# Patient Record
Sex: MALE | Race: WHITE | NOT HISPANIC OR LATINO
[De-identification: names, ages, dates, MRNs, and addresses within clinical notes are randomized per-mention and may not be internally consistent; named-entity substitution may affect disease eponyms.]

---

## 2015-06-03 RX ORDER — WARFARIN SODIUM 2.5 MG/1
1 TABLET ORAL
Qty: 0 | Refills: 0 | DISCHARGE
Start: 2015-06-03

## 2015-06-03 RX ORDER — METOPROLOL TARTRATE 50 MG
1 TABLET ORAL
Qty: 0 | Refills: 0 | DISCHARGE
Start: 2015-06-03

## 2019-12-10 ENCOUNTER — APPOINTMENT (OUTPATIENT)
Dept: CARDIOTHORACIC SURGERY | Facility: CLINIC | Age: 84
End: 2019-12-10
Payer: MEDICARE

## 2019-12-10 ENCOUNTER — NON-APPOINTMENT (OUTPATIENT)
Age: 84
End: 2019-12-10

## 2019-12-10 ENCOUNTER — APPOINTMENT (OUTPATIENT)
Dept: CARDIOTHORACIC SURGERY | Facility: CLINIC | Age: 84
End: 2019-12-10

## 2019-12-10 ENCOUNTER — OUTPATIENT (OUTPATIENT)
Dept: OUTPATIENT SERVICES | Facility: HOSPITAL | Age: 84
LOS: 1 days | End: 2019-12-10

## 2019-12-10 VITALS
OXYGEN SATURATION: 98 % | HEART RATE: 86 BPM | SYSTOLIC BLOOD PRESSURE: 112 MMHG | HEIGHT: 65 IN | DIASTOLIC BLOOD PRESSURE: 73 MMHG | WEIGHT: 129 LBS | BODY MASS INDEX: 21.49 KG/M2 | TEMPERATURE: 98.1 F | RESPIRATION RATE: 15 BRPM

## 2019-12-10 DIAGNOSIS — I35.9 NONRHEUMATIC AORTIC VALVE DISORDER, UNSPECIFIED: ICD-10-CM

## 2019-12-10 DIAGNOSIS — Z98.61 ATHEROSCLEROTIC HEART DISEASE OF NATIVE CORONARY ARTERY W/OUT ANGINA PECTORIS: ICD-10-CM

## 2019-12-10 DIAGNOSIS — I25.10 ATHEROSCLEROTIC HEART DISEASE OF NATIVE CORONARY ARTERY W/OUT ANGINA PECTORIS: ICD-10-CM

## 2019-12-10 DIAGNOSIS — I35.0 NONRHEUMATIC AORTIC (VALVE) STENOSIS: ICD-10-CM

## 2019-12-10 DIAGNOSIS — I50.20 UNSPECIFIED SYSTOLIC (CONGESTIVE) HEART FAILURE: ICD-10-CM

## 2019-12-10 DIAGNOSIS — I25.10 ATHEROSCLEROTIC HEART DISEASE OF NATIVE CORONARY ARTERY WITHOUT ANGINA PECTORIS: ICD-10-CM

## 2019-12-10 DIAGNOSIS — I48.91 UNSPECIFIED ATRIAL FIBRILLATION: ICD-10-CM

## 2019-12-10 LAB
ALBUMIN SERPL ELPH-MCNC: 3.9 G/DL
ALP BLD-CCNC: 73 U/L
ALT SERPL-CCNC: 11 U/L
ANION GAP SERPL CALC-SCNC: 14 MMOL/L
AST SERPL-CCNC: 15 U/L
BILIRUB SERPL-MCNC: 2.2 MG/DL
BUN SERPL-MCNC: 50 MG/DL
CALCIUM SERPL-MCNC: 9.1 MG/DL
CHLORIDE SERPL-SCNC: 105 MMOL/L
CO2 SERPL-SCNC: 22 MMOL/L
CREAT SERPL-MCNC: 1.38 MG/DL
GLUCOSE SERPL-MCNC: 124 MG/DL
INR PPP: 1.74 RATIO
POTASSIUM SERPL-SCNC: 4.5 MMOL/L
PROT SERPL-MCNC: 6.8 G/DL
PT BLD: 20.4 SEC
SODIUM SERPL-SCNC: 141 MMOL/L

## 2019-12-10 PROCEDURE — 99205 OFFICE O/P NEW HI 60 MIN: CPT | Mod: PD

## 2019-12-10 PROCEDURE — 93306 TTE W/DOPPLER COMPLETE: CPT | Mod: 26

## 2019-12-10 PROCEDURE — 93000 ELECTROCARDIOGRAM COMPLETE: CPT | Mod: PD

## 2019-12-10 RX ORDER — GUAIFENESIN 600 MG/1
600 TABLET, EXTENDED RELEASE ORAL DAILY
Refills: 0 | Status: ACTIVE | COMMUNITY
Start: 2019-12-10

## 2019-12-10 RX ORDER — TORSEMIDE 20 MG/1
20 TABLET ORAL
Qty: 360 | Refills: 0 | Status: ACTIVE | COMMUNITY
Start: 2019-08-11

## 2019-12-10 RX ORDER — WARFARIN 5 MG/1
5 TABLET ORAL
Qty: 180 | Refills: 0 | Status: COMPLETED | COMMUNITY
Start: 2019-11-12 | End: 2019-12-10

## 2019-12-10 RX ORDER — WARFARIN SODIUM 7.5 MG/1
7.5 TABLET ORAL
Refills: 0 | Status: ACTIVE | COMMUNITY
Start: 2019-12-10

## 2019-12-10 RX ORDER — FOLIC ACID 1 MG/1
1 TABLET ORAL
Qty: 90 | Refills: 0 | Status: ACTIVE | COMMUNITY
Start: 2019-10-23

## 2019-12-10 RX ORDER — METOPROLOL SUCCINATE 25 MG/1
25 TABLET, EXTENDED RELEASE ORAL DAILY
Refills: 0 | Status: ACTIVE | COMMUNITY
Start: 2019-11-09

## 2019-12-10 RX ORDER — MECLIZINE HYDROCHLORIDE 12.5 MG/1
12.5 TABLET ORAL 3 TIMES DAILY
Qty: 30 | Refills: 3 | Status: ACTIVE | COMMUNITY
Start: 2019-12-10

## 2019-12-10 RX ORDER — FLUTICASONE PROPIONATE 50 UG/1
50 SPRAY, METERED NASAL
Qty: 48 | Refills: 0 | Status: ACTIVE | COMMUNITY
Start: 2019-09-13

## 2019-12-10 RX ORDER — AZELASTINE HYDROCHLORIDE 137 UG/1
0.1 SPRAY, METERED NASAL
Qty: 30 | Refills: 0 | Status: COMPLETED | COMMUNITY
Start: 2019-09-13 | End: 2019-12-10

## 2019-12-10 RX ORDER — BLOOD SUGAR DIAGNOSTIC
STRIP MISCELLANEOUS
Qty: 50 | Refills: 0 | Status: DISCONTINUED | COMMUNITY
Start: 2019-10-02

## 2019-12-10 RX ORDER — PREDNISONE 10 MG/1
10 TABLET ORAL
Qty: 10 | Refills: 0 | Status: COMPLETED | COMMUNITY
Start: 2019-09-13 | End: 2019-12-10

## 2019-12-10 RX ORDER — ROSUVASTATIN CALCIUM 10 MG/1
10 TABLET, FILM COATED ORAL
Qty: 90 | Refills: 0 | Status: COMPLETED | COMMUNITY
Start: 2019-12-07 | End: 2019-12-10

## 2019-12-10 NOTE — ASSESSMENT
[FreeTextEntry1] : Mr. Morales is an 84 year old male with past medical history which includes Anemia, AFib on Coumadin, T2DM, HLD, PVD, Menieres Disease, LBBB, and CAD s/p PCI to mLAD in June 2014, and to pRCA in May 2015. He reports 1-2 months of worsening dyspnea with minimal exertion, associated with palpitations. He reports increased fatigue with decreased activity tolerance and is now mainly sedentary.  He denies angina, PND, orthopnea, dizziness, syncope, or LE edema. He was referred to valve clinic by Dr. Das for consideration for PAYTON. \par \par I had the pleasure of evaluating your patient,Mr. JESUS MORALES who is an 84 year M with heart failure symptoms of fatigue and dyspnea on exertion  (NYHA class II).\par \par  Mr. JESUS MORALES has severe AS with pG 48 mmHg and mG 29 mmHg, AoV 3.5 m/sec and an KATHERYN of 0.8 cm2.  He is an intermediate risk for surgical aortic valve replacement with a calculated STS score of 5.2%. EF 28%. The risks and benefits of both SAVR and PAYTON have been explained and the patient would like to proceed with further workup and consideration for PAYTON by the structural heart team.  I explained the risks of vascular complication, pacemaker requirement and possible paravalvular leakage. He will require CT scan and catheterization before finalizing plans for the procedure. The patient was also made aware of the possibility of using conscious sedation or general anesthesia during the procedure.  Once completed, all imaging will be reviewed by the Heart Team and an optimal treatment strategy will be recommended.\par \par Plan:\par 1) Lab work scheduled for today CBC, CMP and Pro BNP\par 2) Structural CT scan without coronaries \par 3) Cardiac Catherization to evaluate coronary anatomy \par \par

## 2019-12-10 NOTE — DATA REVIEWED
[FreeTextEntry1] : Echocardiogram on 12/5/2019 demonstrated \par KATHERYN 0.7, mGr 29, pGr 48, AoV 3.5, \par \par Echocardiogram on 6/27/2019 demonstrated\par  EF 42%, KATHERYN 0.9, mGr 18, pGr 32, AoV 2.8 LVEF 28%.

## 2019-12-10 NOTE — REVIEW OF SYSTEMS
[Feeling Tired] : feeling tired [Lower Ext Edema] : lower extremity edema [Loss Of Hearing] : hearing loss [SOB on Exertion] : shortness of breath during exertion [Constipation] : constipation [Shortness Of Breath] : shortness of breath [Joint Pain] : joint pain [Negative] : Endocrine [Dizziness] : no dizziness [Chest Pain] : no chest pain

## 2019-12-10 NOTE — HISTORY OF PRESENT ILLNESS
[FreeTextEntry1] : Mr. Latham is an 84 year old male, referred by Dr. Das for evaluation of aortic stenosis with recent symptomatic progression and LVEF decline.  Roel lost his wife two months ago, and notes worsening dyspnea with minimal exertion; he also has angina and palpitations.  He has a decline in functional status that he has attributed to both symptoms and depression. He denies PND, orthopnea, dizziness, or syncope. He does have some LE edema that is improved with Torsemide.  His past medical history includes chronic anemia, AFib on Coumadin, T2DM, HLD, PVD,  Menieres Disease, LBBB, and CAD (s/p PCI mLAD in June 2014 and pRCA PCI, complicated by perforation and cardiogenic shock in May 2015).  He has a reduced ejection fraction, reported at 42% on TTE in June of 2019, now reported at 28% on Echo 12/5.  He reports that his left carotid is "100% blocked but not affecting me".\par \par Repeat TTE in our office today demonstrates an LVEF of 30%, an KATHERYN of 0.6, an AoV of 3.8m/s, and a DVI of 0.16.  He has 2 months of progressive dyspnea.  He describes his appetite as "good".  He reports "I have a hemoglobin problem" and was transfused in 2015 (not since per his report).  He reports no constipation but no melena.  He has substernal "burning" when "I lose my breath".  He has no syncopal symptoms.  He feels fatigued.  There have been no recent changes to his medications.  He takes Torsemide (20mg 4 days a week and 40mg 3 days a week).

## 2019-12-10 NOTE — PHYSICAL EXAM
[Eyelids - No Xanthelasma] : the eyelids demonstrated no xanthelasmas [No Oral Cyanosis] : no oral cyanosis [No Oral Pallor] : no oral pallor [Normal Rate] : normal [Irregularly Irregular] : irregularly irregular [III] : a grade 3 [___ +] : bilateral [unfilled]U+ pretibial pitting edema [Abdomen Soft] : soft [Bowel Sounds] : normal bowel sounds [Abdomen Tenderness] : non-tender [Cyanosis, Localized] : no localized cyanosis [Nail Clubbing] : no clubbing of the fingernails [FreeTextEntry1] : depressed and flat affect

## 2019-12-10 NOTE — DISCUSSION/SUMMARY
[Severe Aortic Stenosis] : severe aortic stenosis [Multidetector Cardiac CT] : multidetector cardiac CT [Deteriorating] : deteriorating [Coronary Angiography] : coronary angiography [Cardiac Catheterization] : cardiac catheterization [Aortic Valve Replacement] : aortic valve replacement [Medication Changes Per Orders] : Medication changes are as documented in orders [Acute Systolic Heart Failure] : acute systolic congestive heart failure [Patient] : the patient [Decompensated] : decompensated [FreeTextEntry1] : Mr Latham has severe aortic stenosis with acute on chronic decompensated systolic heart failure--I have recommended hospital admission today, directly from the office, for the treatment of his volume-excess heart failure (by exam both right and left sided).  He declines admission at this time, stating "I am not prepared for that".  I am therefore sending him for labs today, advised that he increase his Torsemide to 40mg daily, and schedule his right and left cardiac catheterization ASAP (if his numbers are poor, perhaps he will agree to admission for escalation of therapy post- catheterization).  He will ultimately require a cardiac CT to complete his evaluation, after which the optimal TAVR strategy will be recommended by the Heart Team.  I would have him seen by our CHF colleagues when in the hospital; Cardiomems (and possibly CRT-D) may also be reasonable considerations.  I have discussed my findings and plan with Dr Das.

## 2019-12-10 NOTE — CONSULT LETTER
[Dear  ___] : Dear ~TSERING, [Courtesy Letter:] : I had the pleasure of seeing your patient, [unfilled], in my office today. [Please see my note below.] : Please see my note below. [Consult Closing:] : Thank you very much for allowing me to participate in the care of this patient.  If you have any questions, please do not hesitate to contact me. [Sincerely,] : Sincerely, [FreeTextEntry2] : Donnie Das MD \par [address], [facility] [address]\[place] [address] [FreeTextEntry3] : Jens Johns MD\par \par Cardiovascular & Thoracic Surgery\par Professor\par [facility]\par \par

## 2019-12-10 NOTE — REVIEW OF SYSTEMS
[Feeling Fatigued] : feeling fatigued [Loss Of Hearing] : hearing loss [Dyspnea on exertion] : dyspnea during exertion [Chest  Pressure] : chest pressure [Lower Ext Edema] : lower extremity edema [Palpitations] : palpitations [Joint Pain] : joint pain [Depression] : depression [Dizziness] : dizziness [Under Stress] : under stress [Negative] : Heme/Lymph [Abdominal Pain] : no abdominal pain [Change in Appetite] : no change in appetite [Nausea] : no nausea

## 2019-12-10 NOTE — PHYSICAL EXAM
[General Appearance - In No Acute Distress] : in no acute distress [General Appearance - Alert] : alert [PERRL With Normal Accommodation] : pupils were equal in size, round, and reactive to light [Sclera] : the sclera and conjunctiva were normal [Jugular Venous Distention Increased] : there was no jugular-venous distention [Extraocular Movements] : extraocular movements were intact [] : no respiratory distress [Bibasilar Rales/Crackles] : bibasilar rales [III] : a grade 3 [Right Carotid Bruit] : right carotid bruit heard [Left Carotid Bruit] : left carotid bruit heard [Examination Of The Chest] : the chest was normal in appearance [Breast Appearance] : normal in appearance [Bowel Sounds] : normal bowel sounds [Abdomen Soft] : soft [Cervical Lymph Nodes Enlarged Posterior Bilaterally] : posterior cervical [No CVA Tenderness] : no ~M costovertebral angle tenderness [Involuntary Movements] : no involuntary movements were seen [Skin Color & Pigmentation] : normal skin color and pigmentation [No Focal Deficits] : no focal deficits [Oriented To Time, Place, And Person] : oriented to person, place, and time

## 2019-12-10 NOTE — HISTORY OF PRESENT ILLNESS
[FreeTextEntry1] : Mr. Latham is an 84 year old male with past medical history which includes Anemia, AFib on Coumadin, T2DM, HLD, PVD, Menieres Disease, LBBB, and CAD s/p PCI to mLAD in June 2014, and to pRCA in May 2015. He reports 1-2 months of worsening dyspnea with minimal exertion, associated with palpitations. He reports increased fatigue with decreased activity tolerance and is now mainly sedentary.  He denies angina, PND, orthopnea, dizziness, syncope, or LE edema. He was referred to valve clinic by Dr. Das for consideration for PAYTON. He lives alone with 24 hour aide.  \par \par

## 2019-12-13 ENCOUNTER — INPATIENT (INPATIENT)
Facility: HOSPITAL | Age: 84
LOS: 20 days | Discharge: NURSING, REHAB OR LONG-TERM CARE | DRG: 319 | End: 2020-01-03
Attending: INTERNAL MEDICINE | Admitting: INTERNAL MEDICINE
Payer: MEDICARE

## 2019-12-13 VITALS
HEIGHT: 65 IN | DIASTOLIC BLOOD PRESSURE: 59 MMHG | SYSTOLIC BLOOD PRESSURE: 122 MMHG | WEIGHT: 128.97 LBS | TEMPERATURE: 98 F | RESPIRATION RATE: 16 BRPM | HEART RATE: 87 BPM | OXYGEN SATURATION: 100 %

## 2019-12-13 DIAGNOSIS — E11.9 TYPE 2 DIABETES MELLITUS WITHOUT COMPLICATIONS: ICD-10-CM

## 2019-12-13 DIAGNOSIS — I48.91 UNSPECIFIED ATRIAL FIBRILLATION: ICD-10-CM

## 2019-12-13 DIAGNOSIS — I35.0 NONRHEUMATIC AORTIC (VALVE) STENOSIS: ICD-10-CM

## 2019-12-13 DIAGNOSIS — I50.23 ACUTE ON CHRONIC SYSTOLIC (CONGESTIVE) HEART FAILURE: ICD-10-CM

## 2019-12-13 DIAGNOSIS — N17.9 ACUTE KIDNEY FAILURE, UNSPECIFIED: ICD-10-CM

## 2019-12-13 DIAGNOSIS — Z02.9 ENCOUNTER FOR ADMINISTRATIVE EXAMINATIONS, UNSPECIFIED: ICD-10-CM

## 2019-12-13 DIAGNOSIS — D64.9 ANEMIA, UNSPECIFIED: ICD-10-CM

## 2019-12-13 DIAGNOSIS — E78.5 HYPERLIPIDEMIA, UNSPECIFIED: ICD-10-CM

## 2019-12-13 DIAGNOSIS — I35.9 NONRHEUMATIC AORTIC VALVE DISORDER, UNSPECIFIED: ICD-10-CM

## 2019-12-13 DIAGNOSIS — Z29.9 ENCOUNTER FOR PROPHYLACTIC MEASURES, UNSPECIFIED: ICD-10-CM

## 2019-12-13 DIAGNOSIS — I44.7 LEFT BUNDLE-BRANCH BLOCK, UNSPECIFIED: ICD-10-CM

## 2019-12-13 DIAGNOSIS — H81.09 MENIERE'S DISEASE, UNSPECIFIED EAR: ICD-10-CM

## 2019-12-13 LAB
ALBUMIN SERPL ELPH-MCNC: 3.7 G/DL — SIGNIFICANT CHANGE UP (ref 3.3–5)
ALP SERPL-CCNC: 68 U/L — SIGNIFICANT CHANGE UP (ref 40–120)
ALT FLD-CCNC: 9 U/L — LOW (ref 10–45)
ANION GAP SERPL CALC-SCNC: 12 MMOL/L — SIGNIFICANT CHANGE UP (ref 5–17)
APTT BLD: 31.5 SEC — SIGNIFICANT CHANGE UP (ref 27.5–36.3)
AST SERPL-CCNC: 10 U/L — SIGNIFICANT CHANGE UP (ref 10–40)
BILIRUB SERPL-MCNC: 2.2 MG/DL — HIGH (ref 0.2–1.2)
BLD GP AB SCN SERPL QL: NEGATIVE — SIGNIFICANT CHANGE UP
BUN SERPL-MCNC: 46 MG/DL — HIGH (ref 7–23)
CALCIUM SERPL-MCNC: 8.8 MG/DL — SIGNIFICANT CHANGE UP (ref 8.4–10.5)
CHLORIDE SERPL-SCNC: 105 MMOL/L — SIGNIFICANT CHANGE UP (ref 96–108)
CO2 SERPL-SCNC: 23 MMOL/L — SIGNIFICANT CHANGE UP (ref 22–31)
CREAT SERPL-MCNC: 1.39 MG/DL — HIGH (ref 0.5–1.3)
GLUCOSE BLDC GLUCOMTR-MCNC: 199 MG/DL — HIGH (ref 70–99)
GLUCOSE SERPL-MCNC: 145 MG/DL — HIGH (ref 70–99)
HCT VFR BLD CALC: 22.6 % — LOW (ref 39–50)
HCT VFR BLD CALC: 23.9 % — LOW (ref 39–50)
HGB BLD-MCNC: 7.4 G/DL — LOW (ref 13–17)
HGB BLD-MCNC: 7.9 G/DL — LOW (ref 13–17)
INR BLD: 1.49 RATIO — HIGH (ref 0.88–1.16)
MCHC RBC-ENTMCNC: 30.8 PG — SIGNIFICANT CHANGE UP (ref 27–34)
MCHC RBC-ENTMCNC: 31.3 PG — SIGNIFICANT CHANGE UP (ref 27–34)
MCHC RBC-ENTMCNC: 32.7 GM/DL — SIGNIFICANT CHANGE UP (ref 32–36)
MCHC RBC-ENTMCNC: 33.1 GM/DL — SIGNIFICANT CHANGE UP (ref 32–36)
MCV RBC AUTO: 94.2 FL — SIGNIFICANT CHANGE UP (ref 80–100)
MCV RBC AUTO: 94.8 FL — SIGNIFICANT CHANGE UP (ref 80–100)
NRBC # BLD: 0 /100 WBCS — SIGNIFICANT CHANGE UP (ref 0–0)
NRBC # BLD: 0 /100 WBCS — SIGNIFICANT CHANGE UP (ref 0–0)
NT-PROBNP SERPL-SCNC: HIGH PG/ML (ref 0–300)
PLATELET # BLD AUTO: 183 K/UL — SIGNIFICANT CHANGE UP (ref 150–400)
PLATELET # BLD AUTO: 201 K/UL — SIGNIFICANT CHANGE UP (ref 150–400)
POTASSIUM SERPL-MCNC: 3.8 MMOL/L — SIGNIFICANT CHANGE UP (ref 3.5–5.3)
POTASSIUM SERPL-SCNC: 3.8 MMOL/L — SIGNIFICANT CHANGE UP (ref 3.5–5.3)
PROT SERPL-MCNC: 7.4 G/DL — SIGNIFICANT CHANGE UP (ref 6–8.3)
PROTHROM AB SERPL-ACNC: 17.4 SEC — HIGH (ref 10–12.9)
RBC # BLD: 2.4 M/UL — LOW (ref 4.2–5.8)
RBC # BLD: 2.52 M/UL — LOW (ref 4.2–5.8)
RBC # FLD: 18.2 % — HIGH (ref 10.3–14.5)
RBC # FLD: 18.4 % — HIGH (ref 10.3–14.5)
RH IG SCN BLD-IMP: POSITIVE — SIGNIFICANT CHANGE UP
SODIUM SERPL-SCNC: 140 MMOL/L — SIGNIFICANT CHANGE UP (ref 135–145)
WBC # BLD: 8.05 K/UL — SIGNIFICANT CHANGE UP (ref 3.8–10.5)
WBC # BLD: 9.23 K/UL — SIGNIFICANT CHANGE UP (ref 3.8–10.5)
WBC # FLD AUTO: 8.05 K/UL — SIGNIFICANT CHANGE UP (ref 3.8–10.5)
WBC # FLD AUTO: 9.23 K/UL — SIGNIFICANT CHANGE UP (ref 3.8–10.5)

## 2019-12-13 PROCEDURE — 93456 R HRT CORONARY ARTERY ANGIO: CPT | Mod: 26

## 2019-12-13 PROCEDURE — 99223 1ST HOSP IP/OBS HIGH 75: CPT | Mod: GC,AI

## 2019-12-13 PROCEDURE — 99223 1ST HOSP IP/OBS HIGH 75: CPT | Mod: GC

## 2019-12-13 PROCEDURE — 71045 X-RAY EXAM CHEST 1 VIEW: CPT | Mod: 26

## 2019-12-13 PROCEDURE — 99152 MOD SED SAME PHYS/QHP 5/>YRS: CPT

## 2019-12-13 PROCEDURE — 93010 ELECTROCARDIOGRAM REPORT: CPT

## 2019-12-13 RX ORDER — HEPARIN SODIUM 5000 [USP'U]/ML
2000 INJECTION INTRAVENOUS; SUBCUTANEOUS EVERY 6 HOURS
Refills: 0 | Status: DISCONTINUED | OUTPATIENT
Start: 2019-12-13 | End: 2019-12-26

## 2019-12-13 RX ORDER — FERROUS SULFATE 325(65) MG
325 TABLET ORAL DAILY
Refills: 0 | Status: DISCONTINUED | OUTPATIENT
Start: 2019-12-13 | End: 2019-12-26

## 2019-12-13 RX ORDER — SIMVASTATIN 20 MG/1
1 TABLET, FILM COATED ORAL
Qty: 0 | Refills: 0 | DISCHARGE

## 2019-12-13 RX ORDER — HEPARIN SODIUM 5000 [USP'U]/ML
4500 INJECTION INTRAVENOUS; SUBCUTANEOUS EVERY 6 HOURS
Refills: 0 | Status: DISCONTINUED | OUTPATIENT
Start: 2019-12-13 | End: 2019-12-26

## 2019-12-13 RX ORDER — FLUTICASONE PROPIONATE 50 MCG
1 SPRAY, SUSPENSION NASAL DAILY
Refills: 0 | Status: DISCONTINUED | OUTPATIENT
Start: 2019-12-13 | End: 2019-12-26

## 2019-12-13 RX ORDER — DEXTROSE 50 % IN WATER 50 %
25 SYRINGE (ML) INTRAVENOUS ONCE
Refills: 0 | Status: DISCONTINUED | OUTPATIENT
Start: 2019-12-13 | End: 2019-12-15

## 2019-12-13 RX ORDER — CLOPIDOGREL BISULFATE 75 MG/1
1 TABLET, FILM COATED ORAL
Qty: 0 | Refills: 0 | DISCHARGE

## 2019-12-13 RX ORDER — HEPARIN SODIUM 5000 [USP'U]/ML
4500 INJECTION INTRAVENOUS; SUBCUTANEOUS ONCE
Refills: 0 | Status: COMPLETED | OUTPATIENT
Start: 2019-12-13 | End: 2019-12-13

## 2019-12-13 RX ORDER — MECLIZINE HCL 12.5 MG
12.5 TABLET ORAL THREE TIMES A DAY
Refills: 0 | Status: DISCONTINUED | OUTPATIENT
Start: 2019-12-13 | End: 2019-12-26

## 2019-12-13 RX ORDER — ALBUTEROL 90 UG/1
2 AEROSOL, METERED ORAL EVERY 6 HOURS
Refills: 0 | Status: DISCONTINUED | OUTPATIENT
Start: 2019-12-13 | End: 2019-12-26

## 2019-12-13 RX ORDER — DIGOXIN 250 MCG
0.12 TABLET ORAL DAILY
Refills: 0 | Status: DISCONTINUED | OUTPATIENT
Start: 2019-12-13 | End: 2019-12-17

## 2019-12-13 RX ORDER — METOPROLOL TARTRATE 50 MG
25 TABLET ORAL DAILY
Refills: 0 | Status: DISCONTINUED | OUTPATIENT
Start: 2019-12-13 | End: 2019-12-26

## 2019-12-13 RX ORDER — IPRATROPIUM/ALBUTEROL SULFATE 18-103MCG
1 AEROSOL WITH ADAPTER (GRAM) INHALATION
Qty: 0 | Refills: 0 | DISCHARGE

## 2019-12-13 RX ORDER — FUROSEMIDE 40 MG
80 TABLET ORAL
Refills: 0 | Status: DISCONTINUED | OUTPATIENT
Start: 2019-12-13 | End: 2019-12-13

## 2019-12-13 RX ORDER — FUROSEMIDE 40 MG
40 TABLET ORAL
Refills: 0 | Status: DISCONTINUED | OUTPATIENT
Start: 2019-12-14 | End: 2019-12-16

## 2019-12-13 RX ORDER — TIOTROPIUM BROMIDE 18 UG/1
1 CAPSULE ORAL; RESPIRATORY (INHALATION) DAILY
Refills: 0 | Status: DISCONTINUED | OUTPATIENT
Start: 2019-12-13 | End: 2019-12-26

## 2019-12-13 RX ORDER — CLOPIDOGREL BISULFATE 75 MG/1
75 TABLET, FILM COATED ORAL DAILY
Refills: 0 | Status: DISCONTINUED | OUTPATIENT
Start: 2019-12-13 | End: 2019-12-26

## 2019-12-13 RX ORDER — SIMVASTATIN 20 MG/1
10 TABLET, FILM COATED ORAL AT BEDTIME
Refills: 0 | Status: DISCONTINUED | OUTPATIENT
Start: 2019-12-13 | End: 2019-12-14

## 2019-12-13 RX ORDER — DEXTROSE 50 % IN WATER 50 %
12.5 SYRINGE (ML) INTRAVENOUS ONCE
Refills: 0 | Status: DISCONTINUED | OUTPATIENT
Start: 2019-12-13 | End: 2019-12-15

## 2019-12-13 RX ORDER — HEPARIN SODIUM 5000 [USP'U]/ML
INJECTION INTRAVENOUS; SUBCUTANEOUS
Qty: 25000 | Refills: 0 | Status: DISCONTINUED | OUTPATIENT
Start: 2019-12-13 | End: 2019-12-26

## 2019-12-13 RX ORDER — GLUCAGON INJECTION, SOLUTION 0.5 MG/.1ML
1 INJECTION, SOLUTION SUBCUTANEOUS ONCE
Refills: 0 | Status: DISCONTINUED | OUTPATIENT
Start: 2019-12-13 | End: 2019-12-15

## 2019-12-13 RX ORDER — DEXTROSE 50 % IN WATER 50 %
15 SYRINGE (ML) INTRAVENOUS ONCE
Refills: 0 | Status: DISCONTINUED | OUTPATIENT
Start: 2019-12-13 | End: 2019-12-15

## 2019-12-13 RX ORDER — INSULIN LISPRO 100/ML
VIAL (ML) SUBCUTANEOUS AT BEDTIME
Refills: 0 | Status: DISCONTINUED | OUTPATIENT
Start: 2019-12-13 | End: 2019-12-15

## 2019-12-13 RX ORDER — FOLIC ACID 0.8 MG
1 TABLET ORAL DAILY
Refills: 0 | Status: DISCONTINUED | OUTPATIENT
Start: 2019-12-13 | End: 2019-12-26

## 2019-12-13 RX ORDER — SODIUM CHLORIDE 9 MG/ML
3 INJECTION INTRAMUSCULAR; INTRAVENOUS; SUBCUTANEOUS EVERY 8 HOURS
Refills: 0 | Status: DISCONTINUED | OUTPATIENT
Start: 2019-12-13 | End: 2019-12-26

## 2019-12-13 RX ORDER — SODIUM CHLORIDE 9 MG/ML
1000 INJECTION, SOLUTION INTRAVENOUS
Refills: 0 | Status: DISCONTINUED | OUTPATIENT
Start: 2019-12-13 | End: 2019-12-15

## 2019-12-13 RX ORDER — INSULIN LISPRO 100/ML
VIAL (ML) SUBCUTANEOUS
Refills: 0 | Status: DISCONTINUED | OUTPATIENT
Start: 2019-12-13 | End: 2019-12-15

## 2019-12-13 RX ADMIN — SODIUM CHLORIDE 3 MILLILITER(S): 9 INJECTION INTRAMUSCULAR; INTRAVENOUS; SUBCUTANEOUS at 21:07

## 2019-12-13 RX ADMIN — SIMVASTATIN 10 MILLIGRAM(S): 20 TABLET, FILM COATED ORAL at 21:07

## 2019-12-13 RX ADMIN — Medication 80 MILLIGRAM(S): at 15:55

## 2019-12-13 RX ADMIN — Medication 1: at 17:31

## 2019-12-13 RX ADMIN — SODIUM CHLORIDE 3 MILLILITER(S): 9 INJECTION INTRAMUSCULAR; INTRAVENOUS; SUBCUTANEOUS at 15:55

## 2019-12-13 RX ADMIN — Medication 325 MILLIGRAM(S): at 17:30

## 2019-12-13 RX ADMIN — HEPARIN SODIUM 4500 UNIT(S): 5000 INJECTION INTRAVENOUS; SUBCUTANEOUS at 17:31

## 2019-12-13 RX ADMIN — Medication 1 MILLIGRAM(S): at 17:31

## 2019-12-13 RX ADMIN — HEPARIN SODIUM 1100 UNIT(S)/HR: 5000 INJECTION INTRAVENOUS; SUBCUTANEOUS at 17:32

## 2019-12-13 NOTE — CONSULT NOTE ADULT - SUBJECTIVE AND OBJECTIVE BOX
Chief Complaint:    HPI:  83 yo M with hx Afib (Coumadin), DM, HLD, CAD (stent 2014 and 2015 in mLAD and RCA), HFrEF (40%) was referred by cardiologist for CHF exacerbation. Patient was seeing cardiology for TAVR         evlatuion for severe aortic stenosis. Patient endorses chest pain with palpitations. Patient has lower extremitiy edema at baseline. He takes torsemide 20mg 4x week and 40mg 3x week.  Denies PND, orthopnea, dizziness, syncope  Echo 12/5 showed LVEF 28%. Repeat echo demonstrated LVEF 30%                  Admission labs: INR 1.49 (13 Dec 2019 12:28)      PMHx:   Anemia  LBBB (left bundle branch block)  DYER (dyspnea on exertion)  Coronary disease  CHF (congestive heart failure)  Former smoker  Meniere disease  HLD (hyperlipidemia)  DM (diabetes mellitus)  Atrial fibrillation  HTN (hypertension)      PSHx:   Lipoma  History of skin graft  Status post appendectomy      Allergies:  No Known Allergies      Home Meds:    Current Medications:   ALBUTerol    90 MICROgram(s) HFA Inhaler 2 Puff(s) Inhalation every 6 hours PRN  clopidogrel Tablet 75 milliGRAM(s) Oral daily  dextrose 40% Gel 15 Gram(s) Oral once PRN  dextrose 5%. 1000 milliLiter(s) IV Continuous <Continuous>  dextrose 50% Injectable 12.5 Gram(s) IV Push once  dextrose 50% Injectable 25 Gram(s) IV Push once  dextrose 50% Injectable 25 Gram(s) IV Push once  digoxin     Tablet 0.125 milliGRAM(s) Oral daily  ferrous    sulfate 325 milliGRAM(s) Oral daily  fluticasone propionate 50 MICROgram(s)/spray Nasal Spray 1 Spray(s) Both Nostrils daily  folic acid 1 milliGRAM(s) Oral daily  glucagon  Injectable 1 milliGRAM(s) IntraMuscular once PRN  guaiFENesin  milliGRAM(s) Oral every 12 hours PRN  insulin lispro (HumaLOG) corrective regimen sliding scale   SubCutaneous three times a day before meals  insulin lispro (HumaLOG) corrective regimen sliding scale   SubCutaneous at bedtime  meclizine 12.5 milliGRAM(s) Oral three times a day PRN  metoprolol succinate ER 25 milliGRAM(s) Oral daily  simvastatin 10 milliGRAM(s) Oral at bedtime  sodium chloride 0.9% lock flush 3 milliLiter(s) IV Push every 8 hours  tiotropium 18 MICROgram(s) Capsule 1 Capsule(s) Inhalation daily  torsemide 20 milliGRAM(s) Oral daily      FAMILY HISTORY:  Family history of brain tumor (Sibling)  Family history of heart attack (Child)  Family history of CHF (congestive heart failure)  Family history of lung cancer      Social History:  Smoking History: denies  Alcohol Use: denies  Drug Use: denies    REVIEW OF SYSTEMS:  CONSTITUTIONAL: No weakness, fevers or chills  EYES/ENT: No visual changes;  No dysphagia  NECK: No pain or stiffness  RESPIRATORY: No cough, wheezing, hemoptysis; No shortness of breath  CARDIOVASCULAR: No chest pain or palpitations; No lower extremity edema  GASTROINTESTINAL: No abdominal or epigastric pain. No nausea, vomiting, or hematemesis; No diarrhea or constipation. No melena or hematochezia.  BACK: No back pain  GENITOURINARY: No dysuria, frequency or hematuria  NEUROLOGICAL: No numbness or weakness  SKIN: No itching, burning, rashes, or lesions   All other review of systems is negative unless indicated above.        Physical Exam:  T(F): 97.9 (12-13), Max: 97.9 (12-13)  HR: 87 (12-13) (87 - 87)  BP: 122/59 (12-13) (122/59 - 122/59)  RR: 16 (12-13)  SpO2: 100% (12-13)  GENERAL: No acute distress, well-developed  HEAD:  Atraumatic, Normocephalic  ENT: EOMI, PERRLA, conjunctiva and sclera clear, Neck supple, No JVD, moist mucosa  CHEST/LUNG: Clear to auscultation bilaterally; No wheeze, equal breath sounds bilaterally   BACK: No spinal tenderness  HEART: Regular rate and rhythm; No murmurs, rubs, or gallops  ABDOMEN: Soft, Nontender, Nondistended; Bowel sounds present  EXTREMITIES:  No clubbing, cyanosis, or edema  PSYCH: Nl behavior, nl affect  NEUROLOGY: AAOx3, non-focal, cranial nerves intact  SKIN: Normal color, No rashes or lesions  LINES:      Labs: Personally reviewed                        7.9    9.23  )-----------( 201      ( 13 Dec 2019 07:52 )             23.9     12-13    140  |  105  |  46<H>  ----------------------------<  145<H>  3.8   |  23  |  1.39<H>    Ca    8.8      13 Dec 2019 07:52    TPro  7.4  /  Alb  3.7  /  TBili  2.2<H>  /  DBili  x   /  AST  10  /  ALT  9<L>  /  AlkPhos  68  12-13    PT/INR - ( 13 Dec 2019 07:52 )   PT: 17.4 sec;   INR: 1.49 ratio         PTT - ( 13 Dec 2019 07:52 )  PTT:31.5 sec          Serum Pro-Brain Natriuretic Peptide: 34533 pg/mL (12-13 @ 07:52)                    Cardiovascular Diagnostic Testing:    ECG: Personally reviewed:    Echo: Personally reviewed:    Stress Testing:    Cath:    CXR: Personally reviewed Chief Complaint:    HPI:  85 yo M with hx Afib (Coumadin), DM, HLD, CAD (stent 2014 and 2015 in mLAD and RCA), HFrEF (40%) was referred by cardiologist for CHF exacerbation. Patient was seeing cardiology for TAVR         evlatuion for severe aortic stenosis. Patient endorses chest pain with palpitations. Patient has lower extremitiy edema at baseline. He takes torsemide 20mg 4x week and 40mg 3x week.  Denies PND, orthopnea, dizziness, syncope  Echo 12/5 showed LVEF 28%. Repeat echo demonstrated LVEF 30%              EASUREMENTS: (See below)  ------------------------------------------------------------------------  OBSERVATIONS:  Mitral Valve: There is mitral annular calcification. There  is at least moderate(2-3+) mitral regurgitation.  This may  be an underestimation.  Aortic Root: The aortic root is normal in size.  Aortic Valve: The aortic valve is severely calcified. Peak  transaortic valve gradient equals 58 mm Hg, mean  transaortic valve gradient equals 30 mm Hg, estimated  aortic valve area equals 0.6 sqcm (by continuity equation),  aortic valve velocity time integral equals 90 cm, the DVI=  0.16,consistent with severe aortic stenosis.  The patient was in atrial fibrillation with variability in  the R-R intervals and gradients.  The highest matched R-R  intervals were used. Minimal aortic regurgitation.  Peak  left ventricular outflow tract gradient equals 2 mm Hg,  mean gradient is equal to 1 mm Hg, LVOT velocity time  integral equals 15 cm.  Left Atrium: Severely dilated left atrium.  LA volume index  = 70 cc/m2.  Left Ventricle: The LVEF about 30%. The interventicular  septum is dyskinetic.  The rest of the walls are  hypokinetic.  There is dyssynchronous contraction of the  left ventricle. The left ventricle is normal in size.  Right Heart: Normal right atrium.  The right atrial area= 18cm2, the right atrial volume=  45ml, the right atrial volume index= 27ml/m2. Normal right  ventricular size and function. Normal tricuspid valve.  Minimal tricuspid regurgitation. Normal pulmonic valve. No  pulmonic regurgitation.  Pericardium/PleuraThere is no pericardial effusion.  Hemodynamic: Estimated right atrial pressure is 8 mm Hg.  Estimated right ventricular systolic pressure equals 42 mm  Hg, assuming right atrial pressure equals 8 mm Hg,  consistent with mild pulmonary hypertension.  ------------------------------------------------------------------------  CONCLUSIONS:  1. The aortic valve is severely calcified. Peak transaortic  valve gradient equals 58 mm Hg, mean transaortic valve  gradient equals 30 mm Hg, estimated aortic valve area  equals 0.6 sqcm (by continuity equation), aortic valve  velocity time integral equals 90 cm, the DVI= 0.16,  consistent with severe aortic stenosis.  The patient was in atrial fibrillation with variability in  the R-R intervals and gradients.  The highest matched R-R  intervals were used.      PMHx:   Anemia  LBBB (left bundle branch block)  DYER (dyspnea on exertion)  Coronary disease  CHF (congestive heart failure)  Former smoker  Meniere disease  HLD (hyperlipidemia)  DM (diabetes mellitus)  Atrial fibrillation  HTN (hypertension)      PSHx:   Lipoma  History of skin graft  Status post appendectomy      Allergies:  No Known Allergies      Home Meds:    Current Medications:   ALBUTerol    90 MICROgram(s) HFA Inhaler 2 Puff(s) Inhalation every 6 hours PRN  clopidogrel Tablet 75 milliGRAM(s) Oral daily  dextrose 40% Gel 15 Gram(s) Oral once PRN  dextrose 5%. 1000 milliLiter(s) IV Continuous <Continuous>  dextrose 50% Injectable 12.5 Gram(s) IV Push once  dextrose 50% Injectable 25 Gram(s) IV Push once  dextrose 50% Injectable 25 Gram(s) IV Push once  digoxin     Tablet 0.125 milliGRAM(s) Oral daily  ferrous    sulfate 325 milliGRAM(s) Oral daily  fluticasone propionate 50 MICROgram(s)/spray Nasal Spray 1 Spray(s) Both Nostrils daily  folic acid 1 milliGRAM(s) Oral daily  glucagon  Injectable 1 milliGRAM(s) IntraMuscular once PRN  guaiFENesin  milliGRAM(s) Oral every 12 hours PRN  insulin lispro (HumaLOG) corrective regimen sliding scale   SubCutaneous three times a day before meals  insulin lispro (HumaLOG) corrective regimen sliding scale   SubCutaneous at bedtime  meclizine 12.5 milliGRAM(s) Oral three times a day PRN  metoprolol succinate ER 25 milliGRAM(s) Oral daily  simvastatin 10 milliGRAM(s) Oral at bedtime  sodium chloride 0.9% lock flush 3 milliLiter(s) IV Push every 8 hours  tiotropium 18 MICROgram(s) Capsule 1 Capsule(s) Inhalation daily  torsemide 20 milliGRAM(s) Oral daily      FAMILY HISTORY:  Family history of brain tumor (Sibling)  Family history of heart attack (Child)  Family history of CHF (congestive heart failure)  Family history of lung cancer      Social History:  Smoking History: denies  Alcohol Use: denies  Drug Use: denies    REVIEW OF SYSTEMS:  CONSTITUTIONAL: No weakness, fevers or chills  EYES/ENT: No visual changes;  No dysphagia  NECK: No pain or stiffness  RESPIRATORY: No cough, wheezing, hemoptysis; No shortness of breath  CARDIOVASCULAR: No chest pain or palpitations; No lower extremity edema  GASTROINTESTINAL: No abdominal or epigastric pain. No nausea, vomiting, or hematemesis; No diarrhea or constipation. No melena or hematochezia.  BACK: No back pain  GENITOURINARY: No dysuria, frequency or hematuria  NEUROLOGICAL: No numbness or weakness  SKIN: No itching, burning, rashes, or lesions   All other review of systems is negative unless indicated above.        Physical Exam:  T(F): 97.9 (12-13), Max: 97.9 (12-13)  HR: 87 (12-13) (87 - 87)  BP: 122/59 (12-13) (122/59 - 122/59)  RR: 16 (12-13)  SpO2: 100% (12-13)  GENERAL: No acute distress, well-developed  HEAD:  Atraumatic, Normocephalic  ENT: EOMI, PERRLA, conjunctiva and sclera clear, Neck supple, No JVD, moist mucosa  CHEST/LUNG: Clear to auscultation bilaterally; No wheeze, equal breath sounds bilaterally   BACK: No spinal tenderness  HEART: Regular rate and rhythm; No murmurs, rubs, or gallops  ABDOMEN: Soft, Nontender, Nondistended; Bowel sounds present  EXTREMITIES:  No clubbing, cyanosis, or edema  PSYCH: Nl behavior, nl affect  NEUROLOGY: AAOx3, non-focal, cranial nerves intact  SKIN: Normal color, No rashes or lesions  LINES:      Labs: Personally reviewed                        7.9    9.23  )-----------( 201      ( 13 Dec 2019 07:52 )             23.9     12-13    140  |  105  |  46<H>  ----------------------------<  145<H>  3.8   |  23  |  1.39<H>    Ca    8.8      13 Dec 2019 07:52    TPro  7.4  /  Alb  3.7  /  TBili  2.2<H>  /  DBili  x   /  AST  10  /  ALT  9<L>  /  AlkPhos  68  12-13    PT/INR - ( 13 Dec 2019 07:52 )   PT: 17.4 sec;   INR: 1.49 ratio         PTT - ( 13 Dec 2019 07:52 )  PTT:31.5 sec          Serum Pro-Brain Natriuretic Peptide: 42125 pg/mL (12-13 @ 07:52)                    Cardiovascular Diagnostic Testing:    ECG: Personally reviewed:    Echo: Personally reviewed:    Stress Testing:    Cath:    CXR: Personally reviewed Chief Complaint: worsening shortness of breath for one month    HPI:  An 84 year old man with history of moderate LV systolic dysfunction, ischemic cardiomyopathy, CAD s/p PCI (coronary perforation and cardiogenic shock in 2015), atrial fibrillation (coumadin), DM, HLD was referred for LHC/RHC in preparation for TAVR evaluation. In the past one month, he's been having worsening shortness of breath with minimal exertion including going to the bathroom or dressing himself. He also noticed "burning" chest pain and palpitation with exertional dyspnea. He also noted worsening peripheral edema and more frequent lightheadedness. He denies syncope, orthopnea, PND, coughing, fever, nausea, vomiting, abdominal discomfort, diarrhea, melena/hematochezia, or urinary problems.    He has a nurse who helps him at home and she also agrees that he's been having worsening dyspnea. He reports good appetite. Prior to this past month, he used to drive and able to go to grocery shopping without shortness of breath. He denies heart failure hospitalization. He had a complicated hospital course post PCI which led to cardiogenic shock but denies having current symptoms prior to this month.    He was referred to Dr. Rodriguez([facility])on 12/10  to be evaluated for TAVR. He recommended him to be admitted to the hospital but the patient declined so instead he recommended increasing torsemide dosing fomr 40mg TIW to 40mg daily and recommended outpatient RHC/LHC. LHC showed distal LAD disease and 100% ostial RCA disease and moderate disease in OM I. Filling pressures were mildly elevated and CO/CI 4.3/2.6 .  Recommended admission for work up for Hgb 7.3 (~b/l 8-10), heart failure, and further evaluation for TAVR.          PMHx:   Anemia  LBBB (left bundle branch block)  DYER (dyspnea on exertion)  Coronary disease  CHF (congestive heart failure)  Former smoker  Meniere disease  HLD (hyperlipidemia)  DM (diabetes mellitus)  Atrial fibrillation  HTN (hypertension)      PSHx:   Lipoma  History of skin graft  Status post appendectomy      Allergies:  No Known Allergies      Home Meds:  Toprol 12.5mg daily  digoxin 125mcg TIW  torsemide 40mg TIW to daily starting 12/10   simvastatin 10mg daily  Plavix 75mg daily  coumadin 5mg 4 days 7.5mg 3 days a week  combivent  meclizine prn  flonase    Current Medications:   ALBUTerol    90 MICROgram(s) HFA Inhaler 2 Puff(s) Inhalation every 6 hours PRN  clopidogrel Tablet 75 milliGRAM(s) Oral daily  dextrose 40% Gel 15 Gram(s) Oral once PRN  dextrose 5%. 1000 milliLiter(s) IV Continuous <Continuous>  dextrose 50% Injectable 12.5 Gram(s) IV Push once  dextrose 50% Injectable 25 Gram(s) IV Push once  dextrose 50% Injectable 25 Gram(s) IV Push once  digoxin     Tablet 0.125 milliGRAM(s) Oral daily  ferrous    sulfate 325 milliGRAM(s) Oral daily  fluticasone propionate 50 MICROgram(s)/spray Nasal Spray 1 Spray(s) Both Nostrils daily  folic acid 1 milliGRAM(s) Oral daily  glucagon  Injectable 1 milliGRAM(s) IntraMuscular once PRN  guaiFENesin  milliGRAM(s) Oral every 12 hours PRN  insulin lispro (HumaLOG) corrective regimen sliding scale   SubCutaneous three times a day before meals  insulin lispro (HumaLOG) corrective regimen sliding scale   SubCutaneous at bedtime  meclizine 12.5 milliGRAM(s) Oral three times a day PRN  metoprolol succinate ER 25 milliGRAM(s) Oral daily  simvastatin 10 milliGRAM(s) Oral at bedtime  sodium chloride 0.9% lock flush 3 milliLiter(s) IV Push every 8 hours  tiotropium 18 MICROgram(s) Capsule 1 Capsule(s) Inhalation daily  torsemide 20 milliGRAM(s) Oral daily      FAMILY HISTORY:  Family history of brain tumor (Sibling)  Family history of heart attack (Child)  Family history of CHF (congestive heart failure)  Family history of lung cancer      Social History:  Smoking History: denies  Alcohol Use: denies  Drug Use: denies    REVIEW OF SYSTEMS:  All 14points eview of systems is negative unless indicated above.        Physical Exam:  T(F): 97.9 (12-13), Max: 97.9 (12-13)  HR: 87 (12-13) (87 - 87)  BP: 122/59 (12-13) (122/59 - 122/59)  RR: 16 (12-13)  SpO2: 100% (12-13)  GENERAL: No acute distress, well-developed  ENT: conjunctiva pallor, sclera clear, Markedly elevated JVP  CHEST/LUNG: Non-labored breathing, bibasilar rales, no wheezes  HEART: IRR; II/VI CRISTIAN , rubs, or gallops  ABDOMEN: Soft, Nontender, Nondistended; Bowel sounds present  EXTREMITIES:  No clubbing, cyanosis. +1 pitting up to midshin bilaterally   PSYCH: Nl behavior, nl affect  NEUROLOGY: AAOx3, non-focal,  SKIN: Normal color, No rashes or lesions  LINES:      Labs: Personally reviewed                        7.9    9.23  )-----------( 201      ( 13 Dec 2019 07:52 )             23.9     12-13    140  |  105  |  46<H>  ----------------------------<  145<H>  3.8   |  23  |  1.39<H>    Ca    8.8      13 Dec 2019 07:52    TPro  7.4  /  Alb  3.7  /  TBili  2.2<H>  /  DBili  x   /  AST  10  /  ALT  9<L>  /  AlkPhos  68  12-13    PT/INR - ( 13 Dec 2019 07:52 )   PT: 17.4 sec;   INR: 1.49 ratio         PTT - ( 13 Dec 2019 07:52 )  PTT:31.5 sec          Serum Pro-Brain Natriuretic Peptide: 15467 pg/mL (12-13 @ 07:52)        Cardiovascular Diagnostic Testing:    ECG: Personally reviewed: afib, LBBB QRS 150ms      LHC/RHC 12/13 :  25% mLAD in-stent restenosis. 60% stenosis of distal third of LAd  70% OM I stenosis  100% ostial RCA in-stent restenosis    RA 10 RV 57/8 PA 52/21/36 Wedge 23 CO/CI 4.34/CI 2.64 PA sat 47.1 Ao Sat 98%      TTE 12/10/19  LVEDD  LVEF ~30%  Peak gradient across the aortic valve 58mmHG and 30mmHg calculated KATHERYN (continuity eq) 06sqm and DVI 0.16. Severe aortic stenosis.  Minimal AI. LVOT VTI 15cm. Severely dilated LA. RV size and function appear preserved. Chief Complaint: worsening shortness of breath for one month    HPI:  An 84 year old man with history of moderate LV systolic dysfunction, ischemic cardiomyopathy, CAD s/p PCI (coronary perforation and cardiogenic shock in 2015), atrial fibrillation (coumadin), DM, HLD was referred for LHC/RHC in preparation for TAVR evaluation. In the past one month, he's been having worsening shortness of breath with minimal exertion including going to the bathroom or dressing himself. He also noticed "burning" chest pain and palpitation with exertional dyspnea. He also noted worsening peripheral edema and more frequent lightheadedness. He denies syncope, orthopnea, PND, coughing, fever, nausea, vomiting, abdominal discomfort, diarrhea, melena/hematochezia, or urinary problems.    He has a nurse who helps him at home and she also agrees that he's been having worsening dyspnea. He reports good appetite. Prior to this past month, he used to drive and able to go to grocery shopping without shortness of breath. He denies heart failure hospitalization. He had a complicated hospital course post PCI which led to cardiogenic shock but denies having current symptoms prior to this month.    He was referred to Dr. Rodriguez([facility])on 12/10  to be evaluated for TAVR. He recommended him to be admitted to the hospital but the patient declined so instead he recommended increasing torsemide dosing fomr 40mg TIW to 40mg daily and recommended outpatient RHC/LHC. LHC showed distal LAD disease and 100% ostial RCA disease and moderate disease in OM I. Filling pressures were mildly elevated and CO/CI 4.3/2.6 .  Recommended admission for work up for Hgb 7.3 (~b/l 8-10), heart failure, and further evaluation for TAVR.          PMHx:   Anemia  LBBB (left bundle branch block)  DYER (dyspnea on exertion)  Coronary disease  CHF (congestive heart failure)  Former smoker  Meniere disease  HLD (hyperlipidemia)  DM (diabetes mellitus)  Atrial fibrillation  HTN (hypertension)      PSHx:   Lipoma  History of skin graft  Status post appendectomy      Allergies:  No Known Allergies      Home Meds:  Toprol 12.5mg daily  digoxin 125mcg TIW  torsemide 40mg TIW to daily starting 12/10   simvastatin 10mg daily  Plavix 75mg daily  coumadin 5mg 4 days 7.5mg 3 days a week  combivent  meclizine prn  flonase    Current Medications:   ALBUTerol    90 MICROgram(s) HFA Inhaler 2 Puff(s) Inhalation every 6 hours PRN  clopidogrel Tablet 75 milliGRAM(s) Oral daily  dextrose 40% Gel 15 Gram(s) Oral once PRN  dextrose 5%. 1000 milliLiter(s) IV Continuous <Continuous>  dextrose 50% Injectable 12.5 Gram(s) IV Push once  dextrose 50% Injectable 25 Gram(s) IV Push once  dextrose 50% Injectable 25 Gram(s) IV Push once  digoxin     Tablet 0.125 milliGRAM(s) Oral daily  ferrous    sulfate 325 milliGRAM(s) Oral daily  fluticasone propionate 50 MICROgram(s)/spray Nasal Spray 1 Spray(s) Both Nostrils daily  folic acid 1 milliGRAM(s) Oral daily  glucagon  Injectable 1 milliGRAM(s) IntraMuscular once PRN  guaiFENesin  milliGRAM(s) Oral every 12 hours PRN  insulin lispro (HumaLOG) corrective regimen sliding scale   SubCutaneous three times a day before meals  insulin lispro (HumaLOG) corrective regimen sliding scale   SubCutaneous at bedtime  meclizine 12.5 milliGRAM(s) Oral three times a day PRN  metoprolol succinate ER 25 milliGRAM(s) Oral daily  simvastatin 10 milliGRAM(s) Oral at bedtime  sodium chloride 0.9% lock flush 3 milliLiter(s) IV Push every 8 hours  tiotropium 18 MICROgram(s) Capsule 1 Capsule(s) Inhalation daily  torsemide 20 milliGRAM(s) Oral daily      FAMILY HISTORY:  Family history of brain tumor (Sibling)  Family history of heart attack (Child)  Family history of CHF (congestive heart failure)  Family history of lung cancer      Social History:  Smoking History: denies  Alcohol Use: denies  Drug Use: denies    REVIEW OF SYSTEMS:  All 14points eview of systems is negative unless indicated above.        Physical Exam:  T(F): 97.9 (12-13), Max: 97.9 (12-13)  HR: 87 (12-13) (87 - 87)  BP: 122/59 (12-13) (122/59 - 122/59)  RR: 16 (12-13)  SpO2: 100% (12-13)  GENERAL: No acute distress, well-developed  ENT: conjunctiva pallor, sclera clear, Markedly elevated JVP  CHEST/LUNG: Non-labored breathing, bibasilar rales, no wheezes  HEART: IRR; II/VI CRISTIAN , rubs, or gallops  ABDOMEN: Soft, Nontender, Nondistended; Bowel sounds present  EXTREMITIES:  No clubbing, cyanosis. +1 pitting up to midshin bilaterally   PSYCH: Nl behavior, nl affect  NEUROLOGY: AAOx3, non-focal,  SKIN: Normal color, No rashes or lesions  LINES:      Labs: Personally reviewed                        7.9    9.23  )-----------( 201      ( 13 Dec 2019 07:52 )             23.9     12-13    140  |  105  |  46<H>  ----------------------------<  145<H>  3.8   |  23  |  1.39<H>    Ca    8.8      13 Dec 2019 07:52    TPro  7.4  /  Alb  3.7  /  TBili  2.2<H>  /  DBili  x   /  AST  10  /  ALT  9<L>  /  AlkPhos  68  12-13    PT/INR - ( 13 Dec 2019 07:52 )   PT: 17.4 sec;   INR: 1.49 ratio         PTT - ( 13 Dec 2019 07:52 )  PTT:31.5 sec          Serum Pro-Brain Natriuretic Peptide: 15857 pg/mL (12-13 @ 07:52)        Cardiovascular Diagnostic Testing:    ECG: Personally reviewed: afib, LBBB QRS 150ms      LHC/RHC 12/13 :  25% mLAD in-stent restenosis. 60% stenosis of distal third of LAd  70% OM I stenosis  100% ostial RCA in-stent restenosis    RA 10 RV 57/8 PA 52/21/36 Wedge 23 CO/CI 4.34/CI 2.64 PA sat 47.1 Ao Sat 98%      TTE 12/10/19  LVEDD 4.5cm  LVEF ~30%  Peak gradient across the aortic valve 58mmHG and 30mmHg calculated KATHERYN (continuity eq) 06sqm and DVI 0.16. Severe aortic stenosis.  Minimal AI. LVOT VTI 15cm. Severely dilated LA. RV size and function appear preserved. Chief Complaint: worsening shortness of breath for one month    HPI:  An 84 year old man with history of moderate LV systolic dysfunction, ischemic cardiomyopathy, CAD s/p PCI (coronary perforation and cardiogenic shock in 2015), atrial fibrillation (coumadin), DM, HLD was referred for LHC/RHC in preparation for TAVR evaluation. In the past one month, he's been having worsening shortness of breath with minimal exertion including going to the bathroom or dressing himself. He also noticed "burning" chest pain and palpitation with exertional dyspnea. He also noted worsening peripheral edema and more frequent lightheadedness. He denies syncope, orthopnea, PND, coughing, fever, nausea, vomiting, abdominal discomfort, diarrhea, melena/hematochezia, or urinary problems.    He has a nurse who helps him at home and she also agrees that he's been having worsening dyspnea. He reports good appetite. Prior to this past month, he used to drive and able to go to grocery shopping without shortness of breath. He denies heart failure hospitalization. He had a complicated hospital course post PCI which led to cardiogenic shock but denies having current symptoms prior to this month.    He was referred to Dr. Rodriguez([facility])on 12/10  to be evaluated for TAVR. He recommended him to be admitted to the hospital but the patient declined so instead he recommended increasing torsemide dosing fomr 40mg TIW to 40mg daily and recommended outpatient RHC/LHC. LHC showed distal LAD disease and 100% ostial RCA disease and moderate disease in OM I. Filling pressures were mildly elevated and CO/CI 4.3/2.6 .  Recommended admission for work up for Hgb 7.3 (~b/l 8-10), heart failure, and further evaluation for TAVR.          PMHx:   Anemia  LBBB (left bundle branch block)  DYER (dyspnea on exertion)  Coronary disease  CHF (congestive heart failure)  Former smoker  Meniere disease  HLD (hyperlipidemia)  DM (diabetes mellitus)  Atrial fibrillation  HTN (hypertension)      PSHx:   Lipoma  History of skin graft  Status post appendectomy      Allergies:  No Known Allergies      Home Meds:  Toprol 12.5mg daily  digoxin 125mcg TIW  torsemide 40mg TIW to daily starting 12/10   simvastatin 10mg daily  Plavix 75mg daily  coumadin 5mg 4 days 7.5mg 3 days a week  combivent  meclizine prn  flonase    Current Medications:   ALBUTerol    90 MICROgram(s) HFA Inhaler 2 Puff(s) Inhalation every 6 hours PRN  clopidogrel Tablet 75 milliGRAM(s) Oral daily  dextrose 40% Gel 15 Gram(s) Oral once PRN  dextrose 5%. 1000 milliLiter(s) IV Continuous <Continuous>  dextrose 50% Injectable 12.5 Gram(s) IV Push once  dextrose 50% Injectable 25 Gram(s) IV Push once  dextrose 50% Injectable 25 Gram(s) IV Push once  digoxin     Tablet 0.125 milliGRAM(s) Oral daily  ferrous    sulfate 325 milliGRAM(s) Oral daily  fluticasone propionate 50 MICROgram(s)/spray Nasal Spray 1 Spray(s) Both Nostrils daily  folic acid 1 milliGRAM(s) Oral daily  glucagon  Injectable 1 milliGRAM(s) IntraMuscular once PRN  guaiFENesin  milliGRAM(s) Oral every 12 hours PRN  insulin lispro (HumaLOG) corrective regimen sliding scale   SubCutaneous three times a day before meals  insulin lispro (HumaLOG) corrective regimen sliding scale   SubCutaneous at bedtime  meclizine 12.5 milliGRAM(s) Oral three times a day PRN  metoprolol succinate ER 25 milliGRAM(s) Oral daily  simvastatin 10 milliGRAM(s) Oral at bedtime  sodium chloride 0.9% lock flush 3 milliLiter(s) IV Push every 8 hours  tiotropium 18 MICROgram(s) Capsule 1 Capsule(s) Inhalation daily  torsemide 20 milliGRAM(s) Oral daily      FAMILY HISTORY:  Family history of brain tumor (Sibling)  Family history of heart attack (Child)  Family history of CHF (congestive heart failure)  Family history of lung cancer      Social History:  Smoking History: former smoker (20 pack year, quit 1970's)  Alcohol Use: denies      REVIEW OF SYSTEMS:  All 14points review of systems is negative unless indicated above.        Physical Exam:  T(F): 97.9 (12-13), Max: 97.9 (12-13)  HR: 87 (12-13) (87 - 87)  BP: 122/59 (12-13) (122/59 - 122/59)  RR: 16 (12-13)  SpO2: 100% (12-13)  GENERAL: No acute distress, well-developed  ENT: conjunctiva pallor, sclera clear, Markedly elevated JVP  CHEST/LUNG: Non-labored breathing, bibasilar rales, no wheezes  HEART: IRR; II/VI CRISTIAN , rubs, or gallops  ABDOMEN: Soft, Nontender, Nondistended; Bowel sounds present  EXTREMITIES:  No clubbing, cyanosis. +1 pitting up to midshin bilaterally   PSYCH: Nl behavior, nl affect  NEUROLOGY: AAOx3, non-focal,  SKIN: Normal color, No rashes or lesions  LINES:      Labs: Personally reviewed                        7.9    9.23  )-----------( 201      ( 13 Dec 2019 07:52 )             23.9     12-13    140  |  105  |  46<H>  ----------------------------<  145<H>  3.8   |  23  |  1.39<H>    Ca    8.8      13 Dec 2019 07:52    TPro  7.4  /  Alb  3.7  /  TBili  2.2<H>  /  DBili  x   /  AST  10  /  ALT  9<L>  /  AlkPhos  68  12-13    PT/INR - ( 13 Dec 2019 07:52 )   PT: 17.4 sec;   INR: 1.49 ratio         PTT - ( 13 Dec 2019 07:52 )  PTT:31.5 sec          Serum Pro-Brain Natriuretic Peptide: 95040 pg/mL (12-13 @ 07:52)        Cardiovascular Diagnostic Testing:    ECG: Personally reviewed: afib, LBBB QRS 150ms      LHC/RHC 12/13 :  25% mLAD in-stent restenosis. 60% stenosis of distal third of LAd  70% OM I stenosis  100% ostial RCA in-stent restenosis    RA 10 RV 57/8 PA 52/21/36 Wedge 23 CO/CI 4.34/CI 2.64 PA sat 47.1 Ao Sat 98%      TTE 12/10/19  LVEDD 4.5cm  LVEF ~30%  Peak gradient across the aortic valve 58mmHG and 30mmHg calculated KATHERYN (continuity eq) 06sqm and DVI 0.16. Severe aortic stenosis.  Minimal AI. LVOT VTI 15cm. Severely dilated LA. RV size and function appear preserved.

## 2019-12-13 NOTE — H&P ADULT - NSHPREVIEWOFSYSTEMS_GEN_ALL_CORE
Review of Systems:  Constitutional: No fever, No weight loss, good appetite/po intake  Head: No headache   Eyes: No blurry vision, No diplopia  Neuro: No tremors, No muscle weakness   Cardiovascular: No chest pain, No palpitations  Respiratory: No SOB, No cough  GI: No nausea, No vomiting, No diarrhea  : No dysuria, No hematuria  Skin: No rash  MSK: No joint pain   Psych: No depression Review of Systems:  Constitutional: No fever, No weight loss, good appetite/po intake  Head: No headache   Eyes: No blurry vision, No diplopia  Neuro: No tremors, No muscle weakness   Cardiovascular: No chest pain, Intermittent palpitations  Respiratory: Increased SOB with ambulation, No cough, yellow phlegm production  GI: No nausea, No vomiting, No diarrhea  : No dysuria, No hematuria  Skin: No rash  MSK: No joint pain   Psych: No depression

## 2019-12-13 NOTE — H&P ADULT - ASSESSMENT
84M PMH chronic anemia, Afib on coumadin, T2DM, HLD, PVD, Menieres Disease, carotid stenosis, LBBB on EKG, CAD s/p PCI to mLAD in June 2014 and PCI to pRCA that was complicated by perforation and cardiogenic shock in May of 2015. Presented today for elective RHC/LHC as part of TAVR evaluation for severe AS. Being admitted for acute on chronic decompensated heart failure with reduced ejection fraction, and anemia. TTE 12/10 showing severe AS and EF 30%. 84M PMH chronic anemia, Afib on coumadin, T2DM, HLD, PVD, Menieres Disease, carotid stenosis, LBBB on EKG, CAD s/p PCI to mLAD in June 2014 and PCI to pRCA that was complicated by perforation and cardiogenic shock in May of 2015 presented for elective C/[facility] for TAVR evaluation for severe AS, admitted for acute on on chronic CHF and anemia workup. 84M PMH chronic anemia, HFrEF (40%), Afib on coumadin, T2DM, HLD, PVD, Menieres Disease, carotid stenosis, LBBB on EKG, CAD s/p PCI to mLAD in June 2014 and PCI to pRCA that was complicated by perforation and cardiogenic shock in May of 2015 presented for elective C/C for TAVR evaluation for severe AS, admitted for acute on on chronic CHF and anemia workup.

## 2019-12-13 NOTE — H&P ADULT - NSHPLABSRESULTS_GEN_ALL_CORE
< end of copied text >    < from: Transthoracic Echocardiogram (12.10.19 @ 10:24) >    PROCEDURE: Transthoracic echocardiogram with 2-D, M-Mode  and complete spectral and color flow Doppler.  INDICATION: Nonrheumatic aortic valve disorder, unspecified  (I35.9)  ------------------------------------------------------------------------  MEASUREMENTS: (See below)  ------------------------------------------------------------------------  OBSERVATIONS:  Mitral Valve: There is mitral annular calcification. There  is at least moderate(2-3+) mitral regurgitation.  This may  be an underestimation.  Aortic Root: The aortic root is normal in size.  Aortic Valve: The aortic valve is severely calcified. Peak  transaortic valve gradient equals 58 mm Hg, mean  transaortic valve gradient equals 30 mm Hg, estimated  aortic valve area equals 0.6 sqcm (by continuity equation),  aortic valve velocity time integral equals 90 cm, the DVI=  0.16,consistent with severe aortic stenosis.  The patient was in atrial fibrillation with variability in  the R-R intervals and gradients.  The highest matched R-R  intervals were used. Minimal aortic regurgitation.  Peak  left ventricular outflow tract gradient equals 2 mm Hg,  mean gradient is equal to 1 mm Hg, LVOT velocity time  integral equals 15 cm.  Left Atrium: Severely dilated left atrium.  LA volume index  = 70 cc/m2.  Left Ventricle: The LVEF about 30%. The interventicular  septum is dyskinetic.  The rest of the walls are  hypokinetic.  There is dyssynchronous contraction of the  left ventricle. The left ventricle is normal in size.  Right Heart: Normal right atrium.  The right atrial area= 18cm2, the right atrial volume=  45ml, the right atrial volume index= 27ml/m2. Normal right  ventricular size and function. Normal tricuspid valve.  Minimal tricuspid regurgitation. Normal pulmonic valve. No  pulmonic regurgitation.  Pericardium/PleuraThere is no pericardial effusion.  Hemodynamic: Estimated right atrial pressure is 8 mm Hg.  Estimated right ventricular systolic pressure equals 42 mm  Hg, assuming right atrial pressure equals 8 mm Hg,  consistent with mild pulmonary hypertension.  ------------------------------------------------------------------------  CONCLUSIONS:  1. The aortic valve is severely calcified. Peak transaortic  valve gradient equals 58 mm Hg, mean transaortic valve  gradient equals 30 mm Hg, estimated aortic valve area  equals 0.6 sqcm (by continuity equation), aortic valve  velocity time integral equals 90 cm, the DVI= 0.16,  consistent with severe aortic stenosis.  The patient was in atrial fibrillation with variability in  the R-R intervals and gradients.  The highest matched R-R  intervals were used.    < end of copied text > 12-13    140  |  105  |  46<H>  ----------------------------<  145<H>  3.8   |  23  |  1.39<H>    Ca    8.8      13 Dec 2019 07:52    TPro  7.4  /  Alb  3.7  /  TBili  2.2<H>  /  DBili  x   /  AST  10  /  ALT  9<L>  /  AlkPhos  68  12-13      PT/INR - ( 13 Dec 2019 07:52 )   PT: 17.4 sec;   INR: 1.49 ratio         PTT - ( 13 Dec 2019 07:52 )  PTT:31.5 sec                                        7.9    9.23  )-----------( 201      ( 13 Dec 2019 07:52 )             23.9     CAPILLARY BLOOD GLUCOSE              < from: Transthoracic Echocardiogram (12.10.19 @ 10:24) >    PROCEDURE: Transthoracic echocardiogram with 2-D, M-Mode  and complete spectral and color flow Doppler.  INDICATION: Nonrheumatic aortic valve disorder, unspecified  (I35.9)  ------------------------------------------------------------------------  MEASUREMENTS: (See below)  ------------------------------------------------------------------------  OBSERVATIONS:  Mitral Valve: There is mitral annular calcification. There  is at least moderate(2-3+) mitral regurgitation.  This may  be an underestimation.  Aortic Root: The aortic root is normal in size.  Aortic Valve: The aortic valve is severely calcified. Peak  transaortic valve gradient equals 58 mm Hg, mean  transaortic valve gradient equals 30 mm Hg, estimated  aortic valve area equals 0.6 sqcm (by continuity equation),  aortic valve velocity time integral equals 90 cm, the DVI=  0.16,consistent with severe aortic stenosis.  The patient was in atrial fibrillation with variability in  the R-R intervals and gradients.  The highest matched R-R  intervals were used. Minimal aortic regurgitation.  Peak  left ventricular outflow tract gradient equals 2 mm Hg,  mean gradient is equal to 1 mm Hg, LVOT velocity time  integral equals 15 cm.  Left Atrium: Severely dilated left atrium.  LA volume index  = 70 cc/m2.  Left Ventricle: The LVEF about 30%. The interventicular  septum is dyskinetic.  The rest of the walls are  hypokinetic.  There is dyssynchronous contraction of the  left ventricle. The left ventricle is normal in size.  Right Heart: Normal right atrium.  The right atrial area= 18cm2, the right atrial volume=  45ml, the right atrial volume index= 27ml/m2. Normal right  ventricular size and function. Normal tricuspid valve.  Minimal tricuspid regurgitation. Normal pulmonic valve. No  pulmonic regurgitation.  Pericardium/PleuraThere is no pericardial effusion.  Hemodynamic: Estimated right atrial pressure is 8 mm Hg.  Estimated right ventricular systolic pressure equals 42 mm  Hg, assuming right atrial pressure equals 8 mm Hg,  consistent with mild pulmonary hypertension.  ------------------------------------------------------------------------  CONCLUSIONS:  1. The aortic valve is severely calcified. Peak transaortic  valve gradient equals 58 mm Hg, mean transaortic valve  gradient equals 30 mm Hg, estimated aortic valve area  equals 0.6 sqcm (by continuity equation), aortic valve  velocity time integral equals 90 cm, the DVI= 0.16,  consistent with severe aortic stenosis.  The patient was in atrial fibrillation with variability in  the R-R intervals and gradients.  The highest matched R-R  intervals were used.    < end of copied text >

## 2019-12-13 NOTE — H&P ADULT - PROBLEM SELECTOR PLAN 4
Coumadin was held this past Tuesday in setting of cardiac cath and possible TAVR  - Start Heparin Pt on coumadin at home, will hold and start Heparin gtt in anticipation for possible TAVR  - CHADSVASc score of 6 Pt on coumadin at home, will hold and start Heparin gtt in anticipation for possible TAVR  - CHADSVASc score of 6  - Continue with digoxin and toprol 25mg qd Check AM digoxin level  - Monitor on telemetry

## 2019-12-13 NOTE — H&P ADULT - HISTORY OF PRESENT ILLNESS
Mr. Latham is an 84 year old male, referred by Dr. Das for evaluation of aortic stenosis with recent symptomatic progression and LVEF decline. Roel lost his wife two months ago, and notes worsening dyspnea with minimal exertion; he also has angina and palpitations. He has a decline in functional status that he has attributed to both symptoms and depression. He denies PND, orthopnea, dizziness, or syncope. He does have some LE edema that is improved with Torsemide. His past medical history includes chronic anemia, AFib on Coumadin, T2DM, HLD, PVD, Menieres Disease, LBBB, and CAD (s/p PCI mLAD in June 2014 and pRCA PCI, complicated by perforation and cardiogenic shock in May 2015). He has a reduced ejection fraction, reported at 42% on TTE in June of 2019, now reported at 28% on Echo 12/5. He reports that his left carotid is "100% blocked but not affecting me".    Repeat TTE in our office today demonstrates an LVEF of 30%, an KATHERYN of 0.6, an AoV of 3.8m/s, and a DVI of 0.16. He has 2 months of progressive dyspnea. He describes his appetite as "good". He reports "I have a hemoglobin problem" and was transfused in 2015 (not since per his report). He reports no constipation but no melena. He has substernal "burning" when "I lose my breath". He has no syncopal symptoms. He feels fatigued. There have been no recent changes to his medications. He takes Torsemide (20mg 4 days a week and 40mg 3 days a week).

## 2019-12-13 NOTE — CONSULT NOTE ADULT - PROBLEM SELECTOR RECOMMENDATION 3
Structural team is evaluating him for a TAVR on this admission. - agree with TAVR evaluation, his very high gradients despite a low ejection fraction suggest critical AS

## 2019-12-13 NOTE — H&P ADULT - NSHPPHYSICALEXAM_GEN_ALL_CORE
PHYSICAL EXAM  GENERAL: NAD, lying comfortably in bed   HEAD:  Atraumatic, Normocephalic  EYES: EOMI b/l, PERRLA b/l, conjunctiva and sclera clear  NECK: Supple, No JVD, No LAD   CHEST/LUNG: Clear to auscultation bilaterally; No wheeze or ronchi  HEART: Regular rate and rhythm; S1 and S2 present, No murmurs, rubs, or gallops  ABDOMEN: Soft, Nontender, Nondistended; Bowel sounds present  EXTREMITIES:  2+ Peripheral Pulses, No clubbing, cyanosis, or edema  NEURO: AAOx3, non-focal   SKIN: No rashes or lesions PHYSICAL EXAM  GENERAL: NAD, lying comfortably in bed   HEAD:  Atraumatic, Normocephalic  EYES: EOMI b/l, PERRLA b/l, conjunctiva and sclera clear  NECK: Supple, No JVD, No LAD   CHEST/LUNG: Clear to auscultation bilaterally; No wheeze or ronchi  HEART: Regular rate and rhythm; S1 and S2 present, Systolic murmurs, no rubs, or gallops  ABDOMEN: Soft, Nontender, Nondistended; Bowel sounds present  EXTREMITIES:  2+ Peripheral Pulses, No clubbing, cyanosis, 2+ pitting edema  NEURO: AAOx3, non-focal   SKIN: No rashes or lesions

## 2019-12-13 NOTE — H&P ADULT - PROBLEM SELECTOR PLAN 9
Transitions of Care Status:  1.  Name of PCP: Sunshine Rosa  2.  PCP Contacted on Admission: [ ] Y    [ ] N    3.  PCP contacted at Discharge: [ ] Y    [ ] N    [ ] N/A  4.  Post-Discharge Appointment Date and Location:  5.  Summary of Handoff given to PCP:

## 2019-12-13 NOTE — CONSULT NOTE ADULT - PROBLEM SELECTOR RECOMMENDATION 4
baseline reportedly 8-10. No s/s of acute bleed.   -obtain anemia work up including iron panel. - obtain iron studies, if iron deficient give IV iron

## 2019-12-13 NOTE — PROGRESS NOTE ADULT - PROBLEM SELECTOR PLAN 1
Heart failure consult called  R/LHC- LAD 60%, OM1 70%, ostial %;   PA 52/21/36, PCWP 23, RV 57/3/8, CO 4.34, CI 2.64, PA sat 47.1%, AO sat 98.2%; Heart failure consult called  R/LHC- LAD 60%, OM1 70%, ostial %;   PA 52/21/36, PCWP 23, RV 57/3/8, CO 4.34, CI 2.64, PA sat 47.1%, AO sat 98.2%;  Lasix increased to 80mg IV BID, fluid restriction

## 2019-12-13 NOTE — H&P ADULT - NSHPSOCIALHISTORY_GEN_ALL_CORE
Patient lives at home ambulates with walker. HHA comes 5 times a week for assistance with daily activities. Patient's wife  3 mos ago. Patient had 1 son who  in 2017 due to unknown heart condition. Retired [employer] worker. Smoked cigarettes for 20 years but quit in . Denies alcohol use.

## 2019-12-13 NOTE — H&P ADULT - PROBLEM SELECTOR PLAN 7
diet controlled  last A1C per patient 5  repeat A1C - not on home medication, diet controlled  - Check A1c  - ISS

## 2019-12-13 NOTE — CONSULT NOTE ADULT - PROBLEM SELECTOR RECOMMENDATION 2
Rate controlled. LBBB.  Continue with digoxin at home dose  Continue metop XL 12.5mg daily Rate controlled. LBBB.  Continue with digoxin at home dose  Continue metop XL 12.5mg daily  -After optimizing his medical therapy +/- TAVR, he might benefit from BiV PPM vs BiV ICD later. - rate controlled, continue metoprolol  - continue anticoagulation

## 2019-12-13 NOTE — PROGRESS NOTE ADULT - PROBLEM SELECTOR PLAN 2
TAVR evaluation in process, TTE and RHC/LHC complete   Pt. will need cardiac CT when optimized to complete evaluation, after which timing of TAVR will be determined.    [id number] TAVR evaluation in process, TTE and RHC/LHC complete   Pt. will need cardiac CT when optimized to complete evaluation, after which timing of TAVR will be determined (inpt vs outpt).     [id number]

## 2019-12-13 NOTE — H&P ADULT - HISTORY OF PRESENT ILLNESS
83 yo M with hx Afib (Coumadin), DM, HLD, CAD (stent 2014 and 2015 in mLAD and RCA), HFrEF (40%) was referred by cardiologist for CHF exacerbation. Patient was seeing cardiology for TAVR evlatuion for severe aortic stenosis. Patient endorses chest pain with palpitations. Patient has lower extremitiy edema at baseline. He takes torsemide 20mg 4x week and 40mg 3x week.  Denies PND, orthopnea, dizziness, syncope  Echo 12/5 showed LVEF 28%. Repeat echo demonstrated LVEF 30%    Admission labs: INR 1.49 83 yo M with hx Afib (Coumadin), DM, HLD, CAD (stent 2014 and 2015 in mLAD and RCA), HFrEF (40%) was referred by cardiologist for CHF exacerbation. Patient states that since Thanksgiving he has had worsening SOB. Patient ambulates with walker with assistance of [employer] MARK present at bedside, and recently has become more dyspneic with mild activities around the house. Additionally, his legs became swollen and he reports 6-7lb weight gain in last month. Patient uses 2 pillows to sleep at night but denies any difficulty breathing at night. Patient was seeing cardiology for TAVR evaluation for severe aortic stenosis. This past Tuesday, 12/10 patient went to cardiologist, Dr. Becker who increased torsemide from 20 mg to 40mg daily and recommended patient to come to hospital for further optimization of HF but patient declined. Echo from 12/10 shows sever aortic stenosis and EF 30%. Patient came today for a scheduled RHC/LHC prior to TAVR. Patient was found to have LE swelling and anemia with Hb of 7.9 and was admitted to medicine for further management.   Patient denies chest pain but complains of intermittent palpitations. Denies PND, orthopnea, dizziness, syncope. Patient complains of increased yellow phlegm production, but no cough, fever, sick contacts or travel history. Denies any history of COPD, never on home O2.  Echo 12/5 showed LVEF 28%. Repeat echo 12/10 demonstrated LVEF 30%    Admission labs: Hb 7.9 INR 1.49

## 2019-12-13 NOTE — PATIENT PROFILE ADULT - TOBACCO USE
----- Message from Candy Quijano MD sent at 5/22/2018  4:16 PM CDT -----  U/s negative for DVT or clot.  That is good news.  She does have a large baker's cyst behind her knee, which is an indication of knee injury and possible ligament or meniscal injury.  Recommend we have Anaid campo. MAT   Former smoker

## 2019-12-13 NOTE — H&P ADULT - NSICDXFAMILYHX_GEN_ALL_CORE_FT
FAMILY HISTORY:  Family history of CHF (congestive heart failure)  Family history of lung cancer    Sibling  Still living? No  Family history of brain tumor, Age at diagnosis: Age Unknown    Child  Still living? No  Family history of heart attack, Age at diagnosis: Age Unknown

## 2019-12-13 NOTE — CONSULT NOTE ADULT - PROBLEM SELECTOR RECOMMENDATION 5
Baseline SCr is unclear and he is SCr is not significantly elevated.  Monitor especially after dye used during cardiac cath.  If it remains stable, we'll consider an ARB. - unclear baseline Cr, continue to monitor

## 2019-12-13 NOTE — CONSULT NOTE ADULT - PROBLEM SELECTOR RECOMMENDATION 6
- he meets IIa indications for CRT-D given low EF with LBBB and concurrent AF. Given dyssynchrony of septum noted on TTE I believe he has a higher chance of being a responder if able to have high CRT pacing percentage with his AF  - would consider evaluation after TAVR

## 2019-12-13 NOTE — PROGRESS NOTE ADULT - ASSESSMENT
84 year old male with past medical history includes chronic anemia (follows outpt with hematologist Dr. Miranda - reports having PRBC transfusion in 2015, none since that time), AFib on Coumadin, T2DM which is diet controlled, HLD, PVD, Menieres Disease, carotid stenosis, LBBB on EKG, and CAD s/p PCI to mLAD in June 2014 and PCI to pRCA that was complicated by perforation and cardiogenic shock in May of 2015. Presented today for elective RHC/LHC as part of TAVR evaluation for severe AS. Being admitted for acute on chronic decompensated heart failure with reduced ejection fraction, and anemia.

## 2019-12-13 NOTE — CONSULT NOTE ADULT - PROBLEM SELECTOR RECOMMENDATION 9
Appear mildly volume overloaded with elevated JVP. Mildly elevated filling pressure and not in low output state. TTE 12/10 LVEDD 4.5cm LVEF 30%, preserved RV size and function grossly.  -start furosemide IVP 40mg BID starting tomorrow since he already received one dose of IV diuretic this evening  -daily standing weight  -strict I's/O's  -Continue metop XL 12.5mg daily   -if his renal function remains stable, we'll eventually plan to start ARB for afterload reduction. - GDMT: continue metoprolol succinate 12.5 mg daily (will uptitrate when euvolemic). If Cr stable tomorrow will start valsartan 40 mg BID with plans to eventually switch to Entresto.   - diuretic: furosemide IVP 40mg BID  - daily standing weight and strict I's and O's

## 2019-12-13 NOTE — H&P ADULT - PROBLEM SELECTOR PLAN 2
TTE showing severe aortic stenosis, symptomatic with SOB on exertion  Cardiac Cath done for TAVR evaluation  f/u Cath report TTE showing severe aortic stenosis, symptomatic with SOB on exertion  R/LHC- LAD 60%, OM1 70%, ostial %;   PA 52/21/36, PCWP 23, RV 57/3/8, CO 4.34, CI 2.64, PA sat 47.1%, AO sat 98.2% TTE showing severe aortic stenosis, symptomatic with SOB on exertion  - Structural heart following, plan for possible eventual TAVR when euvolemic.  - Monitor on tele.

## 2019-12-13 NOTE — H&P CARDIOLOGY - HISTORY OF PRESENT ILLNESS
85 yo  ma;e no implantable device with PMHx of AFib (on warfarin, last dose 12/10), HTN, HLD, DM2, CAD (s/p mLAD stent 6/6/14), CHFrEF (EF 40%), Meniere's disease, RCA stent x2 in 5/2015 (complicated with Tamponade Vfib arrest) and aort valve stenosis presents for cardiac cath prior to TAVR. pt reports he feels SOB in the house going to bath room. 85 yo  ma;e no implantable device with PMHx of AFib (on warfarin, last dose 12/10), HTN, HLD, DM2, CAD (s/p mLAD stent 6/6/14), CHFrEF (EF 40%), Meniere's disease, RCA stent x2 in 5/2015 (complicated with perforation and tamponade, Vfib arrest) and aortic valve stenosis presents for cardiac cath prior to TAVR.   Pt reports he lost his wife a few months ago noted worsening DYER (minimal exertion in the house), followed up with Dr. Das and referred to Dr. Becker for further TAVR evaluation.   < from: Transthoracic Echocardiogram (12.10.19 @ 10:24) >  1. The aortic valve is severely calcified. Peak transaortic valve gradient equals 58 mm Hg, mean transaortic valve gradient equals 30 mm Hg, estimated aortic valve area equals 0.6 sqcm (by continuity equation), aortic valve velocity time integral equals 90 cm, the DVI= 0.16, consistent with severe aortic stenosis.  The patient was in atrial fibrillation with variability in the R-R intervals and gradients.  The highest matched R-R intervals were used. LVEF of 30%, an KATHERYN of 0.6, an AoV of 3.8m/s, and a DVI of 0.16.  < end of copied text >    Echo 6/2019 EF 42% on TTE, now reported at 28% on Echo 12/5/2019.      Symptoms: severe DYER       Angina (Class):        Ischemic Symptoms: DYER    Heart Failure:        Systolic:        NYHA Class (within 2 weeks): Class IV    Assessment of LVEF:       EF: 30%       Assessed by: Echo       Date: 12/10/2019    Prior Cardiac Interventions:       PCI's: LAD, RCA       CABG:     Noninvasive Testing: Not done since 2015  Stress Test: Date:        Protocol:        Duration of Exercise:        Symptoms:        EKG Changes:        DTS:        Myocardial Imaging:        Risk Assessment:     Echo: see above    Antianginal Therapies:        Beta Blockers:         Calcium Channel Blockers:        Long Acting Nitrates:        Ranexa:     Associated Risk Factors:        Cerebrovascular Disease: N/A       Chronic Lung Disease: N/A       Peripheral Arterial Disease: N/A       Chronic Kidney Disease (if yes, what is GFR): N/A       Uncontrolled Diabetes (if yes, what is HgbA1C or FBS): N/A       Poorly Controlled Hypertension (if yes, what is SBP): N/A       Morbid Obesity (if yes, what is BMI): N/A       History of Recent Ventricular Arrhythmia: N/A       Inability to Ambulate Safely: N/A       Need for Therapeutic Anticoagulation: N/A       Antiplatelet or Contrast Allergy: N/A 85 yo  ma;e no implantable device with PMHx of AFib (on warfarin, last dose 12/10), HTN, HLD, DM2, CAD (s/p mLAD stent 6/6/14), CHFrEF (EF 40%), Meniere's disease, RCA stent x2 in 5/2015 (complicated with perforation and tamponade, Vfib arrest), anemia and aortic valve stenosis presents for cardiac cath prior to TAVR.   Pt reports he lost his wife a few months ago noted worsening DYER (minimal exertion in the house), followed up with Dr. Das and referred to Dr. Becker for further TAVR evaluation.   < from: Transthoracic Echocardiogram (12.10.19 @ 10:24) >  1. The aortic valve is severely calcified. Peak transaortic valve gradient equals 58 mm Hg, mean transaortic valve gradient equals 30 mm Hg, estimated aortic valve area equals 0.6 sqcm (by continuity equation), aortic valve velocity time integral equals 90 cm, the DVI= 0.16, consistent with severe aortic stenosis.  The patient was in atrial fibrillation with variability in the R-R intervals and gradients.  The highest matched R-R intervals were used. LVEF of 30%, an KATHERYN of 0.6, an AoV of 3.8m/s, and a DVI of 0.16.  < end of copied text >    Echo 6/2019 EF 42% on TTE, now reported at 28% on Echo 12/5/2019.      Symptoms: severe DYER       Angina (Class):        Ischemic Symptoms: DYER    Heart Failure:        Systolic:        NYHA Class (within 2 weeks): Class IV    Assessment of LVEF:       EF: 30%       Assessed by: Echo       Date: 12/10/2019    Prior Cardiac Interventions:       PCI's: LAD, RCA       CABG:     Noninvasive Testing: Not done since 2015  Stress Test: Date:        Protocol:        Duration of Exercise:        Symptoms:        EKG Changes:        DTS:        Myocardial Imaging:        Risk Assessment:     Echo: see above    Antianginal Therapies:        Beta Blockers:         Calcium Channel Blockers:        Long Acting Nitrates:        Ranexa:     Associated Risk Factors:        Cerebrovascular Disease: N/A       Chronic Lung Disease: N/A       Peripheral Arterial Disease: N/A       Chronic Kidney Disease (if yes, what is GFR): N/A       Uncontrolled Diabetes (if yes, what is HgbA1C or FBS): N/A       Poorly Controlled Hypertension (if yes, what is SBP): N/A       Morbid Obesity (if yes, what is BMI): N/A       History of Recent Ventricular Arrhythmia: N/A       Inability to Ambulate Safely: N/A       Need for Therapeutic Anticoagulation: N/A       Antiplatelet or Contrast Allergy: N/A

## 2019-12-13 NOTE — H&P ADULT - PROBLEM SELECTOR PLAN 3
Hb 7.9, baseline 8-9, unclear etiology  follows with outside hematologist, Dr. Miranda  has not needed transfusion since 2015  - Iron studies  - CBC daily  - Transfuse if Hb<7 Hb 7.9, baseline 8-9 in 2015, follows with outside hematologist, Dr. Miranda  - Will check iron studies, ferritin, B12, folate.   - Transfuse if Hb<7, T&S

## 2019-12-13 NOTE — H&P ADULT - ATTENDING COMMENTS
Seen, examined the patient with house staff in ED  Admitted for acute on chronic systolic HF, s/p L and R heart cath today in anticipation of TAVR for severe AS  Resting in bed, c/w exertional dyspnea and 5 Lbs weight gain in 2 weeks  - Reviewed labs, imaging, cath report  - appreciated HF team eval, recommended IV Diuretic for next few days, closely monitor I/O, weight and renal function (got contrast in cath)  - c/w BB, AC, might need afterload reducers next week  - f/u Structural heart team plan

## 2019-12-13 NOTE — H&P CARDIOLOGY - PMH
Atrial fibrillation    CHF (congestive heart failure)    Coronary disease  PCI  DM (diabetes mellitus)    DYER (dyspnea on exertion)    Former smoker    HLD (hyperlipidemia)    HTN (hypertension)    Meniere disease Atrial fibrillation    CHF (congestive heart failure)    Coronary disease  PCI  DM (diabetes mellitus)    DYER (dyspnea on exertion)    Former smoker    HLD (hyperlipidemia)    HTN (hypertension)    LBBB (left bundle branch block)    Meniere disease Anemia    Atrial fibrillation    CHF (congestive heart failure)    Coronary disease  PCI  DM (diabetes mellitus)    DYER (dyspnea on exertion)    Former smoker    HLD (hyperlipidemia)    HTN (hypertension)    LBBB (left bundle branch block)    Meniere disease

## 2019-12-13 NOTE — H&P ADULT - NSICDXPASTMEDICALHX_GEN_ALL_CORE_FT
PAST MEDICAL HISTORY:  Anemia     Atrial fibrillation     CHF (congestive heart failure)     Coronary disease PCI    DM (diabetes mellitus)     DYER (dyspnea on exertion)     Former smoker     HLD (hyperlipidemia)     HTN (hypertension)     LBBB (left bundle branch block)     Meniere disease

## 2019-12-13 NOTE — H&P CARDIOLOGY - FAMILY HISTORY
Sibling  Still living? No  Family history of brain tumor, Age at diagnosis: Age Unknown     Mother  Still living? No  Family history of CHF (congestive heart failure), Age at diagnosis: Age Unknown  Family history of heart attack, Age at diagnosis: Age Unknown     Child  Still living? No  Family history of heart attack, Age at diagnosis: Age Unknown     Father  Still living? No  Family history of lung cancer, Age at diagnosis: Age Unknown

## 2019-12-13 NOTE — H&P ADULT - PROBLEM SELECTOR PLAN 1
TTE 12/5 EF 28%, repeat TTE 12/10 EF 30%  2+ LE edema on exam likely 2/2 acute decompensation of HF related to worsening aortic stenosis.  pro-BNP 70481  Was increased by cardiologist to torsemide 40 mg daily at home  - Start IV diuretic  - Strict Ins and Outs  - daily weights  - Low salt diet, 1L daily fluid restriction  - f/u CXR TTE 12/5 EF 28%, repeat TTE 12/10 EF 30%  2+ LE edema on exam with rales bilaterally 2/2 acute decompensation of HF related to worsening aortic stenosis and PO intake  pro-BNP 18629  - recenttly increased to torsemide 40 mg daily by cardiologist  - Start IV Lasix 80mg BID  - Strict Ins and Outs  - daily weights  - Low salt diet, 1L daily fluid restriction  - f/u CXR Pt presented with DYER, with recent TTE 12/10 EF 30% with severe AS, with LV dysfunction, prior Echo in 6/2019 was 42%. Exam notable for 3+ LE edema up to b/l thigh, b/l rales, likely related to severe AS. Pt endorsing diet compliant, recently increased torsemide from 20mg qd to 40mg qd  - WIll start IV Lasix 80mg BID for diuresis.   - Strict Ins and Outs, daily weight, fluid restriction to 1L/day  - Will get CXR   - Follow up HF  recs. Pt presented with DYER, with recent TTE 12/10 EF 30% with severe AS, with LV dysfunction, prior Echo in 6/2019 was 42%. Exam notable for 3+ LE edema up to b/l thigh, b/l rales, likely related to severe AS. Pt endorsing diet compliant, recently increased torsemide from 20mg qd to 40mg qd  - WIll start IV Lasix 80mg BID for diuresis.   - Strict Ins and Outs, daily weight, fluid restriction to 1L/day  - Will get CXR   - C/w toprol 25mg qd  - Follow up HF  recs.

## 2019-12-14 LAB
ANION GAP SERPL CALC-SCNC: 13 MMOL/L — SIGNIFICANT CHANGE UP (ref 5–17)
APTT BLD: 109.2 SEC — HIGH (ref 27.5–36.3)
APTT BLD: 72.5 SEC — HIGH (ref 27.5–36.3)
APTT BLD: 82 SEC — HIGH (ref 27.5–36.3)
APTT BLD: 94 SEC — HIGH (ref 27.5–36.3)
BUN SERPL-MCNC: 52 MG/DL — HIGH (ref 7–23)
CALCIUM SERPL-MCNC: 8.9 MG/DL — SIGNIFICANT CHANGE UP (ref 8.4–10.5)
CHLORIDE SERPL-SCNC: 101 MMOL/L — SIGNIFICANT CHANGE UP (ref 96–108)
CO2 SERPL-SCNC: 21 MMOL/L — LOW (ref 22–31)
CREAT SERPL-MCNC: 1.51 MG/DL — HIGH (ref 0.5–1.3)
DIGOXIN SERPL-MCNC: 1.9 NG/ML — SIGNIFICANT CHANGE UP (ref 0.8–2)
FERRITIN SERPL-MCNC: 360 NG/ML — SIGNIFICANT CHANGE UP (ref 30–400)
FOLATE SERPL-MCNC: >20 NG/ML — SIGNIFICANT CHANGE UP
GLUCOSE BLDC GLUCOMTR-MCNC: 140 MG/DL — HIGH (ref 70–99)
GLUCOSE BLDC GLUCOMTR-MCNC: 162 MG/DL — HIGH (ref 70–99)
GLUCOSE BLDC GLUCOMTR-MCNC: 162 MG/DL — HIGH (ref 70–99)
GLUCOSE BLDC GLUCOMTR-MCNC: 171 MG/DL — HIGH (ref 70–99)
GLUCOSE SERPL-MCNC: 154 MG/DL — HIGH (ref 70–99)
HAPTOGLOB SERPL-MCNC: 31 MG/DL — LOW (ref 34–200)
HBA1C BLD-MCNC: 5.6 % — SIGNIFICANT CHANGE UP (ref 4–5.6)
HCT VFR BLD CALC: 24.5 % — LOW (ref 39–50)
HGB BLD-MCNC: 7.8 G/DL — LOW (ref 13–17)
IRON SATN MFR SERPL: 22 % — SIGNIFICANT CHANGE UP (ref 16–55)
IRON SATN MFR SERPL: 58 UG/DL — SIGNIFICANT CHANGE UP (ref 45–165)
LDH SERPL L TO P-CCNC: 257 U/L — HIGH (ref 50–242)
MCHC RBC-ENTMCNC: 30.5 PG — SIGNIFICANT CHANGE UP (ref 27–34)
MCHC RBC-ENTMCNC: 31.8 GM/DL — LOW (ref 32–36)
MCV RBC AUTO: 95.7 FL — SIGNIFICANT CHANGE UP (ref 80–100)
PLATELET # BLD AUTO: 192 K/UL — SIGNIFICANT CHANGE UP (ref 150–400)
POTASSIUM SERPL-MCNC: 3.8 MMOL/L — SIGNIFICANT CHANGE UP (ref 3.5–5.3)
POTASSIUM SERPL-SCNC: 3.8 MMOL/L — SIGNIFICANT CHANGE UP (ref 3.5–5.3)
RBC # BLD: 2.56 M/UL — LOW (ref 4.2–5.8)
RBC # BLD: 2.56 M/UL — LOW (ref 4.2–5.8)
RBC # FLD: 18.3 % — HIGH (ref 10.3–14.5)
RETICS #: 170.7 K/UL — HIGH (ref 25–125)
RETICS/RBC NFR: 6.7 % — HIGH (ref 0.5–2.5)
SODIUM SERPL-SCNC: 135 MMOL/L — SIGNIFICANT CHANGE UP (ref 135–145)
TIBC SERPL-MCNC: 264 UG/DL — SIGNIFICANT CHANGE UP (ref 220–430)
UIBC SERPL-MCNC: 206 UG/DL — SIGNIFICANT CHANGE UP (ref 110–370)
VIT B12 SERPL-MCNC: 675 PG/ML — SIGNIFICANT CHANGE UP (ref 232–1245)
WBC # BLD: 7.95 K/UL — SIGNIFICANT CHANGE UP (ref 3.8–10.5)
WBC # FLD AUTO: 7.95 K/UL — SIGNIFICANT CHANGE UP (ref 3.8–10.5)

## 2019-12-14 PROCEDURE — 99232 SBSQ HOSP IP/OBS MODERATE 35: CPT | Mod: GC

## 2019-12-14 RX ORDER — LANOLIN ALCOHOL/MO/W.PET/CERES
6 CREAM (GRAM) TOPICAL AT BEDTIME
Refills: 0 | Status: DISCONTINUED | OUTPATIENT
Start: 2019-12-14 | End: 2019-12-26

## 2019-12-14 RX ORDER — ATORVASTATIN CALCIUM 80 MG/1
40 TABLET, FILM COATED ORAL AT BEDTIME
Refills: 0 | Status: DISCONTINUED | OUTPATIENT
Start: 2019-12-14 | End: 2019-12-26

## 2019-12-14 RX ORDER — LANOLIN ALCOHOL/MO/W.PET/CERES
6 CREAM (GRAM) TOPICAL AT BEDTIME
Refills: 0 | Status: DISCONTINUED | OUTPATIENT
Start: 2019-12-14 | End: 2019-12-14

## 2019-12-14 RX ADMIN — HEPARIN SODIUM 1000 UNIT(S)/HR: 5000 INJECTION INTRAVENOUS; SUBCUTANEOUS at 15:37

## 2019-12-14 RX ADMIN — Medication 325 MILLIGRAM(S): at 11:47

## 2019-12-14 RX ADMIN — TIOTROPIUM BROMIDE 1 CAPSULE(S): 18 CAPSULE ORAL; RESPIRATORY (INHALATION) at 11:47

## 2019-12-14 RX ADMIN — CLOPIDOGREL BISULFATE 75 MILLIGRAM(S): 75 TABLET, FILM COATED ORAL at 11:47

## 2019-12-14 RX ADMIN — Medication 1: at 12:19

## 2019-12-14 RX ADMIN — Medication 40 MILLIGRAM(S): at 06:08

## 2019-12-14 RX ADMIN — HEPARIN SODIUM 1000 UNIT(S)/HR: 5000 INJECTION INTRAVENOUS; SUBCUTANEOUS at 07:52

## 2019-12-14 RX ADMIN — Medication 6 MILLIGRAM(S): at 22:09

## 2019-12-14 RX ADMIN — ATORVASTATIN CALCIUM 40 MILLIGRAM(S): 80 TABLET, FILM COATED ORAL at 22:12

## 2019-12-14 RX ADMIN — Medication 1 MILLIGRAM(S): at 11:47

## 2019-12-14 RX ADMIN — Medication 40 MILLIGRAM(S): at 17:59

## 2019-12-14 RX ADMIN — HEPARIN SODIUM 1100 UNIT(S)/HR: 5000 INJECTION INTRAVENOUS; SUBCUTANEOUS at 00:19

## 2019-12-14 RX ADMIN — Medication 1 SPRAY(S): at 12:56

## 2019-12-14 RX ADMIN — SODIUM CHLORIDE 3 MILLILITER(S): 9 INJECTION INTRAMUSCULAR; INTRAVENOUS; SUBCUTANEOUS at 21:23

## 2019-12-14 RX ADMIN — Medication 0.12 MILLIGRAM(S): at 06:08

## 2019-12-14 RX ADMIN — SODIUM CHLORIDE 3 MILLILITER(S): 9 INJECTION INTRAMUSCULAR; INTRAVENOUS; SUBCUTANEOUS at 13:18

## 2019-12-14 RX ADMIN — Medication 6 MILLIGRAM(S): at 00:41

## 2019-12-14 RX ADMIN — SODIUM CHLORIDE 3 MILLILITER(S): 9 INJECTION INTRAMUSCULAR; INTRAVENOUS; SUBCUTANEOUS at 06:05

## 2019-12-14 RX ADMIN — HEPARIN SODIUM 1000 UNIT(S)/HR: 5000 INJECTION INTRAVENOUS; SUBCUTANEOUS at 23:47

## 2019-12-14 RX ADMIN — Medication 1: at 08:28

## 2019-12-14 RX ADMIN — Medication 25 MILLIGRAM(S): at 06:08

## 2019-12-14 NOTE — PROGRESS NOTE ADULT - PROBLEM SELECTOR PLAN 2
TTE showing severe aortic stenosis, symptomatic with SOB on exertion  - Structural heart following, plan for possible eventual TAVR when euvolemic.  - Monitor on tele.  - pt wishes go home after diuresis and does not want to stay in the hospital for TAVR

## 2019-12-14 NOTE — PROGRESS NOTE ADULT - ASSESSMENT
84M PMH chronic anemia, HFrEF (40%), Afib on coumadin, T2DM, HLD, PVD, Menieres Disease, carotid stenosis, LBBB on EKG, CAD s/p PCI to mLAD in June 2014 and PCI to pRCA that was complicated by perforation and cardiogenic shock in May of 2015 presented for elective C/C for TAVR evaluation for severe AS, admitted for acute on on chronic CHF and anemia workup.

## 2019-12-14 NOTE — PROGRESS NOTE ADULT - PROBLEM SELECTOR PLAN 6
-home rosuvstatin 10mg occasionally has dizziness and tinnitus, currently asymptomatic  - continue with home meclizine

## 2019-12-14 NOTE — PROGRESS NOTE ADULT - SUBJECTIVE AND OBJECTIVE BOX
Shari Dang MD  PGY-1  Pager [phone]  Pager [phone]      Patient is a 84y old  Male who presents with a chief complaint of CHF exacerbation (13 Dec 2019 14:16)        SUBJECTIVE / OVERNIGHT EVENTS: Patient had no acute events overnight. Patient seen and examined at bedside this morning. He could not sleep last night despite give 6mg melatonin and wishes to go home. Patient expressed concerns for being in the hospital for prolong period of time and wants to be home before the holidays. His wife passed away earlier this year after being the hospital and transferred to a nursing home. After diuresis, patient wants to continue TAVR evaluation outpatient      ROS: [ - ] Fever [ - ] Chills [ - ] Nausea/Vomiting [ - ] Chest Pain [ - ] Shortness of breath     Telemetry: Afib 70s-90s    MEDICATIONS  (STANDING):  clopidogrel Tablet 75 milliGRAM(s) Oral daily  dextrose 5%. 1000 milliLiter(s) (50 mL/Hr) IV Continuous <Continuous>  dextrose 50% Injectable 12.5 Gram(s) IV Push once  dextrose 50% Injectable 25 Gram(s) IV Push once  dextrose 50% Injectable 25 Gram(s) IV Push once  digoxin     Tablet 0.125 milliGRAM(s) Oral daily  ferrous    sulfate 325 milliGRAM(s) Oral daily  fluticasone propionate 50 MICROgram(s)/spray Nasal Spray 1 Spray(s) Both Nostrils daily  folic acid 1 milliGRAM(s) Oral daily  furosemide   Injectable 40 milliGRAM(s) IV Push two times a day  heparin  Infusion.  Unit(s)/Hr (11 mL/Hr) IV Continuous <Continuous>  insulin lispro (HumaLOG) corrective regimen sliding scale   SubCutaneous three times a day before meals  insulin lispro (HumaLOG) corrective regimen sliding scale   SubCutaneous at bedtime  melatonin 6 milliGRAM(s) Oral at bedtime  metoprolol succinate ER 25 milliGRAM(s) Oral daily  simvastatin 10 milliGRAM(s) Oral at bedtime  sodium chloride 0.9% lock flush 3 milliLiter(s) IV Push every 8 hours  tiotropium 18 MICROgram(s) Capsule 1 Capsule(s) Inhalation daily    MEDICATIONS  (PRN):  ALBUTerol    90 MICROgram(s) HFA Inhaler 2 Puff(s) Inhalation every 6 hours PRN Shortness of Breath and/or Wheezing  dextrose 40% Gel 15 Gram(s) Oral once PRN Blood Glucose LESS THAN 70 milliGRAM(s)/deciliter  glucagon  Injectable 1 milliGRAM(s) IntraMuscular once PRN Glucose LESS THAN 70 milligrams/deciliter  guaiFENesin  milliGRAM(s) Oral every 12 hours PRN Cough  heparin  Injectable 4500 Unit(s) IV Push every 6 hours PRN For aPTT less than 40  heparin  Injectable 2000 Unit(s) IV Push every 6 hours PRN For aPTT between 40 - 57  meclizine 12.5 milliGRAM(s) Oral three times a day PRN Dizziness      Vital Signs Last 24 Hrs  T(C): 36.6 (14 Dec 2019 04:42), Max: 37.1 (13 Dec 2019 20:18)  T(F): 97.8 (14 Dec 2019 04:42), Max: 98.7 (13 Dec 2019 20:18)  HR: 74 (14 Dec 2019 04:42) (74 - 95)  BP: 106/66 (14 Dec 2019 04:42) (99/56 - 111/71)  BP(mean): --  RR: 18 (14 Dec 2019 04:42) (18 - 18)  SpO2: 94% (14 Dec 2019 04:42) (94% - 96%)  CAPILLARY BLOOD GLUCOSE      POCT Blood Glucose.: 171 mg/dL (14 Dec 2019 08:23)  POCT Blood Glucose.: 199 mg/dL (13 Dec 2019 21:06)    I&O's Summary    13 Dec 2019 07:01  -  14 Dec 2019 07:00  --------------------------------------------------------  IN: 120 mL / OUT: 600 mL / NET: -480 mL        PHYSICAL EXAM  GENERAL: NAD, lying comfortably in bed, frail   HEENT:  Atraumatic, Normocephalic, EOMI, conjunctiva and sclera clear, no LAD  CHEST/LUNG: Clear to auscultation bilaterally; No wheeze  HEART: irregular irregular, S1 and S2. Systolic murmur decreacendo.  ABDOMEN: Soft, Nontender, Nondistended; Bowel sounds present  EXTREMITIES:  2+ Peripheral Pulses, 1+ pitting edema bilaterally  NEURO: AAOx3, non-focal  SKIN: No rashes or lesions    LABS:                        7.4    8.05  )-----------( 183      ( 13 Dec 2019 23:52 )             22.6     12-14    135  |  101  |  52<H>  ----------------------------<  154<H>  3.8   |  21<L>  |  1.51<H>    Ca    8.9      14 Dec 2019 07:15    TPro  7.4  /  Alb  3.7  /  TBili  2.2<H>  /  DBili  x   /  AST  10  /  ALT  9<L>  /  AlkPhos  68  12-13    PT/INR - ( 13 Dec 2019 07:52 )   PT: 17.4 sec;   INR: 1.49 ratio         PTT - ( 14 Dec 2019 07:15 )  PTT:109.2 sec      RADIOLOGY & ADDITIONAL TESTS:  < from: Xray Chest 1 View- PORTABLE-Urgent (12.13.19 @ 15:34) >  EXAM:  XR CHEST PORTABLE URGENT 1V                            PROCEDURE DATE:  12/13/2019            INTERPRETATION:  CLINICAL INFORMATION: CHF.    EXAM: Frontal chest radiograph dated 12/13/2019.    COMPARISON: CT chest 5/25/2015.    FINDINGS:  The lungs are clear.  Loculated fluid in the left major fissure.  The cardiomediastinal silhouette is enlarged.  Chronic deformity of the right humeral head.    IMPRESSION:   Loculated fluid in the left major fissure.    < end of copied text >    Imaging Personally Reviewed:  Consultant(s) Notes Reviewed:

## 2019-12-14 NOTE — PROGRESS NOTE ADULT - PROBLEM SELECTOR PLAN 9
Transitions of Care Status:  1.  Name of PCP: Sunshine Rosa  2.  PCP Contacted on Admission: [ ] Y    [ ] N    3.  PCP contacted at Discharge: [ ] Y    [ ] N    [ ] N/A  4.  Post-Discharge Appointment Date and Location:  5.  Summary of Handoff given to PCP: DVT: heparin gtt for possible TAVR procedure  Diet: Consistent carb and DASH  Dispo: PT eval

## 2019-12-14 NOTE — PROGRESS NOTE ADULT - PROBLEM SELECTOR PLAN 3
Hb 7.9, baseline 8-9 in 2015, follows with outside hematologist, Dr. Miranda  - f/u iron studies, ferritin, B12, folate.   - Transfuse if Hb<7, T&S

## 2019-12-14 NOTE — PROGRESS NOTE ADULT - PROBLEM SELECTOR PLAN 4
Pt on coumadin at home, will hold and start Heparin gtt in anticipation for possible TAVR  - CHADSVASc score of 6  - Continue with digoxin and toprol 25mg qd. Digoxin level wnl  - Monitor on telemetry 2/2 contrast from R/LHC. Cr initial 1.3 (unclear baseline Cr)  - hold valsartan until Cr stabilizes  - trend Cr and avoid nephrotoxin meds  - monitor I/Os

## 2019-12-14 NOTE — PROGRESS NOTE ADULT - PROBLEM SELECTOR PLAN 1
Pt presented with DYER, with recent TTE 12/10 EF 30% with severe AS, with LV dysfunction, prior Echo in 6/2019 was 42%. Exam notable for 3+ LE edema up to b/l thigh, b/l rales, likely related to severe AS. Pt endorsing diet compliant, recently increased torsemide from 20mg qd to 40mg qd  - WIll start IV Lasix 40mg BID for diuresis.   - Strict Ins and Outs, daily weight, fluid restriction to 1L/day  - Will get CXR   - C/w toprol 25mg qd  - Follow up HF  recs. Pt presented with DYER, with recent TTE 12/10 EF 30% with severe AS, with LV dysfunction, prior Echo in 6/2019 was 42%. Exam notable for 3+ LE edema up to b/l thigh, b/l rales, likely related to severe AS. Pt endorsing diet compliant, recently increased torsemide from 20mg qd to 40mg qd  -CXR: loculated fluid at left major fissue  - Will start IV Lasix 40mg BID for diuresis.   - Strict Ins and Outs, daily weight, fluid restriction to 1L/day  - C/w toprol 25mg qd  - Follow up HF  recs. Pt presented with DYER, with recent TTE 12/10 EF 30% with severe AS, with LV dysfunction, prior Echo in 6/2019 was 42%. Exam notable for 3+ LE edema up to b/l thigh, b/l rales, likely related to severe AS. Pt endorsing diet compliant, recently increased torsemide from 20mg qd to 40mg qd  -CXR: loculated fluid at left major fissure. Repeat CXR on Monday to evaluate fluid  - IV Lasix 40mg BID for diuresis.   - Strict Ins and Outs, daily weight, fluid restriction to 1L/day  - C/w toprol 25mg qd  - Follow up HF  recs.

## 2019-12-14 NOTE — PROGRESS NOTE ADULT - PROBLEM SELECTOR PLAN 5
occasionally has dizziness and tinnitus, currently asymptomatic  - continue with home meclizine Pt on coumadin at home, will hold and start Heparin gtt in anticipation for possible TAVR  - CHADSVASc score of 6  - Continue with digoxin and toprol 25mg qd. Digoxin level wnl  - Monitor on telemetry

## 2019-12-15 LAB
ANION GAP SERPL CALC-SCNC: 17 MMOL/L — SIGNIFICANT CHANGE UP (ref 5–17)
APTT BLD: 67.5 SEC — HIGH (ref 27.5–36.3)
BUN SERPL-MCNC: 51 MG/DL — HIGH (ref 7–23)
CALCIUM SERPL-MCNC: 8.3 MG/DL — LOW (ref 8.4–10.5)
CHLORIDE SERPL-SCNC: 103 MMOL/L — SIGNIFICANT CHANGE UP (ref 96–108)
CO2 SERPL-SCNC: 20 MMOL/L — LOW (ref 22–31)
CREAT SERPL-MCNC: 1.47 MG/DL — HIGH (ref 0.5–1.3)
GLUCOSE BLDC GLUCOMTR-MCNC: 140 MG/DL — HIGH (ref 70–99)
GLUCOSE BLDC GLUCOMTR-MCNC: 157 MG/DL — HIGH (ref 70–99)
GLUCOSE BLDC GLUCOMTR-MCNC: 169 MG/DL — HIGH (ref 70–99)
GLUCOSE BLDC GLUCOMTR-MCNC: 170 MG/DL — HIGH (ref 70–99)
GLUCOSE SERPL-MCNC: 146 MG/DL — HIGH (ref 70–99)
HCT VFR BLD CALC: 23.7 % — LOW (ref 39–50)
HGB BLD-MCNC: 7.5 G/DL — LOW (ref 13–17)
MAGNESIUM SERPL-MCNC: 2.2 MG/DL — SIGNIFICANT CHANGE UP (ref 1.6–2.6)
MCHC RBC-ENTMCNC: 30.7 PG — SIGNIFICANT CHANGE UP (ref 27–34)
MCHC RBC-ENTMCNC: 31.6 GM/DL — LOW (ref 32–36)
MCV RBC AUTO: 97.1 FL — SIGNIFICANT CHANGE UP (ref 80–100)
PHOSPHATE SERPL-MCNC: 3.3 MG/DL — SIGNIFICANT CHANGE UP (ref 2.5–4.5)
PLATELET # BLD AUTO: 182 K/UL — SIGNIFICANT CHANGE UP (ref 150–400)
POTASSIUM SERPL-MCNC: 3.6 MMOL/L — SIGNIFICANT CHANGE UP (ref 3.5–5.3)
POTASSIUM SERPL-SCNC: 3.6 MMOL/L — SIGNIFICANT CHANGE UP (ref 3.5–5.3)
RBC # BLD: 2.44 M/UL — LOW (ref 4.2–5.8)
RBC # FLD: 18.2 % — HIGH (ref 10.3–14.5)
SODIUM SERPL-SCNC: 140 MMOL/L — SIGNIFICANT CHANGE UP (ref 135–145)
WBC # BLD: 8.47 K/UL — SIGNIFICANT CHANGE UP (ref 3.8–10.5)
WBC # FLD AUTO: 8.47 K/UL — SIGNIFICANT CHANGE UP (ref 3.8–10.5)

## 2019-12-15 PROCEDURE — 99233 SBSQ HOSP IP/OBS HIGH 50: CPT

## 2019-12-15 PROCEDURE — 99232 SBSQ HOSP IP/OBS MODERATE 35: CPT | Mod: GC

## 2019-12-15 RX ADMIN — SODIUM CHLORIDE 3 MILLILITER(S): 9 INJECTION INTRAMUSCULAR; INTRAVENOUS; SUBCUTANEOUS at 05:20

## 2019-12-15 RX ADMIN — Medication 40 MILLIGRAM(S): at 05:22

## 2019-12-15 RX ADMIN — Medication 325 MILLIGRAM(S): at 11:48

## 2019-12-15 RX ADMIN — Medication 1 MILLIGRAM(S): at 11:49

## 2019-12-15 RX ADMIN — Medication 25 MILLIGRAM(S): at 05:22

## 2019-12-15 RX ADMIN — HEPARIN SODIUM 1000 UNIT(S)/HR: 5000 INJECTION INTRAVENOUS; SUBCUTANEOUS at 09:29

## 2019-12-15 RX ADMIN — SODIUM CHLORIDE 3 MILLILITER(S): 9 INJECTION INTRAMUSCULAR; INTRAVENOUS; SUBCUTANEOUS at 21:45

## 2019-12-15 RX ADMIN — SODIUM CHLORIDE 3 MILLILITER(S): 9 INJECTION INTRAMUSCULAR; INTRAVENOUS; SUBCUTANEOUS at 13:12

## 2019-12-15 RX ADMIN — Medication 1: at 12:35

## 2019-12-15 RX ADMIN — Medication 0.12 MILLIGRAM(S): at 05:21

## 2019-12-15 RX ADMIN — Medication 40 MILLIGRAM(S): at 17:11

## 2019-12-15 RX ADMIN — Medication 1: at 08:26

## 2019-12-15 RX ADMIN — Medication 1 SPRAY(S): at 11:48

## 2019-12-15 RX ADMIN — ATORVASTATIN CALCIUM 40 MILLIGRAM(S): 80 TABLET, FILM COATED ORAL at 21:41

## 2019-12-15 RX ADMIN — CLOPIDOGREL BISULFATE 75 MILLIGRAM(S): 75 TABLET, FILM COATED ORAL at 12:35

## 2019-12-15 RX ADMIN — TIOTROPIUM BROMIDE 1 CAPSULE(S): 18 CAPSULE ORAL; RESPIRATORY (INHALATION) at 11:49

## 2019-12-15 NOTE — PHYSICAL THERAPY INITIAL EVALUATION ADULT - PERTINENT HX OF CURRENT PROBLEM, REHAB EVAL
85 yo  male no implantable device with PMHx of AFib (on warfarin, last dose 12/10), HTN, HLD, DM2, CAD (s/p mLAD stent 6/6/14), CHFrEF (EF 40%), Meniere's disease, RCA stent x2 in 5/2015 (complicated with perforation and tamponade, Vfib arrest), anemia and aortic valve stenosis presents for cardiac cath prior to TAVR., CXR 12/13, loculated fluid in the L major fissure, EcG 12/13, A-fib, L BBB.

## 2019-12-15 NOTE — PHYSICAL THERAPY INITIAL EVALUATION ADULT - PLANNED THERAPY INTERVENTIONS, PT EVAL
bed mobility training/transfer training/gait training/stairs: GOAL: patient will be able to negotiated 6 stairs (I) with one HR in 2 weeks

## 2019-12-15 NOTE — PROGRESS NOTE ADULT - PROBLEM SELECTOR PLAN 5
Pt on coumadin at home, will hold and start Heparin gtt in anticipation for possible TAVR  - CHADSVASc score of 6  - Continue with digoxin and toprol 25mg qd. Digoxin level wnl  - Monitor on telemetry

## 2019-12-15 NOTE — PROGRESS NOTE ADULT - PROBLEM SELECTOR PLAN 6
- he meets IIa indications for CRT given low EF with LBBB and concurrent AF. Given dyssynchrony of septum noted on TTE I believe he has a higher chance of being a responder if able to have high CRT pacing percentage with his AF  - would consider evaluation after TAVR.

## 2019-12-15 NOTE — PROGRESS NOTE ADULT - PROBLEM SELECTOR PLAN 1
Pt presented with DYER, with recent TTE 12/10 EF 30% with severe AS, with LV dysfunction, prior Echo in 6/2019 was 42%. Exam notable for 3+ LE edema up to b/l thigh, b/l rales, likely related to severe AS. Pt endorsing diet compliant, recently increased torsemide from 20mg qd to 40mg qd  -CXR: loculated fluid at left major fissure. Repeat CXR on Monday to evaluate fluid  - IV Lasix 40mg BID for diuresis.   - Strict Ins and Outs, daily weight, fluid restriction to 1L/day  - C/w toprol 25mg qd  - Follow up HF  recs.

## 2019-12-15 NOTE — PROVIDER CONTACT NOTE (OTHER) - BACKGROUND
83 yo male admitted on 12/13 for aortic valve disorder. PMH LBBB, CHF, Afib and htn. First time pause on tele.

## 2019-12-15 NOTE — PROVIDER CONTACT NOTE (OTHER) - ASSESSMENT
Pt. alert and oriented x4, remained safe and hemodynamically stable. VSS. Pt denies chest pain, palpations or SOB.

## 2019-12-15 NOTE — PROGRESS NOTE ADULT - PROBLEM SELECTOR PLAN 2
TTE showing severe aortic stenosis, symptomatic with SOB on exertion  - Structural heart following, plan for possible eventual TAVR when euvolemic.  - Monitor on tele.  - pt wishes go home after diuresis and does not want to stay in the hospital for TAVR TTE showing severe aortic stenosis, symptomatic with SOB on exertion  - Structural heart following, plan for possible eventual TAVR when euvolemic.  - Monitor on tele.  - Patient is agreeable to stay in the hospital for TAVR if it Is to be performed near, but if not, then prefers to go home and come back for TAVR.

## 2019-12-15 NOTE — PHYSICAL THERAPY INITIAL EVALUATION ADULT - ADDITIONAL COMMENTS
as per pt, resides in a PH alone, 6 stairs to enter with HR, amb (I) with RW, required assist for ADLs. +HHA 24H/5D

## 2019-12-15 NOTE — PROGRESS NOTE ADULT - ASSESSMENT
85 YO M with a history of ACC/AHA Stage C ischemic cardiomyopathy (LV 4.5 cm, EF 30%), severe AS (58/30/0.6), chronic AF on coumadin, and CAD s/p PCI to RCA and LAD with prior tamponade during coronary intervention who is admitted with decompensated heart failure after R/LHC with plans for optimization prior to consideration of TAVR. His volume status has improved with diuretics    Review of pertinent studies   RHC: RA 10, PA 52/21/36, PCWP 23, CO/CI 4.34/CI 2.64, PA sat 47.1, Ao Sat 98%  LHC: occluded ostial RCA from ISR, 70% OM1, moderate LAD disease   TTE: LV 4.5 cm, EF 30%, severe AS (58/30/0.6) from calcific valve with severely restricted opening, moderate MR, LVOT VTI 15 cm, dyssynchronous septal movement, normal RV function   EKG: AF with LBBB,  ms

## 2019-12-15 NOTE — PROGRESS NOTE ADULT - PROBLEM SELECTOR PLAN 4
2/2 contrast from R/LHC. Cr initial 1.3 (unclear baseline Cr)  - hold valsartan until Cr stabilizes  - trend Cr and avoid nephrotoxin meds  - monitor I/Os

## 2019-12-15 NOTE — PROGRESS NOTE ADULT - PROBLEM SELECTOR PLAN 1
- GDMT: continue metoprolol succinate 25 mg daily. Deferring ACEi/ARB given mildly elevated Cr without known baseline  - diuretic: furosemide IVP 40mg BID, likely switch to PO tomorrow. Will avoid overaggressive diuresis in setting of severe AS   - daily standing weight and strict I's and O's.

## 2019-12-15 NOTE — PROGRESS NOTE ADULT - ATTENDING COMMENTS
Patient seen and examined. Continue with IV lasix twice day, likely transition to PO lasix tomorrow. CHF team following. Cr stable. Patient seen and examined. Continue with IV lasix twice day, likely transition to PO lasix tomorrow. CHF team following. Cr stable.    CHRONIC ATRIAL FIBRILLATION

## 2019-12-15 NOTE — PROGRESS NOTE ADULT - SUBJECTIVE AND OBJECTIVE BOX
Patricia Rodriguez Karie  PGY-2  Pager: [phone]    Patient is a 84y old  Male who presents with a chief complaint of CHF exacerbation (14 Dec 2019 10:36)      SUBJECTIVE / OVERNIGHT EVENTS:    MEDICATIONS  (STANDING):  atorvastatin 40 milliGRAM(s) Oral at bedtime  clopidogrel Tablet 75 milliGRAM(s) Oral daily  dextrose 5%. 1000 milliLiter(s) (50 mL/Hr) IV Continuous <Continuous>  dextrose 50% Injectable 12.5 Gram(s) IV Push once  dextrose 50% Injectable 25 Gram(s) IV Push once  dextrose 50% Injectable 25 Gram(s) IV Push once  digoxin     Tablet 0.125 milliGRAM(s) Oral daily  ferrous    sulfate 325 milliGRAM(s) Oral daily  fluticasone propionate 50 MICROgram(s)/spray Nasal Spray 1 Spray(s) Both Nostrils daily  folic acid 1 milliGRAM(s) Oral daily  furosemide   Injectable 40 milliGRAM(s) IV Push two times a day  heparin  Infusion.  Unit(s)/Hr (11 mL/Hr) IV Continuous <Continuous>  insulin lispro (HumaLOG) corrective regimen sliding scale   SubCutaneous three times a day before meals  insulin lispro (HumaLOG) corrective regimen sliding scale   SubCutaneous at bedtime  melatonin 6 milliGRAM(s) Oral at bedtime  metoprolol succinate ER 25 milliGRAM(s) Oral daily  sodium chloride 0.9% lock flush 3 milliLiter(s) IV Push every 8 hours  tiotropium 18 MICROgram(s) Capsule 1 Capsule(s) Inhalation daily    MEDICATIONS  (PRN):  ALBUTerol    90 MICROgram(s) HFA Inhaler 2 Puff(s) Inhalation every 6 hours PRN Shortness of Breath and/or Wheezing  dextrose 40% Gel 15 Gram(s) Oral once PRN Blood Glucose LESS THAN 70 milliGRAM(s)/deciliter  glucagon  Injectable 1 milliGRAM(s) IntraMuscular once PRN Glucose LESS THAN 70 milligrams/deciliter  guaiFENesin  milliGRAM(s) Oral every 12 hours PRN Cough  heparin  Injectable 4500 Unit(s) IV Push every 6 hours PRN For aPTT less than 40  heparin  Injectable 2000 Unit(s) IV Push every 6 hours PRN For aPTT between 40 - 57  meclizine 12.5 milliGRAM(s) Oral three times a day PRN Dizziness      T(C): 36.8 (12-15-19 @ 06:05), Max: 37 (12-14-19 @ 19:57)  HR: 80 (12-15-19 @ 06:05) (73 - 89)  BP: 104/60 (12-15-19 @ 06:05) (104/59 - 110/66)  RR: 18 (12-15-19 @ 06:05) (18 - 18)  SpO2: 99% (12-15-19 @ 06:05) (95% - 100%)  CAPILLARY BLOOD GLUCOSE      POCT Blood Glucose.: 157 mg/dL (15 Dec 2019 08:05)  POCT Blood Glucose.: 162 mg/dL (14 Dec 2019 21:36)  POCT Blood Glucose.: 140 mg/dL (14 Dec 2019 16:58)  POCT Blood Glucose.: 162 mg/dL (14 Dec 2019 11:52)    I&O's Summary    14 Dec 2019 07:01  -  15 Dec 2019 07:00  --------------------------------------------------------  IN: 930 mL / OUT: 2050 mL / NET: -1120 mL        PHYSICAL EXAM:  PHYSICAL EXAM:    Constitutional: NAD. well-developed; well-groomed; well-nourished;  HEENT: AT/NC, PERRLA; EOMI, MMM, no oropharyngeal lesions, no erythema, no exudates, Supple neck; normal thyroid gland, no cervical lymphadenopathy  Respiratory: CTAB. equal aeration bilaterally. no wheezing, no crackes, no rhonchi. no increase in WOB  Cardiovascular: RRR, no M/R/G. 2+ distal pulses. Cap refill < 2 seconds. no JVD  Gastrointestinal: soft; NT/ND, +BS, no rebounding tenderness / guarding / HSM / mass / ascites.  Genitourinary: not examined.  Extremities: no clubbing; no cyanosis; no pedal edema, non-tender to palpation, DP and Radial pulses intact.  Skin: warm and dry; color normal: no rash: no ulcers  Neurological: A&Ox 3; responds to pain; responds to verbal commands; epicritic and protopathic sensation intact: CN nerves grossly intact.   MSK/Back: no deformities. Active and passive ROM intact; strength intact, no CVA tenderness, No joint tenderness, swelling, erythema  Psychiatric: normal mood/affect. Denies SI/HI    LABS:                        7.8    7.95  )-----------( 192      ( 14 Dec 2019 10:55 )             24.5     12-15    140  |  103  |  51<H>  ----------------------------<  146<H>  3.6   |  20<L>  |  1.47<H>    Ca    8.3<L>      15 Dec 2019 06:15  Phos  3.3     12-15  Mg     2.2     12-15      PTT - ( 14 Dec 2019 23:27 )  PTT:72.5 sec          RADIOLOGY & ADDITIONAL TESTS:    Imaging Personally Reviewed: JESSIKA    Consultant(s) Notes Reviewed:  JESSIKA    Care Discussed with Consultants/Other Providers: JESSIKA Patricia Rodriguez Karie  PGY-2  Pager: [phone]    Patient is a 84y old  Male who presents with a chief complaint of CHF exacerbation (14 Dec 2019 10:36)      SUBJECTIVE / OVERNIGHT EVENTS:  no acute events overnight.  tele showed 3 seconds sinus pause and 4 beats of NSVT. VS was stable and patient did not have any symptoms from the episodes.  Had good UOP of 2L and neg -1120.   Patient reports no dyspneia, good po intake.   Though he feels weak and is unable to ambulate as baseline, yet he does not want to go to rehab as he has bad experience from it.  He has home health aid for 24 hours, who drives for him, so he would rather commute from home or prefer home PT.  He also is agreeable to stay in the hospital until the procedure of TAVR if procedure will be done in near future.     MEDICATIONS  (STANDING):  atorvastatin 40 milliGRAM(s) Oral at bedtime  clopidogrel Tablet 75 milliGRAM(s) Oral daily  dextrose 5%. 1000 milliLiter(s) (50 mL/Hr) IV Continuous <Continuous>  dextrose 50% Injectable 12.5 Gram(s) IV Push once  dextrose 50% Injectable 25 Gram(s) IV Push once  dextrose 50% Injectable 25 Gram(s) IV Push once  digoxin     Tablet 0.125 milliGRAM(s) Oral daily  ferrous    sulfate 325 milliGRAM(s) Oral daily  fluticasone propionate 50 MICROgram(s)/spray Nasal Spray 1 Spray(s) Both Nostrils daily  folic acid 1 milliGRAM(s) Oral daily  furosemide   Injectable 40 milliGRAM(s) IV Push two times a day  heparin  Infusion.  Unit(s)/Hr (11 mL/Hr) IV Continuous <Continuous>  insulin lispro (HumaLOG) corrective regimen sliding scale   SubCutaneous three times a day before meals  insulin lispro (HumaLOG) corrective regimen sliding scale   SubCutaneous at bedtime  melatonin 6 milliGRAM(s) Oral at bedtime  metoprolol succinate ER 25 milliGRAM(s) Oral daily  sodium chloride 0.9% lock flush 3 milliLiter(s) IV Push every 8 hours  tiotropium 18 MICROgram(s) Capsule 1 Capsule(s) Inhalation daily    MEDICATIONS  (PRN):  ALBUTerol    90 MICROgram(s) HFA Inhaler 2 Puff(s) Inhalation every 6 hours PRN Shortness of Breath and/or Wheezing  dextrose 40% Gel 15 Gram(s) Oral once PRN Blood Glucose LESS THAN 70 milliGRAM(s)/deciliter  glucagon  Injectable 1 milliGRAM(s) IntraMuscular once PRN Glucose LESS THAN 70 milligrams/deciliter  guaiFENesin  milliGRAM(s) Oral every 12 hours PRN Cough  heparin  Injectable 4500 Unit(s) IV Push every 6 hours PRN For aPTT less than 40  heparin  Injectable 2000 Unit(s) IV Push every 6 hours PRN For aPTT between 40 - 57  meclizine 12.5 milliGRAM(s) Oral three times a day PRN Dizziness      T(C): 36.8 (12-15-19 @ 06:05), Max: 37 (12-14-19 @ 19:57)  HR: 80 (12-15-19 @ 06:05) (73 - 89)  BP: 104/60 (12-15-19 @ 06:05) (104/59 - 110/66)  RR: 18 (12-15-19 @ 06:05) (18 - 18)  SpO2: 99% (12-15-19 @ 06:05) (95% - 100%)  CAPILLARY BLOOD GLUCOSE      POCT Blood Glucose.: 157 mg/dL (15 Dec 2019 08:05)  POCT Blood Glucose.: 162 mg/dL (14 Dec 2019 21:36)  POCT Blood Glucose.: 140 mg/dL (14 Dec 2019 16:58)  POCT Blood Glucose.: 162 mg/dL (14 Dec 2019 11:52)    I&O's Summary    14 Dec 2019 07:01  -  15 Dec 2019 07:00  --------------------------------------------------------  IN: 930 mL / OUT: 2050 mL / NET: -1120 mL      PHYSICAL EXAM:    Constitutional: NAD. sitting on chair on room air.  HEENT: AT/NC, EOMI, MMM, supple neck.   Respiratory: CTAB. equal aeration bilaterally. no wheezing, no crackes, no rhonchi. no increase in WOB  Cardiovascular: irregularly irregular, systolic ejection murmur without masking S2. no pulsus parvus et tardus on exam. 2+ distal pulses.   Gastrointestinal: soft; NT/ND, +BS  Extremities: no cyanosis; trace pedal edema, non-tender to palpation, DP and Radial pulses intact.  Neurological: A&Ox 3  Psychiatric: normal mood/affect.     LABS:                        7.8    7.95  )-----------( 192      ( 14 Dec 2019 10:55 )             24.5     12-15    140  |  103  |  51<H>  ----------------------------<  146<H>  3.6   |  20<L>  |  1.47<H>    Ca    8.3<L>      15 Dec 2019 06:15  Phos  3.3     12-15  Mg     2.2     12-15      PTT - ( 14 Dec 2019 23:27 )  PTT:72.5 sec          RADIOLOGY & ADDITIONAL TESTS:    Imaging Personally Reviewed: JESSIKA    Consultant(s) Notes Reviewed:  JESSIKA    Care Discussed with Consultants/Other Providers: JESSIKA

## 2019-12-15 NOTE — PROGRESS NOTE ADULT - SUBJECTIVE AND OBJECTIVE BOX
Subjective:  No overnight events    Medications:  ALBUTerol    90 MICROgram(s) HFA Inhaler 2 Puff(s) Inhalation every 6 hours PRN  atorvastatin 40 milliGRAM(s) Oral at bedtime  clopidogrel Tablet 75 milliGRAM(s) Oral daily  dextrose 40% Gel 15 Gram(s) Oral once PRN  dextrose 5%. 1000 milliLiter(s) IV Continuous <Continuous>  dextrose 50% Injectable 12.5 Gram(s) IV Push once  dextrose 50% Injectable 25 Gram(s) IV Push once  dextrose 50% Injectable 25 Gram(s) IV Push once  digoxin     Tablet 0.125 milliGRAM(s) Oral daily  ferrous    sulfate 325 milliGRAM(s) Oral daily  fluticasone propionate 50 MICROgram(s)/spray Nasal Spray 1 Spray(s) Both Nostrils daily  folic acid 1 milliGRAM(s) Oral daily  furosemide   Injectable 40 milliGRAM(s) IV Push two times a day  glucagon  Injectable 1 milliGRAM(s) IntraMuscular once PRN  guaiFENesin  milliGRAM(s) Oral every 12 hours PRN  heparin  Infusion.  Unit(s)/Hr IV Continuous <Continuous>  heparin  Injectable 4500 Unit(s) IV Push every 6 hours PRN  heparin  Injectable 2000 Unit(s) IV Push every 6 hours PRN  insulin lispro (HumaLOG) corrective regimen sliding scale   SubCutaneous three times a day before meals  insulin lispro (HumaLOG) corrective regimen sliding scale   SubCutaneous at bedtime  meclizine 12.5 milliGRAM(s) Oral three times a day PRN  melatonin 6 milliGRAM(s) Oral at bedtime  metoprolol succinate ER 25 milliGRAM(s) Oral daily  sodium chloride 0.9% lock flush 3 milliLiter(s) IV Push every 8 hours  tiotropium 18 MICROgram(s) Capsule 1 Capsule(s) Inhalation daily    Vitals:  T(C): 36.7 (12-15-19 @ 12:30), Max: 37 (19 @ 19:57)  HR: 75 (12-15-19 @ 14:11) (73 - 89)  BP: 106/68 (12-15-19 @ 14:11) (104/59 - 110/70)  BP(mean): --  RR: 18 (12-15-19 @ 14:11) (18 - 18)  SpO2: 98% (12-15-19 @ 14:11) (95% - 100%)    Daily     Daily Weight in k.7 (15 Dec 2019 08:42)        I&O's Summary    14 Dec 2019 07:01  -  15 Dec 2019 07:00  --------------------------------------------------------  IN: 930 mL / OUT: 2050 mL / NET: -1120 mL    15 Dec 2019 07:01  -  15 Dec 2019 15:49  --------------------------------------------------------  IN: 660 mL / OUT: 750 mL / NET: -90 mL        Physical Exam:  Appearance: No Acute Distress  HEENT: JVP 10-12 cm H20  Cardiovascular: irregularly irregular, 3/6 late peaking CRISTIAN at RUSB  Respiratory: Trace basilar rales   Gastrointestinal: Soft, Non-tender, non-distended	  Skin: no skin lesions  Neurologic: Non-focal  Extremities: Trace LE edema, warm and well perfused  Psychiatry: A & O x 3, Mood & affect appropriate      Labs:                        7.5    8.47  )-----------( 182      ( 15 Dec 2019 09:09 )             23.7     12-15    140  |  103  |  51<H>  ----------------------------<  146<H>  3.6   |  20<L>  |  1.47<H>    Ca    8.3<L>      15 Dec 2019 06:15  Phos  3.3     12-15  Mg     2.2     12-15        PTT - ( 15 Dec 2019 08:50 )  PTT:67.5 sec      Serum Pro-Brain Natriuretic Peptide: 90233 pg/mL ( @ 07:52)      Lactate Dehydrogenase, Serum: 257 U/L ( @ 07:15)

## 2019-12-15 NOTE — PROGRESS NOTE ADULT - PROBLEM SELECTOR PLAN 3
- agree with TAVR evaluation, his very high gradients despite a low ejection fraction suggest critical AS.

## 2019-12-16 DIAGNOSIS — I50.43 ACUTE ON CHRONIC COMBINED SYSTOLIC (CONGESTIVE) AND DIASTOLIC (CONGESTIVE) HEART FAILURE: ICD-10-CM

## 2019-12-16 LAB
ANION GAP SERPL CALC-SCNC: 14 MMOL/L — SIGNIFICANT CHANGE UP (ref 5–17)
APTT BLD: 58.1 SEC — HIGH (ref 27.5–36.3)
BLD GP AB SCN SERPL QL: NEGATIVE — SIGNIFICANT CHANGE UP
BUN SERPL-MCNC: 54 MG/DL — HIGH (ref 7–23)
CALCIUM SERPL-MCNC: 8.3 MG/DL — LOW (ref 8.4–10.5)
CHLORIDE SERPL-SCNC: 102 MMOL/L — SIGNIFICANT CHANGE UP (ref 96–108)
CO2 SERPL-SCNC: 22 MMOL/L — SIGNIFICANT CHANGE UP (ref 22–31)
CREAT SERPL-MCNC: 1.4 MG/DL — HIGH (ref 0.5–1.3)
GLUCOSE BLDC GLUCOMTR-MCNC: 194 MG/DL — HIGH (ref 70–99)
GLUCOSE SERPL-MCNC: 136 MG/DL — HIGH (ref 70–99)
HCT VFR BLD CALC: 21.7 % — LOW (ref 39–50)
HCT VFR BLD CALC: 25.5 % — LOW (ref 39–50)
HGB BLD-MCNC: 7 G/DL — CRITICAL LOW (ref 13–17)
HGB BLD-MCNC: 8.3 G/DL — LOW (ref 13–17)
INR BLD: 1.25 RATIO — HIGH (ref 0.88–1.16)
MAGNESIUM SERPL-MCNC: 2.2 MG/DL — SIGNIFICANT CHANGE UP (ref 1.6–2.6)
MCHC RBC-ENTMCNC: 30.4 PG — SIGNIFICANT CHANGE UP (ref 27–34)
MCHC RBC-ENTMCNC: 30.7 PG — SIGNIFICANT CHANGE UP (ref 27–34)
MCHC RBC-ENTMCNC: 32.3 GM/DL — SIGNIFICANT CHANGE UP (ref 32–36)
MCHC RBC-ENTMCNC: 32.5 GM/DL — SIGNIFICANT CHANGE UP (ref 32–36)
MCV RBC AUTO: 94.3 FL — SIGNIFICANT CHANGE UP (ref 80–100)
MCV RBC AUTO: 94.4 FL — SIGNIFICANT CHANGE UP (ref 80–100)
NRBC # BLD: 0 /100 WBCS — SIGNIFICANT CHANGE UP (ref 0–0)
PHOSPHATE SERPL-MCNC: 3.4 MG/DL — SIGNIFICANT CHANGE UP (ref 2.5–4.5)
PLATELET # BLD AUTO: 167 K/UL — SIGNIFICANT CHANGE UP (ref 150–400)
PLATELET # BLD AUTO: 209 K/UL — SIGNIFICANT CHANGE UP (ref 150–400)
POTASSIUM SERPL-MCNC: 3.4 MMOL/L — LOW (ref 3.5–5.3)
POTASSIUM SERPL-SCNC: 3.4 MMOL/L — LOW (ref 3.5–5.3)
PROTHROM AB SERPL-ACNC: 14.3 SEC — HIGH (ref 10–13.1)
RBC # BLD: 2.3 M/UL — LOW (ref 4.2–5.8)
RBC # BLD: 2.7 M/UL — LOW (ref 4.2–5.8)
RBC # FLD: 18.3 % — HIGH (ref 10.3–14.5)
RBC # FLD: 18.3 % — HIGH (ref 10.3–14.5)
RH IG SCN BLD-IMP: POSITIVE — SIGNIFICANT CHANGE UP
SODIUM SERPL-SCNC: 138 MMOL/L — SIGNIFICANT CHANGE UP (ref 135–145)
WBC # BLD: 7.29 K/UL — SIGNIFICANT CHANGE UP (ref 3.8–10.5)
WBC # BLD: 9.88 K/UL — SIGNIFICANT CHANGE UP (ref 3.8–10.5)
WBC # FLD AUTO: 7.29 K/UL — SIGNIFICANT CHANGE UP (ref 3.8–10.5)
WBC # FLD AUTO: 9.88 K/UL — SIGNIFICANT CHANGE UP (ref 3.8–10.5)

## 2019-12-16 PROCEDURE — 71045 X-RAY EXAM CHEST 1 VIEW: CPT | Mod: 26

## 2019-12-16 PROCEDURE — 99233 SBSQ HOSP IP/OBS HIGH 50: CPT | Mod: GC

## 2019-12-16 PROCEDURE — 99232 SBSQ HOSP IP/OBS MODERATE 35: CPT

## 2019-12-16 RX ORDER — POTASSIUM CHLORIDE 20 MEQ
40 PACKET (EA) ORAL ONCE
Refills: 0 | Status: COMPLETED | OUTPATIENT
Start: 2019-12-16 | End: 2019-12-16

## 2019-12-16 RX ORDER — FUROSEMIDE 40 MG
80 TABLET ORAL ONCE
Refills: 0 | Status: COMPLETED | OUTPATIENT
Start: 2019-12-16 | End: 2019-12-16

## 2019-12-16 RX ADMIN — HEPARIN SODIUM 1000 UNIT(S)/HR: 5000 INJECTION INTRAVENOUS; SUBCUTANEOUS at 09:05

## 2019-12-16 RX ADMIN — SODIUM CHLORIDE 3 MILLILITER(S): 9 INJECTION INTRAMUSCULAR; INTRAVENOUS; SUBCUTANEOUS at 12:46

## 2019-12-16 RX ADMIN — Medication 0.12 MILLIGRAM(S): at 05:24

## 2019-12-16 RX ADMIN — SODIUM CHLORIDE 3 MILLILITER(S): 9 INJECTION INTRAMUSCULAR; INTRAVENOUS; SUBCUTANEOUS at 22:36

## 2019-12-16 RX ADMIN — CLOPIDOGREL BISULFATE 75 MILLIGRAM(S): 75 TABLET, FILM COATED ORAL at 12:45

## 2019-12-16 RX ADMIN — Medication 80 MILLIGRAM(S): at 18:49

## 2019-12-16 RX ADMIN — Medication 1 MILLIGRAM(S): at 12:45

## 2019-12-16 RX ADMIN — SODIUM CHLORIDE 3 MILLILITER(S): 9 INJECTION INTRAMUSCULAR; INTRAVENOUS; SUBCUTANEOUS at 05:16

## 2019-12-16 RX ADMIN — Medication 25 MILLIGRAM(S): at 05:24

## 2019-12-16 RX ADMIN — Medication 1 SPRAY(S): at 12:45

## 2019-12-16 RX ADMIN — ATORVASTATIN CALCIUM 40 MILLIGRAM(S): 80 TABLET, FILM COATED ORAL at 22:12

## 2019-12-16 RX ADMIN — Medication 325 MILLIGRAM(S): at 12:45

## 2019-12-16 RX ADMIN — Medication 40 MILLIGRAM(S): at 05:24

## 2019-12-16 RX ADMIN — TIOTROPIUM BROMIDE 1 CAPSULE(S): 18 CAPSULE ORAL; RESPIRATORY (INHALATION) at 12:45

## 2019-12-16 RX ADMIN — Medication 40 MILLIEQUIVALENT(S): at 09:05

## 2019-12-16 NOTE — PROGRESS NOTE ADULT - ATTENDING COMMENTS
Seen, examined the patient, attandant at bedside  Admitted for acute on chronic systolic HF, s/p L and R heart cath on admission, in anticipation of TAVR for severe AS  Resting in bed, feels better, no acute SOB, c/o exertional dyspnea  - Reviewed labs, imaging, cath report  - appreciated HF team eval, recommended IV Diuretic lasix, closely monitor I/O, weight   - c/w BB, AC, might need afterload reducers   - Structural heart team f/u plan noted, Cardiac CT recommended

## 2019-12-16 NOTE — PROGRESS NOTE ADULT - PROBLEM SELECTOR PLAN 2
Monitor I and O, Monitor Daily weight. Continue with IV Diuresis, though would transition to oral when able.

## 2019-12-16 NOTE — PROGRESS NOTE ADULT - PROBLEM SELECTOR PLAN 6
- he meets IIa indications for CRT given low EF with LBBB and concurrent AF.   - would consider evaluation after TAVR.

## 2019-12-16 NOTE — PROGRESS NOTE ADULT - ATTENDING COMMENTS
Briefly, 83 y/o M with a history of ACC/AHA Stage C ischemic cardiomyopathy (LV 4.5 cm, EF 30%), severe AS (58/30/0.6), chronic AF on coumadin, and CAD s/p PCI to RCA and LAD  who is admitted with decompensated heart failure after R/C with plans for optimization prior to consideration of TAVR. On exam, NAD, JVP approx 12 cm with HJR with v waves, harsh crescendo/decrescendo murmur in RUSB with radiation to clavicle with soft S2, CTAB, nontender, trace edema b/l. TTE reviewed - severe LV dysfunction (likely closer to 25-30%), severe AS (mean gradient 30, KATHERYN 0.6). LHC/RHC reviewed.   - would favor patient having his TAVR this admission if possible given significant AS on exam and severe LV dysfunction with marginal kidney function  - CTA tomorrow  - give 80 mg IV lasix x1 now and hold further until post-CT given risk of MAI  - c/w other meds for now; may need to reduce dig to every other day

## 2019-12-16 NOTE — PROGRESS NOTE ADULT - SUBJECTIVE AND OBJECTIVE BOX
Subjective:  No overnight events. He is walking around without significant dyspnea.    Tele: afib     Current Meds:  ALBUTerol    90 MICROgram(s) HFA Inhaler 2 Puff(s) Inhalation every 6 hours PRN  atorvastatin 40 milliGRAM(s) Oral at bedtime  clopidogrel Tablet 75 milliGRAM(s) Oral daily  digoxin     Tablet 0.125 milliGRAM(s) Oral daily  ferrous    sulfate 325 milliGRAM(s) Oral daily  fluticasone propionate 50 MICROgram(s)/spray Nasal Spray 1 Spray(s) Both Nostrils daily  folic acid 1 milliGRAM(s) Oral daily  furosemide   Injectable 80 milliGRAM(s) IV Push once  guaiFENesin  milliGRAM(s) Oral every 12 hours PRN  heparin  Infusion.  Unit(s)/Hr IV Continuous <Continuous>  heparin  Injectable 4500 Unit(s) IV Push every 6 hours PRN  heparin  Injectable 2000 Unit(s) IV Push every 6 hours PRN  meclizine 12.5 milliGRAM(s) Oral three times a day PRN  melatonin 6 milliGRAM(s) Oral at bedtime  metoprolol succinate ER 25 milliGRAM(s) Oral daily  sodium chloride 0.9% lock flush 3 milliLiter(s) IV Push every 8 hours  tiotropium 18 MICROgram(s) Capsule 1 Capsule(s) Inhalation daily      Vitals:  T(C): 36.8 (12-16-19 @ 11:56), Max: 37 (12-16-19 @ 04:37)  T(F): 98.2 (12-16-19 @ 11:56), Max: 98.6 (12-16-19 @ 04:37)  HR: 70 (12-16-19 @ 11:56) (69 - 77)  BP: 109/62 (12-16-19 @ 11:56) (105/65 - 121/61)  RR: 18 (12-16-19 @ 11:56) (18 - 18)  SpO2: 98% (12-16-19 @ 11:56) (96% - 98%)      I&O's Summary    15 Dec 2019 07:01  -  16 Dec 2019 07:00  --------------------------------------------------------  IN: 1090 mL / OUT: 1100 mL / NET: -10 mL    16 Dec 2019 07:01  -  16 Dec 2019 17:20  --------------------------------------------------------  IN: 360 mL / OUT: 0 mL / NET: 360 mL        Physical Exam:  Appearance: No Acute Distress  HEENT: JVP ~15cm H2O  Cardiovascular: irregularly irregular, 3/6 late peaking CRISTIAN at RUSB  Respiratory: Trace basilar rales   Gastrointestinal: Soft, Non-tender, non-distended	  Skin: no skin lesions  Neurologic: Non-focal  Extremities: Trace LE edema, warm and well perfused  Psychiatry: A & O x 3, Mood & affect appropriate                              8.3    9.88  )-----------( 209      ( 16 Dec 2019 13:09 )             25.5     12-16    138  |  102  |  54<H>  ----------------------------<  136<H>  3.4<L>   |  22  |  1.40<H>    Ca    8.3<L>      16 Dec 2019 06:48  Phos  3.4     12-16  Mg     2.2     12-16      PT/INR - ( 16 Dec 2019 08:34 )   PT: 14.3 sec;   INR: 1.25 ratio         PTT - ( 16 Dec 2019 08:34 )  PTT:58.1 sec      Serum Pro-Brain Natriuretic Peptide: 61302 pg/mL (12-13 @ 07:52)

## 2019-12-16 NOTE — PROGRESS NOTE ADULT - PROBLEM SELECTOR PLAN 3
- agree with TAVR evaluation, his very high gradients despite a low ejection fraction suggest critical AS. We'll discuss with the structural team about possibly doing TAVR on this admission.

## 2019-12-16 NOTE — PROGRESS NOTE ADULT - PROBLEM SELECTOR PLAN 1
- GDMT: continue metoprolol succinate 25 mg daily. Deferring ACEi/ARB given mildly elevated Cr without known baseline  - diuretic: Increase furosemide IVP 40mg BID to 80mg once tonight, Hold the morning dose given that he will be receiving IV contrast for CT. Will avoid overaggressive diuresis in setting of severe AS   - daily standing weight and strict I's and O's.

## 2019-12-16 NOTE — PROGRESS NOTE ADULT - PROBLEM SELECTOR PLAN 2
TTE showing severe aortic stenosis, symptomatic with SOB on exertion  - Structural heart following, plan for possible eventual TAVR when euvolemic.  - Monitor on tele.  - Patient is agreeable to stay in the hospital for TAVR if it Is to be performed near, but if not, then prefers to go home and come back for TAVR.   - Spoke with HF, likely outpatient TAVR, awaiting official recs.

## 2019-12-16 NOTE — PROGRESS NOTE ADULT - SUBJECTIVE AND OBJECTIVE BOX
Contact Info:  Pancho Walker M.D., PGY-2  Pager: ns- [phone], DQA- [phone]      Chief Complaint: Patient is a 84y old  Male who presents with a chief complaint of CHF exacerbation (15 Dec 2019 15:48)        INTERVAL HPI/OVERNIGHT EVENTS:   No acute overnight event. Patient reports feeling_____. Denied fever, chill, nausea, vomiting, headache, CP, SOB, abdominal pain, diarrhea, or constipation.       MEDICATIONS  (STANDING):  atorvastatin 40 milliGRAM(s) Oral at bedtime  clopidogrel Tablet 75 milliGRAM(s) Oral daily  digoxin     Tablet 0.125 milliGRAM(s) Oral daily  ferrous    sulfate 325 milliGRAM(s) Oral daily  fluticasone propionate 50 MICROgram(s)/spray Nasal Spray 1 Spray(s) Both Nostrils daily  folic acid 1 milliGRAM(s) Oral daily  furosemide   Injectable 40 milliGRAM(s) IV Push two times a day  heparin  Infusion.  Unit(s)/Hr (11 mL/Hr) IV Continuous <Continuous>  melatonin 6 milliGRAM(s) Oral at bedtime  metoprolol succinate ER 25 milliGRAM(s) Oral daily  sodium chloride 0.9% lock flush 3 milliLiter(s) IV Push every 8 hours  tiotropium 18 MICROgram(s) Capsule 1 Capsule(s) Inhalation daily    MEDICATIONS  (PRN):  ALBUTerol    90 MICROgram(s) HFA Inhaler 2 Puff(s) Inhalation every 6 hours PRN Shortness of Breath and/or Wheezing  guaiFENesin  milliGRAM(s) Oral every 12 hours PRN Cough  heparin  Injectable 4500 Unit(s) IV Push every 6 hours PRN For aPTT less than 40  heparin  Injectable 2000 Unit(s) IV Push every 6 hours PRN For aPTT between 40 - 57  meclizine 12.5 milliGRAM(s) Oral three times a day PRN Dizziness      Vital Signs Last 24 Hrs  T(C): 37 (16 Dec 2019 04:37), Max: 37 (16 Dec 2019 04:37)  T(F): 98.6 (16 Dec 2019 04:37), Max: 98.6 (16 Dec 2019 04:37)  HR: 77 (16 Dec 2019 04:37) (69 - 90)  BP: 105/65 (16 Dec 2019 04:37) (100/51 - 121/61)  BP(mean): --  RR: 18 (16 Dec 2019 04:37) (18 - 18)  SpO2: 96% (16 Dec 2019 04:37) (96% - 99%)  Supplemental O2: [ ] No, on Room Air [ ] Yes,     I&O's Detail    15 Dec 2019 07:01  -  16 Dec 2019 07:00  --------------------------------------------------------  IN:    heparin  Infusion.: 110 mL    Oral Fluid: 980 mL  Total IN: 1090 mL    OUT:    Voided: 1100 mL  Total OUT: 1100 mL    Total NET: -10 mL        CAPILLARY BLOOD GLUCOSE      POCT Blood Glucose.: 169 mg/dL (15 Dec 2019 21:11)  POCT Blood Glucose.: 140 mg/dL (15 Dec 2019 16:33)  POCT Blood Glucose.: 170 mg/dL (15 Dec 2019 12:14)  POCT Blood Glucose.: 157 mg/dL (15 Dec 2019 08:05)      PHYSICAL EXAM:  Daily     Daily Weight in k.7 (15 Dec 2019 08:42)   Appearance: NAD, well-developed	  HEENT:   Normal oral mucosa, PERRL, EOMI	  Neck: No JVD, no LAD, supple, trachea in midline  Cardiovascular: Normal S1 S2, No JVD, No murmurs  Respiratory: Lungs clear to auscultation, no wheezing, rhonchi  Gastrointestinal:  Soft, Non-tender, nondistended, + BS  Skin: No rashes, No ecchymoses, No cyanosis	  MSK/Extremities: Normal range of motion, 5/5 Muscle strength bilaterally. No clubbing, cyanosis or edema  Vascular: Peripheral pulses palpable 2+ bilaterally  Neurologic: Non-focal, CN II-XII intact  Psychiatry: A & O x 3, Mood & affect appropriate    LABS:                        7.5    8.47  )-----------( 182      ( 15 Dec 2019 09:09 )             23.7     12-15    140  |  103  |  51<H>  ----------------------------<  146<H>  3.6   |  20<L>  |  1.47<H>    Ca    8.3<L>      15 Dec 2019 06:15  Phos  3.3     12-15  Mg     2.2     12-15      LIVER FUNCTIONS - ( 13 Dec 2019 07:52 )  Alb: 3.7 g/dL / Pro: 7.4 g/dL / ALK PHOS: 68 U/L / ALT: 9 U/L / AST: 10 U/L / GGT: x     / T. Bili 2.2 mg/dL / D. Bili x         PTT - ( 15 Dec 2019 08:50 )  PTT:67.5 sec            Microbiology:    RADIOLOGY & ADDITIONAL TESTS:    Xray -   CT -  MRI -     Imaging Personally Reviewed:  [ ] YES  [ ] NO  Consultant(s) Notes Reviewed:  [X] YES  [ ] NO  Care Discussed with Consultants/Other Providers [ ] YES  [ ] NO Contact Info:  Pancho Walker M.D., PGY-3  Pager: ns- [phone], lij- [phone]      Chief Complaint: Patient is a 84y old  Male who presents with a chief complaint of CHF exacerbation (15 Dec 2019 15:48)        INTERVAL HPI/OVERNIGHT EVENTS:   No acute event overnight. Pt endorsing feeling good. Lost about 10lbs since admission. No other complaints.       MEDICATIONS  (STANDING):  atorvastatin 40 milliGRAM(s) Oral at bedtime  clopidogrel Tablet 75 milliGRAM(s) Oral daily  digoxin     Tablet 0.125 milliGRAM(s) Oral daily  ferrous    sulfate 325 milliGRAM(s) Oral daily  fluticasone propionate 50 MICROgram(s)/spray Nasal Spray 1 Spray(s) Both Nostrils daily  folic acid 1 milliGRAM(s) Oral daily  furosemide   Injectable 40 milliGRAM(s) IV Push two times a day  heparin  Infusion.  Unit(s)/Hr (11 mL/Hr) IV Continuous <Continuous>  melatonin 6 milliGRAM(s) Oral at bedtime  metoprolol succinate ER 25 milliGRAM(s) Oral daily  sodium chloride 0.9% lock flush 3 milliLiter(s) IV Push every 8 hours  tiotropium 18 MICROgram(s) Capsule 1 Capsule(s) Inhalation daily    MEDICATIONS  (PRN):  ALBUTerol    90 MICROgram(s) HFA Inhaler 2 Puff(s) Inhalation every 6 hours PRN Shortness of Breath and/or Wheezing  guaiFENesin  milliGRAM(s) Oral every 12 hours PRN Cough  heparin  Injectable 4500 Unit(s) IV Push every 6 hours PRN For aPTT less than 40  heparin  Injectable 2000 Unit(s) IV Push every 6 hours PRN For aPTT between 40 - 57  meclizine 12.5 milliGRAM(s) Oral three times a day PRN Dizziness      Vital Signs Last 24 Hrs  T(C): 37 (16 Dec 2019 04:37), Max: 37 (16 Dec 2019 04:37)  T(F): 98.6 (16 Dec 2019 04:37), Max: 98.6 (16 Dec 2019 04:37)  HR: 77 (16 Dec 2019 04:37) (69 - 90)  BP: 105/65 (16 Dec 2019 04:37) (100/51 - 121/61)  BP(mean): --  RR: 18 (16 Dec 2019 04:37) (18 - 18)  SpO2: 96% (16 Dec 2019 04:37) (96% - 99%)  Supplemental O2: [ ] No, on Room Air [ ] Yes,     I&O's Detail    15 Dec 2019 07:01  -  16 Dec 2019 07:00  --------------------------------------------------------  IN:    heparin  Infusion.: 110 mL    Oral Fluid: 980 mL  Total IN: 1090 mL    OUT:    Voided: 1100 mL  Total OUT: 1100 mL    Total NET: -10 mL        CAPILLARY BLOOD GLUCOSE      POCT Blood Glucose.: 169 mg/dL (15 Dec 2019 21:11)  POCT Blood Glucose.: 140 mg/dL (15 Dec 2019 16:33)  POCT Blood Glucose.: 170 mg/dL (15 Dec 2019 12:14)  POCT Blood Glucose.: 157 mg/dL (15 Dec 2019 08:05)      PHYSICAL EXAM:  Constitutional: NAD. sitting on chair on room air.  HEENT: AT/NC, EOMI, MMM, supple neck.   Respiratory: CTAB. equal aeration bilaterally. no wheezing, no crackes, no rhonchi. no increase in WOB  Cardiovascular: irregularly irregular, systolic ejection murmur without masking S2. no pulsus parvus et tardus on exam. 2+ distal pulses.   Gastrointestinal: soft; NT/ND, +BS  Extremities: no cyanosis; 1-2+ pitting edema on b/l ankles, non-tender to palpation, DP and Radial pulses intact.   Neurological: A&Ox 3  Psychiatric: normal mood/affect.     LABS:                        7.5    8.47  )-----------( 182      ( 15 Dec 2019 09:09 )             23.7     12-15    140  |  103  |  51<H>  ----------------------------<  146<H>  3.6   |  20<L>  |  1.47<H>    Ca    8.3<L>      15 Dec 2019 06:15  Phos  3.3     12-15  Mg     2.2     12-15      LIVER FUNCTIONS - ( 13 Dec 2019 07:52 )  Alb: 3.7 g/dL / Pro: 7.4 g/dL / ALK PHOS: 68 U/L / ALT: 9 U/L / AST: 10 U/L / GGT: x     / T. Bili 2.2 mg/dL / D. Bili x         PTT - ( 15 Dec 2019 08:50 )  PTT:67.5 sec        Imaging Personally Reviewed:  [ ] YES  [ ] NO  Consultant(s) Notes Reviewed:  [X] YES  [ ] NO  Care Discussed with Consultants/Other Providers [ ] YES  [ ] NO

## 2019-12-16 NOTE — PROGRESS NOTE ADULT - PROBLEM SELECTOR PLAN 5
Pt on coumadin at home, will hold and start Heparin gtt in anticipation for possible TAVR. If no procedure, transition to coumadin   - CHADSVASc score of 6  - Continue with digoxin and toprol 25mg qd. Digoxin level wnl  - Monitor on telemetry

## 2019-12-16 NOTE — PROGRESS NOTE ADULT - ASSESSMENT
83 YO M with a history of ACC/AHA Stage C ischemic cardiomyopathy (LV 4.5 cm, EF 30%), severe AS (58/30/0.6), chronic AF on coumadin, and CAD s/p PCI to RCA and LAD with prior tamponade during coronary intervention who is admitted with decompensated heart failure after R/LHC with plans for optimization prior to consideration of TAVR. His volume status has improved with diuretics but JVP is still elevated.    Review of pertinent studies   RHC: RA 10, PA 52/21/36, PCWP 23, CO/CI 4.34/CI 2.64, PA sat 47.1, Ao Sat 98%  LHC: occluded ostial RCA from ISR, 70% OM1, moderate LAD disease   TTE: LV 4.5 cm, EF 30%, severe AS (58/30/0.6) from calcific valve with severely restricted opening, moderate MR, LVOT VTI 15 cm, dyssynchronous septal movement, normal RV function   EKG: AF with LBBB,  ms 85 YO M with a history of ACC/AHA Stage C ischemic cardiomyopathy (LV 4.5 cm, EF 30%), severe AS (58/30/0.6), chronic AF on coumadin, and CAD s/p PCI to RCA and LAD with prior tamponade during coronary intervention who is admitted with decompensated heart failure after R/LHC with plans for optimization prior to consideration of TAVR. His volume status has improved with diuretics but JVP is still elevated.    Review of pertinent studies   RHC: RA 10, PA 52/21/36, PCWP 23, CO/CI 4.34/CI 2.64, PA sat 47.1, Ao Sat 98%  LHC: occluded ostial RCA from ISR, 70% OM1, moderate LAD disease   TTE: LV 4.5 cm, EF 30%, severe AS (58/30/0.6) from calcific valve with severely restricted opening, moderate MR, LVOT VTI 15 cm, dyssynchronous septal movement, normal RV function   EKG: AF with LBBB,  ms

## 2019-12-16 NOTE — PROGRESS NOTE ADULT - PROBLEM SELECTOR PLAN 1
Pt presented with DYER, with recent TTE 12/10 EF 30% with severe AS, with LV dysfunction, prior Echo in 6/2019 was 42%. Exam notable for 3+ LE edema up to b/l thigh, b/l rales, likely related to severe AS, being diuresed with lasix 40mg IV BID, currently appear near euvolemia.   -CXR: loculated fluid at left major fissure. Will get repeat CXR today  - IV Lasix 40mg BID for diuresis - Appreciate HF recs, maybe transition to PO today  - Strict Ins and Outs, daily weight, fluid restriction to 1L/day  - C/w toprol 25mg qd  - Follow up HF  recs.

## 2019-12-16 NOTE — PROGRESS NOTE ADULT - SUBJECTIVE AND OBJECTIVE BOX
*****Structural Heart Team*****    Subjective:    Patient is resting comfortably in a chair, offering no complaints at this time. He states that he still has some mild DYER when he ambulates with assistance, but that he feels "much better" than before when he was admitted. He denies any pain or issue with his groins.    PAST MEDICAL & SURGICAL HISTORY:  Anemia  LBBB (left bundle branch block)  DYER (dyspnea on exertion)  Coronary disease: PCI  CHF (congestive heart failure)  Former smoker  Meniere disease  HLD (hyperlipidemia)  DM (diabetes mellitus)  Atrial fibrillation  HTN (hypertension)  Lipoma  History of skin graft  Status post appendectomy        T(C): 36.8 (19 @ 11:56), Max: 37 (19 @ 04:37)  HR: 70 (19 @ 11:56) (69 - 90)  BP: 109/62 (19 @ 11:56) (100/51 - 121/61)  RR: 18 (19 @ 11:56) (18 - 18)  SpO2: 98% (19 @ 11:56) (96% - 99%)  Wt(kg): --  12-15 @ 07:01  -   @ 07:00  --------------------------------------------------------  IN: 1090 mL / OUT: 1100 mL / NET: -10 mL      MEDICATIONS  (STANDING):  atorvastatin 40 milliGRAM(s) Oral at bedtime  clopidogrel Tablet 75 milliGRAM(s) Oral daily  digoxin     Tablet 0.125 milliGRAM(s) Oral daily  ferrous    sulfate 325 milliGRAM(s) Oral daily  fluticasone propionate 50 MICROgram(s)/spray Nasal Spray 1 Spray(s) Both Nostrils daily  folic acid 1 milliGRAM(s) Oral daily  furosemide   Injectable 40 milliGRAM(s) IV Push two times a day  heparin  Infusion.  Unit(s)/Hr (11 mL/Hr) IV Continuous <Continuous>  melatonin 6 milliGRAM(s) Oral at bedtime  metoprolol succinate ER 25 milliGRAM(s) Oral daily  sodium chloride 0.9% lock flush 3 milliLiter(s) IV Push every 8 hours  tiotropium 18 MICROgram(s) Capsule 1 Capsule(s) Inhalation daily    MEDICATIONS  (PRN):  ALBUTerol    90 MICROgram(s) HFA Inhaler 2 Puff(s) Inhalation every 6 hours PRN Shortness of Breath and/or Wheezing  guaiFENesin  milliGRAM(s) Oral every 12 hours PRN Cough  heparin  Injectable 4500 Unit(s) IV Push every 6 hours PRN For aPTT less than 40  heparin  Injectable 2000 Unit(s) IV Push every 6 hours PRN For aPTT between 40 - 57  meclizine 12.5 milliGRAM(s) Oral three times a day PRN Dizziness      Review of Symptoms:  Constitutional: Awake, Alert, Follows commands  Respiratory: Mild DYER, Mild SOB  Cardiac: Denies CP, Denies Palpitations  Gastrointestinal: Denies Pain, Denies N/V, tolerating po intake  Vascular: Negative  Extremities: No Edema, No joint pain or swelling  Neurological: Negative  Endocrine: No heat or cold intolerance, No excessive thirst  Heme/Onc: Negative    Exam:  General: A/Ox3, NAD but appears frail, Follows all commands  HEENT: Supple, No JVD, Trachea midline, no masses  Pulmonary: fine crackles, = Chest Excursion, no accessory muscle use  Cor: S1S2, Irregular, II/VI CRISTIAN, No Gallop/Rub  ECG: AFib  Groin/Wound: Soft, NT, No hematoma at site of the Cath  Gastrointestinal: Soft, NT/ND, + Bowel Sounds  Neuro: = motor and sensory B/L, No focal deficits  Vascular: 1+ Pulses B/L, No Edema Noted  Extremities: + PMS x4, No joint pain or swelling  Skin: Warm/Dry/Normal color, Normal turgor, no rashes                          8.3    9.88  )-----------( 209      ( 16 Dec 2019 13:09 )             25.5   12-16    138  |  102  |  54<H>  ----------------------------<  136<H>  3.4<L>   |  22  |  1.40<H>    Ca    8.3<L>      16 Dec 2019 06:48  Phos  3.4     12-16  Mg     2.2     12-16    PT/INR - ( 16 Dec 2019 08:34 )   PT: 14.3 sec;   INR: 1.25 ratio         PTT - ( 16 Dec 2019 08:34 )  PTT:58.1 sec    Imaging Reviewed:     TTE:    < from: Transthoracic Echocardiogram (12.10.19 @ 10:24) >  OBSERVATIONS:  Mitral Valve: There is mitral annular calcification. There  is at least moderate(2-3+) mitral regurgitation.  This may  be an underestimation.  Aortic Root: The aortic root is normal in size.  Aortic Valve: The aortic valve is severely calcified. Peak  transaortic valve gradient equals 58 mm Hg, mean  transaortic valve gradient equals 30 mm Hg, estimated  aortic valve area equals 0.6 sqcm (by continuity equation),  aortic valve velocity time integral equals 90 cm, the DVI=  0.16,consistent with severe aortic stenosis.  The patient was in atrial fibrillation with variability in  the R-R intervals and gradients.  The highest matched R-R  intervals were used. Minimal aortic regurgitation.  Peak  left ventricular outflow tract gradient equals 2 mm Hg,  mean gradient is equal to 1 mm Hg, LVOT velocity time  integral equals 15 cm.  Left Atrium: Severely dilated left atrium.  LA volume index  = 70 cc/m2.  Left Ventricle: The LVEF about 30%. The interventicular  septum is dyskinetic.  The rest of the walls are  hypokinetic.  There is dyssynchronous contraction of the  left ventricle. The left ventricle is normal in size.  Right Heart: Normal right atrium.  The right atrial area= 18cm2, the right atrial volume=  45ml, the right atrial volume index= 27ml/m2. Normal right  ventricular size and function. Normal tricuspid valve.  Minimal tricuspid regurgitation. Normal pulmonic valve. No  pulmonic regurgitation.  Pericardium/PleuraThere is no pericardial effusion.  Hemodynamic: Estimated right atrial pressure is 8 mm Hg.  Estimated right ventricular systolic pressure equals 42 mm  Hg, assuming right atrial pressure equals 8 mm Hg,  consistent with mild pulmonary hypertension.  ------------------------------------------------------------------------  CONCLUSIONS:  1. The aortic valve is severely calcified. Peak transaortic  valve gradient equals 58 mm Hg, mean transaortic valve  gradient equals 30 mm Hg, estimated aortic valve area  equals 0.6 sqcm (by continuity equation), aortic valve  velocity time integral equals 90 cm, the DVI= 0.16,  consistent with severe aortic stenosis.  The patient was in atrial fibrillation with variability in  the R-R intervals and gradients.  The highest matched R-R  intervals were used.    < end of copied text >    Cardiac Cath:    < from: Cardiac Cath Lab - Adult (19 @ 09:35) >  VENTRICLES: No left ventriculogram was performed.  CORONARY VESSELS: The coronary circulation is co-dominant.  LM:   --  LM: Angiography showed minor luminal irregularities with no flow  limiting lesions.  LAD:   --  Mid LAD: There was a tubular 25 % stenosis at the site of a  prior stent, in-stent. In a second lesion, there was a discrete 60 %  stenosis in the distal third of the vessel segment.  --  D1: Angiography showed minor luminal irregularities with no flow  limiting lesions.  CX:   --  Proximal circumflex: Angiography showed minor luminal  irregularities with no flow limiting lesions.  --  OM1: There was a diffuse 70 % stenosis.  --  LPL1: Angiography showed minor luminal irregularities with no flow  limiting lesions.  RCA:   --  Ostial RCA: There was a diffuse 100 % stenosis at the site of a  prior stent, in-stent.  COMPLICATIONS: There were no complications.  DIAGNOSTIC IMPRESSIONS: Severe anemia (7.3). Patent LAD stent. Occluded RCA  stents. Moderate LAD disease distal to the stent. Mod-severe om disease.  High filling pressures.  DIAGNOSTIC RECOMMENDATIONS: Admit for anemia w/u, heart failure eval and  structural heart eval.  Prepared and signed by  Anuel Loja M.D.  Signed 2019 15:16:10  HEMODYNAMIC TABLES  Pressures:  Baseline  Pressures:  - HR: 83  Pressures:  - Rhythm:  Pressures:  -- Pulmonary Artery (S/D/M): 52/21/36  Pressures:  -- Pulmonary Capillary Wedge: 23/34/23  Pressures:  -- Right Atrium (a/v/M): 14/13/10  Pressures:  -- Right Ventricle (s/edp): 57/8/--  O2 Sats:  Baseline  O2 Sats:  - HR: 83  O2 Sats:  - Rhythm:  O2 Sats:  -- AO: 7.3/98.2/9.75  O2 Sats:  -- PA: 7.3/47.1/4.68  Outputs:  Baseline  Outputs:  -- CALCUL[age]  Outputs:  -- CALCULATIONS: Body Surface Area: 1.64  Outputs:  -- CALCULATIONS: Height in cm: 165.00  Outputs:  -- CALCULATIONS: Sex: Male  Outputs:  -- CALCULATIONS: Weight in k.50  Outputs:  -- OUTPUTS: CO by Mel: 4.34  Outputs:  -- OUTPUTS: Mel cardiac index: 2.64    < end of copied text >

## 2019-12-17 LAB
ANION GAP SERPL CALC-SCNC: 13 MMOL/L — SIGNIFICANT CHANGE UP (ref 5–17)
APTT BLD: 63.1 SEC — HIGH (ref 27.5–36.3)
BUN SERPL-MCNC: 54 MG/DL — HIGH (ref 7–23)
CALCIUM SERPL-MCNC: 9 MG/DL — SIGNIFICANT CHANGE UP (ref 8.4–10.5)
CHLORIDE SERPL-SCNC: 101 MMOL/L — SIGNIFICANT CHANGE UP (ref 96–108)
CO2 SERPL-SCNC: 22 MMOL/L — SIGNIFICANT CHANGE UP (ref 22–31)
CREAT SERPL-MCNC: 1.33 MG/DL — HIGH (ref 0.5–1.3)
GLUCOSE BLDC GLUCOMTR-MCNC: 117 MG/DL — HIGH (ref 70–99)
GLUCOSE SERPL-MCNC: 140 MG/DL — HIGH (ref 70–99)
HCT VFR BLD CALC: 22.8 % — LOW (ref 39–50)
HGB BLD-MCNC: 7.3 G/DL — LOW (ref 13–17)
INR BLD: 1.13 RATIO — SIGNIFICANT CHANGE UP (ref 0.88–1.16)
MAGNESIUM SERPL-MCNC: 2.2 MG/DL — SIGNIFICANT CHANGE UP (ref 1.6–2.6)
MCHC RBC-ENTMCNC: 30.7 PG — SIGNIFICANT CHANGE UP (ref 27–34)
MCHC RBC-ENTMCNC: 32 GM/DL — SIGNIFICANT CHANGE UP (ref 32–36)
MCV RBC AUTO: 95.8 FL — SIGNIFICANT CHANGE UP (ref 80–100)
PHOSPHATE SERPL-MCNC: 3.3 MG/DL — SIGNIFICANT CHANGE UP (ref 2.5–4.5)
PLATELET # BLD AUTO: 183 K/UL — SIGNIFICANT CHANGE UP (ref 150–400)
POTASSIUM SERPL-MCNC: 4 MMOL/L — SIGNIFICANT CHANGE UP (ref 3.5–5.3)
POTASSIUM SERPL-SCNC: 4 MMOL/L — SIGNIFICANT CHANGE UP (ref 3.5–5.3)
PROTHROM AB SERPL-ACNC: 12.8 SEC — SIGNIFICANT CHANGE UP (ref 10–13.1)
RBC # BLD: 2.38 M/UL — LOW (ref 4.2–5.8)
RBC # FLD: 18.1 % — HIGH (ref 10.3–14.5)
SODIUM SERPL-SCNC: 136 MMOL/L — SIGNIFICANT CHANGE UP (ref 135–145)
WBC # BLD: 7.48 K/UL — SIGNIFICANT CHANGE UP (ref 3.8–10.5)
WBC # FLD AUTO: 7.48 K/UL — SIGNIFICANT CHANGE UP (ref 3.8–10.5)

## 2019-12-17 PROCEDURE — 99233 SBSQ HOSP IP/OBS HIGH 50: CPT

## 2019-12-17 PROCEDURE — 99232 SBSQ HOSP IP/OBS MODERATE 35: CPT

## 2019-12-17 PROCEDURE — 75574 CT ANGIO HRT W/3D IMAGE: CPT | Mod: 26

## 2019-12-17 PROCEDURE — 94010 BREATHING CAPACITY TEST: CPT | Mod: 26

## 2019-12-17 RX ORDER — DIGOXIN 250 MCG
0.12 TABLET ORAL
Refills: 0 | Status: DISCONTINUED | OUTPATIENT
Start: 2019-12-18 | End: 2019-12-21

## 2019-12-17 RX ORDER — SENNA PLUS 8.6 MG/1
2 TABLET ORAL AT BEDTIME
Refills: 0 | Status: DISCONTINUED | OUTPATIENT
Start: 2019-12-17 | End: 2019-12-26

## 2019-12-17 RX ORDER — FUROSEMIDE 40 MG
80 TABLET ORAL DAILY
Refills: 0 | Status: DISCONTINUED | OUTPATIENT
Start: 2019-12-18 | End: 2019-12-18

## 2019-12-17 RX ADMIN — TIOTROPIUM BROMIDE 1 CAPSULE(S): 18 CAPSULE ORAL; RESPIRATORY (INHALATION) at 11:51

## 2019-12-17 RX ADMIN — Medication 1 SPRAY(S): at 11:50

## 2019-12-17 RX ADMIN — SODIUM CHLORIDE 3 MILLILITER(S): 9 INJECTION INTRAMUSCULAR; INTRAVENOUS; SUBCUTANEOUS at 05:11

## 2019-12-17 RX ADMIN — SODIUM CHLORIDE 3 MILLILITER(S): 9 INJECTION INTRAMUSCULAR; INTRAVENOUS; SUBCUTANEOUS at 21:55

## 2019-12-17 RX ADMIN — SENNA PLUS 2 TABLET(S): 8.6 TABLET ORAL at 22:00

## 2019-12-17 RX ADMIN — Medication 1 MILLIGRAM(S): at 11:50

## 2019-12-17 RX ADMIN — Medication 325 MILLIGRAM(S): at 11:50

## 2019-12-17 RX ADMIN — HEPARIN SODIUM 1000 UNIT(S)/HR: 5000 INJECTION INTRAVENOUS; SUBCUTANEOUS at 09:58

## 2019-12-17 RX ADMIN — CLOPIDOGREL BISULFATE 75 MILLIGRAM(S): 75 TABLET, FILM COATED ORAL at 11:50

## 2019-12-17 RX ADMIN — Medication 25 MILLIGRAM(S): at 05:12

## 2019-12-17 RX ADMIN — SODIUM CHLORIDE 3 MILLILITER(S): 9 INJECTION INTRAMUSCULAR; INTRAVENOUS; SUBCUTANEOUS at 14:23

## 2019-12-17 RX ADMIN — Medication 0.12 MILLIGRAM(S): at 05:12

## 2019-12-17 RX ADMIN — ATORVASTATIN CALCIUM 40 MILLIGRAM(S): 80 TABLET, FILM COATED ORAL at 22:00

## 2019-12-17 NOTE — PROGRESS NOTE ADULT - PROBLEM SELECTOR PLAN 2
Patient is currently off of all diuretics, though initially was on IV furosemide. Monitor Daily weight and keep I < O.      G. ASTRID Dupree  [id number]

## 2019-12-17 NOTE — PROGRESS NOTE ADULT - ASSESSMENT
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

## 2019-12-17 NOTE — PROGRESS NOTE ADULT - ATTENDING COMMENTS
Seen, examined the patient, attendant at bedside  Admitted for acute on chronic systolic HF, s/p L and R heart cath on admission, in anticipation of TAVR for severe AS  Sitting in chair, breathing better, but c/o exertional dyspnea  - Reviewed labs, imaging, cath report, CXR, Echo  - spoke to Structural heart team, plans for TAVR on Mon 12.22, Cardiac CT, carotid doppler recommended   - HF team plan noted, recommended IV lasix 80mg daily, Metoprolol,     closely monitor I/O, weight   - c/w IV heparin gtt

## 2019-12-17 NOTE — PROGRESS NOTE ADULT - PROBLEM SELECTOR PLAN 1
Pt presented with DYER, with recent TTE 12/10 EF 30% with severe AS, with LV dysfunction, prior Echo in 6/2019 was 42%. Exam notable for 3+ LE edema up to b/l thigh, b/l rales, likely related to severe AS, being diuresed with lasix 40mg IV BID, currently appear near euvolemia.   -CXR: loculated fluid at left major fissure. Repeat CXR 12/16 showed clear lungs  - IV Lasix 80mg daily for diuresis  - Strict Ins and Outs, daily weight, fluid restriction to 1L/day  - C/w toprol 25mg qd  - Follow up HF  recs.

## 2019-12-17 NOTE — PROGRESS NOTE ADULT - SUBJECTIVE AND OBJECTIVE BOX
*****Structural Heart Team*****    Subjective:    Patient resting comfortably in a chair, stating he still feels a little weak and SOB when he moves, otherwise he is OK. He denies any CP or dizziness. He is set to get his Cardiac CT today, which will complete his TAVR workup.      PAST MEDICAL & SURGICAL HISTORY:  Anemia  LBBB (left bundle branch block)  DYER (dyspnea on exertion)  Coronary disease: PCI  CHF (congestive heart failure)  Former smoker  Meniere disease  HLD (hyperlipidemia)  DM (diabetes mellitus)  Atrial fibrillation  HTN (hypertension)  Lipoma  History of skin graft  Status post appendectomy        T(C): 36.8 (19 @ 04:09), Max: 36.8 (19 @ 11:56)  HR: 78 (19 @ 05:09) (70 - 78)  BP: 104/66 (19 @ 05:09) (104/66 - 119/66)  RR: 18 (19 @ 04:09) (18 - 18)  SpO2: 97% (19 @ 04:09) (97% - 98%)  Wt(kg): --   @ 07:01  -   @ 07:00  --------------------------------------------------------  IN: 800 mL / OUT: 1040 mL / NET: -240 mL      MEDICATIONS  (STANDING):  atorvastatin 40 milliGRAM(s) Oral at bedtime  clopidogrel Tablet 75 milliGRAM(s) Oral daily  digoxin     Tablet 0.125 milliGRAM(s) Oral daily  ferrous    sulfate 325 milliGRAM(s) Oral daily  fluticasone propionate 50 MICROgram(s)/spray Nasal Spray 1 Spray(s) Both Nostrils daily  folic acid 1 milliGRAM(s) Oral daily  heparin  Infusion.  Unit(s)/Hr (11 mL/Hr) IV Continuous <Continuous>  melatonin 6 milliGRAM(s) Oral at bedtime  metoprolol succinate ER 25 milliGRAM(s) Oral daily  senna 2 Tablet(s) Oral at bedtime  sodium chloride 0.9% lock flush 3 milliLiter(s) IV Push every 8 hours  tiotropium 18 MICROgram(s) Capsule 1 Capsule(s) Inhalation daily    MEDICATIONS  (PRN):  ALBUTerol    90 MICROgram(s) HFA Inhaler 2 Puff(s) Inhalation every 6 hours PRN Shortness of Breath and/or Wheezing  guaiFENesin  milliGRAM(s) Oral every 12 hours PRN Cough  heparin  Injectable 4500 Unit(s) IV Push every 6 hours PRN For aPTT less than 40  heparin  Injectable 2000 Unit(s) IV Push every 6 hours PRN For aPTT between 40 - 57  meclizine 12.5 milliGRAM(s) Oral three times a day PRN Dizziness      Review of Symptoms:  Constitutional: Awake, Alert, Follows commands  Respiratory: + DYER, + SOB  Cardiac: Denies CP, Denies Palpitations  Gastrointestinal: Denies Pain, Denies N/V, tolerating po intake  Vascular: Negative  Extremities: + Edema, No joint pain or swelling  Neurological: Negative  Endocrine: No heat or cold intolerance, No excessive thirst  Heme/Onc: Negative    Exam:  General: A/O x3, Awake, Weak, follows commands  HEENT: Supple, No JVD, Trachea midline, no masses  Pulmonary: CTAB, = Chest Excursion, no accessory muscle use  Cor: S1S2, Irregular, III/VI CRISTIAN, No gallops or rubs  ECG: AFib  Groin/Wound: No Hematoma at Cath Site  Gastrointestinal: Soft, NT/ND, + Bowel Sounds  Neuro: = motor and sensory B/L, No focal deficits  Vascular: 1+ pulses B/L, No edema noted, No Carotid Bruits  Extremities: No joint pain or swelling, + PMSx4  Skin: Warm/Dry/Normal color, Normal turgor, no rashes                          7.3    7.48  )-----------( 183      ( 17 Dec 2019 09:18 )             22.8   12-    136  |  101  |  54<H>  ----------------------------<  140<H>  4.0   |  22  |  1.33<H>    Ca    9.0      17 Dec 2019 06:45  Phos  3.3     12-17  Mg     2.2     12-17    PT/INR - ( 17 Dec 2019 09:15 )   PT: 12.8 sec;   INR: 1.13 ratio         PTT - ( 17 Dec 2019 09:15 )  PTT:63.1 sec    Imaging Reviewed:    TTE:    < from: Transthoracic Echocardiogram (12.10.19 @ 10:24) >  OBSERVATIONS:  Mitral Valve: There is mitral annular calcification. There  is at least moderate(2-3+) mitral regurgitation.  This may  be an underestimation.  Aortic Root: The aortic root is normal in size.  Aortic Valve: The aortic valve is severely calcified. Peak  transaortic valve gradient equals 58 mm Hg, mean  transaortic valve gradient equals 30 mm Hg, estimated  aortic valve area equals 0.6 sqcm (by continuity equation),  aortic valve velocity time integral equals 90 cm, the DVI=  0.16,consistent with severe aortic stenosis.  The patient was in atrial fibrillation with variability in  the R-R intervals and gradients.  The highest matched R-R  intervals were used. Minimal aortic regurgitation.  Peak  left ventricular outflow tract gradient equals 2 mm Hg,  mean gradient is equal to 1 mm Hg, LVOT velocity time  integral equals 15 cm.  Left Atrium: Severely dilated left atrium.  LA volume index  = 70 cc/m2.  Left Ventricle: The LVEF about 30%. The interventicular  septum is dyskinetic.  The rest of the walls are  hypokinetic.  There is dyssynchronous contraction of the  left ventricle. The left ventricle is normal in size.  Right Heart: Normal right atrium.  The right atrial area= 18cm2, the right atrial volume=  45ml, the right atrial volume index= 27ml/m2. Normal right  ventricular size and function. Normal tricuspid valve.  Minimal tricuspid regurgitation. Normal pulmonic valve. No  pulmonic regurgitation.  Pericardium/PleuraThere is no pericardial effusion.  Hemodynamic: Estimated right atrial pressure is 8 mm Hg.  Estimated right ventricular systolic pressure equals 42 mm  Hg, assuming right atrial pressure equals 8 mm Hg,  consistent with mild pulmonary hypertension.  ------------------------------------------------------------------------  CONCLUSIONS:  1. The aortic valve is severely calcified. Peak transaortic  valve gradient equals 58 mm Hg, mean transaortic valve  gradient equals 30 mm Hg, estimated aortic valve area  equals 0.6 sqcm (by continuity equation), aortic valve  velocity time integral equals 90 cm, the DVI= 0.16,  consistent with severe aortic stenosis.  The patient was in atrial fibrillation with variability in  the R-R intervals and gradients.  The highest matched R-R  intervals were used.    < end of copied text >    Cardiac Cath:    < from: Cardiac Cath Lab - Adult (19 @ 09:35) >  VENTRICLES: No left ventriculogram was performed.  CORONARY VESSELS: The coronary circulation is co-dominant.  LM:   --  LM: Angiography showed minor luminal irregularities with no flow  limiting lesions.  LAD:   --  Mid LAD: There was a tubular 25 % stenosis at the site of a  prior stent, in-stent. In a second lesion, there was a discrete 60 %  stenosis in the distal third of the vessel segment.  --  D1: Angiography showed minor luminal irregularities with no flow  limiting lesions.  CX:   --  Proximal circumflex: Angiography showed minor luminal  irregularities with no flow limiting lesions.  --  OM1: There was a diffuse 70 % stenosis.  --  LPL1: Angiography showed minor luminal irregularities with no flow  limiting lesions.  RCA:   --  Ostial RCA: There was a diffuse 100 % stenosis at the site of a  prior stent, in-stent.  COMPLICATIONS: There were no complications.  DIAGNOSTIC IMPRESSIONS: Severe anemia (7.3). Patent LAD stent. Occluded RCA  stents. Moderate LAD disease distal to the stent. Mod-severe om disease.  High filling pressures.  DIAGNOSTIC RECOMMENDATIONS: Admit for anemia w/u, heart failure eval and  structural heart eval.  Prepared and signed by  Anuel Loja M.D.  Signed 2019 15:16:10  HEMODYNAMIC TABLES  Pressures:  Baseline  Pressures:  - HR: 83  Pressures:  - Rhythm:  Pressures:  -- Pulmonary Artery (S/D/M): 52/21/36  Pressures:  -- Pulmonary Capillary Wedge: 23/34/23  Pressures:  -- Right Atrium (a/v/M): 14/13/10  Pressures:  -- Right Ventricle (s/edp): 57/8/--  O2 Sats:  Baseline  O2 Sats:  - HR: 83  O2 Sats:  - Rhythm:  O2 Sats:  -- AO: 7.3/98.2/9.75  O2 Sats:  -- PA: 7.3/47.1/4.68  Outputs:  Baseline  Outputs:  -- CALCULATIONS[age]  Outputs:  -- CALCULATIONS: Body Surface Area: 1.64  Outputs:  -- CALCULATIONS: Height in cm: 165.00  Outputs:  -- CALCULATIONS: Sex: Male  Outputs:  -- CALCULATIONS: Weight in k.50  Outputs:  -- OUTPUTS: CO by Mel: 4.34  Outputs:  -- OUTPUTS: Mel cardiac index: 2.64    < end of copied text >

## 2019-12-17 NOTE — PROGRESS NOTE ADULT - PROBLEM SELECTOR PLAN 5
Pt on coumadin at home, will hold and start Heparin gtt in anticipation for possible TAVR. If no procedure, transition to coumadin   - CHADSVASc score of 6  - Continue with digoxin MWF and toprol 25mg qd. Digoxin level wnl  - Monitor on telemetry

## 2019-12-17 NOTE — PROGRESS NOTE ADULT - SUBJECTIVE AND OBJECTIVE BOX
Shari Dang MD  PGY-1  Pager [phone]  Pager Y [phone]      Patient is a 84y old  Male who presents with a chief complaint of CHF exacerbation (16 Dec 2019 17:19)        SUBJECTIVE / OVERNIGHT EVENTS: Patient had no acute events overnight. Patient seen and examined at bedside this morning.     ROS: [ - ] Fever [ - ] Chills [ - ] Nausea/Vomiting [ - ] Chest Pain [ - ] Shortness of breath     MEDICATIONS  (STANDING):  atorvastatin 40 milliGRAM(s) Oral at bedtime  clopidogrel Tablet 75 milliGRAM(s) Oral daily  digoxin     Tablet 0.125 milliGRAM(s) Oral daily  ferrous    sulfate 325 milliGRAM(s) Oral daily  fluticasone propionate 50 MICROgram(s)/spray Nasal Spray 1 Spray(s) Both Nostrils daily  folic acid 1 milliGRAM(s) Oral daily  heparin  Infusion.  Unit(s)/Hr (11 mL/Hr) IV Continuous <Continuous>  melatonin 6 milliGRAM(s) Oral at bedtime  metoprolol succinate ER 25 milliGRAM(s) Oral daily  sodium chloride 0.9% lock flush 3 milliLiter(s) IV Push every 8 hours  tiotropium 18 MICROgram(s) Capsule 1 Capsule(s) Inhalation daily    MEDICATIONS  (PRN):  ALBUTerol    90 MICROgram(s) HFA Inhaler 2 Puff(s) Inhalation every 6 hours PRN Shortness of Breath and/or Wheezing  guaiFENesin  milliGRAM(s) Oral every 12 hours PRN Cough  heparin  Injectable 4500 Unit(s) IV Push every 6 hours PRN For aPTT less than 40  heparin  Injectable 2000 Unit(s) IV Push every 6 hours PRN For aPTT between 40 - 57  meclizine 12.5 milliGRAM(s) Oral three times a day PRN Dizziness      Vital Signs Last 24 Hrs  T(C): 36.8 (17 Dec 2019 04:09), Max: 36.8 (16 Dec 2019 11:56)  T(F): 98.2 (17 Dec 2019 04:09), Max: 98.3 (16 Dec 2019 21:12)  HR: 78 (17 Dec 2019 05:09) (70 - 78)  BP: 104/66 (17 Dec 2019 05:09) (104/66 - 119/66)  BP(mean): --  RR: 18 (17 Dec 2019 04:09) (18 - 18)  SpO2: 97% (17 Dec 2019 04:09) (97% - 98%)  CAPILLARY BLOOD GLUCOSE        I&O's Summary    16 Dec 2019 07:01  -  17 Dec 2019 07:00  --------------------------------------------------------  IN: 800 mL / OUT: 1040 mL / NET: -240 mL        PHYSICAL EXAM  GENERAL: NAD, lying comfortably in bed   HEENT:  Atraumatic, Normocephalic, EOMI, conjunctiva and sclera clear, no LAD  CHEST/LUNG: Clear to auscultation bilaterally; No wheeze  HEART: RRR, S1 and S2 No murmurs, rubs, or gallops  ABDOMEN: Soft, Nontender, Nondistended; Bowel sounds present  EXTREMITIES:  2+ Peripheral Pulses, No clubbing, cyanosis, or edema  NEURO: AAOx3, non-focal  SKIN: No rashes or lesions    LABS:                        7.3    7.48  )-----------( 183      ( 17 Dec 2019 09:18 )             22.8     12-17    136  |  101  |  54<H>  ----------------------------<  140<H>  4.0   |  22  |  1.33<H>    Ca    9.0      17 Dec 2019 06:45  Phos  3.3     12-17  Mg     2.2     12-17      PT/INR - ( 17 Dec 2019 09:15 )   PT: 12.8 sec;   INR: 1.13 ratio         PTT - ( 17 Dec 2019 09:15 )  PTT:63.1 sec            RADIOLOGY & ADDITIONAL TESTS:    Imaging Personally Reviewed:  Consultant(s) Notes Reviewed: Shari Dang MD  PGY-1  Pager [phone]  Pager H [phone]      Patient is a 84y old  Male who presents with a chief complaint of CHF exacerbation (16 Dec 2019 17:19)        SUBJECTIVE / OVERNIGHT EVENTS: Patient had no acute events overnight. Patient seen and examined at bedside this morning.     ROS: [ - ] Fever [ - ] Chills [ - ] Nausea/Vomiting [ - ] Chest Pain [ - ] Shortness of breath     MEDICATIONS  (STANDING):  atorvastatin 40 milliGRAM(s) Oral at bedtime  clopidogrel Tablet 75 milliGRAM(s) Oral daily  digoxin     Tablet 0.125 milliGRAM(s) Oral daily  ferrous    sulfate 325 milliGRAM(s) Oral daily  fluticasone propionate 50 MICROgram(s)/spray Nasal Spray 1 Spray(s) Both Nostrils daily  folic acid 1 milliGRAM(s) Oral daily  heparin  Infusion.  Unit(s)/Hr (11 mL/Hr) IV Continuous <Continuous>  melatonin 6 milliGRAM(s) Oral at bedtime  metoprolol succinate ER 25 milliGRAM(s) Oral daily  sodium chloride 0.9% lock flush 3 milliLiter(s) IV Push every 8 hours  tiotropium 18 MICROgram(s) Capsule 1 Capsule(s) Inhalation daily    MEDICATIONS  (PRN):  ALBUTerol    90 MICROgram(s) HFA Inhaler 2 Puff(s) Inhalation every 6 hours PRN Shortness of Breath and/or Wheezing  guaiFENesin  milliGRAM(s) Oral every 12 hours PRN Cough  heparin  Injectable 4500 Unit(s) IV Push every 6 hours PRN For aPTT less than 40  heparin  Injectable 2000 Unit(s) IV Push every 6 hours PRN For aPTT between 40 - 57  meclizine 12.5 milliGRAM(s) Oral three times a day PRN Dizziness      Vital Signs Last 24 Hrs  T(C): 36.8 (17 Dec 2019 04:09), Max: 36.8 (16 Dec 2019 11:56)  T(F): 98.2 (17 Dec 2019 04:09), Max: 98.3 (16 Dec 2019 21:12)  HR: 78 (17 Dec 2019 05:09) (70 - 78)  BP: 104/66 (17 Dec 2019 05:09) (104/66 - 119/66)  BP(mean): --  RR: 18 (17 Dec 2019 04:09) (18 - 18)  SpO2: 97% (17 Dec 2019 04:09) (97% - 98%)  CAPILLARY BLOOD GLUCOSE        I&O's Summary    16 Dec 2019 07:01  -  17 Dec 2019 07:00  --------------------------------------------------------  IN: 800 mL / OUT: 1040 mL / NET: -240 mL        PHYSICAL EXAM  GENERAL: NAD, lying comfortably in bed   HEENT:  Atraumatic, Normocephalic, EOMI, conjunctiva and sclera clear, no LAD  CHEST/LUNG: Clear to auscultation bilaterally; No wheeze  HEART: RRR, S1 and S2. systolic murmur, no rubs, or gallops  ABDOMEN: Soft, Nontender, Nondistended; Bowel sounds present  EXTREMITIES:  2+ Peripheral Pulses, No clubbing, cyanosis, or edema  NEURO: AAOx3, non-focal  SKIN: No rashes or lesions    LABS:                        7.3    7.48  )-----------( 183      ( 17 Dec 2019 09:18 )             22.8     12-17    136  |  101  |  54<H>  ----------------------------<  140<H>  4.0   |  22  |  1.33<H>    Ca    9.0      17 Dec 2019 06:45  Phos  3.3     12-17  Mg     2.2     12-17      PT/INR - ( 17 Dec 2019 09:15 )   PT: 12.8 sec;   INR: 1.13 ratio         PTT - ( 17 Dec 2019 09:15 )  PTT:63.1 sec            RADIOLOGY & ADDITIONAL TESTS:    Imaging Personally Reviewed:  Consultant(s) Notes Reviewed: Shari Dang MD  PGY-1  Pager [phone]  Pager E [phone]      Patient is a 84y old  Male who presents with a chief complaint of CHF exacerbation (16 Dec 2019 17:19)        SUBJECTIVE / OVERNIGHT EVENTS: Patient had no acute events overnight. Patient seen and examined at bedside this morning.     ROS: [ - ] Fever [ - ] Chills [ - ] Nausea/Vomiting [ - ] Chest Pain [ - ] Shortness of breath     MEDICATIONS  (STANDING):  atorvastatin 40 milliGRAM(s) Oral at bedtime  clopidogrel Tablet 75 milliGRAM(s) Oral daily  digoxin     Tablet 0.125 milliGRAM(s) Oral daily  ferrous    sulfate 325 milliGRAM(s) Oral daily  fluticasone propionate 50 MICROgram(s)/spray Nasal Spray 1 Spray(s) Both Nostrils daily  folic acid 1 milliGRAM(s) Oral daily  heparin  Infusion.  Unit(s)/Hr (11 mL/Hr) IV Continuous <Continuous>  melatonin 6 milliGRAM(s) Oral at bedtime  metoprolol succinate ER 25 milliGRAM(s) Oral daily  sodium chloride 0.9% lock flush 3 milliLiter(s) IV Push every 8 hours  tiotropium 18 MICROgram(s) Capsule 1 Capsule(s) Inhalation daily    MEDICATIONS  (PRN):  ALBUTerol    90 MICROgram(s) HFA Inhaler 2 Puff(s) Inhalation every 6 hours PRN Shortness of Breath and/or Wheezing  guaiFENesin  milliGRAM(s) Oral every 12 hours PRN Cough  heparin  Injectable 4500 Unit(s) IV Push every 6 hours PRN For aPTT less than 40  heparin  Injectable 2000 Unit(s) IV Push every 6 hours PRN For aPTT between 40 - 57  meclizine 12.5 milliGRAM(s) Oral three times a day PRN Dizziness      Vital Signs Last 24 Hrs  T(C): 36.8 (17 Dec 2019 04:09), Max: 36.8 (16 Dec 2019 11:56)  T(F): 98.2 (17 Dec 2019 04:09), Max: 98.3 (16 Dec 2019 21:12)  HR: 78 (17 Dec 2019 05:09) (70 - 78)  BP: 104/66 (17 Dec 2019 05:09) (104/66 - 119/66)  BP(mean): --  RR: 18 (17 Dec 2019 04:09) (18 - 18)  SpO2: 97% (17 Dec 2019 04:09) (97% - 98%)  CAPILLARY BLOOD GLUCOSE        I&O's Summary    16 Dec 2019 07:01  -  17 Dec 2019 07:00  --------------------------------------------------------  IN: 800 mL / OUT: 1040 mL / NET: -240 mL        PHYSICAL EXAM  GENERAL: NAD, lying comfortably in bed   HEENT:  Atraumatic, Normocephalic, EOMI, conjunctiva and sclera clear, no LAD  CHEST/LUNG: Clear to auscultation bilaterally; No wheeze  HEART: irregularly irregular, S1 and S2. systolic murmur, no rubs, or gallops  ABDOMEN: Soft, Nontender, Nondistended; Bowel sounds present  EXTREMITIES:  2+ Peripheral Pulses, No clubbing, cyanosis, or edema  NEURO: AAOx3, non-focal  SKIN: No rashes or lesions    LABS:                        7.3    7.48  )-----------( 183      ( 17 Dec 2019 09:18 )             22.8     12-17    136  |  101  |  54<H>  ----------------------------<  140<H>  4.0   |  22  |  1.33<H>    Ca    9.0      17 Dec 2019 06:45  Phos  3.3     12-17  Mg     2.2     12-17      PT/INR - ( 17 Dec 2019 09:15 )   PT: 12.8 sec;   INR: 1.13 ratio         PTT - ( 17 Dec 2019 09:15 )  PTT:63.1 sec            RADIOLOGY & ADDITIONAL TESTS:    Imaging Personally Reviewed:  Consultant(s) Notes Reviewed:

## 2019-12-17 NOTE — PROGRESS NOTE ADULT - PROBLEM SELECTOR PLAN 3
Hb 7.9, baseline 8-9 in 2015, follows with outside hematologist, Dr. Miranda  - Transfuse if Hb<7, T&S

## 2019-12-17 NOTE — PROGRESS NOTE ADULT - PROBLEM SELECTOR PLAN 1
Patient is scheduled for his TAVR CT today, which will complete his workup. Our plan is to keep him in the hospital, and we will implant on Monday 12/23/2019. Plan discussed with patient and Heart Failure service, both of which agree.

## 2019-12-17 NOTE — PROGRESS NOTE ADULT - ASSESSMENT
84M PMH chronic anemia, HFrEF (40%), Afib on coumadin, T2DM, HLD, PVD, Menieres Disease, carotid stenosis, LBBB on EKG, CAD s/p PCI to mLAD in June 2014 and PCI to pRCA that was complicated by perforation and cardiogenic shock in May of 2015 presented for elective [facility] for TAVR evaluation for severe AS, admitted for acute on on chronic CHF and anemia workup, plan for TVAR Mon 12/23

## 2019-12-17 NOTE — PROGRESS NOTE ADULT - PROBLEM SELECTOR PLAN 2
TTE showing severe aortic stenosis, symptomatic with SOB on exertion  - Structural heart following, plan for TAVR Mon 12/23  - Monitor on tele  - f/u CT coronaries and [facility] cartoid arteries

## 2019-12-18 LAB
ANION GAP SERPL CALC-SCNC: 8 MMOL/L — SIGNIFICANT CHANGE UP (ref 5–17)
APTT BLD: 66.8 SEC — HIGH (ref 27.5–36.3)
BUN SERPL-MCNC: 55 MG/DL — HIGH (ref 7–23)
CALCIUM SERPL-MCNC: 8.5 MG/DL — SIGNIFICANT CHANGE UP (ref 8.4–10.5)
CHLORIDE SERPL-SCNC: 101 MMOL/L — SIGNIFICANT CHANGE UP (ref 96–108)
CO2 SERPL-SCNC: 23 MMOL/L — SIGNIFICANT CHANGE UP (ref 22–31)
CREAT SERPL-MCNC: 1.42 MG/DL — HIGH (ref 0.5–1.3)
GLUCOSE SERPL-MCNC: 138 MG/DL — HIGH (ref 70–99)
HCT VFR BLD CALC: 22 % — LOW (ref 39–50)
HGB BLD-MCNC: 7.2 G/DL — LOW (ref 13–17)
MAGNESIUM SERPL-MCNC: 2.3 MG/DL — SIGNIFICANT CHANGE UP (ref 1.6–2.6)
MCHC RBC-ENTMCNC: 31 PG — SIGNIFICANT CHANGE UP (ref 27–34)
MCHC RBC-ENTMCNC: 32.7 GM/DL — SIGNIFICANT CHANGE UP (ref 32–36)
MCV RBC AUTO: 94.8 FL — SIGNIFICANT CHANGE UP (ref 80–100)
PHOSPHATE SERPL-MCNC: 3.4 MG/DL — SIGNIFICANT CHANGE UP (ref 2.5–4.5)
PLATELET # BLD AUTO: 186 K/UL — SIGNIFICANT CHANGE UP (ref 150–400)
POTASSIUM SERPL-MCNC: 3.9 MMOL/L — SIGNIFICANT CHANGE UP (ref 3.5–5.3)
POTASSIUM SERPL-SCNC: 3.9 MMOL/L — SIGNIFICANT CHANGE UP (ref 3.5–5.3)
RBC # BLD: 2.32 M/UL — LOW (ref 4.2–5.8)
RBC # FLD: 18.1 % — HIGH (ref 10.3–14.5)
SODIUM SERPL-SCNC: 132 MMOL/L — LOW (ref 135–145)
WBC # BLD: 7.73 K/UL — SIGNIFICANT CHANGE UP (ref 3.8–10.5)
WBC # FLD AUTO: 7.73 K/UL — SIGNIFICANT CHANGE UP (ref 3.8–10.5)

## 2019-12-18 PROCEDURE — 99232 SBSQ HOSP IP/OBS MODERATE 35: CPT | Mod: GC

## 2019-12-18 PROCEDURE — 93880 EXTRACRANIAL BILAT STUDY: CPT | Mod: 26

## 2019-12-18 PROCEDURE — 99231 SBSQ HOSP IP/OBS SF/LOW 25: CPT

## 2019-12-18 PROCEDURE — 99233 SBSQ HOSP IP/OBS HIGH 50: CPT | Mod: GC

## 2019-12-18 RX ADMIN — Medication 80 MILLIGRAM(S): at 06:07

## 2019-12-18 RX ADMIN — CLOPIDOGREL BISULFATE 75 MILLIGRAM(S): 75 TABLET, FILM COATED ORAL at 11:59

## 2019-12-18 RX ADMIN — Medication 1 MILLIGRAM(S): at 12:00

## 2019-12-18 RX ADMIN — Medication 25 MILLIGRAM(S): at 06:06

## 2019-12-18 RX ADMIN — SODIUM CHLORIDE 3 MILLILITER(S): 9 INJECTION INTRAMUSCULAR; INTRAVENOUS; SUBCUTANEOUS at 13:04

## 2019-12-18 RX ADMIN — HEPARIN SODIUM 1000 UNIT(S)/HR: 5000 INJECTION INTRAVENOUS; SUBCUTANEOUS at 09:29

## 2019-12-18 RX ADMIN — TIOTROPIUM BROMIDE 1 CAPSULE(S): 18 CAPSULE ORAL; RESPIRATORY (INHALATION) at 12:00

## 2019-12-18 RX ADMIN — ATORVASTATIN CALCIUM 40 MILLIGRAM(S): 80 TABLET, FILM COATED ORAL at 22:09

## 2019-12-18 RX ADMIN — Medication 0.12 MILLIGRAM(S): at 06:06

## 2019-12-18 RX ADMIN — SODIUM CHLORIDE 3 MILLILITER(S): 9 INJECTION INTRAMUSCULAR; INTRAVENOUS; SUBCUTANEOUS at 21:49

## 2019-12-18 RX ADMIN — Medication 325 MILLIGRAM(S): at 12:00

## 2019-12-18 RX ADMIN — Medication 1 SPRAY(S): at 12:00

## 2019-12-18 RX ADMIN — SENNA PLUS 2 TABLET(S): 8.6 TABLET ORAL at 22:09

## 2019-12-18 RX ADMIN — SODIUM CHLORIDE 3 MILLILITER(S): 9 INJECTION INTRAMUSCULAR; INTRAVENOUS; SUBCUTANEOUS at 06:07

## 2019-12-18 NOTE — PROGRESS NOTE ADULT - PROBLEM SELECTOR PLAN 1
Pt presented with DYER, with recent TTE 12/10 EF 30% with severe AS, with LV dysfunction, prior Echo in 6/2019 was 42%. Exam notable for 3+ LE edema up to b/l thigh, b/l rales, likely related to severe AS, being diuresed with lasix 40mg IV BID, currently appear near euvolemia.   -CXR: loculated fluid at left major fissure. Repeat CXR 12/16 showed clear lungs  - IV Lasix 80mg daily for diuresis  - Strict Ins and Outs, daily weight, fluid restriction to 1L/day  - C/w toprol 25mg qd  - Follow up HF  recs. Pt presented with DYER, with recent TTE 12/10 EF 30% with severe AS, with LV dysfunction, prior Echo in 6/2019 was 42%. Exam notable for 3+ LE edema up to b/l thigh, b/l rales, likely related to severe AS, being diuresed with lasix 40mg IV BID, currently appear near euvolemia.   -CXR: loculated fluid at left major fissure. Repeat CXR 12/16 showed clear lungs  - IV Lasix 80mg daily for diuresis  - Strict Ins and Outs, daily weight, fluid restriction to 1L/day  - C/w toprol 25mg qd  - Follow up HF  recs

## 2019-12-18 NOTE — PROGRESS NOTE ADULT - SUBJECTIVE AND OBJECTIVE BOX
Structural Heart Team    Mr Latham is sitting up in a chair.  He reports some pain in his chest with deep inhalation and says he feels a little worse than yesterday.      REVIEW OF SYSTEMS:    CONSTITUTIONAL: No weakness, fevers or chills  EYES/ENT: No visual changes;  No vertigo or throat pain   NECK: No pain or stiffness  RESPIRATORY: No cough, wheezing, hemoptysis; No shortness of breath  CARDIOVASCULAR: No chest pain or palpitations  GASTROINTESTINAL: No abdominal or epigastric pain. No nausea, vomiting, or hematemesis; No diarrhea or constipation. No melena or hematochezia.  GENITOURINARY: No dysuria, frequency or hematuria  NEUROLOGICAL: No numbness or weakness  SKIN: No itching, rashes      Allergies    No Known Allergies    Intolerances      Vital Signs Last 24 Hrs  T(C): 36.8 (18 Dec 2019 12:33), Max: 37 (17 Dec 2019 18:15)  T(F): 98.2 (18 Dec 2019 12:33), Max: 98.6 (17 Dec 2019 18:15)  HR: 94 (18 Dec 2019 12:33) (76 - 94)  BP: 100/66 (18 Dec 2019 12:33) (98/65 - 104/67)  BP(mean): --  RR: 18 (18 Dec 2019 12:33) (18 - 18)  SpO2: 95% (18 Dec 2019 12:33) (95% - 100%)    MEDICATIONS  (STANDING):  atorvastatin 40 milliGRAM(s) Oral at bedtime  clopidogrel Tablet 75 milliGRAM(s) Oral daily  digoxin     Tablet 0.125 milliGRAM(s) Oral <User Schedule>  ferrous    sulfate 325 milliGRAM(s) Oral daily  fluticasone propionate 50 MICROgram(s)/spray Nasal Spray 1 Spray(s) Both Nostrils daily  folic acid 1 milliGRAM(s) Oral daily  furosemide   Injectable 80 milliGRAM(s) IV Push daily  heparin  Infusion.  Unit(s)/Hr (11 mL/Hr) IV Continuous <Continuous>  melatonin 6 milliGRAM(s) Oral at bedtime  metoprolol succinate ER 25 milliGRAM(s) Oral daily  senna 2 Tablet(s) Oral at bedtime  sodium chloride 0.9% lock flush 3 milliLiter(s) IV Push every 8 hours  tiotropium 18 MICROgram(s) Capsule 1 Capsule(s) Inhalation daily      Exam-  General: NAD  Cor: s1s2, IRR, III/VI high pitch systolic murmur     EKG: afib  Pulm: CTA b/l, no w/r/r  Gastointestinal: soft, nontender, nondistended, +bowel sounds  Extremities: no edema  Neuro: A&Ox3, nonfocal                          7.2    7.73  )-----------( 186      ( 18 Dec 2019 08:13 )             22.0   12-18    132<L>  |  101  |  55<H>  ----------------------------<  138<H>  3.9   |  23  |  1.42<H>    Ca    8.5      18 Dec 2019 06:17  Phos  3.4     12-18  Mg     2.3     12-18    PT/INR - ( 17 Dec 2019 09:15 )   PT: 12.8 sec;   INR: 1.13 ratio         PTT - ( 18 Dec 2019 08:19 )  PTT:66.8 sec  I&O's Summary    17 Dec 2019 07:01  -  18 Dec 2019 07:00  --------------------------------------------------------  IN: 490 mL / OUT: 1150 mL / NET: -660 mL    18 Dec 2019 07:01  -  18 Dec 2019 14:18  --------------------------------------------------------  IN: 360 mL / OUT: 0 mL / NET: 360 mL              Assessment/Plan:  Mr Latham is an 85 y/o male with Severe Aortic Stenosis, 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

## 2019-12-18 NOTE — PROGRESS NOTE ADULT - ATTENDING COMMENTS
Seen, examined the patient, attendant at bedside  Sitting in chair, breathing better, but c/o exertional dyspnea  - Reviewed labs, imaging, cath report, CXR, Echo  - Structural heart team plans for TAVR on Mon 12/22, Cardiac CT done and awaiting on carotid doppler   - HF team plan noted, recommended IV lasix 80mg daily, Metoprolol    closely monitor I/O, weight   - c/w IV heparin gtt

## 2019-12-18 NOTE — PROGRESS NOTE ADULT - PROBLEM SELECTOR PLAN 1
Close to euvolemic  -Continue metoprolol succinate 25 mg daily.  - Deferring ACEi/ARB given mildly elevated Cr without known baseline  - Stop furosemide IVP 80mg.   - daily standing weight and strict I's and O's. still with elevated neck veins; received 80 mg IV lasix today; hold tomorrow   -Continue metoprolol succinate 25 mg daily.  - Deferring ACEi/ARB given mildly elevated Cr without known baseline  - daily standing weight and strict I's and O's.

## 2019-12-18 NOTE — PROGRESS NOTE ADULT - PROBLEM SELECTOR PLAN 9
DVT: heparin gtt for TAVR procedure  Diet: Consistent carb and DASH  Dispo: PT eval DVT: heparin gtt for TAVR procedure  Diet: Consistent carb and DASH  Dispo: PT reeval after TAVR

## 2019-12-18 NOTE — PROGRESS NOTE ADULT - ASSESSMENT
84M PMH chronic anemia, HFrEF (40%), Afib on coumadin, T2DM, HLD, PVD, Menieres Disease, carotid stenosis, LBBB on EKG, CAD s/p PCI to mLAD in June 2014 and PCI to pRCA that was complicated by perforation and cardiogenic shock in May of 2015 presented for elective [facility] for TAVR evaluation for severe AS, admitted for acute on on chronic CHF and anemia workup, plan for TVAR Mon 12/23

## 2019-12-18 NOTE — PROGRESS NOTE ADULT - ATTENDING COMMENTS
Briefly, 85 y/o M with a history of ACC/AHA Stage C ischemic cardiomyopathy (LV 4.5 cm, EF 30%), severe AS (58/30/0.6), chronic AF on coumadin, and CAD s/p PCI to RCA and LAD  who is admitted with decompensated heart failure after R/LHC with plans for optimization prior to consideration of TAVR which will be on 12/23. On exam, NAD, JVP approx 12 cm with HJR with v waves, harsh crescendo/decrescendo murmur in RUSB with radiation to clavicle with soft S2, CTAB, nontender, trace edema b/l. TTE reviewed - severe LV dysfunction (likely closer to 25-30%), severe AS (mean gradient 30, KATHERYN 0.6). LHC/RHC reviewed.   - received 80 mg IV lasix; will reassess tomorrow further dosing

## 2019-12-18 NOTE — PROGRESS NOTE ADULT - SUBJECTIVE AND OBJECTIVE BOX
Shari Dang MD  PGY-1  Pager [phone]  Pager [phone]      Patient is a 84y old  Male who presents with a chief complaint of CHF exacerbation (17 Dec 2019 11:50)        SUBJECTIVE / OVERNIGHT EVENTS: Patient had no acute events overnight. Patient seen and examined at bedside this morning.     ROS: [ - ] Fever [ - ] Chills [ - ] Nausea/Vomiting [ - ] Chest Pain [ - ] Shortness of breath     MEDICATIONS  (STANDING):  atorvastatin 40 milliGRAM(s) Oral at bedtime  clopidogrel Tablet 75 milliGRAM(s) Oral daily  digoxin     Tablet 0.125 milliGRAM(s) Oral <User Schedule>  ferrous    sulfate 325 milliGRAM(s) Oral daily  fluticasone propionate 50 MICROgram(s)/spray Nasal Spray 1 Spray(s) Both Nostrils daily  folic acid 1 milliGRAM(s) Oral daily  furosemide   Injectable 80 milliGRAM(s) IV Push daily  heparin  Infusion.  Unit(s)/Hr (11 mL/Hr) IV Continuous <Continuous>  melatonin 6 milliGRAM(s) Oral at bedtime  metoprolol succinate ER 25 milliGRAM(s) Oral daily  senna 2 Tablet(s) Oral at bedtime  sodium chloride 0.9% lock flush 3 milliLiter(s) IV Push every 8 hours  tiotropium 18 MICROgram(s) Capsule 1 Capsule(s) Inhalation daily    MEDICATIONS  (PRN):  ALBUTerol    90 MICROgram(s) HFA Inhaler 2 Puff(s) Inhalation every 6 hours PRN Shortness of Breath and/or Wheezing  guaiFENesin  milliGRAM(s) Oral every 12 hours PRN Cough  heparin  Injectable 4500 Unit(s) IV Push every 6 hours PRN For aPTT less than 40  heparin  Injectable 2000 Unit(s) IV Push every 6 hours PRN For aPTT between 40 - 57  meclizine 12.5 milliGRAM(s) Oral three times a day PRN Dizziness      Vital Signs Last 24 Hrs  T(C): 36.9 (18 Dec 2019 04:45), Max: 37 (17 Dec 2019 18:15)  T(F): 98.5 (18 Dec 2019 04:45), Max: 98.6 (17 Dec 2019 18:15)  HR: 76 (18 Dec 2019 06:05) (76 - 79)  BP: 104/67 (18 Dec 2019 06:05) (98/65 - 104/67)  BP(mean): --  RR: 18 (18 Dec 2019 04:45) (18 - 18)  SpO2: 99% (18 Dec 2019 04:45) (99% - 100%)  CAPILLARY BLOOD GLUCOSE      POCT Blood Glucose.: 117 mg/dL (17 Dec 2019 17:05)    I&O's Summary    16 Dec 2019 07:01  -  17 Dec 2019 07:00  --------------------------------------------------------  IN: 800 mL / OUT: 1040 mL / NET: -240 mL    17 Dec 2019 07:01  -  18 Dec 2019 06:31  --------------------------------------------------------  IN: 370 mL / OUT: 850 mL / NET: -480 mL        PHYSICAL EXAM  GENERAL: NAD, lying comfortably in bed   HEENT:  Atraumatic, Normocephalic, EOMI, conjunctiva and sclera clear, no LAD  CHEST/LUNG: Clear to auscultation bilaterally; No wheeze  HEART: RRR, S1 and S2 No murmurs, rubs, or gallops  ABDOMEN: Soft, Nontender, Nondistended; Bowel sounds present  EXTREMITIES:  2+ Peripheral Pulses, No clubbing, cyanosis, or edema  NEURO: AAOx3, non-focal  SKIN: No rashes or lesions    LABS:                        7.3    7.48  )-----------( 183      ( 17 Dec 2019 09:18 )             22.8     12-17    136  |  101  |  54<H>  ----------------------------<  140<H>  4.0   |  22  |  1.33<H>    Ca    9.0      17 Dec 2019 06:45  Phos  3.3     12-17  Mg     2.2     12-17      PT/INR - ( 17 Dec 2019 09:15 )   PT: 12.8 sec;   INR: 1.13 ratio         PTT - ( 17 Dec 2019 09:15 )  PTT:63.1 sec            RADIOLOGY & ADDITIONAL TESTS:    Imaging Personally Reviewed:  Consultant(s) Notes Reviewed: Shari Dang MD  PGY-1  Pager [phone]  Pager [phone]      Patient is a 84y old  Male who presents with a chief complaint of CHF exacerbation (17 Dec 2019 11:50)        SUBJECTIVE / OVERNIGHT EVENTS: Patient had no acute events overnight. Patient seen and examined at bedside this morning. Patient is doing well and waiting for TAVR Mon. Lower extremity swelling has improved     ROS: [ - ] Fever [ - ] Chills [ - ] Nausea/Vomiting [ - ] Chest Pain [ - ] Shortness of breath     MEDICATIONS  (STANDING):  atorvastatin 40 milliGRAM(s) Oral at bedtime  clopidogrel Tablet 75 milliGRAM(s) Oral daily  digoxin     Tablet 0.125 milliGRAM(s) Oral <User Schedule>  ferrous    sulfate 325 milliGRAM(s) Oral daily  fluticasone propionate 50 MICROgram(s)/spray Nasal Spray 1 Spray(s) Both Nostrils daily  folic acid 1 milliGRAM(s) Oral daily  furosemide   Injectable 80 milliGRAM(s) IV Push daily  heparin  Infusion.  Unit(s)/Hr (11 mL/Hr) IV Continuous <Continuous>  melatonin 6 milliGRAM(s) Oral at bedtime  metoprolol succinate ER 25 milliGRAM(s) Oral daily  senna 2 Tablet(s) Oral at bedtime  sodium chloride 0.9% lock flush 3 milliLiter(s) IV Push every 8 hours  tiotropium 18 MICROgram(s) Capsule 1 Capsule(s) Inhalation daily    MEDICATIONS  (PRN):  ALBUTerol    90 MICROgram(s) HFA Inhaler 2 Puff(s) Inhalation every 6 hours PRN Shortness of Breath and/or Wheezing  guaiFENesin  milliGRAM(s) Oral every 12 hours PRN Cough  heparin  Injectable 4500 Unit(s) IV Push every 6 hours PRN For aPTT less than 40  heparin  Injectable 2000 Unit(s) IV Push every 6 hours PRN For aPTT between 40 - 57  meclizine 12.5 milliGRAM(s) Oral three times a day PRN Dizziness      Vital Signs Last 24 Hrs  T(C): 36.9 (18 Dec 2019 04:45), Max: 37 (17 Dec 2019 18:15)  T(F): 98.5 (18 Dec 2019 04:45), Max: 98.6 (17 Dec 2019 18:15)  HR: 76 (18 Dec 2019 06:05) (76 - 79)  BP: 104/67 (18 Dec 2019 06:05) (98/65 - 104/67)  BP(mean): --  RR: 18 (18 Dec 2019 04:45) (18 - 18)  SpO2: 99% (18 Dec 2019 04:45) (99% - 100%)  CAPILLARY BLOOD GLUCOSE      POCT Blood Glucose.: 117 mg/dL (17 Dec 2019 17:05)    I&O's Summary    16 Dec 2019 07:01  -  17 Dec 2019 07:00  --------------------------------------------------------  IN: 800 mL / OUT: 1040 mL / NET: -240 mL    17 Dec 2019 07:01  -  18 Dec 2019 06:31  --------------------------------------------------------  IN: 370 mL / OUT: 850 mL / NET: -480 mL        PHYSICAL EXAM  GENERAL: NAD, lying comfortably in bed   HEENT:  Atraumatic, Normocephalic, EOMI, conjunctiva and sclera clear, no LAD  CHEST/LUNG: Clear to auscultation bilaterally; No wheeze  HEART: irregularly irregular, systolic murmur, S1 and S2. No rubs, or gallops  ABDOMEN: Soft, Nontender, Nondistended; Bowel sounds present  EXTREMITIES:  2+ Peripheral Pulses, No clubbing, cyanosis, or edema  NEURO: AAOx3, non-focal  SKIN: No rashes or lesions    LABS:                        7.3    7.48  )-----------( 183      ( 17 Dec 2019 09:18 )             22.8     12-17    136  |  101  |  54<H>  ----------------------------<  140<H>  4.0   |  22  |  1.33<H>    Ca    9.0      17 Dec 2019 06:45  Phos  3.3     12-17  Mg     2.2     12-17      PT/INR - ( 17 Dec 2019 09:15 )   PT: 12.8 sec;   INR: 1.13 ratio         PTT - ( 17 Dec 2019 09:15 )  PTT:63.1 sec            RADIOLOGY & ADDITIONAL TESTS:    Imaging Personally Reviewed:  Consultant(s) Notes Reviewed:

## 2019-12-18 NOTE — PROGRESS NOTE ADULT - PROBLEM SELECTOR PLAN 2
TTE showing severe aortic stenosis, symptomatic with SOB on exertion  - Structural heart following, plan for TAVR Mon 12/23  - Monitor on tele  - f/u CT coronaries and VA cartoid arteries TTE showing severe aortic stenosis, symptomatic with SOB on exertion  - Structural heart following, plan for TAVR Mon 12/23  - Monitor on tele  - f/u CT coronaries and VA carotid arteries

## 2019-12-18 NOTE — PROGRESS NOTE ADULT - SUBJECTIVE AND OBJECTIVE BOX
Subjective   Radha Steele is a 41 y.o. female.   Chief Complaint   Patient presents with   • Vomiting     vomitng and ear pain since November.  Was in ICU for 3 days in December for worsening vomiting      History of Present Illness   She is here for follow up .   Has surgery scheduled at   on right eye tomorrow.   She was recently in ICU on 12/31/2017. Creatinine was reported as high as 2. 37 from 1.49. Patient reports her ears feel full and she is having some nausea and vomiting off and on.     The following portions of the patient's history were reviewed and updated as appropriate: allergies, current medications, past family history, past medical history, past social history, past surgical history and problem list.    Review of Systems   Constitutional: Positive for fatigue. Negative for chills and fever.   HENT: Positive for ear pain and sneezing.         Feel full - popping    Respiratory: Negative.    Cardiovascular: Positive for leg swelling.   Gastrointestinal: Positive for nausea and vomiting. Negative for abdominal distention, abdominal pain, anal bleeding, blood in stool, constipation and diarrhea.   Allergic/Immunologic: Negative.    Neurological: Positive for headaches. Negative for dizziness and facial asymmetry.       Objective   Allergies   Allergen Reactions   • Keflex [Cephalexin] Hallucinations   • Penicillins Rash     Visit Vitals   • /70 (BP Location: Left arm, Patient Position: Sitting, Cuff Size: Adult)   • Pulse 89   • Temp 98.1 °F (36.7 °C) (Oral)   • Resp 18   • Wt 92.7 kg (204 lb 6.4 oz)   • SpO2 98%   • BMI 36.21 kg/m2       Physical Exam   Constitutional: She is oriented to person, place, and time. She appears well-developed and well-nourished.   Eyes:   Poor vision    Cardiovascular: Normal rate, regular rhythm and normal heart sounds.    Pulmonary/Chest: Effort normal and breath sounds normal.   Abdominal: Soft. Bowel sounds are normal. She exhibits no distension and no mass.  Subjective:  No overnight events. He is walking around without significant dyspnea.    Tele: afib 70's             Current Meds:  ALBUTerol    90 MICROgram(s) HFA Inhaler 2 Puff(s) Inhalation every 6 hours PRN  atorvastatin 40 milliGRAM(s) Oral at bedtime  clopidogrel Tablet 75 milliGRAM(s) Oral daily  digoxin     Tablet 0.125 milliGRAM(s) Oral <User Schedule>  ferrous    sulfate 325 milliGRAM(s) Oral daily  fluticasone propionate 50 MICROgram(s)/spray Nasal Spray 1 Spray(s) Both Nostrils daily  folic acid 1 milliGRAM(s) Oral daily  guaiFENesin  milliGRAM(s) Oral every 12 hours PRN  heparin  Infusion.  Unit(s)/Hr IV Continuous <Continuous>  heparin  Injectable 4500 Unit(s) IV Push every 6 hours PRN  heparin  Injectable 2000 Unit(s) IV Push every 6 hours PRN  meclizine 12.5 milliGRAM(s) Oral three times a day PRN  melatonin 6 milliGRAM(s) Oral at bedtime  metoprolol succinate ER 25 milliGRAM(s) Oral daily  senna 2 Tablet(s) Oral at bedtime  sodium chloride 0.9% lock flush 3 milliLiter(s) IV Push every 8 hours  tiotropium 18 MICROgram(s) Capsule 1 Capsule(s) Inhalation daily      Vitals:  T(C): 36.8 (12-18-19 @ 12:33), Max: 36.9 (12-18-19 @ 04:45)  T(F): 98.2 (12-18-19 @ 12:33), Max: 98.5 (12-18-19 @ 04:45)  HR: 94 (12-18-19 @ 12:33) (76 - 94)  BP: 100/66 (12-18-19 @ 12:33) (98/65 - 104/67)  RR: 18 (12-18-19 @ 12:33) (18 - 18)  SpO2: 95% (12-18-19 @ 12:33) (95% - 100%)      I&O's Summary    17 Dec 2019 07:01  -  18 Dec 2019 07:00  --------------------------------------------------------  IN: 490 mL / OUT: 1150 mL / NET: -660 mL    18 Dec 2019 07:01  -  18 Dec 2019 18:43  --------------------------------------------------------  IN: 360 mL / OUT: 400 mL / NET: -40 mL      Physical Exam:  Appearance: No Acute Distress  HEENT: JVP ~15cm H2O  Cardiovascular: irregularly irregular, 3/6 late peaking CRISTIAN at RUSB  Respiratory: Trace basilar rales   Gastrointestinal: Soft, Non-tender, non-distended	  Skin: no skin lesions  Neurologic: Non-focal  Extremities: Trace LE edema, warm and well perfused  Psychiatry: A & O x 3, Mood & affect appropriate                              7.2    7.73  )-----------( 186      ( 18 Dec 2019 08:13 )             22.0     12-18    132<L>  |  101  |  55<H>  ----------------------------<  138<H>  3.9   |  23  |  1.42<H>    Ca    8.5      18 Dec 2019 06:17  Phos  3.4     12-18  Mg     2.3     12-18      PT/INR - ( 17 Dec 2019 09:15 )   PT: 12.8 sec;   INR: 1.13 ratio         PTT - ( 18 Dec 2019 08:19 )  PTT:66.8 sec      Serum Pro-Brain Natriuretic Peptide: 86965 pg/mL (12-13 @ 07:52) There is no tenderness. There is no rebound and no guarding. No hernia.   Lymphadenopathy:     She has no cervical adenopathy.   Neurological: She is alert and oriented to person, place, and time.   Skin: Skin is warm and dry.   Psychiatric: She has a normal mood and affect. Her behavior is normal.   Vitals reviewed.      Assessment/Plan   Radha was seen today for vomiting.    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    Her  lantus was increase to 7 units nightly on her patient discharge paperwork from . Will make changes on her medication list.     Patient is continuing to have nausea and vomiting periodically. Her ultrasound was unable to view the appendix.   Will order a CT of abdomen and pelvis without contrast due to renal status.     Follow up in 3-4 weeks and as needed. Patient is agreeable.        See patient instructions for CT of abdomen and pelvis.           Suzi Nj, APRN

## 2019-12-18 NOTE — PROGRESS NOTE ADULT - ASSESSMENT
85 YO M with a history of ACC/AHA Stage C ischemic cardiomyopathy (LV 4.5 cm, EF 30%), severe AS (58/30/0.6), chronic AF on coumadin, and CAD s/p PCI to RCA and LAD with prior tamponade during coronary intervention who is admitted with decompensated heart failure after R/LHC with plans for optimization prior to consideration of TAVR. He underwent CT coronaries on 12/17 as a part of TAVR evaluation. His volume status has improved with diuretics.   Tentatively scheduled for TAVR on next Mon 12/23.    Pertinent recent studies.  RHC: 12/13 RA 10, PA 52/21/36, PCWP 23, CO/CI 4.34/CI 2.64, PA sat 47.1, Ao Sat 98%  LHC: 12/13 occluded ostial RCA from ISR, 70% OM1, moderate LAD disease   TTE: 12/13 LV 4.5 cm, EF 30%, severe AS (58/30/0.6) from calcific valve with severely restricted opening, moderate MR, LVOT VTI 15 cm, dyssynchronous septal movement, normal RV function

## 2019-12-19 ENCOUNTER — TRANSCRIPTION ENCOUNTER (OUTPATIENT)
Age: 84
End: 2019-12-19

## 2019-12-19 LAB
ACANTHOCYTES BLD QL SMEAR: SLIGHT — SIGNIFICANT CHANGE UP
ALBUMIN SERPL ELPH-MCNC: 3.4 G/DL — SIGNIFICANT CHANGE UP (ref 3.3–5)
ALP SERPL-CCNC: 65 U/L — SIGNIFICANT CHANGE UP (ref 40–120)
ALT FLD-CCNC: 6 U/L — LOW (ref 10–45)
ANION GAP SERPL CALC-SCNC: 14 MMOL/L — SIGNIFICANT CHANGE UP (ref 5–17)
ANION GAP SERPL CALC-SCNC: 18 MMOL/L — HIGH (ref 5–17)
ANISOCYTOSIS BLD QL: SLIGHT — SIGNIFICANT CHANGE UP
APPEARANCE UR: CLEAR — SIGNIFICANT CHANGE UP
APTT BLD: 52 SEC — HIGH (ref 27.5–36.3)
APTT BLD: 66.2 SEC — HIGH (ref 27.5–36.3)
AST SERPL-CCNC: 17 U/L — SIGNIFICANT CHANGE UP (ref 10–40)
BACTERIA # UR AUTO: NEGATIVE — SIGNIFICANT CHANGE UP
BASOPHILS # BLD AUTO: 0 K/UL — SIGNIFICANT CHANGE UP (ref 0–0.2)
BASOPHILS NFR BLD AUTO: 0 % — SIGNIFICANT CHANGE UP (ref 0–2)
BILIRUB SERPL-MCNC: 2.9 MG/DL — HIGH (ref 0.2–1.2)
BILIRUB UR-MCNC: NEGATIVE — SIGNIFICANT CHANGE UP
BLD GP AB SCN SERPL QL: NEGATIVE — SIGNIFICANT CHANGE UP
BUN SERPL-MCNC: 62 MG/DL — HIGH (ref 7–23)
BUN SERPL-MCNC: 64 MG/DL — HIGH (ref 7–23)
CALCIUM SERPL-MCNC: 8.5 MG/DL — SIGNIFICANT CHANGE UP (ref 8.4–10.5)
CALCIUM SERPL-MCNC: 8.7 MG/DL — SIGNIFICANT CHANGE UP (ref 8.4–10.5)
CHLORIDE SERPL-SCNC: 101 MMOL/L — SIGNIFICANT CHANGE UP (ref 96–108)
CHLORIDE SERPL-SCNC: 97 MMOL/L — SIGNIFICANT CHANGE UP (ref 96–108)
CO2 SERPL-SCNC: 17 MMOL/L — LOW (ref 22–31)
CO2 SERPL-SCNC: 22 MMOL/L — SIGNIFICANT CHANGE UP (ref 22–31)
COLOR SPEC: YELLOW — SIGNIFICANT CHANGE UP
CREAT SERPL-MCNC: 1.65 MG/DL — HIGH (ref 0.5–1.3)
CREAT SERPL-MCNC: 1.66 MG/DL — HIGH (ref 0.5–1.3)
DAT C3-SP REAG RBC QL: POSITIVE — SIGNIFICANT CHANGE UP
DAT POLY-SP REAG RBC QL: POSITIVE — SIGNIFICANT CHANGE UP
DATE OF TRANSF RX: SIGNIFICANT CHANGE UP
DIFF PNL FLD: NEGATIVE — SIGNIFICANT CHANGE UP
DIRECT COOMBS IGG: POSITIVE — SIGNIFICANT CHANGE UP
ELLIPTOCYTES BLD QL SMEAR: SLIGHT — SIGNIFICANT CHANGE UP
ELUATE ANTIBODY 1: SIGNIFICANT CHANGE UP
EOSINOPHIL # BLD AUTO: 0.11 K/UL — SIGNIFICANT CHANGE UP (ref 0–0.5)
EOSINOPHIL NFR BLD AUTO: 1 % — SIGNIFICANT CHANGE UP (ref 0–6)
EPI CELLS # UR: 1 /HPF — SIGNIFICANT CHANGE UP
GAS PNL BLDA: SIGNIFICANT CHANGE UP
GLUCOSE BLDC GLUCOMTR-MCNC: 145 MG/DL — HIGH (ref 70–99)
GLUCOSE SERPL-MCNC: 139 MG/DL — HIGH (ref 70–99)
GLUCOSE SERPL-MCNC: 172 MG/DL — HIGH (ref 70–99)
GLUCOSE UR QL: NEGATIVE — SIGNIFICANT CHANGE UP
GRAN CASTS # UR COMP ASSIST: 2 /LPF — SIGNIFICANT CHANGE UP
HCT VFR BLD CALC: 20.6 % — CRITICAL LOW (ref 39–50)
HCT VFR BLD CALC: 23.2 % — LOW (ref 39–50)
HGB BLD-MCNC: 6.5 G/DL — CRITICAL LOW (ref 13–17)
HGB BLD-MCNC: 7.5 G/DL — LOW (ref 13–17)
HYALINE CASTS # UR AUTO: 0 /LPF — SIGNIFICANT CHANGE UP (ref 0–2)
INR BLD: 1.09 RATIO — SIGNIFICANT CHANGE UP (ref 0.88–1.16)
KETONES UR-MCNC: NEGATIVE — SIGNIFICANT CHANGE UP
LDH SERPL L TO P-CCNC: 372 U/L — HIGH (ref 50–242)
LEUKOCYTE ESTERASE UR-ACNC: ABNORMAL
LYMPHOCYTES # BLD AUTO: 1.12 K/UL — SIGNIFICANT CHANGE UP (ref 1–3.3)
LYMPHOCYTES # BLD AUTO: 10 % — LOW (ref 13–44)
MACROCYTES BLD QL: SLIGHT — SIGNIFICANT CHANGE UP
MAGNESIUM SERPL-MCNC: 2.2 MG/DL — SIGNIFICANT CHANGE UP (ref 1.6–2.6)
MAGNESIUM SERPL-MCNC: 2.3 MG/DL — SIGNIFICANT CHANGE UP (ref 1.6–2.6)
MANUAL SMEAR VERIFICATION: SIGNIFICANT CHANGE UP
MCHC RBC-ENTMCNC: 30 PG — SIGNIFICANT CHANGE UP (ref 27–34)
MCHC RBC-ENTMCNC: 30.4 PG — SIGNIFICANT CHANGE UP (ref 27–34)
MCHC RBC-ENTMCNC: 31.6 GM/DL — LOW (ref 32–36)
MCHC RBC-ENTMCNC: 32.3 GM/DL — SIGNIFICANT CHANGE UP (ref 32–36)
MCV RBC AUTO: 93.9 FL — SIGNIFICANT CHANGE UP (ref 80–100)
MCV RBC AUTO: 94.9 FL — SIGNIFICANT CHANGE UP (ref 80–100)
METAMYELOCYTES # FLD: 2 % — HIGH (ref 0–0)
MICROCYTES BLD QL: SIGNIFICANT CHANGE UP
MONOCYTES # BLD AUTO: 0.34 K/UL — SIGNIFICANT CHANGE UP (ref 0–0.9)
MONOCYTES NFR BLD AUTO: 3 % — SIGNIFICANT CHANGE UP (ref 2–14)
MYELOCYTES NFR BLD: 1 % — HIGH (ref 0–0)
NEUTROPHILS # BLD AUTO: 9.22 K/UL — HIGH (ref 1.8–7.4)
NEUTROPHILS NFR BLD AUTO: 79 % — HIGH (ref 43–77)
NEUTS BAND # BLD: 3 % — SIGNIFICANT CHANGE UP (ref 0–8)
NITRITE UR-MCNC: NEGATIVE — SIGNIFICANT CHANGE UP
NRBC # BLD: 0 /100 — SIGNIFICANT CHANGE UP (ref 0–0)
PH UR: 5.5 — SIGNIFICANT CHANGE UP (ref 5–8)
PHOSPHATE SERPL-MCNC: 3.6 MG/DL — SIGNIFICANT CHANGE UP (ref 2.5–4.5)
PHOSPHATE SERPL-MCNC: 4.1 MG/DL — SIGNIFICANT CHANGE UP (ref 2.5–4.5)
PLAT MORPH BLD: NORMAL — SIGNIFICANT CHANGE UP
PLATELET # BLD AUTO: 191 K/UL — SIGNIFICANT CHANGE UP (ref 150–400)
PLATELET # BLD AUTO: 290 K/UL — SIGNIFICANT CHANGE UP (ref 150–400)
POIKILOCYTOSIS BLD QL AUTO: SLIGHT — SIGNIFICANT CHANGE UP
POLYCHROMASIA BLD QL SMEAR: SLIGHT — SIGNIFICANT CHANGE UP
POTASSIUM SERPL-MCNC: 4 MMOL/L — SIGNIFICANT CHANGE UP (ref 3.5–5.3)
POTASSIUM SERPL-MCNC: 4.3 MMOL/L — SIGNIFICANT CHANGE UP (ref 3.5–5.3)
POTASSIUM SERPL-SCNC: 4 MMOL/L — SIGNIFICANT CHANGE UP (ref 3.5–5.3)
POTASSIUM SERPL-SCNC: 4.3 MMOL/L — SIGNIFICANT CHANGE UP (ref 3.5–5.3)
PROMYELOCYTES # FLD: 1 % — HIGH (ref 0–0)
PROT SERPL-MCNC: 7.1 G/DL — SIGNIFICANT CHANGE UP (ref 6–8.3)
PROT UR-MCNC: NEGATIVE — SIGNIFICANT CHANGE UP
PROTHROM AB SERPL-ACNC: 12.5 SEC — SIGNIFICANT CHANGE UP (ref 10–12.9)
RBC # BLD: 2.17 M/UL — LOW (ref 4.2–5.8)
RBC # BLD: 2.47 M/UL — LOW (ref 4.2–5.8)
RBC # BLD: 2.47 M/UL — LOW (ref 4.2–5.8)
RBC # FLD: 18.1 % — HIGH (ref 10.3–14.5)
RBC # FLD: 18.2 % — HIGH (ref 10.3–14.5)
RBC BLD AUTO: ABNORMAL
RBC CASTS # UR COMP ASSIST: 6 /HPF — HIGH (ref 0–4)
RETICS #: 165 K/UL — HIGH (ref 25–125)
RETICS/RBC NFR: 6.7 % — HIGH (ref 0.5–2.5)
RH IG SCN BLD-IMP: POSITIVE — SIGNIFICANT CHANGE UP
SCHISTOCYTES BLD QL AUTO: SLIGHT — SIGNIFICANT CHANGE UP
SODIUM SERPL-SCNC: 132 MMOL/L — LOW (ref 135–145)
SODIUM SERPL-SCNC: 137 MMOL/L — SIGNIFICANT CHANGE UP (ref 135–145)
SP GR SPEC: 1.02 — SIGNIFICANT CHANGE UP (ref 1.01–1.02)
UROBILINOGEN FLD QL: ABNORMAL
WBC # BLD: 11.24 K/UL — HIGH (ref 3.8–10.5)
WBC # BLD: 8.26 K/UL — SIGNIFICANT CHANGE UP (ref 3.8–10.5)
WBC # FLD AUTO: 11.24 K/UL — HIGH (ref 3.8–10.5)
WBC # FLD AUTO: 8.26 K/UL — SIGNIFICANT CHANGE UP (ref 3.8–10.5)
WBC UR QL: 7 /HPF — HIGH (ref 0–5)

## 2019-12-19 PROCEDURE — 71045 X-RAY EXAM CHEST 1 VIEW: CPT | Mod: 26,76

## 2019-12-19 PROCEDURE — 93010 ELECTROCARDIOGRAM REPORT: CPT

## 2019-12-19 PROCEDURE — 99232 SBSQ HOSP IP/OBS MODERATE 35: CPT

## 2019-12-19 PROCEDURE — 99233 SBSQ HOSP IP/OBS HIGH 50: CPT | Mod: GC

## 2019-12-19 RX ORDER — FUROSEMIDE 40 MG
40 TABLET ORAL ONCE
Refills: 0 | Status: COMPLETED | OUTPATIENT
Start: 2019-12-19 | End: 2019-12-19

## 2019-12-19 RX ORDER — FUROSEMIDE 40 MG
80 TABLET ORAL ONCE
Refills: 0 | Status: DISCONTINUED | OUTPATIENT
Start: 2019-12-19 | End: 2019-12-19

## 2019-12-19 RX ADMIN — Medication 6 MILLIGRAM(S): at 21:06

## 2019-12-19 RX ADMIN — Medication 1 MILLIGRAM(S): at 11:10

## 2019-12-19 RX ADMIN — SENNA PLUS 2 TABLET(S): 8.6 TABLET ORAL at 21:06

## 2019-12-19 RX ADMIN — CLOPIDOGREL BISULFATE 75 MILLIGRAM(S): 75 TABLET, FILM COATED ORAL at 11:10

## 2019-12-19 RX ADMIN — ATORVASTATIN CALCIUM 40 MILLIGRAM(S): 80 TABLET, FILM COATED ORAL at 21:06

## 2019-12-19 RX ADMIN — TIOTROPIUM BROMIDE 1 CAPSULE(S): 18 CAPSULE ORAL; RESPIRATORY (INHALATION) at 14:41

## 2019-12-19 RX ADMIN — SODIUM CHLORIDE 3 MILLILITER(S): 9 INJECTION INTRAMUSCULAR; INTRAVENOUS; SUBCUTANEOUS at 21:05

## 2019-12-19 RX ADMIN — Medication 1 SPRAY(S): at 11:10

## 2019-12-19 RX ADMIN — Medication 25 MILLIGRAM(S): at 06:11

## 2019-12-19 RX ADMIN — Medication 325 MILLIGRAM(S): at 14:40

## 2019-12-19 RX ADMIN — SODIUM CHLORIDE 3 MILLILITER(S): 9 INJECTION INTRAMUSCULAR; INTRAVENOUS; SUBCUTANEOUS at 14:39

## 2019-12-19 RX ADMIN — Medication 40 MILLIGRAM(S): at 15:28

## 2019-12-19 RX ADMIN — SODIUM CHLORIDE 3 MILLILITER(S): 9 INJECTION INTRAMUSCULAR; INTRAVENOUS; SUBCUTANEOUS at 05:09

## 2019-12-19 RX ADMIN — HEPARIN SODIUM 1000 UNIT(S)/HR: 5000 INJECTION INTRAVENOUS; SUBCUTANEOUS at 11:11

## 2019-12-19 NOTE — PROGRESS NOTE ADULT - PROBLEM SELECTOR PLAN 4
- no evidence of iron deficiency anemia. Possibly due to hemolysis in the setting of severe AS.  - a drop H/H this morning. Receiving a unit of PRBC now.  - Please do further anemia work up including LDH, haptoglobin, retic count, and T bili and direct bili.

## 2019-12-19 NOTE — DISCHARGE NOTE NURSING/CASE MANAGEMENT/SOCIAL WORK - PATIENT PORTAL LINK FT
You can access the FollowMyHealth Patient Portal offered by [facility] by registering at the following website: [email or web address]. By joining [facility]’s FollowMyHealth portal, you will also be able to view your health information using other applications (apps) compatible with our system.

## 2019-12-19 NOTE — CHART NOTE - NSCHARTNOTEFT_GEN_A_CORE
Upon Nutritional Assessment by the Registered Dietitian your patient was determined to meet criteria / has evidence of the following diagnosis/diagnoses:          [x]  Mild Protein Calorie Malnutrition        [ ]  Moderate Protein Calorie Malnutrition        [ ] Severe Protein Calorie Malnutrition        [ ] Unspecified Protein Calorie Malnutrition        [ ] Underweight / BMI <19        [ ] Morbid Obesity / BMI > 40      Findings as based on:  [x] Comprehensive nutrition assessment   [x] Nutrition Focused Physical Exam  [x] Other: moderate-severe muscle and fat loss; inadequate energy intake per diet recall related to  Inadequate energy intake 2/2 increased needs    Nutrition Plan/Recommendations:    1. Liberalize diet to Low Sodium only. Continue to monitor PO intake, diet tolerance, weight, labs, skin integrity, food preferences, and further educational needs.   2. Reinforce nutrition education for increased needs, and heart failure nutrition therapy.   3. Recommend Ensure Enlive x 1/day (provides 350 kcal, 20 g protein) and Mighty Health Shake x 1/day  to optimize protein and calorie intake.    RD remains available. Litzy Díaz RD, CDN Pager: [phone]       PROVIDER Section:     By signing this assessment you are acknowledging and agree with the diagnosis/diagnoses assigned by the Registered Dietitian    Comments:

## 2019-12-19 NOTE — DISCHARGE NOTE NURSING/CASE MANAGEMENT/SOCIAL WORK - NSDCPEEMAIL_GEN_ALL_CORE
[facility] for Tobacco Control email [email or web address]

## 2019-12-19 NOTE — DIETITIAN INITIAL EVALUATION ADULT. - SIGNS/SYMPTOMS
moderate-severe muscle and fat loss; moderate-severe muscle and fat loss; inadequate energy intake per diet recall

## 2019-12-19 NOTE — PROGRESS NOTE ADULT - ATTENDING COMMENTS
Seen, examined the patient, attendant at bedside  RRT now for acute hypoxia and chills, being treated for anemia with PRBC, possible transfusion reaction   In bed now, was not SOB in am, has been c/o exertional dyspnea  - Reviewed labs, imaging, cath report, CXR- fluffy infiltrate    Lasix 40mg IV, benadryl, Tylenol were given, will closely monitor  - Structural heart team plans for TAVR on Mon 12/22, Cardiac CT done and awaiting on carotid doppler     called Vascular cardiology for carotid occlusion ( severe on left, moderate on right)  - HF team plan noted, recommended IV Lasix 80mg daily, Metoprolol    closely monitor I/O, weight   - c/w IV heparin gtt .

## 2019-12-19 NOTE — PROGRESS NOTE ADULT - SUBJECTIVE AND OBJECTIVE BOX
Shari Dang MD  PGY-1  Pager [phone]  Pager [phone]      Patient is a 84y old  Male who presents with a chief complaint of CHF exacerbation (18 Dec 2019 18:42)        SUBJECTIVE / OVERNIGHT EVENTS: Patient had no acute events overnight. Patient seen and examined at bedside this morning.     ROS: [ - ] Fever [ - ] Chills [ - ] Nausea/Vomiting [ - ] Chest Pain [ - ] Shortness of breath     MEDICATIONS  (STANDING):  atorvastatin 40 milliGRAM(s) Oral at bedtime  clopidogrel Tablet 75 milliGRAM(s) Oral daily  digoxin     Tablet 0.125 milliGRAM(s) Oral <User Schedule>  ferrous    sulfate 325 milliGRAM(s) Oral daily  fluticasone propionate 50 MICROgram(s)/spray Nasal Spray 1 Spray(s) Both Nostrils daily  folic acid 1 milliGRAM(s) Oral daily  heparin  Infusion.  Unit(s)/Hr (11 mL/Hr) IV Continuous <Continuous>  melatonin 6 milliGRAM(s) Oral at bedtime  metoprolol succinate ER 25 milliGRAM(s) Oral daily  senna 2 Tablet(s) Oral at bedtime  sodium chloride 0.9% lock flush 3 milliLiter(s) IV Push every 8 hours  tiotropium 18 MICROgram(s) Capsule 1 Capsule(s) Inhalation daily    MEDICATIONS  (PRN):  ALBUTerol    90 MICROgram(s) HFA Inhaler 2 Puff(s) Inhalation every 6 hours PRN Shortness of Breath and/or Wheezing  guaiFENesin  milliGRAM(s) Oral every 12 hours PRN Cough  heparin  Injectable 4500 Unit(s) IV Push every 6 hours PRN For aPTT less than 40  heparin  Injectable 2000 Unit(s) IV Push every 6 hours PRN For aPTT between 40 - 57  meclizine 12.5 milliGRAM(s) Oral three times a day PRN Dizziness      Vital Signs Last 24 Hrs  T(C): 36.7 (19 Dec 2019 04:44), Max: 36.8 (18 Dec 2019 12:33)  T(F): 98.1 (19 Dec 2019 04:44), Max: 98.2 (18 Dec 2019 12:33)  HR: 64 (19 Dec 2019 06:05) (64 - 97)  BP: 97/55 (19 Dec 2019 06:05) (95/56 - 105/68)  BP(mean): --  RR: 18 (19 Dec 2019 04:44) (18 - 18)  SpO2: 99% (19 Dec 2019 04:44) (95% - 99%)  CAPILLARY BLOOD GLUCOSE        I&O's Summary    18 Dec 2019 07:01  -  19 Dec 2019 07:00  --------------------------------------------------------  IN: 480 mL / OUT: 400 mL / NET: 80 mL        PHYSICAL EXAM  GENERAL: NAD, lying comfortably in bed   HEENT:  Atraumatic, Normocephalic, EOMI, conjunctiva and sclera clear, no LAD  CHEST/LUNG: Clear to auscultation bilaterally; No wheeze  HEART: RRR, S1 and S2 No murmurs, rubs, or gallops  ABDOMEN: Soft, Nontender, Nondistended; Bowel sounds present  EXTREMITIES:  2+ Peripheral Pulses, No clubbing, cyanosis, or edema  NEURO: AAOx3, non-focal  SKIN: No rashes or lesions    LABS:                        6.5    8.26  )-----------( 191      ( 19 Dec 2019 08:05 )             20.6     12-19    137  |  101  |  62<H>  ----------------------------<  139<H>  4.0   |  22  |  1.65<H>    Ca    8.5      19 Dec 2019 06:26  Phos  3.6     12-19  Mg     2.3     12-19      PT/INR - ( 17 Dec 2019 09:15 )   PT: 12.8 sec;   INR: 1.13 ratio         PTT - ( 18 Dec 2019 08:19 )  PTT:66.8 sec            RADIOLOGY & ADDITIONAL TESTS:    Imaging Personally Reviewed:  Consultant(s) Notes Reviewed: Shari Dang MD  PGY-1  Pager [phone]  Pager [phone]      Patient is a 84y old  Male who presents with a chief complaint of CHF exacerbation (18 Dec 2019 18:42)        SUBJECTIVE / OVERNIGHT EVENTS: Patient had no acute events overnight. Patient seen and examined at bedside this morning. He endorses pain in his ribs when taking deep breaths but denies SOB.     ROS: [ - ] Fever [ - ] Chills [ - ] Nausea/Vomiting [ - ] Chest Pain [ - ] Shortness of breath   Tele: Afib 60s-80s with PVCs    Around 1:20pm, RRT called for hypoxia. Patient started 1/2 U pRBC transfusion and was 1hr 15 mins in before becoming SOB with rigors. Transfusion was stopped and nurse was unable to get a pulse ox reading. Patient was tachypenic RR26 but AOx3. He was put non nonrebreather and he felt better. Crackles on the RLL. Patient endorsed that he felt cold, temp was 97.5, but SBP was increasing to 200s. Symptoms most likely from transfusion rxn. Per pt, last transfusion was 2015 and had no rxn. CXR was overall unchanged from admission besides small atelectasis bilaterally. Pt was given 40IV lasix and pulse ox 100%. Patient was put on room air and saturating at 96-98%.     MEDICATIONS  (STANDING):  atorvastatin 40 milliGRAM(s) Oral at bedtime  clopidogrel Tablet 75 milliGRAM(s) Oral daily  digoxin     Tablet 0.125 milliGRAM(s) Oral <User Schedule>  ferrous    sulfate 325 milliGRAM(s) Oral daily  fluticasone propionate 50 MICROgram(s)/spray Nasal Spray 1 Spray(s) Both Nostrils daily  folic acid 1 milliGRAM(s) Oral daily  heparin  Infusion.  Unit(s)/Hr (11 mL/Hr) IV Continuous <Continuous>  melatonin 6 milliGRAM(s) Oral at bedtime  metoprolol succinate ER 25 milliGRAM(s) Oral daily  senna 2 Tablet(s) Oral at bedtime  sodium chloride 0.9% lock flush 3 milliLiter(s) IV Push every 8 hours  tiotropium 18 MICROgram(s) Capsule 1 Capsule(s) Inhalation daily    MEDICATIONS  (PRN):  ALBUTerol    90 MICROgram(s) HFA Inhaler 2 Puff(s) Inhalation every 6 hours PRN Shortness of Breath and/or Wheezing  guaiFENesin  milliGRAM(s) Oral every 12 hours PRN Cough  heparin  Injectable 4500 Unit(s) IV Push every 6 hours PRN For aPTT less than 40  heparin  Injectable 2000 Unit(s) IV Push every 6 hours PRN For aPTT between 40 - 57  meclizine 12.5 milliGRAM(s) Oral three times a day PRN Dizziness      Vital Signs Last 24 Hrs  T(C): 36.7 (19 Dec 2019 04:44), Max: 36.8 (18 Dec 2019 12:33)  T(F): 98.1 (19 Dec 2019 04:44), Max: 98.2 (18 Dec 2019 12:33)  HR: 64 (19 Dec 2019 06:05) (64 - 97)  BP: 97/55 (19 Dec 2019 06:05) (95/56 - 105/68)  BP(mean): --  RR: 18 (19 Dec 2019 04:44) (18 - 18)  SpO2: 99% (19 Dec 2019 04:44) (95% - 99%)  CAPILLARY BLOOD GLUCOSE        I&O's Summary    18 Dec 2019 07:01  -  19 Dec 2019 07:00  --------------------------------------------------------  IN: 480 mL / OUT: 400 mL / NET: 80 mL        PHYSICAL EXAM  GENERAL: NAD, lying comfortably in bed   HEENT:  Atraumatic, Normocephalic, EOMI, conjunctiva and sclera clear, no LAD  CHEST/LUNG: Clear to auscultation bilaterally; No wheeze  HEART: systolic murmur. irregularly irregular, S1 and S2. No murmurs, rubs, or gallops  ABDOMEN: Soft, Nontender, Nondistended; Bowel sounds present  EXTREMITIES:  2+ Peripheral Pulses, No clubbing, cyanosis, or edema  NEURO: AAOx3, non-focal  SKIN: No rashes or lesions    LABS:                        6.5    8.26  )-----------( 191      ( 19 Dec 2019 08:05 )             20.6     12-19    137  |  101  |  62<H>  ----------------------------<  139<H>  4.0   |  22  |  1.65<H>    Ca    8.5      19 Dec 2019 06:26  Phos  3.6     12-19  Mg     2.3     12-19      PT/INR - ( 17 Dec 2019 09:15 )   PT: 12.8 sec;   INR: 1.13 ratio         PTT - ( 18 Dec 2019 08:19 )  PTT:66.8 sec      RADIOLOGY & ADDITIONAL TESTS:    Imaging Personally Reviewed:  Consultant(s) Notes Reviewed:

## 2019-12-19 NOTE — DIETITIAN INITIAL EVALUATION ADULT. - PERTINENT LABORATORY DATA
(12/19) Glucose 139 <H>, BUN 62 <H>, Creatinine 1.65 <H>, eGFR 38 <L> (12/17) Finger Sticks 117 (12/14) HbA1c 5.6%

## 2019-12-19 NOTE — DIETITIAN INITIAL EVALUATION ADULT. - DIET TYPE
DASH/TLC, 100ml Fluid Restriction Recommend liberalize diet to low sodium + Ensure Enlive x 1/day (provides 350 kcal, 20 g protein) + Might[employer] Shake x 1/daily (provides 172kcal, 5g protein)- Spoke to team Defer fluids to team

## 2019-12-19 NOTE — PROGRESS NOTE ADULT - PROBLEM SELECTOR PLAN 3
Hb 7.9, baseline 8-9 in 2015, follows with outside hematologist, Dr. Miranda  - Transfuse if Hb<7, T&S Hb 7.9, baseline 8-9 in 2015, follows with outside hematologist, Dr. Miranda  s/p possible transfusion rxn during 1/2U prbc  - Transfuse if Hb<7, T&S  - f/u transfusion rxn labs

## 2019-12-19 NOTE — DISCHARGE NOTE NURSING/CASE MANAGEMENT/SOCIAL WORK - NSDCPEWEB_GEN_ALL_CORE
[facility] website --- [email or web address] website --- [email or web address]

## 2019-12-19 NOTE — DIETITIAN INITIAL EVALUATION ADULT. - PROBLEM SELECTOR PLAN 1
Pt presented with DYER, with recent TTE 12/10 EF 30% with severe AS, with LV dysfunction, prior Echo in 6/2019 was 42%. Exam notable for 3+ LE edema up to b/l thigh, b/l rales, likely related to severe AS. Pt endorsing diet compliant, recently increased torsemide from 20mg qd to 40mg qd  - WIll start IV Lasix 80mg BID for diuresis.   - Strict Ins and Outs, daily weight, fluid restriction to 1L/day  - Will get CXR   - C/w toprol 25mg qd  - Follow up HF  recs.

## 2019-12-19 NOTE — PROGRESS NOTE ADULT - ATTENDING COMMENTS
Feels more short of breath today. Neck veins elevated. Labs noted - dropping H/H.   - please check hemolysis labs (haptoglobin, LDH, retic count)  - give lasix 80 mg IV now and then twice/day  - plan for TAVR possibly next week

## 2019-12-19 NOTE — DIETITIAN INITIAL EVALUATION ADULT. - ENERGY NEEDS
Per medical record pt is an 83 yo male with PMH: Afib on warfarin, HTN. HLD, T2DM, CAD, CHFrEF, Meniere's disease, RCA stent x 2 (2015), anemia, aortic valve stenosis, presented for cardiac cath before TAVR procedure, found with acute on chronic CHF. TAVR planned for 12/23.

## 2019-12-19 NOTE — DIETITIAN INITIAL EVALUATION ADULT. - CONTINUE CURRENT NUTRITION CARE PLAN
Recommend add Ensure Enlive x 1/day (provides 350 kcal, 20 g protein) to optimize protein and calorie intake.- Will speak to team/yes

## 2019-12-19 NOTE — PROVIDER CONTACT NOTE (OTHER) - SITUATION
Pt. heparin running @ 10ml/hr, however last aptt was sub therapeutic (52) when results came back @ 14:30. However, heparin bag was not scanned at the time and was noticed by night nurse.

## 2019-12-19 NOTE — PROGRESS NOTE ADULT - PROBLEM SELECTOR PLAN 2
TTE showing severe aortic stenosis, symptomatic with SOB on exertion  - Structural heart following, plan for TAVR Mon 12/23  - Monitor on tele  - f/u CT coronaries and VA carotid arteries TTE showing severe aortic stenosis, symptomatic with SOB on exertion  - Structural heart following, plan for TAVR Mon 12/23  - Monitor on tele  - f/u CT coronaries and VA carotid arteries (left ICA occluded, right ICA 50-69% TTE showing severe aortic stenosis, symptomatic with SOB on exertion  - Structural heart following, plan for TAVR Mon 12/23  - Monitor on tele  - f/u CT coronaries and VA carotid arteries (left ICA occluded, right ICA 50-69%)

## 2019-12-19 NOTE — PROGRESS NOTE ADULT - ASSESSMENT
84M PMH chronic anemia, HFrEF (40%), Afib on coumadin, T2DM, HLD, PVD, Menieres Disease, carotid stenosis, LBBB on EKG, CAD s/p PCI to mLAD in June 2014 and PCI to pRCA that was complicated by perforation and cardiogenic shock in May of 2015 presented for elective [facility] for TAVR evaluation for severe AS, admitted for acute on on chronic CHF and anemia workup, plan for TVAR Mon 12/23

## 2019-12-19 NOTE — DIETITIAN INITIAL EVALUATION ADULT. - OTHER INFO
Pt reports good appetite ad PO intake PTA. Reports following low sodium diet more recently PTA; Prior to wife's passing a few months ago pt reports consuming frozen, prepackaged meals. States he consumes on breakfast and dinner most days, skipping lunch. Per diet recall pt and aide state pt eats well rounded, home cooked meals at home. Denies use of vitamins PTA. States occasional consumption of Ensure at home. Pt denies food allergies. Pt reports diagnosis of T2DM "years ago" made him change the way he ate and motivated him to eat a more healthful diet. Pt states T2DM controlled by diet only. No reported T2DM medications per pt and medical record. Pt reports good appetite ad PO intake PTA. Reports following low sodium diet more recently PTA; Prior to wife's passing a few months ago pt reports consuming frozen, prepackaged meals. States he consumes on breakfast and dinner most days, skipping lunch. Per diet recall pt and aide state pt eats well rounded, home cooked meals at home. Denies use of vitamins PTA. States occasional consumption of Ensure at home. Pt denies food allergies. Pt reports diagnosis of T2DM "over 20 years ago" made him change the way he ate and motivated him to eat a more healthful diet. Pt states T2DM controlled by diet only. No reported T2DM medications per pt and medical record. HbA1c of 5.6% (12/14) shows good BG control.    Pt reports good appetite and PO intake up until 2 days ago (12/17). States low appetite and fatigue of hospital food has made it difficult for him to eat enough. Pt offered and amenable to trying Ensure Enlive x 1/day (provides 350 kcal, 20 g protein).- Spoke to team. Pt denies difficulty chewing and swallowing. States no N+V. Reports last BM today (12/19). Pt and aide state UBW is 117 pounds. State no recent weight changes. Per previous RD note 05/27/2015 pt was 130 pounds. Current charted standing weight is 125.2 pounds (12/18). However ?accuracy due to fluid retention as pt with fluctuating edema throughout hospital course. -Will continue to monitor    Reinforced Heart Failure Nutrition Therapy by discussing importance of low sodium diet adherence, with examples of foods to avoid, fluid restriction, and daily weights. Pt was able to teach back many points of education. Also discussed increased needs and how to maximize PO intake by mimicking small frequent meals by leaving food away from the tray for later consumption and sipping on nutritional supplement between meals. Also discussed importance of protein intake with examples of foods. Pt reports good appetite ad PO intake PTA. Reports following low sodium diet more recently PTA; Prior to wife's passing a few months ago pt reports consuming frozen, prepackaged meals. States he consumes only breakfast and dinner most days, skipping lunch. Per diet recall pt and aide state pt eats well rounded, home cooked meals at home. Denies use of vitamins PTA. States occasional consumption of Ensure at home. Pt denies food allergies. Pt reports diagnosis of T2DM "over 20 years ago" made him change the way he ate and motivated him to eat a more healthful diet. Pt states T2DM controlled by diet only. Per pt does not check finger sticks and relies on doctor to let him know BG levels. No reported T2DM medications per pt and medical record. HbA1c of 5.6% (12/14) shows good BG control.    Pt reports good appetite and PO intake up until 2 days ago (12/17). States low appetite and fatigue of hospital food has made it difficult for him to eat enough. Pt offered and amenable to trying Ensure Enlive x 1/day (provides 350 kcal, 20 g protein).- Spoke to team. Pt denies difficulty chewing and swallowing. States no N+V. Reports last BM today (12/19). Pt and aide state UBW is 117 pounds. State no recent weight changes. Per previous RD note 05/27/2015 pt was 130 pounds. Current charted standing weight is 125.2 pounds (12/18). However ?accuracy due to fluid retention as pt with fluctuating edema throughout hospital course. -Will continue to monitor    Reinforced Heart Failure Nutrition Therapy by discussing importance of low sodium diet adherence, with examples of foods to avoid, fluid restriction, and daily weights. Pt was able to teach back many points of education. Also discussed increased needs and how to maximize PO intake by mimicking small frequent meals by leaving food away from the tray for later consumption and sipping on nutritional supplement between meals. Also discussed importance of protein intake with examples of foods. Pt made aware RD remains available. Pt reports good appetite and PO intake PTA. Reports following low sodium diet more recently PTA; Prior to wife's passing a few months ago pt reports consuming frozen, prepackaged meals. States he consumes only breakfast and dinner most days, skipping lunch. Per diet recall pt and aide state pt eats well rounded, home cooked meals at home. Denies use of vitamins PTA. States occasional consumption of Ensure at home. Pt denies food allergies. Pt reports diagnosis of T2DM "over 20 years ago" made him change the way he ate and motivated him to eat a more healthful diet. Pt states T2DM controlled by diet only. Per pt does not check finger sticks and relies on doctor to let him know BG levels. No reported T2DM medications per pt and medical record. HbA1c of 5.6% (12/14) shows good BG control.    Pt reports good appetite and PO intake up until 2 days ago (12/17). States low appetite and fatigue of hospital food has made it difficult for him to eat enough. Pt offered and amenable to trying Ensure Enlive x 1/day (provides 350 kcal, 20 g protein).- Spoke to team. Pt denies difficulty chewing and swallowing. States no N+V. Reports last BM today (12/19). Pt and aide state UBW is 117 pounds. State no recent weight changes. Per previous RD note 05/27/2015 pt was 130 pounds. Current charted standing weight is 125.2 pounds (12/18). However ?accuracy due to fluid retention as pt with fluctuating edema throughout hospital course. -Will continue to monitor    Reinforced Heart Failure Nutrition Therapy by discussing importance of low sodium diet adherence, with examples of foods to avoid, fluid restriction, and daily weights. Pt was able to teach back many points of education. Also discussed increased needs and how to maximize PO intake by mimicking small frequent meals by leaving food away from the tray for later consumption and sipping on nutritional supplement between meals. Also discussed importance of protein intake with examples of foods. Pt made aware RD remains available.

## 2019-12-19 NOTE — DISCHARGE NOTE NURSING/CASE MANAGEMENT/SOCIAL WORK - NSDCPEPTCOWAR_GEN_ALL_CORE
Warfarin/Coumadin - Follow up monitoring/Warfarin/Coumadin - Dietary Advice/Warfarin/Coumadin - Potential for adverse drug reactions and interactions/Warfarin/Coumadin - Compliance

## 2019-12-19 NOTE — DIETITIAN INITIAL EVALUATION ADULT. - PROBLEM SELECTOR PLAN 2
TTE showing severe aortic stenosis, symptomatic with SOB on exertion  - Structural heart following, plan for possible eventual TAVR when euvolemic.  - Monitor on tele.

## 2019-12-19 NOTE — DIETITIAN INITIAL EVALUATION ADULT. - PROBLEM SELECTOR PLAN 4
Pt on coumadin at home, will hold and start Heparin gtt in anticipation for possible TAVR  - CHADSVASc score of 6  - Continue with digoxin and toprol 25mg qd Check AM digoxin level  - Monitor on telemetry

## 2019-12-19 NOTE — PROVIDER CONTACT NOTE (OTHER) - BACKGROUND
pt admitted for aortic valve disorder. Pt had a Rapid called for blood transfusion reaction. During rapid the heparin could've been interfered w/ causing sub therapeutic result.

## 2019-12-19 NOTE — RAPID RESPONSE TEAM SUMMARY - NSSITUATIONBACKGROUNDRRT_GEN_ALL_CORE
84M PMH chronic anemia, HFrEF (40%), Afib on coumadin, T2DM, HLD, PVD, Menieres Disease, carotid stenosis, LBBB on EKG, CAD s/p PCI to mLAD in June 2014 and PCI to pRCA c/b perforation and cardiogenic shock in May of 2015 presented for elective [facility] for TAVR evaluation for severe AS, admitted for acute on on chronic CHF and anemia workup, pending TVAR for Mon 12/23.  RRT called due to respiratory distress and hypoxia. Pt was reported to be seen in respiratory distress and turned blue while receiving blood transfusion. Transfusion was stopped. he was placed on NRB mask. EKG showed prior LBBB without rate 80-90s. CXr concerning for a possible RLL infiltrate vs pulmonary edema. 40 IV lasix was given. ABG, CBC, CMP, t+s and coags were drawn. Primary team at bedside to assume care for possible transfusion reaction vs flash pulmonary edema.

## 2019-12-19 NOTE — PROGRESS NOTE ADULT - SUBJECTIVE AND OBJECTIVE BOX
Subjective:  No overnight events. Denies dyspnea, chest pain, leg edema, or lightheadedness.    Tele: afib 70's               Current Meds:  ALBUTerol    90 MICROgram(s) HFA Inhaler 2 Puff(s) Inhalation every 6 hours PRN  atorvastatin 40 milliGRAM(s) Oral at bedtime  clopidogrel Tablet 75 milliGRAM(s) Oral daily  digoxin     Tablet 0.125 milliGRAM(s) Oral <User Schedule>  ferrous    sulfate 325 milliGRAM(s) Oral daily  fluticasone propionate 50 MICROgram(s)/spray Nasal Spray 1 Spray(s) Both Nostrils daily  folic acid 1 milliGRAM(s) Oral daily  furosemide   Injectable 80 milliGRAM(s) IV Push once  guaiFENesin  milliGRAM(s) Oral every 12 hours PRN  heparin  Infusion.  Unit(s)/Hr IV Continuous <Continuous>  heparin  Injectable 4500 Unit(s) IV Push every 6 hours PRN  heparin  Injectable 2000 Unit(s) IV Push every 6 hours PRN  meclizine 12.5 milliGRAM(s) Oral three times a day PRN  melatonin 6 milliGRAM(s) Oral at bedtime  metoprolol succinate ER 25 milliGRAM(s) Oral daily  senna 2 Tablet(s) Oral at bedtime  sodium chloride 0.9% lock flush 3 milliLiter(s) IV Push every 8 hours  tiotropium 18 MICROgram(s) Capsule 1 Capsule(s) Inhalation daily      Vitals:  T(C): 36.3 (12-19-19 @ 12:00), Max: 36.7 (12-18-19 @ 20:09)  T(F): 97.3 (12-19-19 @ 12:00), Max: 98.1 (12-18-19 @ 20:09)  HR: 73 (12-19-19 @ 12:00) (64 - 97)  BP: 100/61 (12-19-19 @ 12:00) (95/56 - 105/68)  RR: 18 (12-19-19 @ 12:00) (18 - 18)  SpO2: 99% (12-19-19 @ 12:00) (98% - 99%)      I&O's Summary    18 Dec 2019 07:01  -  19 Dec 2019 07:00  --------------------------------------------------------  IN: 480 mL / OUT: 400 mL / NET: 80 mL    19 Dec 2019 07:01  -  19 Dec 2019 13:31  --------------------------------------------------------  IN: 400 mL / OUT: 0 mL / NET: 400 mL        Physical Exam:  Appearance: No Acute Distress  HEENT: JVP ~15cm H2O  Cardiovascular: irregularly irregular, 3/6 late peaking CRISTIAN at RUSB  Respiratory: Trace basilar rales   Gastrointestinal: Soft, Non-tender, non-distended	  Skin: no skin lesions  Neurologic: Non-focal  Extremities: Trace LE edema, warm and well perfused  Psychiatry: A & O x 3, Mood & affect appropriate                              6.5    8.26  )-----------( 191      ( 19 Dec 2019 08:05 )             20.6     12-19    137  |  101  |  62<H>  ----------------------------<  139<H>  4.0   |  22  |  1.65<H>    Ca    8.5      19 Dec 2019 06:26  Phos  3.6     12-19  Mg     2.3     12-19      PTT - ( 19 Dec 2019 09:36 )  PTT:66.2 sec      Serum Pro-Brain Natriuretic Peptide: 06122 pg/mL (12-13 @ 07:52)

## 2019-12-19 NOTE — DISCHARGE NOTE NURSING/CASE MANAGEMENT/SOCIAL WORK - NSDCPEPTCAREGIVEDUMATLIST _GEN_ALL_CORE
Pink


[phone]


   ICD Code: I95.9 - HYPOTENSION, UNSPECIFIED   Status: Acute   Current Visit: 

Yes   





(2) Pneumonia


SNOMED Code(s): 822995483


   DAILY   


 


 


 Prochlorperazine [University City


   University City


Enoxaparin Sodium (Lovenox)  40 mg SUBCUT Q24H [facility]


Fentanyl (Duragesic)  25 mcg TRDERM Q72H [facility]


Ferrous Sulfate (Ferrous Sulfate)  325 mg PO TID [facility]


Fludrocortisone Acetate (Florinef)  0.1 mg PO DAILY [facility]


Folic Acid (Folic Acid)  1 mg PO DAILY [facility]


Gabapentin (Neurontin)  900 mg PO TID [facility]


[facility]


Midodrine (Midodrine)  10 mg PO TID [facility]


Non-Formulary Medication (Bupropion)  100 mg PO BID [facility]


Non-Formulary Medication (Cholecalciferol (Vitamin D3) [Vitamin D3])  50,000 

unit PO Q7D [facility]


University City


University City


Ondansetron HCl (Zofran Odt)  4 mg PO Q4H PRN


   PRN Reason: nausea, able to take PO


Oxycodone HCl (Oxycodone)  5 mg PO Q4H PRN


   PRN Reason: Pain (moderate 4-6)


Prochlorperazine Maleate (Compazine)  10 mg PO QID PRN


   PRN Reason: Nausea


Sertraline HCl (Zoloft)  200 mg PO DAILY [facility]








Heart Failure/Influenza Vaccination/Diabetes Diabetes/Warfarin/Coumadin/Heart Failure/Influenza Vaccination

## 2019-12-19 NOTE — DIETITIAN INITIAL EVALUATION ADULT. - ADD RECOMMEND
1. Continue to monitor PO intake, diet tolerance, weight, labs, skin integrity, food preferences, and further educational needs. 2. Reinforce nutrition education for increased needs, and heart failure nutrition therapy. 3. Recommend Ensure Enlive x 1/day (provides 350 kcal, 20 g protein) to optimize protein and calorie intake. 1. Continue to monitor PO intake, diet tolerance, weight, labs, skin integrity, food preferences, and further educational needs. 2. Reinforce nutrition education for increased needs, and heart failure nutrition therapy. 3. Recommend Ensure Enlive x 1/day (provides 350 kcal, 20 g protein) and [employer] Shake x 1/daily (provides 172kcal, 5g protein) to optimize protein and calorie intake. 4. Mild malnutrition note placed. 1. Continue to monitor PO intake, diet tolerance, weight, labs, skin integrity, food preferences, and further educational needs. 2. Reinforce nutrition education for increased needs, and heart failure nutrition therapy. 3. Recommend liberalize diet to low sodium, Ensure Enlive x 1/day (provides 350 kcal, 20 g protein) and [employer] Shake x 1/daily (provides 172kcal, 5g protein) to optimize protein and calorie intake; Defer fluids to team 4. Mild malnutrition note placed.

## 2019-12-19 NOTE — DIETITIAN INITIAL EVALUATION ADULT. - REASON INDICATOR FOR ASSESSMENT
Pt seen for length of stay initial nutrition evaluation  Information obtained from medical record, pt, aide at bedside, and previous RD note.

## 2019-12-19 NOTE — PROGRESS NOTE ADULT - PROBLEM SELECTOR PLAN 5
- Cr stable at 1.4-5. Up to 1.65. could be 2/2 contrast dye exposure two days ago.  - close monitoring

## 2019-12-19 NOTE — DIETITIAN INITIAL EVALUATION ADULT. - PHYSICAL APPEARANCE
other (specify)/Nutrition focused physical exam conducted with pt's consent, under RD supervision. Physical findings: Severe muscle loss clavicle; Mild muscle loss temples. Moderate fat loss buccal region, triceps, and shoulders. Ht: 65 inches Wt: 125.2 pounds BMI: 20.8kg/m2 IBW: 136 pounds (+/-10%) %IBW: 92%  Edema: 2+ bilateral ankles per flow sheets Skin: no pressure injuries per documentation

## 2019-12-19 NOTE — PROGRESS NOTE ADULT - PROBLEM SELECTOR PLAN 1
Pt presented with DYER, with recent TTE 12/10 EF 30% with severe AS, with LV dysfunction, prior Echo in 6/2019 was 42%. Exam notable for 3+ LE edema up to b/l thigh, b/l rales, likely related to severe AS, being diuresed with lasix 40mg IV BID, currently appear near euvolemia.   -CXR: loculated fluid at left major fissure. Repeat CXR 12/16 showed clear lungs  - IV Lasix 80mg daily for diuresis  - Strict Ins and Outs, daily weight, fluid restriction to 1L/day  - C/w toprol 25mg qd  - Follow up HF  recs

## 2019-12-19 NOTE — DISCHARGE NOTE NURSING/CASE MANAGEMENT/SOCIAL WORK - NSTRANSFEREYEGLASSESPAIRS_GEN_A_NUR
Attempted to contact patient for follow up from hospital visit in Sept. 2018 for left frontal AVM.  Phone number on file is invalid.  Letter mailed to home address on file.    
1 pair

## 2019-12-19 NOTE — DIETITIAN INITIAL EVALUATION ADULT. - ETIOLOGY
Inadequate intake 2/2 increased needs Inadequate energy intake 2/2 increased needs Inadequate energy intake 2/2 increased needs due to heart failure

## 2019-12-19 NOTE — PROVIDER CONTACT NOTE (OTHER) - ASSESSMENT
Pt VSS, see flowsheet, denies pain or discomfort. Skin is ecchymotic, but  no signs of bleeding. Heparin running @ 10ml/hr.

## 2019-12-19 NOTE — PROVIDER CONTACT NOTE (OTHER) - ACTION/TREATMENT ORDERED:
Notified Team 4 MD Pj Cast. Will ask for if aptt order is recommended or if heparin rate needs to be changed. Will continue to monitor

## 2019-12-19 NOTE — PROGRESS NOTE ADULT - PROBLEM SELECTOR PLAN 1
still with elevated neck veins  -Continue metoprolol succinate 25 mg daily.  -Give Lasix IVP 80mg x1 now  - Deferring ACEi/ARB given mildly elevated Cr without known baseline  - daily standing weight and strict I's and O's.

## 2019-12-20 DIAGNOSIS — I48.20 CHRONIC ATRIAL FIBRILLATION, UNSPECIFIED: ICD-10-CM

## 2019-12-20 DIAGNOSIS — N17.9 ACUTE KIDNEY FAILURE, UNSPECIFIED: ICD-10-CM

## 2019-12-20 LAB
ALBUMIN SERPL ELPH-MCNC: 3 G/DL — LOW (ref 3.3–5)
ALP SERPL-CCNC: 56 U/L — SIGNIFICANT CHANGE UP (ref 40–120)
ALT FLD-CCNC: 7 U/L — LOW (ref 10–45)
ANION GAP SERPL CALC-SCNC: 16 MMOL/L — SIGNIFICANT CHANGE UP (ref 5–17)
APTT BLD: 65.6 SEC — HIGH (ref 27.5–36.3)
APTT BLD: 74.8 SEC — HIGH (ref 27.5–36.3)
AST SERPL-CCNC: 12 U/L — SIGNIFICANT CHANGE UP (ref 10–40)
BASOPHILS # BLD AUTO: 0.06 K/UL — SIGNIFICANT CHANGE UP (ref 0–0.2)
BASOPHILS NFR BLD AUTO: 0.8 % — SIGNIFICANT CHANGE UP (ref 0–2)
BILIRUB SERPL-MCNC: 1.7 MG/DL — HIGH (ref 0.2–1.2)
BUN SERPL-MCNC: 76 MG/DL — HIGH (ref 7–23)
CALCIUM SERPL-MCNC: 8.6 MG/DL — SIGNIFICANT CHANGE UP (ref 8.4–10.5)
CHLORIDE SERPL-SCNC: 99 MMOL/L — SIGNIFICANT CHANGE UP (ref 96–108)
CO2 SERPL-SCNC: 20 MMOL/L — LOW (ref 22–31)
CREAT SERPL-MCNC: 2.49 MG/DL — HIGH (ref 0.5–1.3)
EOSINOPHIL # BLD AUTO: 0.19 K/UL — SIGNIFICANT CHANGE UP (ref 0–0.5)
EOSINOPHIL NFR BLD AUTO: 2.7 % — SIGNIFICANT CHANGE UP (ref 0–6)
GLUCOSE SERPL-MCNC: 130 MG/DL — HIGH (ref 70–99)
HAPTOGLOB SERPL-MCNC: <20 MG/DL — LOW (ref 34–200)
HAPTOGLOB SERPL-MCNC: <20 MG/DL — LOW (ref 34–200)
HCT VFR BLD CALC: 20.7 % — CRITICAL LOW (ref 39–50)
HGB BLD-MCNC: 6.8 G/DL — CRITICAL LOW (ref 13–17)
IMM GRANULOCYTES NFR BLD AUTO: 2.4 % — HIGH (ref 0–1.5)
LDH SERPL L TO P-CCNC: 321 U/L — HIGH (ref 50–242)
LYMPHOCYTES # BLD AUTO: 1.21 K/UL — SIGNIFICANT CHANGE UP (ref 1–3.3)
LYMPHOCYTES # BLD AUTO: 16.9 % — SIGNIFICANT CHANGE UP (ref 13–44)
MAGNESIUM SERPL-MCNC: 2.3 MG/DL — SIGNIFICANT CHANGE UP (ref 1.6–2.6)
MCHC RBC-ENTMCNC: 30.9 PG — SIGNIFICANT CHANGE UP (ref 27–34)
MCHC RBC-ENTMCNC: 32.9 GM/DL — SIGNIFICANT CHANGE UP (ref 32–36)
MCV RBC AUTO: 94.1 FL — SIGNIFICANT CHANGE UP (ref 80–100)
MONOCYTES # BLD AUTO: 0.42 K/UL — SIGNIFICANT CHANGE UP (ref 0–0.9)
MONOCYTES NFR BLD AUTO: 5.9 % — SIGNIFICANT CHANGE UP (ref 2–14)
NEUTROPHILS # BLD AUTO: 5.09 K/UL — SIGNIFICANT CHANGE UP (ref 1.8–7.4)
NEUTROPHILS NFR BLD AUTO: 71.3 % — SIGNIFICANT CHANGE UP (ref 43–77)
PHOSPHATE SERPL-MCNC: 4.8 MG/DL — HIGH (ref 2.5–4.5)
PLATELET # BLD AUTO: 188 K/UL — SIGNIFICANT CHANGE UP (ref 150–400)
POTASSIUM SERPL-MCNC: 4.2 MMOL/L — SIGNIFICANT CHANGE UP (ref 3.5–5.3)
POTASSIUM SERPL-SCNC: 4.2 MMOL/L — SIGNIFICANT CHANGE UP (ref 3.5–5.3)
PROT SERPL-MCNC: 6.5 G/DL — SIGNIFICANT CHANGE UP (ref 6–8.3)
RBC # BLD: 2.2 M/UL — LOW (ref 4.2–5.8)
RBC # FLD: 18.2 % — HIGH (ref 10.3–14.5)
SODIUM SERPL-SCNC: 135 MMOL/L — SIGNIFICANT CHANGE UP (ref 135–145)
WBC # BLD: 7.14 K/UL — SIGNIFICANT CHANGE UP (ref 3.8–10.5)
WBC # FLD AUTO: 7.14 K/UL — SIGNIFICANT CHANGE UP (ref 3.8–10.5)

## 2019-12-20 PROCEDURE — 99222 1ST HOSP IP/OBS MODERATE 55: CPT | Mod: GC

## 2019-12-20 PROCEDURE — 99223 1ST HOSP IP/OBS HIGH 75: CPT

## 2019-12-20 PROCEDURE — 99233 SBSQ HOSP IP/OBS HIGH 50: CPT | Mod: GC

## 2019-12-20 PROCEDURE — 99232 SBSQ HOSP IP/OBS MODERATE 35: CPT

## 2019-12-20 PROCEDURE — 99223 1ST HOSP IP/OBS HIGH 75: CPT | Mod: GC

## 2019-12-20 RX ORDER — FUROSEMIDE 40 MG
20 TABLET ORAL ONCE
Refills: 0 | Status: DISCONTINUED | OUTPATIENT
Start: 2019-12-20 | End: 2019-12-20

## 2019-12-20 RX ORDER — DIPHENHYDRAMINE HCL 50 MG
50 CAPSULE ORAL ONCE
Refills: 0 | Status: DISCONTINUED | OUTPATIENT
Start: 2019-12-20 | End: 2019-12-20

## 2019-12-20 RX ORDER — ACETAMINOPHEN 500 MG
650 TABLET ORAL ONCE
Refills: 0 | Status: COMPLETED | OUTPATIENT
Start: 2019-12-20 | End: 2019-12-20

## 2019-12-20 RX ORDER — ACETAMINOPHEN 500 MG
650 TABLET ORAL ONCE
Refills: 0 | Status: DISCONTINUED | OUTPATIENT
Start: 2019-12-20 | End: 2019-12-20

## 2019-12-20 RX ADMIN — Medication 1 SPRAY(S): at 14:33

## 2019-12-20 RX ADMIN — CLOPIDOGREL BISULFATE 75 MILLIGRAM(S): 75 TABLET, FILM COATED ORAL at 14:33

## 2019-12-20 RX ADMIN — Medication 25 MILLIGRAM(S): at 05:03

## 2019-12-20 RX ADMIN — TIOTROPIUM BROMIDE 1 CAPSULE(S): 18 CAPSULE ORAL; RESPIRATORY (INHALATION) at 14:33

## 2019-12-20 RX ADMIN — HEPARIN SODIUM 1000 UNIT(S)/HR: 5000 INJECTION INTRAVENOUS; SUBCUTANEOUS at 18:49

## 2019-12-20 RX ADMIN — Medication 0.12 MILLIGRAM(S): at 05:03

## 2019-12-20 RX ADMIN — SODIUM CHLORIDE 3 MILLILITER(S): 9 INJECTION INTRAMUSCULAR; INTRAVENOUS; SUBCUTANEOUS at 14:34

## 2019-12-20 RX ADMIN — SENNA PLUS 2 TABLET(S): 8.6 TABLET ORAL at 21:58

## 2019-12-20 RX ADMIN — Medication 1 MILLIGRAM(S): at 14:33

## 2019-12-20 RX ADMIN — SODIUM CHLORIDE 3 MILLILITER(S): 9 INJECTION INTRAMUSCULAR; INTRAVENOUS; SUBCUTANEOUS at 21:57

## 2019-12-20 RX ADMIN — Medication 325 MILLIGRAM(S): at 14:33

## 2019-12-20 RX ADMIN — Medication 6 MILLIGRAM(S): at 21:58

## 2019-12-20 RX ADMIN — SODIUM CHLORIDE 3 MILLILITER(S): 9 INJECTION INTRAMUSCULAR; INTRAVENOUS; SUBCUTANEOUS at 05:06

## 2019-12-20 RX ADMIN — HEPARIN SODIUM 1000 UNIT(S)/HR: 5000 INJECTION INTRAVENOUS; SUBCUTANEOUS at 09:59

## 2019-12-20 RX ADMIN — ATORVASTATIN CALCIUM 40 MILLIGRAM(S): 80 TABLET, FILM COATED ORAL at 21:58

## 2019-12-20 NOTE — PROVIDER CONTACT NOTE (OTHER) - BACKGROUND
Pt admitted for aortic valve disorder. pt has had episode of pauses on tele as long as 3.1 seconds during this admission.

## 2019-12-20 NOTE — PROGRESS NOTE ADULT - ATTENDING COMMENTS
Seen, examined the patient, attendant at bedside  84M PMH chronic anemia, HFrEF (40%), Afib on coumadin, T2DM, HLD, PVD, Menieres Disease, carotid stenosis, LBBB on EKG, CAD s/p PCI to mLAD in June 2014 and PCI to pRCA that was complicated by perforation and cardiogenic shock in May of 2015 presented for elective RHC/LHC for TAVR evaluation for severe AS, admitted for acute on on chronic CHF and anemia   - In bed now, no c/o SOB, had transfusion reaction yesterday, has been c/o exertional dyspnea  - Reviewed labs, imaging, cath report,   - Structural heart team plans for TAVR on Mon 12/22, Cardiac CT done, carotid duplex showed total LIC occlusion and moderate right carotid occlusion. Vascular cardiology cleared.   - HF team plan noted, recommended IV Lasix 80mg daily, Metoprolol  - Noted JUHI on CKD 3- likely from contrast mediated( cath & Cardiac CT)    Renal consult called.    daily I/O  - Blood bank to let us know about transfusion reaction- Ab etc. Heme consult called also for anemia   - c/w IV heparin gtt for chronic a.fib

## 2019-12-20 NOTE — PROGRESS NOTE ADULT - ASSESSMENT
85 YO M with a history of ACC/AHA Stage C ischemic cardiomyopathy (LV 4.5 cm, EF 30%), severe AS (58/30/0.6), chronic AF on coumadin, and CAD s/p PCI to RCA and LAD with prior tamponade during coronary intervention who is admitted with decompensated heart failure after R/LHC with plans for optimization prior to consideration of TAVR. He underwent CT coronaries on 12/17 as a part of TAVR evaluation. His volume status has improved with diuretics. Now, his renal function has worsened in the setting of contrast use with CT.  Tentatively scheduled for possible BAV vs TAVR on Mon given his worsened renal function, anemia, and overall frailty.    Pertinent recent studies.  RHC: 12/13 RA 10, PA 52/21/36, PCWP 23, CO/CI 4.34/CI 2.64, PA sat 47.1, Ao Sat 98%  LHC: 12/13 occluded ostial RCA from ISR, 70% OM1, moderate LAD disease   TTE: 12/13 LV 4.5 cm, EF 30%, severe AS (58/30/0.6) from calcific valve with severely restricted opening, moderate MR, LVOT VTI 15 cm, dyssynchronous septal movement, normal RV function

## 2019-12-20 NOTE — PROGRESS NOTE ADULT - ATTENDING COMMENTS
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  This should be medically managed.  Surveillance duplex for the R ICA in 6 months    No objection to TAVR or BAV    [facility] [id number]

## 2019-12-20 NOTE — CONSULT NOTE ADULT - ASSESSMENT
85 yo M with hx Afib (Coumadin), DM, HLD, CAD (stent 2014 and 2015 in mLAD and RCA), HFrEF (40%), sever aortic stenosis was admitted to [facility] on 12/13/19 after being referred by Cardiologist Dr. Becker for  decompensated heart failure after R/LHC with plans for optimization prior to consideration of TAVR. Nephrology consulted for JUHI.

## 2019-12-20 NOTE — PROGRESS NOTE ADULT - SUBJECTIVE AND OBJECTIVE BOX
*****Structural Heart Team*****    Subjective:    The patient is laying comfortably in bed, stating he feels tired. His H and H has been steadily dropping, and when they went to give him a unit of PRBC yesterday, he began having SOB, and the transfusion was stopped. It is unsure if it was a transfusion reaction, or if it was TACO. Awaiting testing results from [facility]. He currently denies SOB.      PAST MEDICAL & SURGICAL HISTORY:  Anemia  LBBB (left bundle branch block)  DYER (dyspnea on exertion)  Coronary disease: PCI  CHF (congestive heart failure)  Former smoker  Meniere disease  HLD (hyperlipidemia)  DM (diabetes mellitus)  Atrial fibrillation  HTN (hypertension)  Lipoma  History of skin graft  Status post appendectomy        T(C): 36.4 (12-20-19 @ 09:53), Max: 36.9 (12-19-19 @ 20:16)  HR: 69 (12-20-19 @ 09:53) (69 - 85)  BP: 107/56 (12-20-19 @ 09:53) (93/58 - 107/56)  RR: 18 (12-20-19 @ 09:53) (18 - 18)  SpO2: 99% (12-20-19 @ 09:53) (96% - 99%)  Wt(kg): --  12-19 @ 07:01  -  12-20 @ 07:00  --------------------------------------------------------  IN: 880 mL / OUT: 300 mL / NET: 580 mL      MEDICATIONS  (STANDING):  atorvastatin 40 milliGRAM(s) Oral at bedtime  clopidogrel Tablet 75 milliGRAM(s) Oral daily  digoxin     Tablet 0.125 milliGRAM(s) Oral <User Schedule>  ferrous    sulfate 325 milliGRAM(s) Oral daily  fluticasone propionate 50 MICROgram(s)/spray Nasal Spray 1 Spray(s) Both Nostrils daily  folic acid 1 milliGRAM(s) Oral daily  heparin  Infusion.  Unit(s)/Hr (11 mL/Hr) IV Continuous <Continuous>  melatonin 6 milliGRAM(s) Oral at bedtime  metoprolol succinate ER 25 milliGRAM(s) Oral daily  senna 2 Tablet(s) Oral at bedtime  sodium chloride 0.9% lock flush 3 milliLiter(s) IV Push every 8 hours  tiotropium 18 MICROgram(s) Capsule 1 Capsule(s) Inhalation daily    MEDICATIONS  (PRN):  ALBUTerol    90 MICROgram(s) HFA Inhaler 2 Puff(s) Inhalation every 6 hours PRN Shortness of Breath and/or Wheezing  guaiFENesin  milliGRAM(s) Oral every 12 hours PRN Cough  heparin  Injectable 4500 Unit(s) IV Push every 6 hours PRN For aPTT less than 40  heparin  Injectable 2000 Unit(s) IV Push every 6 hours PRN For aPTT between 40 - 57  meclizine 12.5 milliGRAM(s) Oral three times a day PRN Dizziness      Review of Symptoms:  Constitutional: Awake, Alert, Follows commands  Respiratory: + SOB  Cardiac: Denies CP, Denies Palpitations  Gastrointestinal: Denies Pain, Denies N/V, tolerating po intake  Vascular: Negative  Extremities: No Edema, No joint pain or swelling  Neurological: Negative  Endocrine: No heat or cold intolerance, No excessive thirst  Heme/Onc: Anemia    Exam:  General: Weak, A/O x3  HEENT: Supple, No JVD, Trachea midline, no masses, LESLIE  Pulmonary: CTAB, = Chest Excursion, no accessory muscle use  Cor: S1S2, Irregular, III/VI CRISTIAN  ECG: AFib  Gastrointestinal: Soft, NT/ND, + Bowel Sounds  Neuro: = motor and sensory B/L, No focal deficits  Vascular: 1+ Pulses B/L, Trace edema  Extremities: No joint pain or swelling  Skin: Warm/Dry/Normal color, Normal turgor, no rashes                          6.8    7.14  )-----------( 188      ( 20 Dec 2019 08:34 )             20.7   12-20    135  |  99  |  76<H>  ----------------------------<  130<H>  4.2   |  20<L>  |  2.49<H>    Ca    8.6      20 Dec 2019 06:33  Phos  4.8     12-20  Mg     2.3     12-20    TPro  6.5  /  Alb  3.0<L>  /  TBili  1.7<H>  /  DBili  x   /  AST  12  /  ALT  7<L>  /  AlkPhos  56  12-20  PT/INR - ( 19 Dec 2019 14:30 )   PT: 12.5 sec;   INR: 1.09 ratio         PTT - ( 20 Dec 2019 08:23 )  PTT:65.6 sec    Imaging Reviewed:

## 2019-12-20 NOTE — PROGRESS NOTE ADULT - PROBLEM SELECTOR PLAN 6
- he meets IIa indications for CRT given low EF with LBBB and concurrent AF.   - would consider evaluation after BAV and/or TAVR

## 2019-12-20 NOTE — PROGRESS NOTE ADULT - PROBLEM SELECTOR PLAN 1
Overall not volume expanded.  -Continue metoprolol succinate 25 mg daily.  -we'll hold off diuretics and ACE/ARB given worsening renal function.  - daily standing weight and strict I's and O's.

## 2019-12-20 NOTE — CONSULT NOTE ADULT - ASSESSMENT
84M PMH chronic anemia, HFrEF (40%), Afib on coumadin, T2DM, HLD, PVD, Menieres Disease, carotid stenosis, LBBB on EKG, CAD s/p PCI to mLAD in June 2014 and PCI to pRCA that was complicated by perforation and cardiogenic shock in May of 2015 presented for elective RHC/LHC for TAVR evaluation for severe AS, admitted for acute on on chronic CHF and anemia workup for TVAR or balloon aortic vavluoplasty Mon 12/23. Course complicated by possible transfusion rxn with RRT 12/19 for hypoxia. Hematology consulted for hx of chronic anemia.     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, please obtain cold agglutinin titer to r/o cold agglutinin component   - Please start treatment with steroids 1mg/kg daily   - Please obtain pan scan CT chest/abd/pelvis with IV contrast to r/o any underlying malignancy that may be causing this chronic hemolytic anemia   - Continue folic acid  - Please obtain ALMA DELIA and rheumatoid factor as pt states he has arthritis in many joints, r/o underlying autoimmune disease that could have triggered hemolytic anemia  - Agree that TAVR may not be the best option for patient who has dropping H/H, and may not meet parameter of hgb 8 for procedure due to hemolytic process  - DO NOT transfuse pt unless absolutely necessary as pt's anemia is hemolytic in nature  - Trend daily CBC with diff, retic count and LDH  - No DIC noted on labs   - will follow closely with you     Freda Correa MD  Hematology Oncology Fellow, PGY-4  [facility] Pager: [phone]/ [facility] Pager: [phone]

## 2019-12-20 NOTE — PROGRESS NOTE ADULT - PROBLEM SELECTOR PLAN 4
2/2 contrast from R/LHC. Cr initial 1.3 (unclear baseline Cr)  - hold valsartan until Cr stabilizes  - trend Cr and avoid nephrotoxin meds  - monitor I/Os 2/2 contrast from R/C with diuresis. Cr initial 1.3 (baseline Cr 0.85 from 2015)  - hold valsartan until Cr stabilizes  - trend Cr and avoid nephrotoxin meds  - f/u urine studies   - monitor I/Os  - nephrology consulted

## 2019-12-20 NOTE — PROGRESS NOTE ADULT - PROBLEM SELECTOR PLAN 1
Patient TAVR workup is completed. He is preliminarily scheduled for his TAVR this morning, however we have concerns that TAVR might not be his best option at this time. He is debilitated with a dropping  H and H, as well as a rising creatinine. BAV at this time might be his better option as a stabilizer and to see how he does. Will discuss with Dr. Stephen and Dr. Becker. Also discussed this with the patient and Dr. Richter this morning

## 2019-12-20 NOTE — CHART NOTE - NSCHARTNOTEFT_GEN_A_CORE
Nutrition Follow Up Note  Patient seen for: Initial malnutrition follow up    Per medical record pt is 85yo male with PMH chronic anemia, HFrEF (40%), Afib on coumadin, T2DM, HLD, PVD, Meniere's Disease, carotid stenosis, LBBB on EKG, CAD, presented for elective RHC/LHC for TAVR evaluation for severe AS, admitted for acute on chronic CHF and anemia workup, plan for TVAR Mon 12/23. Pt had rapid response called yesterday (12/19) for hypoxia. Pt now NPO after midnight 12/19 pending BAV procedure today (12/20) as he is not in ideal condition for TAVR per PA event note.     Source: Medical record and health aide at bedside.    Diet: NPO after Midnight 12/19; previously DASH/TLC, 100ml fluid restriction + Ensure Enlive x 1/day (provides 350 kcal, 20 g protein) + Health Shake x 1/day (provides 172 kcal, 5g protein)     Per health aide at bedside pt ate about 25% of dinner last night (12/19). States no BM since early yesterday (12/19). Aide had questions about menu. Discussed how to order and pt preferences.     Pertinent Medications: senna. Lipitor, ferrous sulfate, folic acid Toprol XL  Pertinent Labs: BUN 72 <H>, Creatinine 2.49 <H>, Glucose 130 <H>, ALT/SGPT 7 <L>, eGFR 23 <L>, Phosphorous 4.8 <H>    Skin per nursing documentation: tear on left arm; no pressure injuries  Edema: 12/19 1+ bilateral ankles and feet    Estimated Needs:   [x] no change since previous assessment (based on 56.7kg weight from 12/18): 30-35 kcal/kg = 9417-9788 kcal/day; 1.2-1.4 g/kg= 68.04-79.38g protein/day  [ ] recalculated:     Previous Nutrition Diagnosis: Malnutrition Mild in the context of chronic disease  Nutrition Diagnosis is: care plan in progress; Being addressed with addition of supplements     New Nutrition Diagnosis: N/A    Interventions:     Recommend  1) Continue to monitor diet, weight, labs, skin integrity, food preferences, and further educational needs.   2) Once diet resumed, encourage PO intake and monitor tolerance to diet- Adjust as needed.  3) Pt's aide made aware RD remains available.     RD remains available upon request and will follow up per protocol.  Clarice Arias, Dietetic Intern (Pager #[phone]) Nutrition Follow Up Note  Patient seen for: Initial mild malnutrition follow up    Per medical record pt is 83yo male with PMH chronic anemia, HFrEF (40%), Afib on coumadin, T2DM, HLD, PVD, Meniere's Disease, carotid stenosis, LBBB on EKG, CAD, presented for elective RHC/LHC for TAVR evaluation for severe AS, admitted for acute on chronic CHF and anemia workup, plan for TVAR Mon 12/23. Pt had rapid response called yesterday (12/19) for hypoxia. Pt now NPO after midnight 12/19 pending BAV procedure today (12/20) as he is not in ideal condition for TAVR per PA event note.     Source: Medical record and health aide at bedside.    Diet: NPO after Midnight 12/19; previously DASH/TLC, 1000ml fluid restriction + Ensure Enlive x 1/day (provides 350 kcal, 20 g protein) + Health Shake x 1/day (provides 172 kcal, 5g protein)     Pt observed resting in bed. Per health aide at bedside pt ate about 25% of dinner last night (12/19). States no BM since early yesterday (12/19). Aide had questions about menu. Discussed how to order and pt preferences.     Pertinent Medications: senna. Lipitor, ferrous sulfate, folic acid Toprol XL  Pertinent Labs: BUN 72 <H>, Creatinine 2.49 <H>, Glucose 130 <H>, ALT/SGPT 7 <L>, eGFR 23 <L>, Phosphorous 4.8 <H>    Skin per nursing documentation: tear on left arm; no pressure injuries  Edema: 12/19 1+ bilateral ankles and feet    Estimated Needs:   [x] no change since previous assessment (based on 56.7kg weight from 12/18): 30-35 kcal/kg = 0662-0647 kcal/day; 1.2-1.4 g/kg= 68.04-79.38g protein/day  [ ] recalculated:     Previous Nutrition Diagnosis: Malnutrition Mild in the context of chronic disease  Nutrition Diagnosis is: care plan in progress; Being addressed with addition of supplements     New Nutrition Diagnosis: N/A    Interventions:     Recommend  1) Continue to monitor diet, weight, labs, skin integrity, food preferences, and further educational needs.   2) Once diet resumed, encourage PO intake and monitor tolerance to diet- Adjust as needed.  3) Pt's aide made aware RD remains available.     RD remains available upon request and will follow up per protocol.  Clarice Arias, Dietetic Intern (Pager #[phone])

## 2019-12-20 NOTE — PROVIDER CONTACT NOTE (OTHER) - RECOMMENDATIONS
Notified Team 4 MD Pj Cast. Will pass on to day shift nurse to continue to monitor and monitor AM lab results.

## 2019-12-20 NOTE — PROGRESS NOTE ADULT - PROBLEM SELECTOR PLAN 4
- No evidence of iron deficiency anemia. Possibly due to hemolysis in the setting of severe AS. s/p 1 U PRBC with post transfusion reaction.  -Blood bank and Heme note appreciated. Findings consistent with hemolytic anemia. Dire Maribell pos consistent with Warm hemolytic aneami. Awaiting cold agglutinin titer. Steroids recommended.  Ruling out underlying autoimmune disease.

## 2019-12-20 NOTE — CONSULT NOTE ADULT - SUBJECTIVE AND OBJECTIVE BOX
REASON FOR CONSULTATION:    HPI:    REVIEW OF SYSTEMS:    CONSTITUTIONAL: No weakness, fevers or chills  EYES/ENT: No visual changes;  No vertigo or throat pain   NECK: No pain or stiffness  RESPIRATORY: No cough, wheezing, hemoptysis; No shortness of breath  CARDIOVASCULAR: No chest pain or palpitations  GASTROINTESTINAL: No abdominal or epigastric pain. No nausea, vomiting, or hematemesis; No diarrhea or constipation. No melena or hematochezia.  GENITOURINARY: No dysuria, frequency or hematuria  NEUROLOGICAL: No numbness or weakness  SKIN: No itching, burning, rashes, or lesions   All other review of systems is negative unless indicated above.    Allergies    No Known Allergies    Intolerances        MEDICATIONS  (STANDING):  atorvastatin 40 milliGRAM(s) Oral at bedtime  clopidogrel Tablet 75 milliGRAM(s) Oral daily  digoxin     Tablet 0.125 milliGRAM(s) Oral <User Schedule>  ferrous    sulfate 325 milliGRAM(s) Oral daily  fluticasone propionate 50 MICROgram(s)/spray Nasal Spray 1 Spray(s) Both Nostrils daily  folic acid 1 milliGRAM(s) Oral daily  heparin  Infusion.  Unit(s)/Hr (11 mL/Hr) IV Continuous <Continuous>  melatonin 6 milliGRAM(s) Oral at bedtime  metoprolol succinate ER 25 milliGRAM(s) Oral daily  senna 2 Tablet(s) Oral at bedtime  sodium chloride 0.9% lock flush 3 milliLiter(s) IV Push every 8 hours  tiotropium 18 MICROgram(s) Capsule 1 Capsule(s) Inhalation daily    MEDICATIONS  (PRN):  ALBUTerol    90 MICROgram(s) HFA Inhaler 2 Puff(s) Inhalation every 6 hours PRN Shortness of Breath and/or Wheezing  guaiFENesin  milliGRAM(s) Oral every 12 hours PRN Cough  heparin  Injectable 4500 Unit(s) IV Push every 6 hours PRN For aPTT less than 40  heparin  Injectable 2000 Unit(s) IV Push every 6 hours PRN For aPTT between 40 - 57  meclizine 12.5 milliGRAM(s) Oral three times a day PRN Dizziness      Vital Signs Last 24 Hrs  T(C): 36.4 (20 Dec 2019 12:17), Max: 36.9 (19 Dec 2019 20:16)  T(F): 97.6 (20 Dec 2019 12:17), Max: 98.5 (19 Dec 2019 20:16)  HR: 72 (20 Dec 2019 12:17) (69 - 85)  BP: 93/58 (20 Dec 2019 12:17) (93/58 - 107/56)  BP(mean): --  RR: 18 (20 Dec 2019 12:17) (18 - 18)  SpO2: 99% (20 Dec 2019 12:17) (96% - 99%)    PHYSICAL EXAM:    General: AOx3, no acute distress  EYES: EOMI, PERRLA, conjunctiva and sclera clear  CHEST/LUNG: Clear to auscultation bilaterally; no wheezes, crackles or rales  HEART: Regular rate and rhythm; no murmurs  ABDOMEN: Soft, Nontender, Nondistended; BS+  LYMPH: No lymphadenopathy noted.  Extremities: No peripheral edema    LABS:                        6.8    7.14  )-----------( 188      ( 20 Dec 2019 08:34 )             20.7     12-20    135  |  99  |  76<H>  ----------------------------<  130<H>  4.2   |  20<L>  |  2.49<H>    Ca    8.6      20 Dec 2019 06:33  Phos  4.8     12-20  Mg     2.3     12-20    TPro  6.5  /  Alb  3.0<L>  /  TBili  1.7<H>  /  DBili  x   /  AST  12  /  ALT  7<L>  /  AlkPhos  56  12-20    PT/INR - ( 19 Dec 2019 14:30 )   PT: 12.5 sec;   INR: 1.09 ratio         PTT - ( 20 Dec 2019 08:23 )  PTT:65.6 sec  Urinalysis Basic - ( 19 Dec 2019 19:25 )    Color: Yellow / Appearance: Clear / S.024 / pH: x  Gluc: x / Ketone: Negative  / Bili: Negative / Urobili: 2 mg/dL   Blood: x / Protein: Negative / Nitrite: Negative   Leuk Esterase: Small / RBC: 6 /hpf / WBC 7 /HPF   Sq Epi: x / Non Sq Epi: 1 /hpf / Bacteria: Negative            RADIOLOGY & ADDITIONAL STUDIES:    PATHOLOGY: REASON FOR CONSULTATION: Hemolytic anemia     HPI: 84M PMH chronic anemia, HFrEF (40%), Afib on coumadin, T2DM, HLD, PVD, Menieres Disease, carotid stenosis, LBBB on EKG, CAD s/p PCI to mLAD in 2014 and PCI to pRCA that was complicated by perforation and cardiogenic shock in May of 2015 presented for elective RHC/LHC for TAVR evaluation for severe AS, admitted for acute on on chronic CHF and anemia workup for TVAR or balloon aortic vavluoplasty Mon . Course complicated by possible transfusion rxn with RRT  for hypoxia.    Hematology consulted for hx of chronic anemia. Patient seen and examined at the bedside. He states that he currently follows with a hematologist in [place], and has had chronic anemia, however has never been given a reason for his anemia. He remembers receiving transfusions in , without any reactions. He denies ever receiving steroid or Rituxan treatments for his anemia. He currently denies any obvious sources of bleeding, but states he has easy bruising. He denies any dizziness or shortness of breath at rest.     REVIEW OF SYSTEMS:    CONSTITUTIONAL: No weakness, fevers or chills  EYES/ENT: No visual changes;  No vertigo or throat pain   NECK: No pain or stiffness  RESPIRATORY: No cough, wheezing, hemoptysis; +SOB on exertion   CARDIOVASCULAR: No chest pain or palpitations  GASTROINTESTINAL: No abdominal or epigastric pain. No nausea, vomiting, or hematemesis; No diarrhea or constipation. No melena or hematochezia.  GENITOURINARY: No dysuria, frequency or hematuria  NEUROLOGICAL: No numbness or weakness  SKIN: No itching, burning, rashes, or lesions   All other review of systems is negative unless indicated above.    PAST MEDICAL HISTORY:  Anemia     Atrial fibrillation     CHF (congestive heart failure)     Coronary disease PCI    DM (diabetes mellitus)     DYER (dyspnea on exertion)     Former smoker     HLD (hyperlipidemia)     HTN (hypertension)     LBBB (left bundle branch block)     Meniere disease.     PAST SURGICAL HISTORY:  History of skin graft     Lipoma     Status post appendectomy.     FAMILY HISTORY:  Family history of CHF (congestive heart failure)  Family history of lung cancer    Sibling  Still living? No  Family history of brain tumor, Age at diagnosis: Age Unknown    Child  Still living? No  Family history of heart attack, Age at diagnosis: Age Unknown.     Social History:  Social History (marital status, living situation, occupation, tobacco use, alcohol and drug use, and sexual history): Patient lives at home ambulates with walker. HHA comes 5 times a week for assistance with daily activities. Patient's wife  3 mos ago. Patient had 1 son who  in 2017 due to unknown heart condition. Retired supermarket worker. Smoked cigarettes for 20 years but quit in . Denies alcohol use.	      Allergies    No Known Allergies    Intolerances        MEDICATIONS  (STANDING):  atorvastatin 40 milliGRAM(s) Oral at bedtime  clopidogrel Tablet 75 milliGRAM(s) Oral daily  digoxin     Tablet 0.125 milliGRAM(s) Oral <User Schedule>  ferrous    sulfate 325 milliGRAM(s) Oral daily  fluticasone propionate 50 MICROgram(s)/spray Nasal Spray 1 Spray(s) Both Nostrils daily  folic acid 1 milliGRAM(s) Oral daily  heparin  Infusion.  Unit(s)/Hr (11 mL/Hr) IV Continuous <Continuous>  melatonin 6 milliGRAM(s) Oral at bedtime  metoprolol succinate ER 25 milliGRAM(s) Oral daily  senna 2 Tablet(s) Oral at bedtime  sodium chloride 0.9% lock flush 3 milliLiter(s) IV Push every 8 hours  tiotropium 18 MICROgram(s) Capsule 1 Capsule(s) Inhalation daily    MEDICATIONS  (PRN):  ALBUTerol    90 MICROgram(s) HFA Inhaler 2 Puff(s) Inhalation every 6 hours PRN Shortness of Breath and/or Wheezing  guaiFENesin  milliGRAM(s) Oral every 12 hours PRN Cough  heparin  Injectable 4500 Unit(s) IV Push every 6 hours PRN For aPTT less than 40  heparin  Injectable 2000 Unit(s) IV Push every 6 hours PRN For aPTT between 40 - 57  meclizine 12.5 milliGRAM(s) Oral three times a day PRN Dizziness      Vital Signs Last 24 Hrs  T(C): 36.4 (20 Dec 2019 12:17), Max: 36.9 (19 Dec 2019 20:16)  T(F): 97.6 (20 Dec 2019 12:17), Max: 98.5 (19 Dec 2019 20:16)  HR: 72 (20 Dec 2019 12:17) (69 - 85)  BP: 93/58 (20 Dec 2019 12:17) (93/58 - 107/56)  BP(mean): --  RR: 18 (20 Dec 2019 12:17) (18 - 18)  SpO2: 99% (20 Dec 2019 12:17) (96% - 99%)    PHYSICAL EXAM:    General: AOx3, no acute distress  EYES: EOMI, PERRLA, conjunctiva and sclera clear  CHEST/LUNG:  Clear to auscultation bilaterally; no wheezes, crackles or rales  HEART: +systolic murmur, holosystolic. Regular rate and rhythm  ABDOMEN: Soft, Nontender, Nondistended; BS+  LYMPH: No lymphadenopathy noted.  Extremities: No peripheral edema, ecchymosis on bilateral upper extremities      LABS:                        6.8    7.14  )-----------( 188      ( 20 Dec 2019 08:34 )             20.7     12-    135  |  99  |  76<H>  ----------------------------<  130<H>  4.2   |  20<L>  |  2.49<H>    Ca    8.6      20 Dec 2019 06:33  Phos  4.8     12-  Mg     2.3     12-20    TPro  6.5  /  Alb  3.0<L>  /  TBili  1.7<H>  /  DBili  x   /  AST  12  /  ALT  7<L>  /  AlkPhos  56  12-20    PT/INR - ( 19 Dec 2019 14:30 )   PT: 12.5 sec;   INR: 1.09 ratio         PTT - ( 20 Dec 2019 08:23 )  PTT:65.6 sec  Urinalysis Basic - ( 19 Dec 2019 19:25 )    Color: Yellow / Appearance: Clear / S.024 / pH: x  Gluc: x / Ketone: Negative  / Bili: Negative / Urobili: 2 mg/dL   Blood: x / Protein: Negative / Nitrite: Negative   Leuk Esterase: Small / RBC: 6 /hpf / WBC 7 /HPF   Sq Epi: x / Non Sq Epi: 1 /hpf / Bacteria: Negative            RADIOLOGY & ADDITIONAL STUDIES:        PATHOLOGY:

## 2019-12-20 NOTE — CHART NOTE - NSCHARTNOTEFT_GEN_A_CORE
Blood bank was notified of the reaction after the patient had SOB for which RRT was called yesterday. Based on immediate discussion with medicine, plan to hold future transfusions and recommended to follow protocol of transfusion reaction work up.   At that time clear lungs on CXR.  75 ml of PRBC transfused over 90 minutes (slow Tx). No change in BP or HR during the reaction.   Patient was stable after RRT. Pro-BNP previously reported [id number] on 12/13. CXR clear before and after transfusion reaction. Just after reaction patient also received iv Lasix.    Today CXR is clear with small right pleural effusion.   Blood bank Work up: No new alloantibodies. Although RIGOBERTO IgG and C3 are positive on both pre and post transfusion specimen, there are no antibodies in the eluate.    Based on the vitals and CXR it is unlikely to be a TACO, but the patient is definitely very high risk for TACO. Hence recommended to transfuse half unit of PRBC/blood products. Diuretics prior to and in between transfusion if clinically not contraindicated.   Transfuse only if patient is symptomatic.   Patient has been having mild hemolysis even prior to transfusion with rising LDH, T.bili with low hapto, likely 2/2 severe AS with transaortic gradient of 58 on echo.

## 2019-12-20 NOTE — CONSULT NOTE ADULT - PROBLEM SELECTOR RECOMMENDATION 9
Patient with Non-oliguric JUHI secondary to diuretic use and recent contrast exposure. Could also be hemodynamically mediated. Blood pressure are borderline low.    Recommendations  - Hold diuretics  - No need for IV fluids as of yet  - Continue to monitor renal function and electrolytes   - Dose medications as per eGFR

## 2019-12-20 NOTE — PROGRESS NOTE ADULT - SUBJECTIVE AND OBJECTIVE BOX
Shari Dang MD  PGY-1  Pager [phone]  Pager [phone]      Patient is a 84y old  Male who presents with a chief complaint of CHF exacerbation (19 Dec 2019 13:30)        SUBJECTIVE / OVERNIGHT EVENTS: Patient had no acute events overnight. Patient seen and examined at bedside this morning.     ROS: [ - ] Fever [ - ] Chills [ - ] Nausea/Vomiting [ - ] Chest Pain [ - ] Shortness of breath     MEDICATIONS  (STANDING):  atorvastatin 40 milliGRAM(s) Oral at bedtime  clopidogrel Tablet 75 milliGRAM(s) Oral daily  digoxin     Tablet 0.125 milliGRAM(s) Oral <User Schedule>  ferrous    sulfate 325 milliGRAM(s) Oral daily  fluticasone propionate 50 MICROgram(s)/spray Nasal Spray 1 Spray(s) Both Nostrils daily  folic acid 1 milliGRAM(s) Oral daily  heparin  Infusion.  Unit(s)/Hr (11 mL/Hr) IV Continuous <Continuous>  melatonin 6 milliGRAM(s) Oral at bedtime  metoprolol succinate ER 25 milliGRAM(s) Oral daily  senna 2 Tablet(s) Oral at bedtime  sodium chloride 0.9% lock flush 3 milliLiter(s) IV Push every 8 hours  tiotropium 18 MICROgram(s) Capsule 1 Capsule(s) Inhalation daily    MEDICATIONS  (PRN):  ALBUTerol    90 MICROgram(s) HFA Inhaler 2 Puff(s) Inhalation every 6 hours PRN Shortness of Breath and/or Wheezing  guaiFENesin  milliGRAM(s) Oral every 12 hours PRN Cough  heparin  Injectable 4500 Unit(s) IV Push every 6 hours PRN For aPTT less than 40  heparin  Injectable 2000 Unit(s) IV Push every 6 hours PRN For aPTT between 40 - 57  meclizine 12.5 milliGRAM(s) Oral three times a day PRN Dizziness      Vital Signs Last 24 Hrs  T(C): 36.6 (20 Dec 2019 06:15), Max: 36.9 (19 Dec 2019 20:16)  T(F): 97.8 (20 Dec 2019 06:15), Max: 98.5 (19 Dec 2019 20:16)  HR: 69 (20 Dec 2019 06:15) (69 - 85)  BP: 98/56 (20 Dec 2019 06:15) (93/58 - 104/53)  BP(mean): --  RR: 18 (20 Dec 2019 06:15) (18 - 18)  SpO2: 99% (20 Dec 2019 06:15) (96% - 99%)  CAPILLARY BLOOD GLUCOSE      POCT Blood Glucose.: 145 mg/dL (19 Dec 2019 13:38)    I&O's Summary    19 Dec 2019 07:01  -  20 Dec 2019 07:00  --------------------------------------------------------  IN: 880 mL / OUT: 300 mL / NET: 580 mL        PHYSICAL EXAM  GENERAL: NAD, lying comfortably in bed   HEENT:  Atraumatic, Normocephalic, EOMI, conjunctiva and sclera clear, no LAD  CHEST/LUNG: Clear to auscultation bilaterally; No wheeze  HEART: RRR, S1 and S2 No murmurs, rubs, or gallops  ABDOMEN: Soft, Nontender, Nondistended; Bowel sounds present  EXTREMITIES:  2+ Peripheral Pulses, No clubbing, cyanosis, or edema  NEURO: AAOx3, non-focal  SKIN: No rashes or lesions    LABS:                        7.5    11.24 )-----------( 290      ( 19 Dec 2019 14:30 )             23.2     12-20    135  |  99  |  76<H>  ----------------------------<  130<H>  4.2   |  20<L>  |  2.49<H>    Ca    8.6      20 Dec 2019 06:33  Phos  4.8     12-20  Mg     2.3     12-20    TPro  6.5  /  Alb  3.0<L>  /  TBili  1.7<H>  /  DBili  x   /  AST  12  /  ALT  7<L>  /  AlkPhos  56  12-20    PT/INR - ( 19 Dec 2019 14:30 )   PT: 12.5 sec;   INR: 1.09 ratio         PTT - ( 19 Dec 2019 14:30 )  PTT:52.0 sec      Urinalysis Basic - ( 19 Dec 2019 19:25 )    Color: Yellow / Appearance: Clear / S.024 / pH: x  Gluc: x / Ketone: Negative  / Bili: Negative / Urobili: 2 mg/dL   Blood: x / Protein: Negative / Nitrite: Negative   Leuk Esterase: Small / RBC: 6 /hpf / WBC 7 /HPF   Sq Epi: x / Non Sq Epi: 1 /hpf / Bacteria: Negative      ABG - ( 19 Dec 2019 14:20 )  pH, Arterial: 7.45  pH, Blood: x     /  pCO2: 27    /  pO2: 248   / HCO3: 19    / Base Excess: -4.3  /  SaO2: >100                RADIOLOGY & ADDITIONAL TESTS:    Imaging Personally Reviewed:  Consultant(s) Notes Reviewed: Shari Dang MD  PGY-1  Pager [phone]  Pager H [phone]      Patient is a 84y old  Male who presents with a chief complaint of CHF exacerbation (19 Dec 2019 13:30)        SUBJECTIVE / OVERNIGHT EVENTS: Patient had no acute events overnight. Patient seen and examined at bedside this morning. He denies rigors, cough and SOB. No supplemental O2 needed. He was made NPO overnight for possible ballon vavluoplasty. His last BM was this morning. Patient had 2.7 sec pause at 6am, but denies dizziness, chest pain, SOB or palpitations.    ROS: [ - ] Fever [ - ] Chills [ - ] Nausea/Vomiting [ - ] Chest Pain [ - ] Shortness of breath     Telemetry: 2.7sec pause. Afib 60-80s    MEDICATIONS  (STANDING):  atorvastatin 40 milliGRAM(s) Oral at bedtime  clopidogrel Tablet 75 milliGRAM(s) Oral daily  digoxin     Tablet 0.125 milliGRAM(s) Oral <User Schedule>  ferrous    sulfate 325 milliGRAM(s) Oral daily  fluticasone propionate 50 MICROgram(s)/spray Nasal Spray 1 Spray(s) Both Nostrils daily  folic acid 1 milliGRAM(s) Oral daily  heparin  Infusion.  Unit(s)/Hr (11 mL/Hr) IV Continuous <Continuous>  melatonin 6 milliGRAM(s) Oral at bedtime  metoprolol succinate ER 25 milliGRAM(s) Oral daily  senna 2 Tablet(s) Oral at bedtime  sodium chloride 0.9% lock flush 3 milliLiter(s) IV Push every 8 hours  tiotropium 18 MICROgram(s) Capsule 1 Capsule(s) Inhalation daily    MEDICATIONS  (PRN):  ALBUTerol    90 MICROgram(s) HFA Inhaler 2 Puff(s) Inhalation every 6 hours PRN Shortness of Breath and/or Wheezing  guaiFENesin  milliGRAM(s) Oral every 12 hours PRN Cough  heparin  Injectable 4500 Unit(s) IV Push every 6 hours PRN For aPTT less than 40  heparin  Injectable 2000 Unit(s) IV Push every 6 hours PRN For aPTT between 40 - 57  meclizine 12.5 milliGRAM(s) Oral three times a day PRN Dizziness      Vital Signs Last 24 Hrs  T(C): 36.6 (20 Dec 2019 06:15), Max: 36.9 (19 Dec 2019 20:16)  T(F): 97.8 (20 Dec 2019 06:15), Max: 98.5 (19 Dec 2019 20:16)  HR: 69 (20 Dec 2019 06:15) (69 - 85)  BP: 98/56 (20 Dec 2019 06:15) (93/58 - 104/53)  BP(mean): --  RR: 18 (20 Dec 2019 06:15) (18 - 18)  SpO2: 99% (20 Dec 2019 06:15) (96% - 99%)  CAPILLARY BLOOD GLUCOSE      POCT Blood Glucose.: 145 mg/dL (19 Dec 2019 13:38)    I&O's Summary    19 Dec 2019 07:01  -  20 Dec 2019 07:00  --------------------------------------------------------  IN: 880 mL / OUT: 300 mL / NET: 580 mL        PHYSICAL EXAM  GENERAL: NAD, lying comfortably in bed   HEENT:  Atraumatic, Normocephalic, EOMI, conjunctiva and sclera clear, no LAD  CHEST/LUNG: Clear to auscultation bilaterally; No wheeze  HEART: systolic murmur, irregularly irregular, S1 and S2 No murmurs, rubs, or gallops  ABDOMEN: Soft, Nontender, Nondistended; Bowel sounds present  EXTREMITIES:  2+ Peripheral Pulses, No clubbing, cyanosis, or edema  NEURO: AAOx3, non-focal  SKIN: No rashes or lesions    LABS:                        7.5    11.24 )-----------( 290      ( 19 Dec 2019 14:30 )             23.2     12-20    135  |  99  |  76<H>  ----------------------------<  130<H>  4.2   |  20<L>  |  2.49<H>    Ca    8.6      20 Dec 2019 06:33  Phos  4.8     12-20  Mg     2.3     12-20    TPro  6.5  /  Alb  3.0<L>  /  TBili  1.7<H>  /  DBili  x   /  AST  12  /  ALT  7<L>  /  AlkPhos  56  12-20    PT/INR - ( 19 Dec 2019 14:30 )   PT: 12.5 sec;   INR: 1.09 ratio         PTT - ( 19 Dec 2019 14:30 )  PTT:52.0 sec      Urinalysis Basic - ( 19 Dec 2019 19:25 )    Color: Yellow / Appearance: Clear / S.024 / pH: x  Gluc: x / Ketone: Negative  / Bili: Negative / Urobili: 2 mg/dL   Blood: x / Protein: Negative / Nitrite: Negative   Leuk Esterase: Small / RBC: 6 /hpf / WBC 7 /HPF   Sq Epi: x / Non Sq Epi: 1 /hpf / Bacteria: Negative      ABG - ( 19 Dec 2019 14:20 )  pH, Arterial: 7.45  pH, Blood: x     /  pCO2: 27    /  pO2: 248   / HCO3: 19    / Base Excess: -4.3  /  SaO2: >100                RADIOLOGY & ADDITIONAL TESTS:  < from: Xray Chest 1 View- PORTABLE-Urgent (19 @ 16:13) >    EXAM:  XR CHEST PORTABLE URGENT 1V                            PROCEDURE DATE:  2019            INTERPRETATION:  CLINICAL INFORMATION: Shortness of breath.    TECHNIQUE: Single frontal radiograph of the chest dated 2019 4:13 PM.    COMPARISON: Chest radiograph 2019 1:50 PM.    FINDINGS:  The left costophrenic angle is outside the field-of-view.  The lungs are clear. Small right pleural effusion. No pneumothorax.  Cardiac size cannot accurately be assessed in this projection.       IMPRESSION: Small right pleural effusion.         < end of copied text >    Imaging Personally Reviewed:  Consultant(s) Notes Reviewed:

## 2019-12-20 NOTE — PROGRESS NOTE ADULT - PROBLEM SELECTOR PLAN 3
Hb 7.9, baseline 8-9 in 2015, follows with outside hematologist, Dr. Miranda  s/p possible transfusion rxn during 1/2U prbc  - Transfuse if Hb<7, T&S  - f/u transfusion rxn labs Hb 7.9, baseline 8-9 in 2015, follows with outside hematologist, Dr. Miranda in [place]  s/p possible transfusion rxn during 1/2U prbc  - Hold off transfusion. If need to transfuse, 20IV Lasix follow with 1/2U prbc over 3 hours  - f/u transfusion rxn labs: prelim labs show no antibodies indicating nonhemolytic and need to evaluate other sources of hemolysis  -hematology consulted

## 2019-12-20 NOTE — PROGRESS NOTE ADULT - ASSESSMENT
84M PMH chronic anemia, HFrEF (40%), Afib on coumadin, T2DM, HLD, PVD, Menieres Disease, carotid stenosis, LBBB on EKG, CAD s/p PCI to mLAD in June 2014 and PCI to pRCA that was complicated by perforation and cardiogenic shock in May of 2015 presented for elective [facility] for TAVR evaluation for severe AS, admitted for acute on on chronic CHF and anemia workup, plan for TVAR Mon 12/23 84M PMH chronic anemia, HFrEF (40%), Afib on coumadin, T2DM, HLD, PVD, Menieres Disease, carotid stenosis, LBBB on EKG, CAD s/p PCI to mLAD in June 2014 and PCI to pRCA that was complicated by perforation and cardiogenic shock in May of 2015 presented for elective C/C for TAVR evaluation for severe AS, admitted for acute on on chronic CHF and anemia workup for TVAR or balloon aortic vavluoplasty Mon 12/23. Course complicated by possible transfusion rxn with RRT 12/19 for hypoxia.

## 2019-12-20 NOTE — PROGRESS NOTE ADULT - PROBLEM SELECTOR PLAN 2
Patient currently off of diuretics at this time. Received IV Diuretics yesterday after he had SOB and crackles with his blood transfusion. Also Received on admission. Monitor I and O, daily weight.

## 2019-12-20 NOTE — PROGRESS NOTE ADULT - PROBLEM SELECTOR PLAN 1
Pt presented with DYER, with recent TTE 12/10 EF 30% with severe AS, with LV dysfunction, prior Echo in 6/2019 was 42%. Exam notable for 3+ LE edema up to b/l thigh, b/l rales, likely related to severe AS, being diuresed with lasix 40mg IV BID, currently appear near euvolemia.   -CXR: loculated fluid at left major fissure. Repeat CXR 12/16 showed clear lungs  - IV Lasix 80mg daily for diuresis  - Strict Ins and Outs, daily weight, fluid restriction to 1L/day  - C/w toprol 25mg qd  - Follow up HF  recs Pt presented with DYER, with recent TTE 12/10 EF 30% with severe AS, with LV dysfunction, prior Echo in 6/2019 was 42%. Exam notable for 3+ LE edema up to b/l thigh, b/l rales, likely related to severe AS, s/p diuresis, currently appear near euvolemia.   -CXR: loculated fluid at left major fissure. Repeat CXR 12/16 showed clear lungs  - dc IV Lasix 80mg given JUHI  - Strict Ins and Outs, daily weight, fluid restriction to 1L/day  - C/w toprol 25mg qd  - Follow up HF  recs

## 2019-12-20 NOTE — PROGRESS NOTE ADULT - ASSESSMENT
84 Male with Severe Symptomatic Aortic Stenosis, Acute on Chronic Combined Heart Failure, Acute Kidney Injury, Anemia

## 2019-12-20 NOTE — PROGRESS NOTE ADULT - SUBJECTIVE AND OBJECTIVE BOX
Vascular Cardiology Consult Note     S[facility]RA [id number]              EMAIL [email or web address]   OFFICE [phone]    CC:  CHF exacerbation    HPI:  83 y/o M w/ PMHX HLD, DM, CAD s/p prior PCI, Chronic Systolic CHF, Afib, Chronic Anemia, who presented w/ acute on chronic systolic CHF, noted to have reduced EF and severe AS, s/p diuresis with acute on chronic anemia s/p PRBC transfusion, awaiting TAVR versus BAV, vascular cardiology called for carotid artery disease on US showing LICA occlusion and NICHELLE 50-69% stenosis. Patient denies history of TIA or CVA. Reports prior history of vertigo secondary to Meniere's disease which has improved. Patient denies focal extremity weakness, vision changes, speech changes, facial dropping. Current reports feeling better, no active C/P or SOB.     Allergies  No Known Allergies    MEDICATIONS:  clopidogrel Tablet 75 milliGRAM(s) Oral daily  digoxin     Tablet 0.125 milliGRAM(s) Oral <User Schedule>  furosemide   Injectable 20 milliGRAM(s) IV Push once  furosemide   Injectable 20 milliGRAM(s) IV Push once  heparin  Infusion.  Unit(s)/Hr IV Continuous <Continuous>  heparin  Injectable 4500 Unit(s) IV Push every 6 hours PRN  heparin  Injectable 2000 Unit(s) IV Push every 6 hours PRN  metoprolol succinate ER 25 milliGRAM(s) Oral daily  ALBUTerol    90 MICROgram(s) HFA Inhaler 2 Puff(s) Inhalation every 6 hours PRN  diphenhydrAMINE   Injectable 50 milliGRAM(s) IV Push once  diphenhydrAMINE   Injectable 50 milliGRAM(s) IV Push once  guaiFENesin  milliGRAM(s) Oral every 12 hours PRN  tiotropium 18 MICROgram(s) Capsule 1 Capsule(s) Inhalation daily  acetaminophen   Tablet .. 650 milliGRAM(s) Oral once  acetaminophen   Tablet .. 650 milliGRAM(s) Oral once  meclizine 12.5 milliGRAM(s) Oral three times a day PRN  melatonin 6 milliGRAM(s) Oral at bedtime  senna 2 Tablet(s) Oral at bedtime  atorvastatin 40 milliGRAM(s) Oral at bedtime  ferrous    sulfate 325 milliGRAM(s) Oral daily  fluticasone propionate 50 MICROgram(s)/spray Nasal Spray 1 Spray(s) Both Nostrils daily  folic acid 1 milliGRAM(s) Oral daily  sodium chloride 0.9% lock flush 3 milliLiter(s) IV Push every 8 hours    PAST MEDICAL & SURGICAL HISTORY:  Anemia  LBBB (left bundle branch block)  DYER (dyspnea on exertion)  Coronary disease: PCI  CHF (congestive heart failure)  Former smoker  Meniere disease  HLD (hyperlipidemia)  DM (diabetes mellitus)  Atrial fibrillation  HTN (hypertension)  Lipoma  History of skin graft  Status post appendectomy    FAMILY HISTORY:  Family history of brain tumor (Sibling)  Family history of heart attack (Child)  Family history of CHF (congestive heart failure)  Family history of lung cancer    SOCIAL HISTORY:  unchanged    REVIEW OF SYSTEMS:  CONSTITUTIONAL: No fever  EYES: No eye pain, visual disturbances, or discharge  ENT:  No difficulty hearing, tinnitus, vertigo; No sinus or throat pain  NECK: No pain or stiffness  RESPIRATORY:  No SOB  CARDIOVASCULAR:  No C/P  GASTROINTESTINAL: No abdominal or epigastric pain. No nausea, vomiting, or hematemesis;  No melena or hematochezia.  GENITOURINARY: No dysuria, frequency, hematuria, or incontinence  NEUROLOGICAL: No headaches, memory loss, loss of strength, numbness, or tremors  SKIN: No myriam   LYMPH Nodes: No enlarged glands  ENDOCRINE: No heat or cold intolerance; No hair loss  MUSCULOSKELETAL: No joint pain or swelling; No muscle, back, or extremity pain  PSYCHIATRIC: No depression, anxiety, mood swings, or difficulty sleeping  HEME/LYMPH: No easy bruising, or bleeding gums  ALLERGY AND IMMUNOLOGIC: No hives or eczema	    [ x] All others negative	    PHYSICAL EXAM:  T(C): 36.4 (12-20-19 @ 12:17), Max: 36.9 (12-19-19 @ 20:16)  HR: 72 (12-20-19 @ 12:17) (69 - 79)  BP: 93/58 (12-20-19 @ 12:17) (93/58 - 107/56)  RR: 18 (12-20-19 @ 12:17) (18 - 18)  SpO2: 99% (12-20-19 @ 12:17) (96% - 99%)  I&O's Summary    19 Dec 2019 07:01  -  20 Dec 2019 07:00  --------------------------------------------------------  IN: 880 mL / OUT: 300 mL / NET: 580 mL    20 Dec 2019 07:01  -  20 Dec 2019 18:34  --------------------------------------------------------  IN: 90 mL / OUT: 25 mL / NET: 65 mL    Appearance:  NAD	  HEENT:   Normal oral mucosa  Carotid:  Left:  Bruit noted, Right: bruit not heard   Cardiovascular:  RRR, 4/6 CRISTIAN  Respiratory: CTA B/L  Psychiatry:  AAO x to person, month, year, place   Gastrointestinal:  Soft, Non-tender, + BS	  Skin: No rashes, No ecchymoses, No cyanosis	  Neurologic: no focal deficit noted, CN 2-12 intact, no focal motor weakness, sensation intact     Extremities:  no LE edema noted    LABS:	 	    CBC Full  -  ( 20 Dec 2019 08:34 )  WBC Count : 7.14 K/uL  Hemoglobin : 6.8 g/dL  Hematocrit : 20.7 %  Platelet Count - Automated : 188 K/uL  Mean Cell Volume : 94.1 fl  Mean Cell Hemoglobin : 30.9 pg  Mean Cell Hemoglobin Concentration : 32.9 gm/dL  Auto Neutrophil # : 5.09 K/uL  Auto Lymphocyte # : 1.21 K/uL  Auto Monocyte # : 0.42 K/uL  Auto Eosinophil # : 0.19 K/uL  Auto Basophil # : 0.06 K/uL  Auto Neutrophil % : 71.3 %  Auto Lymphocyte % : 16.9 %  Auto Monocyte % : 5.9 %  Auto Eosinophil % : 2.7 %  Auto Basophil % : 0.8 %    12-20    135  |  99  |  76<H>  ----------------------------<  130<H>  4.2   |  20<L>  |  2.49<H>  12-19    132<L>  |  97  |  64<H>  ----------------------------<  172<H>  4.3   |  17<L>  |  1.66<H>    Ca    8.6      20 Dec 2019 06:33  Ca    8.7      19 Dec 2019 14:30  Phos  4.8     12-20  Phos  4.1     12-19  Mg     2.3     12-20  Mg     2.2     12-19    TPro  6.5  /  Alb  3.0<L>  /  TBili  1.7<H>  /  DBili  x   /  AST  12  /  ALT  7<L>  /  AlkPhos  56  12-20  TPro  7.1  /  Alb  3.4  /  TBili  2.9<H>  /  DBili  x   /  AST  17  /  ALT  6<L>  /  AlkPhos  65  12-19    Assessment:  1. Asymptomatic Carotid Artery Disease   2. Acute on Chronic Systolic CHF  3. Severe AS awaiting TAVR versus BAV  4. CKD  5. Anemia     Plan:  1. No contraindication from carotid artery disease perspective to proceed with TAVR or BAV.  2. Continue medical management. On Plavix and Lipitor.    Thank you  ASTRID Rodriguez  Vascular Cardiology Service    Please call with any questions:   SPECTRA - [phone]  Office [phone]  email:  [email or web address]

## 2019-12-20 NOTE — PROGRESS NOTE ADULT - PROBLEM SELECTOR PLAN 2
TTE showing severe aortic stenosis, symptomatic with SOB on exertion  - Structural heart following, plan for TAVR Mon 12/23  - Monitor on tele  - f/u CT coronaries and VA carotid arteries (left ICA occluded, right ICA 50-69%) TTE showing severe aortic stenosis, symptomatic with SOB on exertion  - Structural heart following, plan for TAVR or balloon vavluoplasty Mon 12/23  - Monitor on tele  - CT coronaries and VA carotid arteries (left ICA occluded, right ICA 50-69%)

## 2019-12-20 NOTE — PROGRESS NOTE ADULT - ATTENDING COMMENTS
Event noted during transfusion yesterday which may be 2/2 transfusion reaction versus severe AS causing elevation in filling pressures (most likely). On exam, has elevated neck veins approx 12-14 cm, harsh murmur c/w severe AS, crackles b/l, nontender abdomen, no pedal edema. Labs reviewed - BUN/Cr worsening, Hb low with elevated LDH and low haptoglobin.   - may have hemolytic anemia 2/2 shear stress from critical AS   - would hold on diuretics given worsening renal function which may be 2/2 CTA done for AS anatomy vs. diuresis in setting of severe AS  - do no transfuse for low Hb unless warranted; if needed, would do very slowly and treat with 80 mg IV lasix pre-transfusion  - plan for possible BAV as TAVR deferred

## 2019-12-20 NOTE — PROGRESS NOTE ADULT - PROBLEM SELECTOR PLAN 6
Unsure origin of anemia. Monitor CBC. Await bloodbank results from transfusion reaction. Patient sees an outside hemetologist, would recommend Heme consult.

## 2019-12-20 NOTE — PROGRESS NOTE ADULT - PROBLEM SELECTOR PLAN 5
- Cr stable at 1.4-5. Up to 2.49 in the setting of contrast dye exposure, transfusion reaction, and diuretic use.   -Nephrology consult appreciated.  - close monitoring

## 2019-12-20 NOTE — PROGRESS NOTE ADULT - SUBJECTIVE AND OBJECTIVE BOX
Subjective:  No overnight events. Denies dyspnea, chest pain, leg edema, or lightheadedness.  HE is feeling nauseated this late afternoon. Denies vomiting or abdominal discomfort.          Current Meds:  acetaminophen   Tablet .. 650 milliGRAM(s) Oral once  acetaminophen   Tablet .. 650 milliGRAM(s) Oral once  ALBUTerol    90 MICROgram(s) HFA Inhaler 2 Puff(s) Inhalation every 6 hours PRN  atorvastatin 40 milliGRAM(s) Oral at bedtime  clopidogrel Tablet 75 milliGRAM(s) Oral daily  digoxin     Tablet 0.125 milliGRAM(s) Oral <User Schedule>  diphenhydrAMINE   Injectable 50 milliGRAM(s) IV Push once  diphenhydrAMINE   Injectable 50 milliGRAM(s) IV Push once  ferrous    sulfate 325 milliGRAM(s) Oral daily  fluticasone propionate 50 MICROgram(s)/spray Nasal Spray 1 Spray(s) Both Nostrils daily  folic acid 1 milliGRAM(s) Oral daily  furosemide   Injectable 20 milliGRAM(s) IV Push once  furosemide   Injectable 20 milliGRAM(s) IV Push once  guaiFENesin  milliGRAM(s) Oral every 12 hours PRN  heparin  Infusion.  Unit(s)/Hr IV Continuous <Continuous>  heparin  Injectable 4500 Unit(s) IV Push every 6 hours PRN  heparin  Injectable 2000 Unit(s) IV Push every 6 hours PRN  meclizine 12.5 milliGRAM(s) Oral three times a day PRN  melatonin 6 milliGRAM(s) Oral at bedtime  metoprolol succinate ER 25 milliGRAM(s) Oral daily  senna 2 Tablet(s) Oral at bedtime  sodium chloride 0.9% lock flush 3 milliLiter(s) IV Push every 8 hours  tiotropium 18 MICROgram(s) Capsule 1 Capsule(s) Inhalation daily      Vitals:  T(C): 36.4 (12-20-19 @ 12:17), Max: 36.9 (12-19-19 @ 20:16)  T(F): 97.6 (12-20-19 @ 12:17), Max: 98.5 (12-19-19 @ 20:16)  HR: 72 (12-20-19 @ 12:17) (69 - 79)  BP: 93/58 (12-20-19 @ 12:17) (93/58 - 107/56)  RR: 18 (12-20-19 @ 12:17) (18 - 18)  SpO2: 99% (12-20-19 @ 12:17) (96% - 99%)      I&O's Summary    19 Dec 2019 07:01  -  20 Dec 2019 07:00  --------------------------------------------------------  IN: 880 mL / OUT: 300 mL / NET: 580 mL    20 Dec 2019 07:01  -  20 Dec 2019 17:34  --------------------------------------------------------  IN: 90 mL / OUT: 25 mL / NET: 65 mL      Physical Exam:  Appearance: No Acute Distress  HEENT: JVP ~12cm H2O  Cardiovascular: irregularly irregular, 3/6 late peaking CRISTIAN at RUSB  Respiratory: Trace basilar rales   Gastrointestinal: Soft, Non-tender, non-distended	  Skin: no skin lesions  Neurologic: Non-focal  Extremities: Trace LE edema, warm and well perfused  Psychiatry: A & O x 3, Mood & affect appropriate                 6.8    7.14  )-----------( 188      ( 20 Dec 2019 08:34 )             20.7     12-20    135  |  99  |  76<H>  ----------------------------<  130<H>  4.2   |  20<L>  |  2.49<H>    Ca    8.6      20 Dec 2019 06:33  Phos  4.8     12-20  Mg     2.3     12-20    TPro  6.5  /  Alb  3.0<L>  /  TBili  1.7<H>  /  DBili  x   /  AST  12  /  ALT  7<L>  /  AlkPhos  56  12-20    PT/INR - ( 19 Dec 2019 14:30 )   PT: 12.5 sec;   INR: 1.09 ratio         PTT - ( 20 Dec 2019 08:23 )  PTT:65.6 sec

## 2019-12-21 LAB
ALBUMIN SERPL ELPH-MCNC: 2.8 G/DL — LOW (ref 3.3–5)
ALP SERPL-CCNC: 54 U/L — SIGNIFICANT CHANGE UP (ref 40–120)
ALT FLD-CCNC: 8 U/L — LOW (ref 10–45)
ANION GAP SERPL CALC-SCNC: 14 MMOL/L — SIGNIFICANT CHANGE UP (ref 5–17)
APPEARANCE UR: ABNORMAL
APTT BLD: 71.3 SEC — HIGH (ref 27.5–36.3)
AST SERPL-CCNC: 10 U/L — SIGNIFICANT CHANGE UP (ref 10–40)
BACTERIA # UR AUTO: NEGATIVE — SIGNIFICANT CHANGE UP
BASOPHILS # BLD AUTO: 0.07 K/UL — SIGNIFICANT CHANGE UP (ref 0–0.2)
BASOPHILS NFR BLD AUTO: 0.9 % — SIGNIFICANT CHANGE UP (ref 0–2)
BILIRUB SERPL-MCNC: 1.8 MG/DL — HIGH (ref 0.2–1.2)
BILIRUB UR-MCNC: NEGATIVE — SIGNIFICANT CHANGE UP
BUN SERPL-MCNC: 79 MG/DL — HIGH (ref 7–23)
CALCIUM SERPL-MCNC: 8.5 MG/DL — SIGNIFICANT CHANGE UP (ref 8.4–10.5)
CHLORIDE SERPL-SCNC: 98 MMOL/L — SIGNIFICANT CHANGE UP (ref 96–108)
CO2 SERPL-SCNC: 20 MMOL/L — LOW (ref 22–31)
COLOR SPEC: YELLOW — SIGNIFICANT CHANGE UP
CREAT ?TM UR-MCNC: 131 MG/DL — SIGNIFICANT CHANGE UP
CREAT SERPL-MCNC: 3.19 MG/DL — HIGH (ref 0.5–1.3)
DIFF PNL FLD: NEGATIVE — SIGNIFICANT CHANGE UP
EOSINOPHIL # BLD AUTO: 0.26 K/UL — SIGNIFICANT CHANGE UP (ref 0–0.5)
EOSINOPHIL NFR BLD AUTO: 3.2 % — SIGNIFICANT CHANGE UP (ref 0–6)
EPI CELLS # UR: 7 /HPF — HIGH (ref 0–5)
GLUCOSE SERPL-MCNC: 124 MG/DL — HIGH (ref 70–99)
GLUCOSE UR QL: NEGATIVE — SIGNIFICANT CHANGE UP
HCT VFR BLD CALC: 19.9 % — CRITICAL LOW (ref 39–50)
HCT VFR BLD CALC: 20.4 % — CRITICAL LOW (ref 39–50)
HGB BLD-MCNC: 6.3 G/DL — CRITICAL LOW (ref 13–17)
HGB BLD-MCNC: 6.6 G/DL — CRITICAL LOW (ref 13–17)
HYALINE CASTS # UR AUTO: 6 /LPF — SIGNIFICANT CHANGE UP (ref 0–7)
IMM GRANULOCYTES NFR BLD AUTO: 1.7 % — HIGH (ref 0–1.5)
KETONES UR-MCNC: NEGATIVE — SIGNIFICANT CHANGE UP
LDH SERPL L TO P-CCNC: 293 U/L — HIGH (ref 50–242)
LEUKOCYTE ESTERASE UR-ACNC: ABNORMAL
LYMPHOCYTES # BLD AUTO: 1.09 K/UL — SIGNIFICANT CHANGE UP (ref 1–3.3)
LYMPHOCYTES # BLD AUTO: 13.4 % — SIGNIFICANT CHANGE UP (ref 13–44)
MAGNESIUM SERPL-MCNC: 2.4 MG/DL — SIGNIFICANT CHANGE UP (ref 1.6–2.6)
MCHC RBC-ENTMCNC: 30.4 PG — SIGNIFICANT CHANGE UP (ref 27–34)
MCHC RBC-ENTMCNC: 30.4 PG — SIGNIFICANT CHANGE UP (ref 27–34)
MCHC RBC-ENTMCNC: 31.7 GM/DL — LOW (ref 32–36)
MCHC RBC-ENTMCNC: 32.4 GM/DL — SIGNIFICANT CHANGE UP (ref 32–36)
MCV RBC AUTO: 94 FL — SIGNIFICANT CHANGE UP (ref 80–100)
MCV RBC AUTO: 96.1 FL — SIGNIFICANT CHANGE UP (ref 80–100)
MONOCYTES # BLD AUTO: 0.46 K/UL — SIGNIFICANT CHANGE UP (ref 0–0.9)
MONOCYTES NFR BLD AUTO: 5.7 % — SIGNIFICANT CHANGE UP (ref 2–14)
NEUTROPHILS # BLD AUTO: 6.11 K/UL — SIGNIFICANT CHANGE UP (ref 1.8–7.4)
NEUTROPHILS NFR BLD AUTO: 75.1 % — SIGNIFICANT CHANGE UP (ref 43–77)
NITRITE UR-MCNC: NEGATIVE — SIGNIFICANT CHANGE UP
NRBC # BLD: 0 /100 WBCS — SIGNIFICANT CHANGE UP (ref 0–0)
OSMOLALITY SERPL: 311 MOSMOL/KG — HIGH (ref 280–301)
OSMOLALITY UR: 482 MOSM/KG — SIGNIFICANT CHANGE UP (ref 50–1200)
PH UR: 5 — SIGNIFICANT CHANGE UP (ref 5–8)
PHOSPHATE SERPL-MCNC: 5.2 MG/DL — HIGH (ref 2.5–4.5)
PLATELET # BLD AUTO: 197 K/UL — SIGNIFICANT CHANGE UP (ref 150–400)
PLATELET # BLD AUTO: 204 K/UL — SIGNIFICANT CHANGE UP (ref 150–400)
POTASSIUM SERPL-MCNC: 4 MMOL/L — SIGNIFICANT CHANGE UP (ref 3.5–5.3)
POTASSIUM SERPL-SCNC: 4 MMOL/L — SIGNIFICANT CHANGE UP (ref 3.5–5.3)
PROT SERPL-MCNC: 6.4 G/DL — SIGNIFICANT CHANGE UP (ref 6–8.3)
PROT UR-MCNC: NEGATIVE — SIGNIFICANT CHANGE UP
RBC # BLD: 2.07 M/UL — LOW (ref 4.2–5.8)
RBC # BLD: 2.07 M/UL — LOW (ref 4.2–5.8)
RBC # BLD: 2.17 M/UL — LOW (ref 4.2–5.8)
RBC # FLD: 18 % — HIGH (ref 10.3–14.5)
RBC # FLD: 18.5 % — HIGH (ref 10.3–14.5)
RBC CASTS # UR COMP ASSIST: 4 /HPF — SIGNIFICANT CHANGE UP (ref 0–4)
RETICS #: 165.4 K/UL — HIGH (ref 25–125)
RETICS/RBC NFR: 8 % — HIGH (ref 0.5–2.5)
RHEUMATOID FACT SERPL-ACNC: <10 IU/ML — SIGNIFICANT CHANGE UP (ref 0–13)
SODIUM SERPL-SCNC: 132 MMOL/L — LOW (ref 135–145)
SODIUM UR-SCNC: <35 MMOL/L — SIGNIFICANT CHANGE UP
SP GR SPEC: 1.02 — SIGNIFICANT CHANGE UP (ref 1.01–1.02)
UROBILINOGEN FLD QL: SIGNIFICANT CHANGE UP
UUN UR-MCNC: 775 MG/DL — SIGNIFICANT CHANGE UP
WBC # BLD: 8.13 K/UL — SIGNIFICANT CHANGE UP (ref 3.8–10.5)
WBC # BLD: 8.28 K/UL — SIGNIFICANT CHANGE UP (ref 3.8–10.5)
WBC # FLD AUTO: 8.13 K/UL — SIGNIFICANT CHANGE UP (ref 3.8–10.5)
WBC # FLD AUTO: 8.28 K/UL — SIGNIFICANT CHANGE UP (ref 3.8–10.5)
WBC UR QL: 19 /HPF — HIGH (ref 0–5)

## 2019-12-21 PROCEDURE — 99221 1ST HOSP IP/OBS SF/LOW 40: CPT

## 2019-12-21 PROCEDURE — 99233 SBSQ HOSP IP/OBS HIGH 50: CPT | Mod: GC

## 2019-12-21 RX ADMIN — SENNA PLUS 2 TABLET(S): 8.6 TABLET ORAL at 20:40

## 2019-12-21 RX ADMIN — CLOPIDOGREL BISULFATE 75 MILLIGRAM(S): 75 TABLET, FILM COATED ORAL at 11:05

## 2019-12-21 RX ADMIN — HEPARIN SODIUM 1000 UNIT(S)/HR: 5000 INJECTION INTRAVENOUS; SUBCUTANEOUS at 11:11

## 2019-12-21 RX ADMIN — SODIUM CHLORIDE 3 MILLILITER(S): 9 INJECTION INTRAMUSCULAR; INTRAVENOUS; SUBCUTANEOUS at 20:38

## 2019-12-21 RX ADMIN — Medication 325 MILLIGRAM(S): at 11:05

## 2019-12-21 RX ADMIN — Medication 1 SPRAY(S): at 11:05

## 2019-12-21 RX ADMIN — ATORVASTATIN CALCIUM 40 MILLIGRAM(S): 80 TABLET, FILM COATED ORAL at 20:40

## 2019-12-21 RX ADMIN — Medication 1 MILLIGRAM(S): at 11:06

## 2019-12-21 RX ADMIN — SODIUM CHLORIDE 3 MILLILITER(S): 9 INJECTION INTRAMUSCULAR; INTRAVENOUS; SUBCUTANEOUS at 05:00

## 2019-12-21 RX ADMIN — Medication 60 MILLIGRAM(S): at 14:20

## 2019-12-21 RX ADMIN — TIOTROPIUM BROMIDE 1 CAPSULE(S): 18 CAPSULE ORAL; RESPIRATORY (INHALATION) at 11:06

## 2019-12-21 RX ADMIN — Medication 25 MILLIGRAM(S): at 05:13

## 2019-12-21 RX ADMIN — SODIUM CHLORIDE 3 MILLILITER(S): 9 INJECTION INTRAMUSCULAR; INTRAVENOUS; SUBCUTANEOUS at 14:22

## 2019-12-21 RX ADMIN — Medication 6 MILLIGRAM(S): at 20:40

## 2019-12-21 NOTE — PROGRESS NOTE ADULT - PROBLEM SELECTOR PLAN 9
DVT: heparin gtt for TAVR procedure  Diet: Consistent carb and DASH  Dispo: PT reeval after TAVR DVT: heparin gtt for TAVR/BAV procedure  Diet: Consistent carb and DASH  Dispo: PT reeval after TAVR

## 2019-12-21 NOTE — PROGRESS NOTE ADULT - ATTENDING COMMENTS
-Patient seen/examined on 12/21/19. Case/plan discussed with the intern and resident as reviewed/edited by me above and in any comments below.  -C/w prednisone and monitor H/H for response.  -Added PPI while on heparin drip, plavix, and steroids.

## 2019-12-21 NOTE — PROGRESS NOTE ADULT - PROBLEM SELECTOR PLAN 2
TTE showing severe aortic stenosis, symptomatic with SOB on exertion  - Structural heart following, plan for TAVR or balloon vavluoplasty Mon 12/23  - Monitor on tele  - CT coronaries and VA carotid arteries (left ICA occluded, right ICA 50-69%) TTE showing severe aortic stenosis, symptomatic with SOB on exertion  - Structural heart following, plan for TAVR or balloon vavluoplasty Mon 12/23. Cleared by vascular  - Monitor on tele  - CT coronaries and VA carotid arteries (left ICA occluded, right ICA 50-69%) TTE showing severe aortic stenosis, symptomatic with SOB on exertion  - Structural heart following, plan for TAVR or balloon valvuloplasty Mon 12/23. Cleared by vascular  - Monitor on tele  - CT coronaries and VA carotid arteries (left ICA occluded, right ICA 50-69%)

## 2019-12-21 NOTE — PROGRESS NOTE ADULT - PROBLEM SELECTOR PLAN 1
Patient with Non-oliguric JUHI secondary to diuretic use and recent contrast exposure. Could also be hemodynamically mediated. Blood pressure are borderline low. Possible hemolysis? platelets stable. Total Bili: 1.8    Recommendations  - Heme/Onc recs noted with concern for hemolysis with high LDH, low haptoglobin, and elevated retic count  - Serum creatinine increasing.  - Hold diuretics  - No need for IV fluids as of yet  - Continue to monitor renal function and electrolytes   - No indication for HD as of yet.   - Dose medications as per eGFR.

## 2019-12-21 NOTE — PROGRESS NOTE ADULT - PROBLEM SELECTOR PLAN 6
occasionally has dizziness and tinnitus, currently asymptomatic  - continue with home meclizine occasionally has dizziness and tinnitus, currently asymptomatic  - continue with home meclizine PRN

## 2019-12-21 NOTE — PROGRESS NOTE ADULT - PROBLEM SELECTOR PLAN 5
Pt on coumadin at home, will hold and start Heparin gtt in anticipation for possible TAVR. If no procedure, transition to coumadin   - CHADSVASc score of 6  - Continue with digoxin MWF and toprol 25mg qd. Digoxin level wnl  - Monitor on telemetry Pt on coumadin at home, will hold and start Heparin gtt in anticipation for possible TAVR. If no procedure, transition to coumadin   - CHADSVASc score of 6  - Continue with toprol 25mg qd. Digoxin has been held.   - Monitor on telemetry

## 2019-12-21 NOTE — PROGRESS NOTE ADULT - ASSESSMENT
83 yo M with hx Afib (Coumadin), DM, HLD, CAD (stent 2014 and 2015 in mLAD and RCA), HFrEF (40%), sever aortic stenosis was admitted to [facility] on 12/13/19 after being referred by Cardiologist Dr. Becker for  decompensated heart failure after R/LHC with plans for optimization prior to consideration of TAVR. Nephrology consulted for JUHI.

## 2019-12-21 NOTE — PROGRESS NOTE ADULT - PROBLEM SELECTOR PLAN 8
- not on home medication, diet controlled  - A1c 5.6  - ISS - not on home medication, diet controlled  - A1c 5.6  - ISS if necessary.

## 2019-12-21 NOTE — PROGRESS NOTE ADULT - SUBJECTIVE AND OBJECTIVE BOX
[facility] Division of Kidney Diseases & Hypertension  FOLLOW UP NOTE  --------------------------------------------------------------------------------  Chief Complaint:Aortic valve disorder      24 hour events/subjective:    Patient seen this morning without subjective complaints  Labs and charts reviewed.  Patient still with anemia  SCr: 3.19 increasing      PAST HISTORY  --------------------------------------------------------------------------------  No significant changes to PMH, PSH, FHx, SHx, unless otherwise noted    ALLERGIES & MEDICATIONS  --------------------------------------------------------------------------------  Allergies    No Known Allergies    Intolerances      Standing Inpatient Medications  atorvastatin 40 milliGRAM(s) Oral at bedtime  clopidogrel Tablet 75 milliGRAM(s) Oral daily  digoxin     Tablet 0.125 milliGRAM(s) Oral <User Schedule>  ferrous    sulfate 325 milliGRAM(s) Oral daily  fluticasone propionate 50 MICROgram(s)/spray Nasal Spray 1 Spray(s) Both Nostrils daily  folic acid 1 milliGRAM(s) Oral daily  heparin  Infusion.  Unit(s)/Hr IV Continuous <Continuous>  melatonin 6 milliGRAM(s) Oral at bedtime  metoprolol succinate ER 25 milliGRAM(s) Oral daily  predniSONE   Tablet 60 milliGRAM(s) Oral daily  senna 2 Tablet(s) Oral at bedtime  sodium chloride 0.9% lock flush 3 milliLiter(s) IV Push every 8 hours  tiotropium 18 MICROgram(s) Capsule 1 Capsule(s) Inhalation daily    PRN Inpatient Medications  ALBUTerol    90 MICROgram(s) HFA Inhaler 2 Puff(s) Inhalation every 6 hours PRN  guaiFENesin  milliGRAM(s) Oral every 12 hours PRN  heparin  Injectable 4500 Unit(s) IV Push every 6 hours PRN  heparin  Injectable 2000 Unit(s) IV Push every 6 hours PRN  meclizine 12.5 milliGRAM(s) Oral three times a day PRN      REVIEW OF SYSTEMS  --------------------------------------------------------------------------------  Gen: No  fevers/chills, weakness  Skin: No rashes  Head/Eyes/Ears/Mouth: No headache;   Respiratory: + DYER  CV: No chest pain,   GI: No abdominal pain  : No increased frequency, dysuria, hematuria, nocturia  MSK: + Leg swelling  Neuro: No dizziness/lightheadedness, weakness, seizures    All other systems were reviewed and are negative, except as noted.    VITALS/PHYSICAL EXAM  --------------------------------------------------------------------------------  T(C): 36.7 (12-21-19 @ 08:49), Max: 36.8 (12-20-19 @ 20:40)  HR: 79 (12-21-19 @ 08:49) (67 - 79)  BP: 100/60 (12-21-19 @ 08:49) (100/60 - 107/65)  RR: 18 (12-21-19 @ 08:49) (18 - 18)  SpO2: 98% (12-21-19 @ 08:49) (96% - 98%)  Wt(kg): --        12-20-19 @ 07:01  -  12-21-19 @ 07:00  --------------------------------------------------------  IN: 240 mL / OUT: 275 mL / NET: -35 mL      hysical Exam:  	Gen: NAD, well-appearing  	HEENT: supple neck  	Pulm: CTA B/L  	CV:  S1S2  	Abd: +BS, soft   	Ext: No B/L Lower ext edema  	Neuro: No focal deficits  	Skin: Warm, without rashes  	Vascular access: none    LABS/STUDIES  --------------------------------------------------------------------------------              6.3    8.13  >-----------<  197      [12-21-19 @ 10:01]              19.9     132  |  98  |  79  ----------------------------<  124      [12-21-19 @ 06:51]  4.0   |  20  |  3.19        Ca     8.5     [12-21-19 @ 06:51]      Mg     2.4     [12-21-19 @ 06:51]      Phos  5.2     [12-21-19 @ 06:51]    TPro  6.4  /  Alb  2.8  /  TBili  1.8  /  DBili  x   /  AST  10  /  ALT  8   /  AlkPhos  54  [12-21-19 @ 06:51]    PT/INR: PT 12.5 , INR 1.09       [12-19-19 @ 14:30]  PTT: 71.3       [12-21-19 @ 10:00]          [12-21-19 @ 06:51]    Creatinine Trend:  SCr 3.19 [12-21 @ 06:51]  SCr 2.49 [12-20 @ 06:33]  SCr 1.66 [12-19 @ 14:30]  SCr 1.65 [12-19 @ 06:26]  SCr 1.42 [12-18 @ 06:17]    Urinalysis - [12-21-19 @ 02:14]      Color Yellow / Appearance Slightly Turbid / SG 1.022 / pH 5.0      Gluc Negative / Ketone Negative  / Bili Negative / Urobili <2 mg/dL       Blood Negative / Protein Negative / Leuk Est Small / Nitrite Negative      RBC 4 / WBC 19 / Hyaline 6 / Gran  / Sq Epi  / Non Sq Epi 7 / Bacteria Negative    Urine Creatinine 131      [12-21-19 @ 00:40]  Urine Sodium <35      [12-21-19 @ 00:40]  Urine Urea Nitrogen 775      [12-21-19 @ 02:30]  Urine Osmolality 482      [12-21-19 @ 02:14]

## 2019-12-21 NOTE — PROGRESS NOTE ADULT - SUBJECTIVE AND OBJECTIVE BOX
Shari Dang MD  PGY-1  Pager [phone]  Pager [phone]      Patient is a 84y old  Male who presents with a chief complaint of CHF exacerbation (20 Dec 2019 18:33)        SUBJECTIVE / OVERNIGHT EVENTS: Patient had no acute events overnight. Patient seen and examined at bedside this morning.     ROS: [ - ] Fever [ - ] Chills [ - ] Nausea/Vomiting [ - ] Chest Pain [ - ] Shortness of breath     MEDICATIONS  (STANDING):  atorvastatin 40 milliGRAM(s) Oral at bedtime  clopidogrel Tablet 75 milliGRAM(s) Oral daily  digoxin     Tablet 0.125 milliGRAM(s) Oral <User Schedule>  ferrous    sulfate 325 milliGRAM(s) Oral daily  fluticasone propionate 50 MICROgram(s)/spray Nasal Spray 1 Spray(s) Both Nostrils daily  folic acid 1 milliGRAM(s) Oral daily  heparin  Infusion.  Unit(s)/Hr (11 mL/Hr) IV Continuous <Continuous>  melatonin 6 milliGRAM(s) Oral at bedtime  metoprolol succinate ER 25 milliGRAM(s) Oral daily  senna 2 Tablet(s) Oral at bedtime  sodium chloride 0.9% lock flush 3 milliLiter(s) IV Push every 8 hours  tiotropium 18 MICROgram(s) Capsule 1 Capsule(s) Inhalation daily    MEDICATIONS  (PRN):  ALBUTerol    90 MICROgram(s) HFA Inhaler 2 Puff(s) Inhalation every 6 hours PRN Shortness of Breath and/or Wheezing  guaiFENesin  milliGRAM(s) Oral every 12 hours PRN Cough  heparin  Injectable 4500 Unit(s) IV Push every 6 hours PRN For aPTT less than 40  heparin  Injectable 2000 Unit(s) IV Push every 6 hours PRN For aPTT between 40 - 57  meclizine 12.5 milliGRAM(s) Oral three times a day PRN Dizziness      Vital Signs Last 24 Hrs  T(C): 36.7 (21 Dec 2019 04:14), Max: 36.8 (20 Dec 2019 20:40)  T(F): 98.1 (21 Dec 2019 04:14), Max: 98.3 (20 Dec 2019 20:40)  HR: 67 (21 Dec 2019 05:11) (67 - 75)  BP: 100/61 (21 Dec 2019 05:11) (93/58 - 107/65)  BP(mean): --  RR: 18 (21 Dec 2019 04:14) (18 - 18)  SpO2: 98% (21 Dec 2019 04:14) (96% - 99%)  CAPILLARY BLOOD GLUCOSE        I&O's Summary    20 Dec 2019 07:01  -  21 Dec 2019 07:00  --------------------------------------------------------  IN: 240 mL / OUT: 275 mL / NET: -35 mL        PHYSICAL EXAM  GENERAL: NAD, lying comfortably in bed   HEENT:  Atraumatic, Normocephalic, EOMI, conjunctiva and sclera clear, no LAD  CHEST/LUNG: Clear to auscultation bilaterally; No wheeze  HEART: RRR, S1 and S2 No murmurs, rubs, or gallops  ABDOMEN: Soft, Nontender, Nondistended; Bowel sounds present  EXTREMITIES:  2+ Peripheral Pulses, No clubbing, cyanosis, or edema  NEURO: AAOx3, non-focal  SKIN: No rashes or lesions    LABS:                        6.8    7.14  )-----------( 188      ( 20 Dec 2019 08:34 )             20.7     12-20    135  |  99  |  76<H>  ----------------------------<  130<H>  4.2   |  20<L>  |  2.49<H>    Ca    8.6      20 Dec 2019 06:33  Phos  4.8     12-20  Mg     2.3     12-20    TPro  6.5  /  Alb  3.0<L>  /  TBili  1.7<H>  /  DBili  x   /  AST  12  /  ALT  7<L>  /  AlkPhos  56  12-20    PT/INR - ( 19 Dec 2019 14:30 )   PT: 12.5 sec;   INR: 1.09 ratio         PTT - ( 20 Dec 2019 17:27 )  PTT:74.8 sec      Urinalysis Basic - ( 21 Dec 2019 02:14 )    Color: Yellow / Appearance: Slightly Turbid / S.022 / pH: x  Gluc: x / Ketone: Negative  / Bili: Negative / Urobili: <2 mg/dL   Blood: x / Protein: Negative / Nitrite: Negative   Leuk Esterase: Small / RBC: 4 /HPF / WBC 19 /HPF   Sq Epi: x / Non Sq Epi: 7 /HPF / Bacteria: Negative      ABG - ( 19 Dec 2019 14:20 )  pH, Arterial: 7.45  pH, Blood: x     /  pCO2: 27    /  pO2: 248   / HCO3: 19    / Base Excess: -4.3  /  SaO2: >100                RADIOLOGY & ADDITIONAL TESTS:    Imaging Personally Reviewed:  Consultant(s) Notes Reviewed: Shari Dang MD  PGY-1  Pager [phone]  Pager [phone]      Patient is a 84y old  Male who presents with a chief complaint of CHF exacerbation (20 Dec 2019 18:33)        SUBJECTIVE / OVERNIGHT EVENTS: Patient had no acute events overnight. Patient seen and examined at bedside this morning. Patient denies SOB, swelling, fevers, chills. He endorses poor appetite due to the     ROS: [ - ] Fever [ - ] Chills [ - ] Nausea/Vomiting [ - ] Chest Pain [ - ] Shortness of breath     MEDICATIONS  (STANDING):  atorvastatin 40 milliGRAM(s) Oral at bedtime  clopidogrel Tablet 75 milliGRAM(s) Oral daily  digoxin     Tablet 0.125 milliGRAM(s) Oral <User Schedule>  ferrous    sulfate 325 milliGRAM(s) Oral daily  fluticasone propionate 50 MICROgram(s)/spray Nasal Spray 1 Spray(s) Both Nostrils daily  folic acid 1 milliGRAM(s) Oral daily  heparin  Infusion.  Unit(s)/Hr (11 mL/Hr) IV Continuous <Continuous>  melatonin 6 milliGRAM(s) Oral at bedtime  metoprolol succinate ER 25 milliGRAM(s) Oral daily  senna 2 Tablet(s) Oral at bedtime  sodium chloride 0.9% lock flush 3 milliLiter(s) IV Push every 8 hours  tiotropium 18 MICROgram(s) Capsule 1 Capsule(s) Inhalation daily    MEDICATIONS  (PRN):  ALBUTerol    90 MICROgram(s) HFA Inhaler 2 Puff(s) Inhalation every 6 hours PRN Shortness of Breath and/or Wheezing  guaiFENesin  milliGRAM(s) Oral every 12 hours PRN Cough  heparin  Injectable 4500 Unit(s) IV Push every 6 hours PRN For aPTT less than 40  heparin  Injectable 2000 Unit(s) IV Push every 6 hours PRN For aPTT between 40 - 57  meclizine 12.5 milliGRAM(s) Oral three times a day PRN Dizziness      Vital Signs Last 24 Hrs  T(C): 36.7 (21 Dec 2019 04:14), Max: 36.8 (20 Dec 2019 20:40)  T(F): 98.1 (21 Dec 2019 04:14), Max: 98.3 (20 Dec 2019 20:40)  HR: 67 (21 Dec 2019 05:11) (67 - 75)  BP: 100/61 (21 Dec 2019 05:11) (93/58 - 107/65)  BP(mean): --  RR: 18 (21 Dec 2019 04:14) (18 - 18)  SpO2: 98% (21 Dec 2019 04:14) (96% - 99%)  CAPILLARY BLOOD GLUCOSE        I&O's Summary    20 Dec 2019 07:01  -  21 Dec 2019 07:00  --------------------------------------------------------  IN: 240 mL / OUT: 275 mL / NET: -35 mL        PHYSICAL EXAM  GENERAL: NAD, lying comfortably in bed   HEENT:  Atraumatic, Normocephalic, EOMI, conjunctiva and sclera clear, no LAD  CHEST/LUNG: Clear to auscultation bilaterally; No wheeze  HEART: RRR, S1 and S2 No murmurs, rubs, or gallops  ABDOMEN: Soft, Nontender, Nondistended; Bowel sounds present  EXTREMITIES:  2+ Peripheral Pulses, No clubbing, cyanosis, or edema  NEURO: AAOx3, non-focal  SKIN: No rashes or lesions    LABS:                        6.8    7.14  )-----------( 188      ( 20 Dec 2019 08:34 )             20.7     12-20    135  |  99  |  76<H>  ----------------------------<  130<H>  4.2   |  20<L>  |  2.49<H>    Ca    8.6      20 Dec 2019 06:33  Phos  4.8     12-20  Mg     2.3     12-20    TPro  6.5  /  Alb  3.0<L>  /  TBili  1.7<H>  /  DBili  x   /  AST  12  /  ALT  7<L>  /  AlkPhos  56  12-20    PT/INR - ( 19 Dec 2019 14:30 )   PT: 12.5 sec;   INR: 1.09 ratio         PTT - ( 20 Dec 2019 17:27 )  PTT:74.8 sec      Urinalysis Basic - ( 21 Dec 2019 02:14 )    Color: Yellow / Appearance: Slightly Turbid / S.022 / pH: x  Gluc: x / Ketone: Negative  / Bili: Negative / Urobili: <2 mg/dL   Blood: x / Protein: Negative / Nitrite: Negative   Leuk Esterase: Small / RBC: 4 /HPF / WBC 19 /HPF   Sq Epi: x / Non Sq Epi: 7 /HPF / Bacteria: Negative      ABG - ( 19 Dec 2019 14:20 )  pH, Arterial: 7.45  pH, Blood: x     /  pCO2: 27    /  pO2: 248   / HCO3: 19    / Base Excess: -4.3  /  SaO2: >100                RADIOLOGY & ADDITIONAL TESTS:    Imaging Personally Reviewed:  Consultant(s) Notes Reviewed: Shari Dang MD  PGY-1  Pager [phone]  Pager T [phone]      Patient is a 84y old  Male who presents with a chief complaint of CHF exacerbation (20 Dec 2019 18:33)        SUBJECTIVE / OVERNIGHT EVENTS: Patient had no acute events overnight. Patient seen and examined at bedside this morning. Patient denies SOB, swelling, fevers, chills. He endorses poor appetite.     ROS: [ - ] Fever [ - ] Chills [ - ] Nausea/Vomiting [ - ] Chest Pain [ - ] Shortness of breath     MEDICATIONS  (STANDING):  atorvastatin 40 milliGRAM(s) Oral at bedtime  clopidogrel Tablet 75 milliGRAM(s) Oral daily  digoxin     Tablet 0.125 milliGRAM(s) Oral <User Schedule>  ferrous    sulfate 325 milliGRAM(s) Oral daily  fluticasone propionate 50 MICROgram(s)/spray Nasal Spray 1 Spray(s) Both Nostrils daily  folic acid 1 milliGRAM(s) Oral daily  heparin  Infusion.  Unit(s)/Hr (11 mL/Hr) IV Continuous <Continuous>  melatonin 6 milliGRAM(s) Oral at bedtime  metoprolol succinate ER 25 milliGRAM(s) Oral daily  senna 2 Tablet(s) Oral at bedtime  sodium chloride 0.9% lock flush 3 milliLiter(s) IV Push every 8 hours  tiotropium 18 MICROgram(s) Capsule 1 Capsule(s) Inhalation daily    MEDICATIONS  (PRN):  ALBUTerol    90 MICROgram(s) HFA Inhaler 2 Puff(s) Inhalation every 6 hours PRN Shortness of Breath and/or Wheezing  guaiFENesin  milliGRAM(s) Oral every 12 hours PRN Cough  heparin  Injectable 4500 Unit(s) IV Push every 6 hours PRN For aPTT less than 40  heparin  Injectable 2000 Unit(s) IV Push every 6 hours PRN For aPTT between 40 - 57  meclizine 12.5 milliGRAM(s) Oral three times a day PRN Dizziness      Vital Signs Last 24 Hrs  T(C): 36.7 (21 Dec 2019 04:14), Max: 36.8 (20 Dec 2019 20:40)  T(F): 98.1 (21 Dec 2019 04:14), Max: 98.3 (20 Dec 2019 20:40)  HR: 67 (21 Dec 2019 05:11) (67 - 75)  BP: 100/61 (21 Dec 2019 05:11) (93/58 - 107/65)  BP(mean): --  RR: 18 (21 Dec 2019 04:14) (18 - 18)  SpO2: 98% (21 Dec 2019 04:14) (96% - 99%)  CAPILLARY BLOOD GLUCOSE        I&O's Summary    20 Dec 2019 07:01  -  21 Dec 2019 07:00  --------------------------------------------------------  IN: 240 mL / OUT: 275 mL / NET: -35 mL        PHYSICAL EXAM  GENERAL: NAD, lying comfortably in bed   HEENT:  Atraumatic, Normocephalic, EOMI, conjunctiva and sclera clear, no LAD  CHEST/LUNG: Clear to auscultation bilaterally; No wheeze  HEART: RRR, S1 and S2 No murmurs, rubs, or gallops  ABDOMEN: Soft, Nontender, Nondistended; Bowel sounds present  EXTREMITIES:  2+ Peripheral Pulses, No clubbing, cyanosis, or edema  NEURO: AAOx3, non-focal  SKIN: No rashes or lesions    LABS:                        6.8    7.14  )-----------( 188      ( 20 Dec 2019 08:34 )             20.7     12-20    135  |  99  |  76<H>  ----------------------------<  130<H>  4.2   |  20<L>  |  2.49<H>    Ca    8.6      20 Dec 2019 06:33  Phos  4.8     12-20  Mg     2.3     12-20    TPro  6.5  /  Alb  3.0<L>  /  TBili  1.7<H>  /  DBili  x   /  AST  12  /  ALT  7<L>  /  AlkPhos  56  12-20    PT/INR - ( 19 Dec 2019 14:30 )   PT: 12.5 sec;   INR: 1.09 ratio         PTT - ( 20 Dec 2019 17:27 )  PTT:74.8 sec      Urinalysis Basic - ( 21 Dec 2019 02:14 )    Color: Yellow / Appearance: Slightly Turbid / S.022 / pH: x  Gluc: x / Ketone: Negative  / Bili: Negative / Urobili: <2 mg/dL   Blood: x / Protein: Negative / Nitrite: Negative   Leuk Esterase: Small / RBC: 4 /HPF / WBC 19 /HPF   Sq Epi: x / Non Sq Epi: 7 /HPF / Bacteria: Negative      ABG - ( 19 Dec 2019 14:20 )  pH, Arterial: 7.45  pH, Blood: x     /  pCO2: 27    /  pO2: 248   / HCO3: 19    / Base Excess: -4.3  /  SaO2: >100                RADIOLOGY & ADDITIONAL TESTS:    Imaging Personally Reviewed:  Consultant(s) Notes Reviewed: Shari Dang MD  PGY-1  Pager [phone]  Pager D [phone]      Patient is a 84y old  Male who presents with a chief complaint of CHF exacerbation (20 Dec 2019 18:33)        SUBJECTIVE / OVERNIGHT EVENTS: Patient had no acute events overnight. Patient seen and examined at bedside this morning. Patient denies SOB, swelling, fevers, chills. No supplemental oxygen needed. He endorses poor appetite.     ROS: [ - ] Fever [ - ] Chills [ - ] Nausea/Vomiting [ - ] Chest Pain [ - ] Shortness of breath     MEDICATIONS  (STANDING):  atorvastatin 40 milliGRAM(s) Oral at bedtime  clopidogrel Tablet 75 milliGRAM(s) Oral daily  digoxin     Tablet 0.125 milliGRAM(s) Oral <User Schedule>  ferrous    sulfate 325 milliGRAM(s) Oral daily  fluticasone propionate 50 MICROgram(s)/spray Nasal Spray 1 Spray(s) Both Nostrils daily  folic acid 1 milliGRAM(s) Oral daily  heparin  Infusion.  Unit(s)/Hr (11 mL/Hr) IV Continuous <Continuous>  melatonin 6 milliGRAM(s) Oral at bedtime  metoprolol succinate ER 25 milliGRAM(s) Oral daily  senna 2 Tablet(s) Oral at bedtime  sodium chloride 0.9% lock flush 3 milliLiter(s) IV Push every 8 hours  tiotropium 18 MICROgram(s) Capsule 1 Capsule(s) Inhalation daily    MEDICATIONS  (PRN):  ALBUTerol    90 MICROgram(s) HFA Inhaler 2 Puff(s) Inhalation every 6 hours PRN Shortness of Breath and/or Wheezing  guaiFENesin  milliGRAM(s) Oral every 12 hours PRN Cough  heparin  Injectable 4500 Unit(s) IV Push every 6 hours PRN For aPTT less than 40  heparin  Injectable 2000 Unit(s) IV Push every 6 hours PRN For aPTT between 40 - 57  meclizine 12.5 milliGRAM(s) Oral three times a day PRN Dizziness      Vital Signs Last 24 Hrs  T(C): 36.7 (21 Dec 2019 04:14), Max: 36.8 (20 Dec 2019 20:40)  T(F): 98.1 (21 Dec 2019 04:14), Max: 98.3 (20 Dec 2019 20:40)  HR: 67 (21 Dec 2019 05:11) (67 - 75)  BP: 100/61 (21 Dec 2019 05:11) (93/58 - 107/65)  BP(mean): --  RR: 18 (21 Dec 2019 04:14) (18 - 18)  SpO2: 98% (21 Dec 2019 04:14) (96% - 99%)  CAPILLARY BLOOD GLUCOSE        I&O's Summary    20 Dec 2019 07:01  -  21 Dec 2019 07:00  --------------------------------------------------------  IN: 240 mL / OUT: 275 mL / NET: -35 mL        PHYSICAL EXAM  GENERAL: NAD, lying comfortably in bed   HEENT:  Atraumatic, Normocephalic, EOMI, conjunctiva and sclera clear, no LAD  CHEST/LUNG: crackles RLL. clear in remaining fields  HEART: RRR, S1 and S2 No murmurs, rubs, or gallops  ABDOMEN: Soft, Nontender, Nondistended; Bowel sounds present  EXTREMITIES:  2+ Peripheral Pulses, No clubbing, cyanosis, or edema  NEURO: AAOx3, non-focal  SKIN: No rashes or lesions    LABS:                        6.8    7.14  )-----------( 188      ( 20 Dec 2019 08:34 )             20.7     12-20    135  |  99  |  76<H>  ----------------------------<  130<H>  4.2   |  20<L>  |  2.49<H>    Ca    8.6      20 Dec 2019 06:33  Phos  4.8     12-20  Mg     2.3     12-20    TPro  6.5  /  Alb  3.0<L>  /  TBili  1.7<H>  /  DBili  x   /  AST  12  /  ALT  7<L>  /  AlkPhos  56  12-20    PT/INR - ( 19 Dec 2019 14:30 )   PT: 12.5 sec;   INR: 1.09 ratio         PTT - ( 20 Dec 2019 17:27 )  PTT:74.8 sec      Urinalysis Basic - ( 21 Dec 2019 02:14 )    Color: Yellow / Appearance: Slightly Turbid / S.022 / pH: x  Gluc: x / Ketone: Negative  / Bili: Negative / Urobili: <2 mg/dL   Blood: x / Protein: Negative / Nitrite: Negative   Leuk Esterase: Small / RBC: 4 /HPF / WBC 19 /HPF   Sq Epi: x / Non Sq Epi: 7 /HPF / Bacteria: Negative      ABG - ( 19 Dec 2019 14:20 )  pH, Arterial: 7.45  pH, Blood: x     /  pCO2: 27    /  pO2: 248   / HCO3: 19    / Base Excess: -4.3  /  SaO2: >100                RADIOLOGY & ADDITIONAL TESTS:    Imaging Personally Reviewed:  Consultant(s) Notes Reviewed: Shari Dang MD  PGY-1  Pager [phone]  Pager [phone]      Patient is a 84y old  Male who presents with a chief complaint of CHF exacerbation (20 Dec 2019 18:33)        SUBJECTIVE / OVERNIGHT EVENTS: Patient had no acute events overnight. Patient seen and examined at bedside this morning. Patient denies SOB, swelling, fevers, chills. No supplemental oxygen needed. He endorses poor appetite.     ROS: [ - ] Fever [ - ] Chills [ - ] Nausea/Vomiting [ - ] Chest Pain [ - ] Shortness of breath     MEDICATIONS  (STANDING):  atorvastatin 40 milliGRAM(s) Oral at bedtime  clopidogrel Tablet 75 milliGRAM(s) Oral daily  digoxin     Tablet 0.125 milliGRAM(s) Oral <User Schedule>  ferrous    sulfate 325 milliGRAM(s) Oral daily  fluticasone propionate 50 MICROgram(s)/spray Nasal Spray 1 Spray(s) Both Nostrils daily  folic acid 1 milliGRAM(s) Oral daily  heparin  Infusion.  Unit(s)/Hr (11 mL/Hr) IV Continuous <Continuous>  melatonin 6 milliGRAM(s) Oral at bedtime  metoprolol succinate ER 25 milliGRAM(s) Oral daily  senna 2 Tablet(s) Oral at bedtime  sodium chloride 0.9% lock flush 3 milliLiter(s) IV Push every 8 hours  tiotropium 18 MICROgram(s) Capsule 1 Capsule(s) Inhalation daily    MEDICATIONS  (PRN):  ALBUTerol    90 MICROgram(s) HFA Inhaler 2 Puff(s) Inhalation every 6 hours PRN Shortness of Breath and/or Wheezing  guaiFENesin  milliGRAM(s) Oral every 12 hours PRN Cough  heparin  Injectable 4500 Unit(s) IV Push every 6 hours PRN For aPTT less than 40  heparin  Injectable 2000 Unit(s) IV Push every 6 hours PRN For aPTT between 40 - 57  meclizine 12.5 milliGRAM(s) Oral three times a day PRN Dizziness      Vital Signs Last 24 Hrs  T(C): 36.7 (21 Dec 2019 04:14), Max: 36.8 (20 Dec 2019 20:40)  T(F): 98.1 (21 Dec 2019 04:14), Max: 98.3 (20 Dec 2019 20:40)  HR: 67 (21 Dec 2019 05:11) (67 - 75)  BP: 100/61 (21 Dec 2019 05:11) (93/58 - 107/65)  BP(mean): --  RR: 18 (21 Dec 2019 04:14) (18 - 18)  SpO2: 98% (21 Dec 2019 04:14) (96% - 99%)  CAPILLARY BLOOD GLUCOSE        I&O's Summary    20 Dec 2019 07:01  -  21 Dec 2019 07:00  --------------------------------------------------------  IN: 240 mL / OUT: 275 mL / NET: -35 mL        PHYSICAL EXAM  GENERAL: NAD, lying comfortably in bed   HEENT:  Atraumatic, Normocephalic, EOMI, conjunctiva and sclera clear, no LAD  CHEST/LUNG: crackles RLL. clear in remaining fields  HEART: systolic murmur, irregularly irregular S1 and S2 No murmurs, rubs, or gallops  ABDOMEN: Soft, Nontender, Nondistended; Bowel sounds present  EXTREMITIES:  2+ Peripheral Pulses, No clubbing, cyanosis, or edema  NEURO: AAOx3, non-focal  SKIN: No rashes or lesions    LABS:                        6.8    7.14  )-----------( 188      ( 20 Dec 2019 08:34 )             20.7     12-20    135  |  99  |  76<H>  ----------------------------<  130<H>  4.2   |  20<L>  |  2.49<H>    Ca    8.6      20 Dec 2019 06:33  Phos  4.8     12-20  Mg     2.3     12-20    TPro  6.5  /  Alb  3.0<L>  /  TBili  1.7<H>  /  DBili  x   /  AST  12  /  ALT  7<L>  /  AlkPhos  56  12-20    PT/INR - ( 19 Dec 2019 14:30 )   PT: 12.5 sec;   INR: 1.09 ratio         PTT - ( 20 Dec 2019 17:27 )  PTT:74.8 sec      Urinalysis Basic - ( 21 Dec 2019 02:14 )    Color: Yellow / Appearance: Slightly Turbid / S.022 / pH: x  Gluc: x / Ketone: Negative  / Bili: Negative / Urobili: <2 mg/dL   Blood: x / Protein: Negative / Nitrite: Negative   Leuk Esterase: Small / RBC: 4 /HPF / WBC 19 /HPF   Sq Epi: x / Non Sq Epi: 7 /HPF / Bacteria: Negative      ABG - ( 19 Dec 2019 14:20 )  pH, Arterial: 7.45  pH, Blood: x     /  pCO2: 27    /  pO2: 248   / HCO3: 19    / Base Excess: -4.3  /  SaO2: >100                RADIOLOGY & ADDITIONAL TESTS:    Imaging Personally Reviewed:  Consultant(s) Notes Reviewed:

## 2019-12-21 NOTE — PROVIDER CONTACT NOTE (CRITICAL VALUE NOTIFICATION) - ACTION/TREATMENT ORDERED:
MD contacted and aware. No new orders. Will continue to monitor. MD contacted and aware. Stat labs ordered for 4pm. Will continue to monitor.

## 2019-12-21 NOTE — PROGRESS NOTE ADULT - PROBLEM SELECTOR PLAN 1
Pt presented with DYER, with recent TTE 12/10 EF 30% with severe AS, with LV dysfunction, prior Echo in 6/2019 was 42%. Exam notable for 3+ LE edema up to b/l thigh, b/l rales, likely related to severe AS, s/p diuresis, currently appear near euvolemia.   -CXR: loculated fluid at left major fissure. Repeat CXR 12/16 showed clear lungs  - dc IV Lasix 80mg given JUHI  - Strict Ins and Outs, daily weight, fluid restriction to 1L/day  - C/w toprol 25mg qd  - Follow up HF  recs

## 2019-12-21 NOTE — PROGRESS NOTE ADULT - ATTENDING COMMENTS
JUHI- Likely ATN due to contrast induced nephropathy now possibly superimposed by heme pigment induced injury ( decreased renal perfusion, direct cytotoxicity, and intratubular casts formed from the interaction of heme proteins with Tamm-Horsfall protein). Monitor. No diuretics.

## 2019-12-21 NOTE — PROGRESS NOTE ADULT - PROBLEM SELECTOR PLAN 3
Hb 7.9, baseline 8-9 in 2015, follows with outside hematologist, Dr. Miranda in [place]  s/p possible transfusion rxn during 1/2U prbc  - Hold off transfusion. If need to transfuse, 20IV Lasix follow with 1/2U prbc over 3 hours  - f/u transfusion rxn labs: prelim labs show no antibodies indicating nonhemolytic and need to evaluate other sources of hemolysis  -hematology consulted Hb 7.9, baseline 8-9 in 2015, follows with outside hematologist, Dr. Miranda in [place]  s/p febrile nonhemolytic transfusion rxn during 1/2U prbc  - Hold off transfusion unless symptomatic. 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 Miranda in [place]  s/p febrile nonhemolytic transfusion rxn during 1/2U prbc  - Hold off transfusion unless symptomatic. 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 Miranda in [place]  s/p febrile nonhemolytic transfusion rxn during 1/2U prbc  - Hold off transfusion unless symptomatic. 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  -Check SPEP, UPEP, serum FLC

## 2019-12-21 NOTE — PROGRESS NOTE ADULT - PROBLEM SELECTOR PLAN 4
2/2 contrast from R/C with diuresis. Cr initial 1.3 (baseline Cr 0.85 from 2015)  - hold valsartan until Cr stabilizes  - trend Cr and avoid nephrotoxin meds  - f/u urine studies   - monitor I/Os  - nephrology consulted 2/2 contrast from R/[facility] with diuresis. Cr initial 1.3 (baseline Cr 0.85 from 2015)  - hold valsartan until Cr stabilizes  - trend Cr and avoid nephrotoxin meds. Hold fluids for now  - monitor I/Os  - nephrology recs 2/2 contrast from R/[facility] with diuresis. Cr initial 1.3 (baseline Cr 0.85 from 2015)  - hold valsartan until Cr stabilizes. -Lasix held.   - trend Cr and avoid nephrotoxin meds. Hold fluids for now  - monitor I/Os  - nephrology recs

## 2019-12-21 NOTE — PROGRESS NOTE ADULT - ASSESSMENT
84M PMH chronic anemia, HFrEF (40%), Afib on coumadin, T2DM, HLD, PVD, Menieres Disease, carotid stenosis, LBBB on EKG, CAD s/p PCI to mLAD in June 2014 and PCI to pRCA that was complicated by perforation and cardiogenic shock in May of 2015 presented for elective RHC/LHC for TAVR or BAV evaluation for severe AS, admitted for acute on on chronic CHF and anemia workup for TVAR or balloon aortic vavluoplasty Mon 12/23. Course complicated by RRT 12/19 for hypoxia due to febrile nonhemolytic transfusion rxn.

## 2019-12-22 ENCOUNTER — TRANSCRIPTION ENCOUNTER (OUTPATIENT)
Age: 84
End: 2019-12-22

## 2019-12-22 LAB
ACANTHOCYTES BLD QL SMEAR: SLIGHT — SIGNIFICANT CHANGE UP
ALBUMIN SERPL ELPH-MCNC: 2.9 G/DL — LOW (ref 3.3–5)
ALP SERPL-CCNC: 56 U/L — SIGNIFICANT CHANGE UP (ref 40–120)
ALT FLD-CCNC: 6 U/L — LOW (ref 10–45)
ANION GAP SERPL CALC-SCNC: 16 MMOL/L — SIGNIFICANT CHANGE UP (ref 5–17)
ANISOCYTOSIS BLD QL: SLIGHT — SIGNIFICANT CHANGE UP
APTT BLD: 88.5 SEC — HIGH (ref 27.5–36.3)
AST SERPL-CCNC: 10 U/L — SIGNIFICANT CHANGE UP (ref 10–40)
BASOPHILS # BLD AUTO: 0.01 K/UL — SIGNIFICANT CHANGE UP (ref 0–0.2)
BASOPHILS NFR BLD AUTO: 0.2 % — SIGNIFICANT CHANGE UP (ref 0–2)
BILIRUB SERPL-MCNC: 1.2 MG/DL — SIGNIFICANT CHANGE UP (ref 0.2–1.2)
BUN SERPL-MCNC: 90 MG/DL — HIGH (ref 7–23)
CALCIUM SERPL-MCNC: 8.5 MG/DL — SIGNIFICANT CHANGE UP (ref 8.4–10.5)
CHLORIDE SERPL-SCNC: 97 MMOL/L — SIGNIFICANT CHANGE UP (ref 96–108)
CO2 SERPL-SCNC: 20 MMOL/L — LOW (ref 22–31)
CREAT SERPL-MCNC: 3.45 MG/DL — HIGH (ref 0.5–1.3)
ELLIPTOCYTES BLD QL SMEAR: SLIGHT — SIGNIFICANT CHANGE UP
EOSINOPHIL # BLD AUTO: 0 K/UL — SIGNIFICANT CHANGE UP (ref 0–0.5)
EOSINOPHIL NFR BLD AUTO: 0 % — SIGNIFICANT CHANGE UP (ref 0–6)
FIBRINOGEN PPP-MCNC: 626 MG/DL — HIGH (ref 320–500)
GLUCOSE BLDC GLUCOMTR-MCNC: 300 MG/DL — HIGH (ref 70–99)
GLUCOSE SERPL-MCNC: 194 MG/DL — HIGH (ref 70–99)
HCT VFR BLD CALC: 19.6 % — CRITICAL LOW (ref 39–50)
HGB BLD-MCNC: 6.4 G/DL — CRITICAL LOW (ref 13–17)
HYPOCHROMIA BLD QL: SLIGHT — SIGNIFICANT CHANGE UP
IMM GRANULOCYTES NFR BLD AUTO: 2.1 % — HIGH (ref 0–1.5)
LDH SERPL L TO P-CCNC: 255 U/L — HIGH (ref 50–242)
LYMPHOCYTES # BLD AUTO: 0.5 K/UL — LOW (ref 1–3.3)
LYMPHOCYTES # BLD AUTO: 9.4 % — LOW (ref 13–44)
MAGNESIUM SERPL-MCNC: 2.5 MG/DL — SIGNIFICANT CHANGE UP (ref 1.6–2.6)
MANUAL SMEAR VERIFICATION: SIGNIFICANT CHANGE UP
MCHC RBC-ENTMCNC: 30.5 PG — SIGNIFICANT CHANGE UP (ref 27–34)
MCHC RBC-ENTMCNC: 32.7 GM/DL — SIGNIFICANT CHANGE UP (ref 32–36)
MCV RBC AUTO: 93.3 FL — SIGNIFICANT CHANGE UP (ref 80–100)
MICROCYTES BLD QL: SLIGHT — SIGNIFICANT CHANGE UP
MONOCYTES # BLD AUTO: 0.09 K/UL — SIGNIFICANT CHANGE UP (ref 0–0.9)
MONOCYTES NFR BLD AUTO: 1.7 % — LOW (ref 2–14)
NEUTROPHILS # BLD AUTO: 4.63 K/UL — SIGNIFICANT CHANGE UP (ref 1.8–7.4)
NEUTROPHILS NFR BLD AUTO: 86.6 % — HIGH (ref 43–77)
OB PNL STL: NEGATIVE — SIGNIFICANT CHANGE UP
PHOSPHATE SERPL-MCNC: 5.8 MG/DL — HIGH (ref 2.5–4.5)
PLAT MORPH BLD: NORMAL — SIGNIFICANT CHANGE UP
PLATELET # BLD AUTO: 190 K/UL — SIGNIFICANT CHANGE UP (ref 150–400)
POIKILOCYTOSIS BLD QL AUTO: SLIGHT — SIGNIFICANT CHANGE UP
POLYCHROMASIA BLD QL SMEAR: SIGNIFICANT CHANGE UP
POTASSIUM SERPL-MCNC: 4.6 MMOL/L — SIGNIFICANT CHANGE UP (ref 3.5–5.3)
POTASSIUM SERPL-SCNC: 4.6 MMOL/L — SIGNIFICANT CHANGE UP (ref 3.5–5.3)
PROT ?TM UR-MCNC: 11 MG/DL — SIGNIFICANT CHANGE UP (ref 0–12)
PROT SERPL-MCNC: 6.4 G/DL — SIGNIFICANT CHANGE UP (ref 6–8.3)
RBC # BLD: 2.1 M/UL — LOW (ref 4.2–5.8)
RBC # BLD: 2.1 M/UL — LOW (ref 4.2–5.8)
RBC # FLD: 17.9 % — HIGH (ref 10.3–14.5)
RBC BLD AUTO: ABNORMAL
RETICS #: 143.6 K/UL — HIGH (ref 25–125)
RETICS/RBC NFR: 7 % — HIGH (ref 0.5–2.5)
SODIUM SERPL-SCNC: 133 MMOL/L — LOW (ref 135–145)
URATE SERPL-MCNC: 14.9 MG/DL — HIGH (ref 3.4–8.8)
WBC # BLD: 5.34 K/UL — SIGNIFICANT CHANGE UP (ref 3.8–10.5)
WBC # FLD AUTO: 5.34 K/UL — SIGNIFICANT CHANGE UP (ref 3.8–10.5)

## 2019-12-22 PROCEDURE — 99232 SBSQ HOSP IP/OBS MODERATE 35: CPT | Mod: GC

## 2019-12-22 PROCEDURE — 99231 SBSQ HOSP IP/OBS SF/LOW 25: CPT

## 2019-12-22 RX ORDER — PANTOPRAZOLE SODIUM 20 MG/1
40 TABLET, DELAYED RELEASE ORAL
Refills: 0 | Status: DISCONTINUED | OUTPATIENT
Start: 2019-12-22 | End: 2019-12-26

## 2019-12-22 RX ADMIN — Medication 6 MILLIGRAM(S): at 21:14

## 2019-12-22 RX ADMIN — PANTOPRAZOLE SODIUM 40 MILLIGRAM(S): 20 TABLET, DELAYED RELEASE ORAL at 06:41

## 2019-12-22 RX ADMIN — CLOPIDOGREL BISULFATE 75 MILLIGRAM(S): 75 TABLET, FILM COATED ORAL at 12:39

## 2019-12-22 RX ADMIN — SENNA PLUS 2 TABLET(S): 8.6 TABLET ORAL at 21:14

## 2019-12-22 RX ADMIN — TIOTROPIUM BROMIDE 1 CAPSULE(S): 18 CAPSULE ORAL; RESPIRATORY (INHALATION) at 12:39

## 2019-12-22 RX ADMIN — HEPARIN SODIUM 1000 UNIT(S)/HR: 5000 INJECTION INTRAVENOUS; SUBCUTANEOUS at 17:17

## 2019-12-22 RX ADMIN — ATORVASTATIN CALCIUM 40 MILLIGRAM(S): 80 TABLET, FILM COATED ORAL at 21:14

## 2019-12-22 RX ADMIN — SODIUM CHLORIDE 3 MILLILITER(S): 9 INJECTION INTRAMUSCULAR; INTRAVENOUS; SUBCUTANEOUS at 12:38

## 2019-12-22 RX ADMIN — Medication 325 MILLIGRAM(S): at 12:39

## 2019-12-22 RX ADMIN — SODIUM CHLORIDE 3 MILLILITER(S): 9 INJECTION INTRAMUSCULAR; INTRAVENOUS; SUBCUTANEOUS at 05:06

## 2019-12-22 RX ADMIN — Medication 25 MILLIGRAM(S): at 05:06

## 2019-12-22 RX ADMIN — Medication 60 MILLIGRAM(S): at 05:06

## 2019-12-22 RX ADMIN — Medication 1 MILLIGRAM(S): at 12:39

## 2019-12-22 RX ADMIN — SODIUM CHLORIDE 3 MILLILITER(S): 9 INJECTION INTRAMUSCULAR; INTRAVENOUS; SUBCUTANEOUS at 21:51

## 2019-12-22 RX ADMIN — Medication 1 SPRAY(S): at 12:39

## 2019-12-22 NOTE — DISCHARGE NOTE PROVIDER - NSDCFUADDAPPT_GEN_ALL_CORE_FT
1. Please schedule an appointment with your Interventional Cardiologist Dr. Becker in 1 week after discharged from rehab for your TAVR pre op work up, please call the office to schedule an appointment [phone].   2. Please schedule an appointment with your PCP in 1 week after discharged from rehab, call the office to schedule an appointment.  3. Please schedule an appointment with your CHF Dr. No in 1-2 weeks after discharged from rehab, please call the office to schedule an appointment.  4. Please schedule an appointment with your Hematologist Dr. Miranda in 1 week, please call the office to schedule an appointment.   5. Please schedule an appointment with Endocrinologist Dr. Cobb in 3-4 weeks after discharged from rehab, please call the office to schedule an appointment. Suggest to DC home on Tradjenta 5mg daily and FU endo 4 weeks.

## 2019-12-22 NOTE — DISCHARGE NOTE PROVIDER - CARE PROVIDER_API CALL
Trevin Becker)  Cardiovascular Disease  [address] [place] [address]  Phone: [phone]  Fax: [phone]  Follow Up Time: 1 week    Emmett No)  Cardiovascular Disease; Internal Medicine  [address] [place] [address]  Phone: [phone]  Fax: [phone]  Follow Up Time: 2 weeks    Betty Miranda)  Medical Oncology  [address] [facility], [place] [address]  Phone: [phone]  Fax: [phone]  Follow Up Time: 1 week    Apolinar Cobb)  EndocrinologyMetabDiabetes; Internal Medicine  [address] [place] [address]  Phone: [phone]  Fax: [phone]  Follow Up Time: 1 month

## 2019-12-22 NOTE — PROGRESS NOTE ADULT - PROBLEM SELECTOR PLAN 1
Patient with Non-oliguric JUHI secondary to diuretic use and recent contrast exposure. Now having hemolysis? JUHI could also be secondary to pigment induced ATN    Recommendations  - Heme/Onc recs noted with concern for hemolysis with high LDH, low haptoglobin, and elevated retic count  - Serum creatinine increasing.  - Continue to hold nephrotoxic medications such as diuretics, RCA and ARB/ACE  - Continue to monitor renal function and electrolytes   - No indication for HD as of yet.   - Dose medications as per eGFR.

## 2019-12-22 NOTE — DISCHARGE NOTE PROVIDER - CARE PROVIDERS DIRECT ADDRESSES
[email or web address],[email or web address],DirectAddress_Unknown

## 2019-12-22 NOTE — DISCHARGE NOTE PROVIDER - NSDCMRMEDTOKEN_GEN_ALL_CORE_FT
Coumadin 5 mg oral tablet: 1 tab(s) orally 4 times a week (T/T/S/S)_  Coumadin 7.5 mg oral tablet: 1 tab(s) orally 3 times a week (M/W/F)  digoxin 125 mcg (0.125 mg) oral tablet: 1 tab(s) orally once a day  Feosol 325 mg (65 mg elemental iron) oral tablet: 1 tab(s) orally once a day  fluticasone 50 mcg/inh nasal spray: 1 spray(s) nasal once a day  folic acid 1 mg oral tablet: 1 tab(s) orally once a day  meclizine 12.5 mg oral tablet: 1 tab(s) orally 3 times a day, As Needed  Mucinex 600 mg oral tablet, extended release: 1 tab(s) orally every 12 hours  Plavix 75 mg oral tablet: 1 tab(s) orally once a day  rosuvastatin 10 mg oral tablet: 1 tab(s) orally once a day  Toprol-XL 25 mg oral tablet, extended release: 1 tab(s) orally once a day  torsemide: 40 milligram(s) orally once a day acetaminophen 325 mg oral tablet: 2 tab(s) orally every 6 hours, As needed, Temp greater or equal to 38.5C (101.3F), Mild Pain (1 - 3)  albuterol 90 mcg/inh inhalation aerosol: 2 puff(s) inhaled every 6 hours, As needed, Shortness of Breath and/or Wheezing  aspirin 81 mg oral delayed release tablet: 1 tab(s) orally once a day  atorvastatin 40 mg oral tablet: 1 tab(s) orally once a day (at bedtime)  Coumadin 5 mg oral tablet: 1 tab(s) orally 4 times a week (T/T/S/S)_  Coumadin 7.5 mg oral tablet: 1 tab(s) orally 3 times a week (M/W/F)  Feosol 325 mg (65 mg elemental iron) oral tablet: 1 tab(s) orally once a day  fluticasone 50 mcg/inh nasal spray: 1 spray(s) nasal 2 times a day  folic acid 1 mg oral tablet: 1 tab(s) orally once a day  HumaLOG 100 units/mL subcutaneous solution: 7 unit(s) subcutaneous 3 times a day (before meals)  HumaLOG 100 units/mL subcutaneous solution: 1 application subcutaneous 4 times a day (before meals and at bedtime)  1 Unit(s) if Glucose 151 - 200  2 Unit(s) if Glucose 201 - 250  3 Unit(s) if Glucose 251 - 300  4 Unit(s) if Glucose 301 - 350  5 Unit(s) if Glucose 351 - 400  6 Unit(s) if Glucose GREATER THAN 400  insulin glargine: 10 unit(s) subcutaneous once a day (at bedtime)  meclizine 12.5 mg oral tablet: 1 tab(s) orally 3 times a day, As Needed  melatonin 5 mg oral tablet: 1 tab(s) orally once a day (at bedtime)  Mucinex 600 mg oral tablet, extended release: 1 tab(s) orally every 12 hours  pantoprazole 40 mg oral delayed release tablet: 1 tab(s) orally once a day (before a meal)  polyethylene glycol 3350 oral powder for reconstitution: 17 gram(s) orally once a day  predniSONE 20 mg oral tablet: 3 tab(s) orally once a day  senna oral tablet: 2 tab(s) orally once a day (at bedtime)  tiotropium 18 mcg inhalation capsule: 1 cap(s) inhaled once a day  torsemide 20 mg oral tablet: 2 tab(s) orally once a day

## 2019-12-22 NOTE — PROGRESS NOTE ADULT - PROBLEM SELECTOR PLAN 5
Pt on coumadin at home, will hold and start Heparin gtt in anticipation for possible TAVR. If no procedure, transition to coumadin   - CHADSVASc score of 6  - Continue with toprol 25mg qd. Digoxin has been held.   - Monitor on telemetry Pt on coumadin at home, on Heparin gtt in anticipation for possible TAVR. If no procedure, transition to coumadin   - CHADSVASc score of 6  - Continue with toprol 25mg qd. Digoxin has been held.   - Monitor on telemetry

## 2019-12-22 NOTE — DISCHARGE NOTE PROVIDER - HOSPITAL COURSE
84M PMH chronic anemia, HFrEF (40%), Afib on coumadin, T2DM, HLD, PVD, Menieres Disease, carotid stenosis, LBBB on EKG, CAD s/p PCI to mLAD in June 2014 and PCI to pRCA that was complicated by perforation and cardiogenic shock in May of 2015 presented for elective RHC/LHC for TAVR or BAV evaluation for severe AS, admitted for acute on on chronic CHF and anemia workup for TVAR or balloon aortic vavluoplasty Mon 12/23. Diuresis was managed by heart failure. Course complicated by RRT 12/19 for hypoxia due to febrile nonhemolytic transfusion rxn when Hg <7. 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 Nephrology consulted for JUHI likely from contrast induced nephropathy. 83 yo M with hx Afib (Coumadin), DM2, HLD, CAD (stent 2014 and 2015 in mLAD and RCA), HFrEF (40%) severe AS, LBBB, baseline SCr 1.4, was referred by cardiologist for symptomatic CHF exacerbation. Echo from 12/10 shows severe aortic stenosis and EF 30%. Patient s/p RHC/LHC prior to scheduled TAVR. Patient was found to have worsening LE swelling and anemia with Hb of 7.9 and was admitted to medicine for further management. Pt was being optimized pre TAVR with diuretics per Heart failure service.     Pt underwent balloon aortic valvuloplasty on 12/26.    Hospital Course:    12/26 s/p AV balloon valvuloplasty. s/p 1 PRBC    12/27 s/p PRBC x1. H/H stable 8/24 Transferred to 2 [facility]. Continue w/ intermittent diureses.      12/28: creatinine improving.     12/29: Creatinine continues to improve. Pt recommended for rehab. Also lives alone. Discharge when medically stable. Diuretics still being adjusted. Insulin added to regimen as blood sugars are high likely 2/2 addition of prednisone.     12/30: VSS. Continued improvement of BUN and creatinine. Cont on Lasix 40mg IV BID. Lantus and humalog added for elevated sugars, Dr. Cobb to evaluate pt today for insulin regimen. INR of 1.0, will dose 7.5 of Coumadin today in accordance with his home Coumadin schedule (7.5mg on M/W/F, 5mg on T/Th/Sat/Sun). Continue patient on prednisone 60 and folic acid as per heme, improvement of H&H today at 8.1 & 24.     Discharge planning-rehab once medically stable.     12/31 - vss hct 22 - pt asymptomatic - heme to tape steroids. d/c to rehab Thursday. [facility]     1/1/20 Lasix po d/c'd by HF- pt received Lasix 40mg po this am - will start Torsemide 40mg po tomorrow - d/c to rehab Thursday on pred 60 per heme & diuretics.    1/2/20 repeat hbg 6.3 - RBC 1 unit as per Heme - spoke to fellow.  will hold off on transfer to rehab today - rehab tomorrow if hct stable pt is asymptomatic @ this time.     1/3/20 VVS; Repeat H&H 6.9 / 20.  Per Hematology patient medically stable at this time.  No further transfusion warranted at this time. Patient cleared by Dr. Becker for discharge.  Patient discharged to Rehab. Per heme discharge recommendations: Steroids started on 12/21 dosed 1mg/kg daily- please continue the same dose through discharge to rehab- please indicate in discharge summary that patient will resume with this same dose until he sees his private hematologist Dr. Miranda. Dr. Miranda will then taper steroids over a long period of time- usually over a period of months.

## 2019-12-22 NOTE — DISCHARGE NOTE PROVIDER - PROVIDER TOKENS
PROVIDER:[TOKEN:[[id number]],FOLLOWUP:[1 week]],PROVIDER:[TOKEN:[[id number]],FOLLOWUP:[2 weeks]],PROVIDER:[TOKEN:[[id number]],FOLLOWUP:[1 week]],PROVIDER:[TOKEN:[[id number]],FOLLOWUP:[1 month]]

## 2019-12-22 NOTE — PROVIDER CONTACT NOTE (CRITICAL VALUE NOTIFICATION) - ACTION/TREATMENT ORDERED:
No transfusion as patient is currently stable and has hx transfusion reaction. Will continue to monitor and follow heme recommendations.

## 2019-12-22 NOTE — PROGRESS NOTE ADULT - PROBLEM SELECTOR PLAN 2
TTE showing severe aortic stenosis, symptomatic with SOB on exertion  - Structural heart following, plan for TAVR or balloon valvuloplasty Mon 12/23. Cleared by vascular  - Monitor on tele  - CT coronaries and VA carotid arteries (left ICA occluded, right ICA 50-69%) TTE showing severe aortic stenosis, symptomatic with SOB on exertion  - Structural heart following, plan for TAVR or balloon valvuloplasty Mon 12/23 but HgB remains <7. Cleared by vascular.  - Monitor on tele  - CT coronaries and VA carotid arteries (left ICA occluded, right ICA 50-69%)

## 2019-12-22 NOTE — PROGRESS NOTE ADULT - ATTENDING COMMENTS
Advance Care Planning (ACP) Provider Conversation Snapshot    Date of ACP Conversation: 11/05/18  Persons included in Conversation:  patient  Length of ACP Conversation in minutes:  <16 minutes (Non-Billable)    Authorized Decision Maker (if patient is incapable of making informed decisions): This person is:   Healthcare Agent/Medical Power of  under Advance Directive          For Patients with Decision Making Capacity:   Values/Goals: Exploration of values, goals, and preferences if recovery is not expected, even with continued medical treatment in the event of:  Imminent death  Severe, permanent brain injury    Conversation Outcomes / Follow-Up Plan:   Recommended completion of Advance Directive form after review of ACP materials and conversation with prospective healthcare agent    Patient said he is not interested in completing Advance Directive. JUHI- Likely ATN due to contrast induced nephropathy now possibly superimposed by heme pigment induced injury ( decreased renal perfusion, direct cytotoxicity, and intratubular casts formed from the interaction of heme proteins with T[place]-Horsfall protein). Monitor. No diuretics.

## 2019-12-22 NOTE — DISCHARGE NOTE PROVIDER - INSTRUCTIONS
Regular Dash / Diabetic diet - low fat, low cholesterol, no added salt. Add 1 Can of Ensure with meal three times a day

## 2019-12-22 NOTE — PROGRESS NOTE ADULT - ATTENDING COMMENTS
-Patient seen/examined on 12/22/19. Case/plan discussed with the intern and resident as reviewed/edited by me above and in any comments below. -Patient seen/examined on 12/22/19. Case/plan discussed with the intern and resident as reviewed/edited by me above and in any comments below.  -F/u renal recs.   -F/u with structural heart.   -C/w steroids and monitor CBC.   -F/u uric acid levels. -Patient reports decreased UO.

## 2019-12-22 NOTE — PROGRESS NOTE ADULT - PROBLEM SELECTOR PLAN 3
Hb 7.9, baseline 8-9 in 2015, follows with outside hematologist, Dr. Miranda in [place]  s/p febrile nonhemolytic transfusion rxn during 1/2U prbc  - Hold off transfusion unless symptomatic. 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  -Check SPEP, UPEP, serum FLC Hb 7.9, baseline 8-9 in 2015, follows with outside hematologist, Dr. Miranda in [place]  s/p febrile nonhemolytic transfusion rxn during 1/2U prbc  - Hold off transfusion unless symptomatic. 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 Heme rec hold off transfusion unless symptomatic  -f/u cold agglutinin titer, ALMA DELIA, RF (neg)  -daily CBC with diff, retic count and LDH  -Added PPI while on steroids, heparin drip, and plavix.  -Check SPEP, UPEP, serum FLC

## 2019-12-22 NOTE — PROGRESS NOTE ADULT - PROBLEM SELECTOR PLAN 9
DVT: heparin gtt for TAVR/BAV procedure  Diet: Consistent carb and DASH  Dispo: PT reeval after TAVR DVT: heparin gtt for TAVR/BAV procedure  Diet: Consistent carb and DASH, NPO except meds for possible procedure gracia  Dispo: PT reeval after TAVR DVT: heparin gtt for TAVR/BAV procedure  Diet: Consistent carb and DASH, NPO except meds for possible procedure gracia  Dispo: PT reeval after TAVR.

## 2019-12-22 NOTE — PROGRESS NOTE ADULT - SUBJECTIVE AND OBJECTIVE BOX
Shari Dang MD  PGY-1  Pager [phone]  Pager N [phone]      Patient is a 84y old  Male who presents with a chief complaint of CHF exacerbation (21 Dec 2019 12:29)        SUBJECTIVE / OVERNIGHT EVENTS: Patient had no acute events overnight. Patient seen and examined at bedside this morning.     ROS: [ - ] Fever [ - ] Chills [ - ] Nausea/Vomiting [ - ] Chest Pain [ - ] Shortness of breath     MEDICATIONS  (STANDING):  atorvastatin 40 milliGRAM(s) Oral at bedtime  clopidogrel Tablet 75 milliGRAM(s) Oral daily  ferrous    sulfate 325 milliGRAM(s) Oral daily  fluticasone propionate 50 MICROgram(s)/spray Nasal Spray 1 Spray(s) Both Nostrils daily  folic acid 1 milliGRAM(s) Oral daily  heparin  Infusion.  Unit(s)/Hr (11 mL/Hr) IV Continuous <Continuous>  melatonin 6 milliGRAM(s) Oral at bedtime  metoprolol succinate ER 25 milliGRAM(s) Oral daily  pantoprazole    Tablet 40 milliGRAM(s) Oral before breakfast  predniSONE   Tablet 60 milliGRAM(s) Oral daily  senna 2 Tablet(s) Oral at bedtime  sodium chloride 0.9% lock flush 3 milliLiter(s) IV Push every 8 hours  tiotropium 18 MICROgram(s) Capsule 1 Capsule(s) Inhalation daily    MEDICATIONS  (PRN):  ALBUTerol    90 MICROgram(s) HFA Inhaler 2 Puff(s) Inhalation every 6 hours PRN Shortness of Breath and/or Wheezing  guaiFENesin  milliGRAM(s) Oral every 12 hours PRN Cough  heparin  Injectable 4500 Unit(s) IV Push every 6 hours PRN For aPTT less than 40  heparin  Injectable 2000 Unit(s) IV Push every 6 hours PRN For aPTT between 40 - 57  meclizine 12.5 milliGRAM(s) Oral three times a day PRN Dizziness      Vital Signs Last 24 Hrs  T(C): 36.7 (22 Dec 2019 04:06), Max: 36.8 (21 Dec 2019 18:32)  T(F): 98.1 (22 Dec 2019 04:06), Max: 98.3 (21 Dec 2019 18:32)  HR: 76 (22 Dec 2019 05:04) (53 - 79)  BP: 99/58 (22 Dec 2019 05:04) (99/58 - 101/41)  BP(mean): --  RR: 18 (22 Dec 2019 04:06) (17 - 18)  SpO2: 100% (22 Dec 2019 04:06) (98% - 100%)  CAPILLARY BLOOD GLUCOSE        I&O's Summary    21 Dec 2019 07:01  -  22 Dec 2019 07:00  --------------------------------------------------------  IN: 700 mL / OUT: 350 mL / NET: 350 mL        PHYSICAL EXAM  GENERAL: NAD, lying comfortably in bed   HEENT:  Atraumatic, Normocephalic, EOMI, conjunctiva and sclera clear, no LAD  CHEST/LUNG: Clear to auscultation bilaterally; No wheeze  HEART: RRR, S1 and S2 No murmurs, rubs, or gallops  ABDOMEN: Soft, Nontender, Nondistended; Bowel sounds present  EXTREMITIES:  2+ Peripheral Pulses, No clubbing, cyanosis, or edema  NEURO: AAOx3, non-focal  SKIN: No rashes or lesions    LABS:                        6.6    8.28  )-----------( 204      ( 21 Dec 2019 16:59 )             20.4     12-    132<L>  |  98  |  79<H>  ----------------------------<  124<H>  4.0   |  20<L>  |  3.19<H>    Ca    8.5      21 Dec 2019 06:51  Phos  5.2       Mg     2.4         TPro  6.4  /  Alb  2.8<L>  /  TBili  1.8<H>  /  DBili  x   /  AST  10  /  ALT  8<L>  /  AlkPhos  54      PTT - ( 21 Dec 2019 10:00 )  PTT:71.3 sec      Urinalysis Basic - ( 21 Dec 2019 02:14 )    Color: Yellow / Appearance: Slightly Turbid / S.022 / pH: x  Gluc: x / Ketone: Negative  / Bili: Negative / Urobili: <2 mg/dL   Blood: x / Protein: Negative / Nitrite: Negative   Leuk Esterase: Small / RBC: 4 /HPF / WBC 19 /HPF   Sq Epi: x / Non Sq Epi: 7 /HPF / Bacteria: Negative          RADIOLOGY & ADDITIONAL TESTS:    Imaging Personally Reviewed:  Consultant(s) Notes Reviewed: Shari Dang MD  PGY-1  Pager [phone]  Pager I [phone]      Patient is a 84y old  Male who presents with a chief complaint of CHF exacerbation (21 Dec 2019 12:29)        SUBJECTIVE / OVERNIGHT EVENTS: Patient had no acute events overnight. Patient seen and examined at bedside this morning. He denies SOB, fevers, swelling, and tolerating diet. Patient reports decrease urine.     ROS: [ - ] Fever [ - ] Chills [ - ] Nausea/Vomiting [ - ] Chest Pain [ - ] Shortness of breath     Telemetry: Afib 60-80s    MEDICATIONS  (STANDING):  atorvastatin 40 milliGRAM(s) Oral at bedtime  clopidogrel Tablet 75 milliGRAM(s) Oral daily  ferrous    sulfate 325 milliGRAM(s) Oral daily  fluticasone propionate 50 MICROgram(s)/spray Nasal Spray 1 Spray(s) Both Nostrils daily  folic acid 1 milliGRAM(s) Oral daily  heparin  Infusion.  Unit(s)/Hr (11 mL/Hr) IV Continuous <Continuous>  melatonin 6 milliGRAM(s) Oral at bedtime  metoprolol succinate ER 25 milliGRAM(s) Oral daily  pantoprazole    Tablet 40 milliGRAM(s) Oral before breakfast  predniSONE   Tablet 60 milliGRAM(s) Oral daily  senna 2 Tablet(s) Oral at bedtime  sodium chloride 0.9% lock flush 3 milliLiter(s) IV Push every 8 hours  tiotropium 18 MICROgram(s) Capsule 1 Capsule(s) Inhalation daily    MEDICATIONS  (PRN):  ALBUTerol    90 MICROgram(s) HFA Inhaler 2 Puff(s) Inhalation every 6 hours PRN Shortness of Breath and/or Wheezing  guaiFENesin  milliGRAM(s) Oral every 12 hours PRN Cough  heparin  Injectable 4500 Unit(s) IV Push every 6 hours PRN For aPTT less than 40  heparin  Injectable 2000 Unit(s) IV Push every 6 hours PRN For aPTT between 40 - 57  meclizine 12.5 milliGRAM(s) Oral three times a day PRN Dizziness      Vital Signs Last 24 Hrs  T(C): 36.7 (22 Dec 2019 04:06), Max: 36.8 (21 Dec 2019 18:32)  T(F): 98.1 (22 Dec 2019 04:06), Max: 98.3 (21 Dec 2019 18:32)  HR: 76 (22 Dec 2019 05:04) (53 - 79)  BP: 99/58 (22 Dec 2019 05:04) (99/58 - 101/41)  BP(mean): --  RR: 18 (22 Dec 2019 04:06) (17 - 18)  SpO2: 100% (22 Dec 2019 04:06) (98% - 100%)  CAPILLARY BLOOD GLUCOSE        I&O's Summary    21 Dec 2019 07:01  -  22 Dec 2019 07:00  --------------------------------------------------------  IN: 700 mL / OUT: 350 mL / NET: 350 mL        PHYSICAL EXAM  GENERAL: NAD, lying comfortably in bed   HEENT:  Atraumatic, Normocephalic, EOMI, conjunctiva and sclera clear, no LAD, no JVD  CHEST/LUNG: Clear to auscultation bilaterally; No wheeze  HEART: irregularly irregular, systolic murmur, S1 and S2, rubs, or gallops  ABDOMEN: Soft, Nontender, Nondistended; Bowel sounds present  EXTREMITIES:  2+ Peripheral Pulses, No clubbing, cyanosis, or edema  NEURO: AAOx3, non-focal  SKIN: No rashes or lesions    LABS:                        6.6    8.28  )-----------( 204      ( 21 Dec 2019 16:59 )             20.4     12-21    132<L>  |  98  |  79<H>  ----------------------------<  124<H>  4.0   |  20<L>  |  3.19<H>    Ca    8.5      21 Dec 2019 06:51  Phos  5.2     12-  Mg     2.4     12-    TPro  6.4  /  Alb  2.8<L>  /  TBili  1.8<H>  /  DBili  x   /  AST  10  /  ALT  8<L>  /  AlkPhos  54  12-21    PTT - ( 21 Dec 2019 10:00 )  PTT:71.3 sec      Urinalysis Basic - ( 21 Dec 2019 02:14 )    Color: Yellow / Appearance: Slightly Turbid / S.022 / pH: x  Gluc: x / Ketone: Negative  / Bili: Negative / Urobili: <2 mg/dL   Blood: x / Protein: Negative / Nitrite: Negative   Leuk Esterase: Small / RBC: 4 /HPF / WBC 19 /HPF   Sq Epi: x / Non Sq Epi: 7 /HPF / Bacteria: Negative          RADIOLOGY & ADDITIONAL TESTS:    Imaging Personally Reviewed:  Consultant(s) Notes Reviewed:

## 2019-12-22 NOTE — DISCHARGE NOTE PROVIDER - NSDCPNSUBOBJ_GEN_ALL_CORE
VITAL SIGNS        Subjective: Denies CP, palpitation, SOB, DYER, HA, dizziness, N/V/D, fever or chills.  No acute event noted overnight.         Telemetry: Afib           Vital Signs Last 24 Hrs    T(C): 36.7 (20 @ 04:25), Max: 36.7 (20 @ 04:25)    T(F): 98 (20 @ 04:25), Max: 98 (20 @ 04:25)    HR: 79 (20 @ 12:56) (79 - 93)    BP: 116/68 (20 @ 12:56) (100/62 - 119/74)    RR: 18 (20 @ 04:25) (18 - 18)    SpO2: 96% (20 @ 12:56) (96% - 100%)                @ 07:01  -   @ 07:00    --------------------------------------------------------    IN: 810 mL / OUT: 1250 mL / NET: -440 mL         @ 07:01  -   @ 13:46    --------------------------------------------------------    IN: 240 mL / OUT: 900 mL / NET: -660 mL        Daily       Daily Weight in k.9 (2020 08:00)        CAPILLARY BLOOD GLUCOSE        POCT Blood Glucose.: 159 mg/dL (2020 12:18)    POCT Blood Glucose.: 123 mg/dL (2020 08:30)    POCT Blood Glucose.: 116 mg/dL (2020 21:25)    POCT Blood Glucose.: 173 mg/dL (2020 17:16)            PHYSICAL EXAM        Neurology: alert and oriented x 3, nonfocal, no gross deficits        CV: (+) systolic muimur         Lungs: CTA B/L         Abdomen: soft, nontender, nondistended, positive bowel sounds, (+) Flatus; (+) BM         :  Voiding                   Extremities:  B/L LE (-) edema; negative calf tenderness; (+) 2 DP palpable            acetaminophen   Tablet .. 650 milliGRAM(s) Oral every 6 hours PRN    ALBUTerol    90 MICROgram(s) HFA Inhaler 2 Puff(s) Inhalation every 6 hours PRN    aspirin enteric coated 81 milliGRAM(s) Oral daily    atorvastatin 40 milliGRAM(s) Oral at bedtime    dextrose 40% Gel 15 Gram(s) Oral once PRN    dextrose 5%. 1000 milliLiter(s) IV Continuous <Continuous>    dextrose 50% Injectable 12.5 Gram(s) IV Push once    ferrous    sulfate 325 milliGRAM(s) Oral daily    fluticasone propionate 50 MICROgram(s)/spray Nasal Spray 1 Spray(s) Both Nostrils two times a day    folic acid 1 milliGRAM(s) Oral daily    glucagon  Injectable 1 milliGRAM(s) IntraMuscular once PRN    insulin glargine Injectable (LANTUS) 10 Unit(s) SubCutaneous at bedtime    insulin lispro (HumaLOG) corrective regimen sliding scale   SubCutaneous three times a day before meals    insulin lispro (HumaLOG) corrective regimen sliding scale   SubCutaneous at bedtime    insulin lispro Injectable (HumaLOG) 7 Unit(s) SubCutaneous three times a day before meals    melatonin 5 milliGRAM(s) Oral at bedtime    pantoprazole    Tablet 40 milliGRAM(s) Oral before breakfast    polyethylene glycol 3350 17 Gram(s) Oral daily    predniSONE   Tablet 60 milliGRAM(s) Oral daily    senna 2 Tablet(s) Oral at bedtime    sodium chloride 0.9% lock flush 3 milliLiter(s) IV Push every 8 hours    sodium chloride 0.9%. 1000 milliLiter(s) IV Continuous <Continuous>    tiotropium 18 MICROgram(s) Capsule 1 Capsule(s) Inhalation daily    torsemide 40 milliGRAM(s) Oral daily        Discussed with Cardiothoracic Team at AM rounds.        Spent 45 min face to face encounter with patient and discharge note.

## 2019-12-22 NOTE — PROGRESS NOTE ADULT - PROBLEM SELECTOR PLAN 4
2/2 contrast from R/[facility] with diuresis. Cr initial 1.3 (baseline Cr 0.85 from 2015)  - hold valsartan until Cr stabilizes. -Lasix held.   - trend Cr and avoid nephrotoxin meds. Hold fluids for now  - monitor I/Os  - nephrology recs

## 2019-12-22 NOTE — PROGRESS NOTE ADULT - ASSESSMENT
85 yo M with hx Afib (Coumadin), DM, HLD, CAD (stent 2014 and 2015 in mLAD and RCA), HFrEF (40%), sever aortic stenosis was admitted to [facility] on 12/13/19 after being referred by Cardiologist Dr. Becker for  decompensated heart failure after R/LHC with plans for optimization prior to consideration of TAVR. Nephrology consulted for JUHI.

## 2019-12-22 NOTE — PROGRESS NOTE ADULT - SUBJECTIVE AND OBJECTIVE BOX
[facility] Division of Kidney Diseases & Hypertension  FOLLOW UP NOTE  ------------------------------------------------------------------------------  Chief Complaint:Aortic valve disorder      24 hour events/subjective:    Patient seen this morning without significant complaints  No acute events overnight    PAST HISTORY  --------------------------------------------------------------------------------  No significant changes to PMH, PSH, FHx, SHx, unless otherwise noted    ALLERGIES & MEDICATIONS  --------------------------------------------------------------------------------  Allergies    No Known Allergies    Intolerances      Standing Inpatient Medications  atorvastatin 40 milliGRAM(s) Oral at bedtime  clopidogrel Tablet 75 milliGRAM(s) Oral daily  ferrous    sulfate 325 milliGRAM(s) Oral daily  fluticasone propionate 50 MICROgram(s)/spray Nasal Spray 1 Spray(s) Both Nostrils daily  folic acid 1 milliGRAM(s) Oral daily  heparin  Infusion.  Unit(s)/Hr IV Continuous <Continuous>  melatonin 6 milliGRAM(s) Oral at bedtime  metoprolol succinate ER 25 milliGRAM(s) Oral daily  pantoprazole    Tablet 40 milliGRAM(s) Oral before breakfast  predniSONE   Tablet 60 milliGRAM(s) Oral daily  senna 2 Tablet(s) Oral at bedtime  sodium chloride 0.9% lock flush 3 milliLiter(s) IV Push every 8 hours  tiotropium 18 MICROgram(s) Capsule 1 Capsule(s) Inhalation daily    PRN Inpatient Medications  ALBUTerol    90 MICROgram(s) HFA Inhaler 2 Puff(s) Inhalation every 6 hours PRN  guaiFENesin  milliGRAM(s) Oral every 12 hours PRN  heparin  Injectable 4500 Unit(s) IV Push every 6 hours PRN  heparin  Injectable 2000 Unit(s) IV Push every 6 hours PRN  meclizine 12.5 milliGRAM(s) Oral three times a day PRN      REVIEW OF SYSTEMS  --------------------------------------------------------------------------------  Gen: No  fevers/chills, weakness  Skin: No rashes  Head/Eyes/Ears/Mouth: No headache;   Respiratory: + DYER  CV: No chest pain,   GI: No abdominal pain  : No increased frequency, dysuria, hematuria, nocturia  MSK: + Leg swelling  Neuro: No dizziness/lightheadedness, weakness, seizures    All other systems were reviewed and are negative, except as noted.      VITALS/PHYSICAL EXAM  --------------------------------------------------------------------------------  T(C): 36.7 (12-22-19 @ 04:06), Max: 36.8 (12-21-19 @ 18:32)  HR: 76 (12-22-19 @ 05:04) (53 - 76)  BP: 99/58 (12-22-19 @ 05:04) (99/58 - 100/66)  RR: 18 (12-22-19 @ 04:06) (17 - 18)  SpO2: 100% (12-22-19 @ 04:06) (98% - 100%)  Wt(kg): --        12-21-19 @ 07:01  -  12-22-19 @ 07:00  --------------------------------------------------------  IN: 700 mL / OUT: 350 mL / NET: 350 mL      Physical Exam:  	Gen: NAD, well-appearing  	HEENT: supple neck  	Pulm: CTA B/L  	CV:  S1S2  	Abd: +BS, soft   	Ext: No B/L Lower ext edema  	Neuro: No focal deficits  	Skin: Warm, without rashes  	Vascular access: none    LABS/STUDIES  --------------------------------------------------------------------------------              6.4    5.34  >-----------<  190      [12-22-19 @ 09:01]              19.6     133  |  97  |  90  ----------------------------<  194      [12-22-19 @ 06:46]  4.6   |  20  |  3.45        Ca     8.5     [12-22-19 @ 06:46]      Mg     2.5     [12-22-19 @ 06:46]      Phos  5.8     [12-22-19 @ 06:46]    TPro  6.4  /  Alb  2.9  /  TBili  1.2  /  DBili  x   /  AST  10  /  ALT  6   /  AlkPhos  56  [12-22-19 @ 06:46]      PTT: 71.3       [12-21-19 @ 10:00]    Uric acid 14.9      [12-22-19 @ 06:46]        [12-22-19 @ 06:46]  Serum Osmolality 311      [12-21-19 @ 10:00]    Creatinine Trend:  SCr 3.45 [12-22 @ 06:46]  SCr 3.19 [12-21 @ 06:51]  SCr 2.49 [12-20 @ 06:33]  SCr 1.66 [12-19 @ 14:30]  SCr 1.65 [12-19 @ 06:26]    Urinalysis - [12-21-19 @ 02:14]      Color Yellow / Appearance Slightly Turbid / SG 1.022 / pH 5.0      Gluc Negative / Ketone Negative  / Bili Negative / Urobili <2 mg/dL       Blood Negative / Protein Negative / Leuk Est Small / Nitrite Negative      RBC 4 / WBC 19 / Hyaline 6 / Gran  / Sq Epi  / Non Sq Epi 7 / Bacteria Negative    Urine Creatinine 131      [12-21-19 @ 00:40]  Urine Sodium <35      [12-21-19 @ 00:40]  Urine Urea Nitrogen 775      [12-21-19 @ 02:30]  Urine Osmolality 482      [12-21-19 @ 02:14]        Rheumatoid Factor <10      [12-21-19 @ 20:17]

## 2019-12-23 LAB
% ALBUMIN: 46.7 % — SIGNIFICANT CHANGE UP
% ALPHA 1: 7.7 % — SIGNIFICANT CHANGE UP
% ALPHA 2: 11.4 % — SIGNIFICANT CHANGE UP
% BETA: 11.3 % — SIGNIFICANT CHANGE UP
% GAMMA: 22.9 % — SIGNIFICANT CHANGE UP
% M SPIKE: SIGNIFICANT CHANGE UP
ALBUMIN SERPL ELPH-MCNC: 3.1 G/DL — LOW (ref 3.3–5)
ALBUMIN SERPL ELPH-MCNC: 3.1 G/DL — LOW (ref 3.6–5.5)
ALBUMIN/GLOB SERPL ELPH: 0.9 RATIO — SIGNIFICANT CHANGE UP
ALP SERPL-CCNC: 56 U/L — SIGNIFICANT CHANGE UP (ref 40–120)
ALPHA1 GLOB SERPL ELPH-MCNC: 0.5 G/DL — HIGH (ref 0.1–0.4)
ALPHA2 GLOB SERPL ELPH-MCNC: 0.8 G/DL — SIGNIFICANT CHANGE UP (ref 0.5–1)
ALT FLD-CCNC: 7 U/L — LOW (ref 10–45)
ANION GAP SERPL CALC-SCNC: 17 MMOL/L — SIGNIFICANT CHANGE UP (ref 5–17)
APPEARANCE UR: CLEAR — SIGNIFICANT CHANGE UP
APTT BLD: 119.3 SEC — HIGH (ref 27.5–36.3)
APTT BLD: 66.5 SEC — HIGH (ref 27.5–36.3)
APTT BLD: 98.2 SEC — HIGH (ref 27.5–36.3)
AST SERPL-CCNC: 10 U/L — SIGNIFICANT CHANGE UP (ref 10–40)
B-GLOBULIN SERPL ELPH-MCNC: 0.7 G/DL — SIGNIFICANT CHANGE UP (ref 0.5–1)
BACTERIA # UR AUTO: NEGATIVE — SIGNIFICANT CHANGE UP
BASOPHILS # BLD AUTO: 0.01 K/UL — SIGNIFICANT CHANGE UP (ref 0–0.2)
BASOPHILS NFR BLD AUTO: 0.1 % — SIGNIFICANT CHANGE UP (ref 0–2)
BILIRUB SERPL-MCNC: 1.3 MG/DL — HIGH (ref 0.2–1.2)
BILIRUB UR-MCNC: NEGATIVE — SIGNIFICANT CHANGE UP
BUN SERPL-MCNC: 102 MG/DL — HIGH (ref 7–23)
CALCIUM SERPL-MCNC: 8.6 MG/DL — SIGNIFICANT CHANGE UP (ref 8.4–10.5)
CHLORIDE SERPL-SCNC: 95 MMOL/L — LOW (ref 96–108)
CO2 SERPL-SCNC: 19 MMOL/L — LOW (ref 22–31)
COLOR SPEC: YELLOW — SIGNIFICANT CHANGE UP
CREAT SERPL-MCNC: 4.12 MG/DL — HIGH (ref 0.5–1.3)
DIFF PNL FLD: NEGATIVE — SIGNIFICANT CHANGE UP
EOSINOPHIL # BLD AUTO: 0.01 K/UL — SIGNIFICANT CHANGE UP (ref 0–0.5)
EOSINOPHIL NFR BLD AUTO: 0.1 % — SIGNIFICANT CHANGE UP (ref 0–6)
EPI CELLS # UR: 0 /HPF — SIGNIFICANT CHANGE UP
GAMMA GLOBULIN: 1.5 G/DL — SIGNIFICANT CHANGE UP (ref 0.6–1.6)
GLUCOSE BLDC GLUCOMTR-MCNC: 205 MG/DL — HIGH (ref 70–99)
GLUCOSE BLDC GLUCOMTR-MCNC: 241 MG/DL — HIGH (ref 70–99)
GLUCOSE BLDC GLUCOMTR-MCNC: 260 MG/DL — HIGH (ref 70–99)
GLUCOSE SERPL-MCNC: 170 MG/DL — HIGH (ref 70–99)
GLUCOSE UR QL: NEGATIVE — SIGNIFICANT CHANGE UP
HCT VFR BLD CALC: 19 % — CRITICAL LOW (ref 39–50)
HGB BLD-MCNC: 6.1 G/DL — CRITICAL LOW (ref 13–17)
HYALINE CASTS # UR AUTO: 1 /LPF — SIGNIFICANT CHANGE UP (ref 0–2)
IMM GRANULOCYTES NFR BLD AUTO: 1.7 % — HIGH (ref 0–1.5)
INR BLD: 1.09 RATIO — SIGNIFICANT CHANGE UP (ref 0.88–1.16)
INTERPRETATION SERPL IFE-IMP: SIGNIFICANT CHANGE UP
KAPPA LC SER QL IFE: 13.58 MG/DL — HIGH (ref 0.33–1.94)
KAPPA/LAMBDA FREE LIGHT CHAIN RATIO, SERUM: 1.61 RATIO — SIGNIFICANT CHANGE UP (ref 0.26–1.65)
KETONES UR-MCNC: NEGATIVE — SIGNIFICANT CHANGE UP
LAMBDA LC SER QL IFE: 8.44 MG/DL — HIGH (ref 0.57–2.63)
LDH SERPL L TO P-CCNC: 239 U/L — SIGNIFICANT CHANGE UP (ref 50–242)
LEUKOCYTE ESTERASE UR-ACNC: ABNORMAL
LYMPHOCYTES # BLD AUTO: 1.02 K/UL — SIGNIFICANT CHANGE UP (ref 1–3.3)
LYMPHOCYTES # BLD AUTO: 11.4 % — LOW (ref 13–44)
M-SPIKE: SIGNIFICANT CHANGE UP (ref 0–0)
MAGNESIUM SERPL-MCNC: 2.6 MG/DL — SIGNIFICANT CHANGE UP (ref 1.6–2.6)
MCHC RBC-ENTMCNC: 30 PG — SIGNIFICANT CHANGE UP (ref 27–34)
MCHC RBC-ENTMCNC: 32.1 GM/DL — SIGNIFICANT CHANGE UP (ref 32–36)
MCV RBC AUTO: 93.6 FL — SIGNIFICANT CHANGE UP (ref 80–100)
MONOCYTES # BLD AUTO: 0.47 K/UL — SIGNIFICANT CHANGE UP (ref 0–0.9)
MONOCYTES NFR BLD AUTO: 5.3 % — SIGNIFICANT CHANGE UP (ref 2–14)
NEUTROPHILS # BLD AUTO: 7.26 K/UL — SIGNIFICANT CHANGE UP (ref 1.8–7.4)
NEUTROPHILS NFR BLD AUTO: 81.4 % — HIGH (ref 43–77)
NITRITE UR-MCNC: NEGATIVE — SIGNIFICANT CHANGE UP
OSMOLALITY UR: 439 MOS/KG — SIGNIFICANT CHANGE UP (ref 300–900)
PH UR: 5 — SIGNIFICANT CHANGE UP (ref 5–8)
PHOSPHATE SERPL-MCNC: 6.1 MG/DL — HIGH (ref 2.5–4.5)
PLATELET # BLD AUTO: 215 K/UL — SIGNIFICANT CHANGE UP (ref 150–400)
POTASSIUM SERPL-MCNC: 4.5 MMOL/L — SIGNIFICANT CHANGE UP (ref 3.5–5.3)
POTASSIUM SERPL-SCNC: 4.5 MMOL/L — SIGNIFICANT CHANGE UP (ref 3.5–5.3)
PROT PATTERN SERPL ELPH-IMP: SIGNIFICANT CHANGE UP
PROT SERPL-MCNC: 6.6 G/DL — SIGNIFICANT CHANGE UP (ref 6–8.3)
PROT SERPL-MCNC: 6.6 G/DL — SIGNIFICANT CHANGE UP (ref 6–8.3)
PROT UR-MCNC: NEGATIVE — SIGNIFICANT CHANGE UP
PROTHROM AB SERPL-ACNC: 12.4 SEC — SIGNIFICANT CHANGE UP (ref 10–13.1)
RBC # BLD: 2.03 M/UL — LOW (ref 4.2–5.8)
RBC # BLD: 2.03 M/UL — LOW (ref 4.2–5.8)
RBC # FLD: 18.2 % — HIGH (ref 10.3–14.5)
RBC CASTS # UR COMP ASSIST: 6 /HPF — HIGH (ref 0–4)
RETICS #: 154.7 K/UL — HIGH (ref 25–125)
RETICS/RBC NFR: 7.6 % — HIGH (ref 0.5–2.5)
SODIUM SERPL-SCNC: 131 MMOL/L — LOW (ref 135–145)
SODIUM UR-SCNC: <35 MMOL/L — SIGNIFICANT CHANGE UP
SP GR SPEC: 1.02 — SIGNIFICANT CHANGE UP (ref 1.01–1.02)
URATE SERPL-MCNC: 15.2 MG/DL — HIGH (ref 3.4–8.8)
UROBILINOGEN FLD QL: NEGATIVE — SIGNIFICANT CHANGE UP
WBC # BLD: 8.92 K/UL — SIGNIFICANT CHANGE UP (ref 3.8–10.5)
WBC # FLD AUTO: 8.92 K/UL — SIGNIFICANT CHANGE UP (ref 3.8–10.5)
WBC UR QL: 4 /HPF — SIGNIFICANT CHANGE UP (ref 0–5)

## 2019-12-23 PROCEDURE — 99233 SBSQ HOSP IP/OBS HIGH 50: CPT | Mod: GC

## 2019-12-23 PROCEDURE — 99232 SBSQ HOSP IP/OBS MODERATE 35: CPT

## 2019-12-23 PROCEDURE — 99233 SBSQ HOSP IP/OBS HIGH 50: CPT

## 2019-12-23 RX ORDER — DEXTROSE 50 % IN WATER 50 %
25 SYRINGE (ML) INTRAVENOUS ONCE
Refills: 0 | Status: DISCONTINUED | OUTPATIENT
Start: 2019-12-23 | End: 2019-12-26

## 2019-12-23 RX ORDER — GLUCAGON INJECTION, SOLUTION 0.5 MG/.1ML
1 INJECTION, SOLUTION SUBCUTANEOUS ONCE
Refills: 0 | Status: DISCONTINUED | OUTPATIENT
Start: 2019-12-23 | End: 2019-12-26

## 2019-12-23 RX ORDER — DEXTROSE 50 % IN WATER 50 %
15 SYRINGE (ML) INTRAVENOUS ONCE
Refills: 0 | Status: DISCONTINUED | OUTPATIENT
Start: 2019-12-23 | End: 2019-12-26

## 2019-12-23 RX ORDER — SODIUM CHLORIDE 9 MG/ML
1000 INJECTION, SOLUTION INTRAVENOUS
Refills: 0 | Status: DISCONTINUED | OUTPATIENT
Start: 2019-12-23 | End: 2019-12-26

## 2019-12-23 RX ORDER — RASBURICASE 7.5 MG
3 KIT INTRAVENOUS ONCE
Refills: 0 | Status: DISCONTINUED | OUTPATIENT
Start: 2019-12-23 | End: 2019-12-23

## 2019-12-23 RX ORDER — INSULIN LISPRO 100/ML
VIAL (ML) SUBCUTANEOUS AT BEDTIME
Refills: 0 | Status: DISCONTINUED | OUTPATIENT
Start: 2019-12-23 | End: 2019-12-26

## 2019-12-23 RX ORDER — INSULIN LISPRO 100/ML
VIAL (ML) SUBCUTANEOUS
Refills: 0 | Status: DISCONTINUED | OUTPATIENT
Start: 2019-12-23 | End: 2019-12-26

## 2019-12-23 RX ORDER — DEXTROSE 50 % IN WATER 50 %
12.5 SYRINGE (ML) INTRAVENOUS ONCE
Refills: 0 | Status: DISCONTINUED | OUTPATIENT
Start: 2019-12-23 | End: 2019-12-26

## 2019-12-23 RX ORDER — SODIUM CHLORIDE 9 MG/ML
250 INJECTION INTRAMUSCULAR; INTRAVENOUS; SUBCUTANEOUS ONCE
Refills: 0 | Status: COMPLETED | OUTPATIENT
Start: 2019-12-23 | End: 2019-12-23

## 2019-12-23 RX ADMIN — Medication 1 MILLIGRAM(S): at 16:58

## 2019-12-23 RX ADMIN — SODIUM CHLORIDE 3 MILLILITER(S): 9 INJECTION INTRAMUSCULAR; INTRAVENOUS; SUBCUTANEOUS at 11:37

## 2019-12-23 RX ADMIN — Medication 1 SPRAY(S): at 11:33

## 2019-12-23 RX ADMIN — Medication 3: at 16:58

## 2019-12-23 RX ADMIN — PANTOPRAZOLE SODIUM 40 MILLIGRAM(S): 20 TABLET, DELAYED RELEASE ORAL at 05:06

## 2019-12-23 RX ADMIN — SENNA PLUS 2 TABLET(S): 8.6 TABLET ORAL at 21:09

## 2019-12-23 RX ADMIN — TIOTROPIUM BROMIDE 1 CAPSULE(S): 18 CAPSULE ORAL; RESPIRATORY (INHALATION) at 11:34

## 2019-12-23 RX ADMIN — Medication 60 MILLIGRAM(S): at 05:06

## 2019-12-23 RX ADMIN — SODIUM CHLORIDE 250 MILLILITER(S): 9 INJECTION INTRAMUSCULAR; INTRAVENOUS; SUBCUTANEOUS at 10:32

## 2019-12-23 RX ADMIN — CLOPIDOGREL BISULFATE 75 MILLIGRAM(S): 75 TABLET, FILM COATED ORAL at 16:58

## 2019-12-23 RX ADMIN — HEPARIN SODIUM 900 UNIT(S)/HR: 5000 INJECTION INTRAVENOUS; SUBCUTANEOUS at 16:20

## 2019-12-23 RX ADMIN — Medication 325 MILLIGRAM(S): at 16:58

## 2019-12-23 RX ADMIN — SODIUM CHLORIDE 3 MILLILITER(S): 9 INJECTION INTRAMUSCULAR; INTRAVENOUS; SUBCUTANEOUS at 21:08

## 2019-12-23 RX ADMIN — Medication 25 MILLIGRAM(S): at 05:06

## 2019-12-23 RX ADMIN — HEPARIN SODIUM 900 UNIT(S)/HR: 5000 INJECTION INTRAVENOUS; SUBCUTANEOUS at 09:21

## 2019-12-23 RX ADMIN — SODIUM CHLORIDE 3 MILLILITER(S): 9 INJECTION INTRAMUSCULAR; INTRAVENOUS; SUBCUTANEOUS at 05:06

## 2019-12-23 RX ADMIN — HEPARIN SODIUM 900 UNIT(S)/HR: 5000 INJECTION INTRAVENOUS; SUBCUTANEOUS at 23:24

## 2019-12-23 RX ADMIN — Medication 6 MILLIGRAM(S): at 21:09

## 2019-12-23 RX ADMIN — Medication 2: at 11:33

## 2019-12-23 RX ADMIN — ATORVASTATIN CALCIUM 40 MILLIGRAM(S): 80 TABLET, FILM COATED ORAL at 21:09

## 2019-12-23 NOTE — PROGRESS NOTE ADULT - PROBLEM SELECTOR PLAN 2
TTE showing severe aortic stenosis, symptomatic with SOB on exertion  - Structural heart following, plan for TAVR or balloon valvuloplasty Mon 12/23 but HgB remains <7. Cleared by vascular.  - Monitor on tele  - CT coronaries and VA carotid arteries (left ICA occluded, right ICA 50-69%) TTE showing severe aortic stenosis, symptomatic with SOB on exertion  - Structural heart following, holding an TAVR/balloon valvulopasty given decreased Hgb  - Monitor on tele  - possible TAVR/valvuloplasty

## 2019-12-23 NOTE — PROGRESS NOTE ADULT - PROBLEM SELECTOR PLAN 1
Patient with Non-oliguric JUHI secondary to diuretic use and recent contrast exposure. Now having hemolysis? JUHI could also be secondary to pigment induced ATN    Recommendations  - Heme/Onc recs noted with concern for hemolysis with high LDH, low haptoglobin, and elevated retic count  - Maribell test positive for IgG and C3  - Serum creatinine increasing.  - Hyperuricemia and hyperphosphatemia in the setting of ongoing renal failure?  - Check iCal  - Recheck UA with Urine Electrolytes   - GIVE IV NS 250cc IV Bolus  - Continue to hold nephrotoxic medications such as diuretics, RCA and ARB/ACE  - Continue to monitor renal function and electrolytes   - No indication for HD as of yet.   - Dose medications as per eGFR. Patient with Non-oliguric JUHI secondary to diuretic use and recent contrast exposure. His blood pressures has been borderline low recently. I would suspect ongoing renal failure currently could be pre-renal. 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   - Dose medications as per eGFR. Patient with Non-oliguric JUHI secondary to diuretic use and recent contrast exposure. His blood pressures has been borderline low recently. I would suspect ongoing renal failure currently could be pre-renal. 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   - Dose medications as per eGFR.  - Would avoid sudden hemodynamic shift in BP and achieve goal SBP>100 Patient with Non-oliguric JUHI secondary to diuretic use and recent contrast exposure. His blood pressures has been borderline low recently. I would suspect ongoing renal failure currently could be pre-renal. 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 BUN elevated can be explained by steroids.  - Hyperuricemia and hyperphosphatemia can be explained by ongoing renal failure  - Recheck UA with Urine Electrolytes   - GIVE IV NS 250cc IV Bolus  - Continue to hold nephrotoxic medications such as diuretics, RCA and ARB/ACE  - Continue to monitor renal function and electrolytes   - No indication for HD as of yet.   - Dose medications as per eGFR.  - Would avoid sudden hemodynamic shift in BP and achieve goal SBP>100

## 2019-12-23 NOTE — PROGRESS NOTE ADULT - PROBLEM SELECTOR PLAN 1
Overall not volume expanded, but with elevated right sided filling pressure.  -Continue metoprolol succinate 25 mg daily.  -we'll hold off diuretics and ACE/ARB given worsening renal function.  - daily standing weight and strict I's and O's.

## 2019-12-23 NOTE — PROGRESS NOTE ADULT - PROBLEM SELECTOR PLAN 8
- not on home medication, diet controlled  - A1c 5.6  - ISS if necessary. - not on home medication, diet controlled  - A1c 5.6  - will start correctional scale insulin as patient's glucose 300 this morning.

## 2019-12-23 NOTE — PROGRESS NOTE ADULT - ATTENDING COMMENTS
JUHI/ATN, CKD III, AS  No edema  Renal insult in setting of transfusion and diuretics; would be late for contrast (but  may be concern for atheroembolic?)  Hold diuretics and any other nephrotoxins  Check complement C3 and C4 with next set of labs  No IVF  Encourage eating  Will follow, remainder per fellow

## 2019-12-23 NOTE — PROGRESS NOTE ADULT - ASSESSMENT
84M PMH chronic anemia, HFrEF (40%), Afib on coumadin, T2DM, HLD, PVD, Menieres Disease, carotid stenosis, LBBB on EKG, CAD s/p PCI to mLAD in June 2014 and PCI to pRCA that was complicated by perforation and cardiogenic shock in May of 2015 presented for elective RHC/LHC for TAVR evaluation for severe AS, admitted for acute on on chronic CHF and anemia workup for TVAR or balloon aortic vavluoplasty Mon 12/23. Course complicated by possible transfusion rxn with RRT 12/19 for hypoxia. Hematology consulted for hx of chronic anemia.     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  - Cold agglutinin titer is still pending   - Steroids started on 12/21 dosed 1mg/kg daily- please continue at this time, it may take a few days up to a week to improve counts. If cold agglutinin titer returns high, we will add on Rituxan to pt's regimen.    - Please obtain pan scan CT chest/abd/pelvis without contrast given pt's kidney dysfunction to r/o any underlying malignancy that may be causing this chronic hemolytic anemia   - Continue folic acid  - F/u ALMA DELIA   - Agree that TAVR may not be the best option for patient who has dropping H/H, and may not meet parameter of hgb 8 for procedure due to hemolytic process  - DO NOT transfuse pt unless absolutely necessary as pt's anemia is hemolytic in nature  - Trend daily CBC with diff, retic count and LDH  - No DIC noted on labs   - will follow closely with you     Freda Correa MD  Hematology Oncology Fellow, PGY-4  [facility] Pager: [phone]/ [facility] Pager: [phone]

## 2019-12-23 NOTE — PROGRESS NOTE ADULT - SUBJECTIVE AND OBJECTIVE BOX
[facility] Division of Kidney Diseases & Hypertension  FOLLOW UP NOTE  -------------------------------------------------------------------------------  Chief Complaint:Aortic valve disorder      24 hour events/subjective:    Patient seen this morning without significant complaints   No acute events overnight    PAST HISTORY  --------------------------------------------------------------------------------  No significant changes to PMH, PSH, FHx, SHx, unless otherwise noted    ALLERGIES & MEDICATIONS  --------------------------------------------------------------------------------  Allergies    No Known Allergies    Intolerances      Standing Inpatient Medications  atorvastatin 40 milliGRAM(s) Oral at bedtime  clopidogrel Tablet 75 milliGRAM(s) Oral daily  ferrous    sulfate 325 milliGRAM(s) Oral daily  fluticasone propionate 50 MICROgram(s)/spray Nasal Spray 1 Spray(s) Both Nostrils daily  folic acid 1 milliGRAM(s) Oral daily  heparin  Infusion.  Unit(s)/Hr IV Continuous <Continuous>  melatonin 6 milliGRAM(s) Oral at bedtime  metoprolol succinate ER 25 milliGRAM(s) Oral daily  pantoprazole    Tablet 40 milliGRAM(s) Oral before breakfast  predniSONE   Tablet 60 milliGRAM(s) Oral daily  rasburicase IVPB 3 milliGRAM(s) IV Intermittent once  senna 2 Tablet(s) Oral at bedtime  sodium chloride 0.9% lock flush 3 milliLiter(s) IV Push every 8 hours  tiotropium 18 MICROgram(s) Capsule 1 Capsule(s) Inhalation daily    PRN Inpatient Medications  ALBUTerol    90 MICROgram(s) HFA Inhaler 2 Puff(s) Inhalation every 6 hours PRN  guaiFENesin  milliGRAM(s) Oral every 12 hours PRN  heparin  Injectable 4500 Unit(s) IV Push every 6 hours PRN  heparin  Injectable 2000 Unit(s) IV Push every 6 hours PRN  meclizine 12.5 milliGRAM(s) Oral three times a day PRN        REVIEW OF SYSTEMS  --------------------------------------------------------------------------------  Gen: No  fevers/chills, weakness  Skin: No rashes  Head/Eyes/Ears/Mouth: No headache;   Respiratory: + DYER  CV: No chest pain,   GI: No abdominal pain  : No increased frequency, dysuria, hematuria, nocturia  MSK: + Leg swelling  Neuro: No dizziness/lightheadedness, weakness, seizures    All other systems were reviewed and are negative, except as noted.    VITALS/PHYSICAL EXAM  --------------------------------------------------------------------------------  T(C): 36.4 (12-23-19 @ 05:04), Max: 36.6 (12-22-19 @ 20:38)  HR: 72 (12-23-19 @ 05:04) (64 - 73)  BP: 96/54 (12-23-19 @ 05:04) (95/61 - 99/58)  RR: 18 (12-23-19 @ 05:04) (17 - 18)  SpO2: 99% (12-23-19 @ 05:04) (96% - 99%)  Wt(kg): --        12-22-19 @ 07:01  -  12-23-19 @ 07:00  --------------------------------------------------------  IN: 720 mL / OUT: 400 mL / NET: 320 mL      Physical Exam:  	Gen: NAD, well-appearing  	HEENT: supple neck  	Pulm: CTA B/L  	CV:  S1S2  	Abd: +BS, soft   	Ext: No B/L Lower ext edema  	Neuro: No focal deficits  	Skin: Warm, without rashes  	Vascular access: none    LABS/STUDIES  --------------------------------------------------------------------------------              6.1    8.92  >-----------<  215      [12-23-19 @ 07:45]              19.0     131  |  95  |  102  ----------------------------<  170      [12-23-19 @ 06:34]  4.5   |  19  |  4.12        Ca     8.6     [12-23-19 @ 06:34]      Mg     2.6     [12-23-19 @ 06:34]      Phos  6.1     [12-23-19 @ 06:34]    TPro  6.6  /  Alb  3.1  /  TBili  1.3  /  DBili  x   /  AST  10  /  ALT  7   /  AlkPhos  56  [12-23-19 @ 06:34]    PT/INR: PT 12.4 , INR 1.09       [12-23-19 @ 08:18]  PTT: 119.3      [12-23-19 @ 08:18]    Uric acid 15.2      [12-23-19 @ 06:34]        [12-23-19 @ 06:34]  Serum Osmolality 311      [12-21-19 @ 10:00]    Creatinine Trend:  SCr 4.12 [12-23 @ 06:34]  SCr 3.45 [12-22 @ 06:46]  SCr 3.19 [12-21 @ 06:51]  SCr 2.49 [12-20 @ 06:33]  SCr 1.66 [12-19 @ 14:30]    Urinalysis - [12-21-19 @ 02:14]      Color Yellow / Appearance Slightly Turbid / SG 1.022 / pH 5.0      Gluc Negative / Ketone Negative  / Bili Negative / Urobili <2 mg/dL       Blood Negative / Protein Negative / Leuk Est Small / Nitrite Negative      RBC 4 / WBC 19 / Hyaline 6 / Gran  / Sq Epi  / Non Sq Epi 7 / Bacteria Negative    Urine Creatinine 131      [12-21-19 @ 00:40]  Urine Protein 11      [12-21-19 @ 22:31]  Urine Sodium <35      [12-21-19 @ 00:40]  Urine Urea Nitrogen 775      [12-21-19 @ 02:30]  Urine Osmolality 482      [12-21-19 @ 02:14]        Rheumatoid Factor <10      [12-21-19 @ 20:17]

## 2019-12-23 NOTE — PROGRESS NOTE ADULT - PROBLEM SELECTOR PLAN 4
2/2 contrast from R/[facility] with diuresis. Cr initial 1.3 (baseline Cr 0.85 from 2015)  - hold valsartan until Cr stabilizes. -Lasix held.   - trend Cr and avoid nephrotoxin meds. Hold fluids for now  - monitor I/Os  - nephrology recs 2/2 contrast from R/[facility] with diuresis. Cr initial 1.3 (baseline Cr 0.85 from 2015)  - holding valsartan until Cr stabilizes.   -Lasix held.   - trend Cr and avoid nephrotoxin meds. Hold fluids for now  - monitor I/Os, daily weight  - f/u nephrology recs

## 2019-12-23 NOTE — PROGRESS NOTE ADULT - SUBJECTIVE AND OBJECTIVE BOX
INTERVAL History:    Allergies    No Known Allergies    Intolerances        MEDICATIONS  (STANDING):  atorvastatin 40 milliGRAM(s) Oral at bedtime  clopidogrel Tablet 75 milliGRAM(s) Oral daily  dextrose 5%. 1000 milliLiter(s) (50 mL/Hr) IV Continuous <Continuous>  dextrose 50% Injectable 12.5 Gram(s) IV Push once  dextrose 50% Injectable 25 Gram(s) IV Push once  dextrose 50% Injectable 25 Gram(s) IV Push once  ferrous    sulfate 325 milliGRAM(s) Oral daily  fluticasone propionate 50 MICROgram(s)/spray Nasal Spray 1 Spray(s) Both Nostrils daily  folic acid 1 milliGRAM(s) Oral daily  heparin  Infusion.  Unit(s)/Hr (11 mL/Hr) IV Continuous <Continuous>  insulin lispro (HumaLOG) corrective regimen sliding scale   SubCutaneous three times a day before meals  insulin lispro (HumaLOG) corrective regimen sliding scale   SubCutaneous at bedtime  melatonin 6 milliGRAM(s) Oral at bedtime  metoprolol succinate ER 25 milliGRAM(s) Oral daily  pantoprazole    Tablet 40 milliGRAM(s) Oral before breakfast  predniSONE   Tablet 60 milliGRAM(s) Oral daily  senna 2 Tablet(s) Oral at bedtime  sodium chloride 0.9% lock flush 3 milliLiter(s) IV Push every 8 hours  tiotropium 18 MICROgram(s) Capsule 1 Capsule(s) Inhalation daily    MEDICATIONS  (PRN):  ALBUTerol    90 MICROgram(s) HFA Inhaler 2 Puff(s) Inhalation every 6 hours PRN Shortness of Breath and/or Wheezing  dextrose 40% Gel 15 Gram(s) Oral once PRN Blood Glucose LESS THAN 70 milliGRAM(s)/deciliter  glucagon  Injectable 1 milliGRAM(s) IntraMuscular once PRN Glucose LESS THAN 70 milligrams/deciliter  guaiFENesin  milliGRAM(s) Oral every 12 hours PRN Cough  heparin  Injectable 4500 Unit(s) IV Push every 6 hours PRN For aPTT less than 40  heparin  Injectable 2000 Unit(s) IV Push every 6 hours PRN For aPTT between 40 - 57  meclizine 12.5 milliGRAM(s) Oral three times a day PRN Dizziness      Vital Signs Last 24 Hrs  T(C): 36.4 (23 Dec 2019 05:04), Max: 36.6 (22 Dec 2019 20:38)  T(F): 97.6 (23 Dec 2019 05:04), Max: 97.8 (22 Dec 2019 20:38)  HR: 72 (23 Dec 2019 05:04) (64 - 73)  BP: 96/54 (23 Dec 2019 05:04) (95/61 - 99/58)  BP(mean): --  RR: 18 (23 Dec 2019 05:04) (17 - 18)  SpO2: 99% (23 Dec 2019 05:04) (96% - 99%)    PHYSICAL EXAM:    General: AOX3, no acute distress  EYES: EOMI, PERRLA, conjunctiva and sclera clear  NECK: Supple, No JVD, Normal thyroid  CHEST/LUNG: Clear to auscultation bilaterally; No rales, rhonchi, or wheezing  HEART: Regular rate and rhythm; no murmurs  ABDOMEN: Soft, Nontender. BS+  LYMPH: No lymphadenopathy noted  Extremities: No peripheral edema      LABS:                        6.1    8.92  )-----------( 215      ( 23 Dec 2019 07:45 )             19.0     12-23    131<L>  |  95<L>  |  102<H>  ----------------------------<  170<H>  4.5   |  19<L>  |  4.12<H>    Ca    8.6      23 Dec 2019 06:34  Phos  6.1     12-23  Mg     2.6     12-23    TPro  6.6  /  Alb  3.1<L>  /  TBili  1.3<H>  /  DBili  x   /  AST  10  /  ALT  7<L>  /  AlkPhos  56  12-23    PT/INR - ( 23 Dec 2019 08:18 )   PT: 12.4 sec;   INR: 1.09 ratio         PTT - ( 23 Dec 2019 08:18 )  PTT:119.3 sec        RADIOLOGY & ADDITIONAL STUDIES:    PATHOLOGY: INTERVAL History: No acute events overnight. Patient seen and examined at the bedside. He states that his procedure for his heart is likely cancelled because of his low blood counts. He continues to deny any dizziness, fatigue, or sources of bleeding. He understands that he is receiving steroids for his hemolytic anemia.     Allergies    No Known Allergies    Intolerances        MEDICATIONS  (STANDING):  atorvastatin 40 milliGRAM(s) Oral at bedtime  clopidogrel Tablet 75 milliGRAM(s) Oral daily  dextrose 5%. 1000 milliLiter(s) (50 mL/Hr) IV Continuous <Continuous>  dextrose 50% Injectable 12.5 Gram(s) IV Push once  dextrose 50% Injectable 25 Gram(s) IV Push once  dextrose 50% Injectable 25 Gram(s) IV Push once  ferrous    sulfate 325 milliGRAM(s) Oral daily  fluticasone propionate 50 MICROgram(s)/spray Nasal Spray 1 Spray(s) Both Nostrils daily  folic acid 1 milliGRAM(s) Oral daily  heparin  Infusion.  Unit(s)/Hr (11 mL/Hr) IV Continuous <Continuous>  insulin lispro (HumaLOG) corrective regimen sliding scale   SubCutaneous three times a day before meals  insulin lispro (HumaLOG) corrective regimen sliding scale   SubCutaneous at bedtime  melatonin 6 milliGRAM(s) Oral at bedtime  metoprolol succinate ER 25 milliGRAM(s) Oral daily  pantoprazole    Tablet 40 milliGRAM(s) Oral before breakfast  predniSONE   Tablet 60 milliGRAM(s) Oral daily  senna 2 Tablet(s) Oral at bedtime  sodium chloride 0.9% lock flush 3 milliLiter(s) IV Push every 8 hours  tiotropium 18 MICROgram(s) Capsule 1 Capsule(s) Inhalation daily    MEDICATIONS  (PRN):  ALBUTerol    90 MICROgram(s) HFA Inhaler 2 Puff(s) Inhalation every 6 hours PRN Shortness of Breath and/or Wheezing  dextrose 40% Gel 15 Gram(s) Oral once PRN Blood Glucose LESS THAN 70 milliGRAM(s)/deciliter  glucagon  Injectable 1 milliGRAM(s) IntraMuscular once PRN Glucose LESS THAN 70 milligrams/deciliter  guaiFENesin  milliGRAM(s) Oral every 12 hours PRN Cough  heparin  Injectable 4500 Unit(s) IV Push every 6 hours PRN For aPTT less than 40  heparin  Injectable 2000 Unit(s) IV Push every 6 hours PRN For aPTT between 40 - 57  meclizine 12.5 milliGRAM(s) Oral three times a day PRN Dizziness      Vital Signs Last 24 Hrs  T(C): 36.4 (23 Dec 2019 05:04), Max: 36.6 (22 Dec 2019 20:38)  T(F): 97.6 (23 Dec 2019 05:04), Max: 97.8 (22 Dec 2019 20:38)  HR: 72 (23 Dec 2019 05:04) (64 - 73)  BP: 96/54 (23 Dec 2019 05:04) (95/61 - 99/58)  BP(mean): --  RR: 18 (23 Dec 2019 05:04) (17 - 18)  SpO2: 99% (23 Dec 2019 05:04) (96% - 99%)    PHYSICAL EXAM:    General: AOx3, no acute distress  EYES: EOMI, PERRLA, conjunctiva and sclera clear  CHEST/LUNG:  Clear to auscultation bilaterally; no wheezes, crackles or rales  HEART: +systolic murmur, holosystolic. Regular rate and rhythm  ABDOMEN: Soft, Nontender, Nondistended; BS+  LYMPH: No lymphadenopathy noted.  Extremities: No peripheral edema, ecchymosis on bilateral upper extremities        LABS:                        6.1    8.92  )-----------( 215      ( 23 Dec 2019 07:45 )             19.0     12-23    131<L>  |  95<L>  |  102<H>  ----------------------------<  170<H>  4.5   |  19<L>  |  4.12<H>    Ca    8.6      23 Dec 2019 06:34  Phos  6.1     12-23  Mg     2.6     12-23    TPro  6.6  /  Alb  3.1<L>  /  TBili  1.3<H>  /  DBili  x   /  AST  10  /  ALT  7<L>  /  AlkPhos  56  12-23    PT/INR - ( 23 Dec 2019 08:18 )   PT: 12.4 sec;   INR: 1.09 ratio         PTT - ( 23 Dec 2019 08:18 )  PTT:119.3 sec        RADIOLOGY & ADDITIONAL STUDIES:    PATHOLOGY:

## 2019-12-23 NOTE — PROGRESS NOTE ADULT - PROBLEM SELECTOR PLAN 3
Hb 7.9, baseline 8-9 in 2015, follows with outside hematologist, Dr. Miranda in [place]  s/p febrile nonhemolytic transfusion rxn during 1/2U prbc  - Hold off transfusion unless symptomatic. 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 Heme rec hold off transfusion unless symptomatic  -f/u cold agglutinin titer, ALMA DELIA, RF (neg)  -daily CBC with diff, retic count and LDH  -Added PPI while on steroids, heparin drip, and plavix.  -Check SPEP, UPEP, serum FLC Hb 7.9, baseline 8-9 in 2015, follows with outside hematologist, Dr. 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  -f/u cold agglutinin titer, ALMA DELIA, RF (neg)  -daily CBC with diff, retic count and LDH  -Added PPI while on steroids, heparin drip, and plavix.  -f/u SPEP, UPEP, serum FLC

## 2019-12-23 NOTE — PROGRESS NOTE ADULT - SUBJECTIVE AND OBJECTIVE BOX
*****Structural Heart Team*****    Subjective:    The patient is resting comfortably in bed stating he is feeling tired, but is currently denying any SOB or CP. He has been OOB to chair, but not ambulating. His BAV was postponed this morning as his Creatinine is continuing to go up, while his hemoglobin/hematocrit remain low.    PAST MEDICAL & SURGICAL HISTORY:  Anemia  LBBB (left bundle branch block)  DYER (dyspnea on exertion)  Coronary disease: PCI  CHF (congestive heart failure)  Former smoker  Meniere disease  HLD (hyperlipidemia)  DM (diabetes mellitus)  Atrial fibrillation  HTN (hypertension)  Lipoma  History of skin graft  Status post appendectomy        T(C): 36.4 (12-23-19 @ 05:04), Max: 36.6 (12-22-19 @ 20:38)  HR: 72 (12-23-19 @ 05:04) (64 - 73)  BP: 96/54 (12-23-19 @ 05:04) (95/61 - 99/58)  RR: 18 (12-23-19 @ 05:04) (17 - 18)  SpO2: 99% (12-23-19 @ 05:04) (96% - 99%)  Wt(kg): --  12-22 @ 07:01  -  12-23 @ 07:00  --------------------------------------------------------  IN: 720 mL / OUT: 400 mL / NET: 320 mL    12-23 @ 07:01  -  12-23 @ 11:54  --------------------------------------------------------  IN: 0 mL / OUT: 150 mL / NET: -150 mL      MEDICATIONS  (STANDING):  atorvastatin 40 milliGRAM(s) Oral at bedtime  clopidogrel Tablet 75 milliGRAM(s) Oral daily  dextrose 5%. 1000 milliLiter(s) (50 mL/Hr) IV Continuous <Continuous>  dextrose 50% Injectable 12.5 Gram(s) IV Push once  dextrose 50% Injectable 25 Gram(s) IV Push once  dextrose 50% Injectable 25 Gram(s) IV Push once  ferrous    sulfate 325 milliGRAM(s) Oral daily  fluticasone propionate 50 MICROgram(s)/spray Nasal Spray 1 Spray(s) Both Nostrils daily  folic acid 1 milliGRAM(s) Oral daily  heparin  Infusion.  Unit(s)/Hr (11 mL/Hr) IV Continuous <Continuous>  insulin lispro (HumaLOG) corrective regimen sliding scale   SubCutaneous three times a day before meals  insulin lispro (HumaLOG) corrective regimen sliding scale   SubCutaneous at bedtime  melatonin 6 milliGRAM(s) Oral at bedtime  metoprolol succinate ER 25 milliGRAM(s) Oral daily  pantoprazole    Tablet 40 milliGRAM(s) Oral before breakfast  predniSONE   Tablet 60 milliGRAM(s) Oral daily  senna 2 Tablet(s) Oral at bedtime  sodium chloride 0.9% lock flush 3 milliLiter(s) IV Push every 8 hours  tiotropium 18 MICROgram(s) Capsule 1 Capsule(s) Inhalation daily    MEDICATIONS  (PRN):  ALBUTerol    90 MICROgram(s) HFA Inhaler 2 Puff(s) Inhalation every 6 hours PRN Shortness of Breath and/or Wheezing  dextrose 40% Gel 15 Gram(s) Oral once PRN Blood Glucose LESS THAN 70 milliGRAM(s)/deciliter  glucagon  Injectable 1 milliGRAM(s) IntraMuscular once PRN Glucose LESS THAN 70 milligrams/deciliter  guaiFENesin  milliGRAM(s) Oral every 12 hours PRN Cough  heparin  Injectable 4500 Unit(s) IV Push every 6 hours PRN For aPTT less than 40  heparin  Injectable 2000 Unit(s) IV Push every 6 hours PRN For aPTT between 40 - 57  meclizine 12.5 milliGRAM(s) Oral three times a day PRN Dizziness      Review of Symptoms:  Constitutional: Awake, Alert, Follows commands  Respiratory: Mild SOB, Mild DYER  Cardiac: Denies CP, Denies Palpitations  Gastrointestinal: Denies Pain, Denies N/V, tolerating po intake  Vascular: Negative  Extremities: + Edema, No joint pain or swelling  Neurological: Negative  Endocrine: No heat or cold intolerance, No excessive thirst  Heme/Onc: Negative    Exam:  General: A/Ox3, NAD, Appears tired/frail  HEENT: Supple, No JVD, Trachea midline, no masses  Pulmonary: B/L Crackles, = Chest Excursion, no accessory muscle use  Cor: S1S2, Irregular, III/VI CRISTIAN, No Gallop  ECG: AFib  Gastrointestinal: Soft, NT/ND, + Bowel Sounds  Neuro: = motor and sensory B/L, No focal deficits  Vascular: 1+ Pulses B/L, No carotid Bruits  Extremities: No Edema, No joint pain or swelling  Skin: Warm/Dry/Normal color, Normal turgor, no rashes                          6.1    8.92  )-----------( 215      ( 23 Dec 2019 07:45 )             19.0   12-23    131<L>  |  95<L>  |  102<H>  ----------------------------<  170<H>  4.5   |  19<L>  |  4.12<H>    Ca    8.6      23 Dec 2019 06:34  Phos  6.1     12-23  Mg     2.6     12-23    TPro  6.6  /  Alb  3.1<L>  /  TBili  1.3<H>  /  DBili  x   /  AST  10  /  ALT  7<L>  /  AlkPhos  56  12-23  PT/INR - ( 23 Dec 2019 08:18 )   PT: 12.4 sec;   INR: 1.09 ratio         PTT - ( 23 Dec 2019 08:18 )  PTT:119.3 sec    Imaging Reviewed:      Assesment/Plan:  84 Male with Severe Symptomatic Aortic Stenosis, Acute on Chronic Combined Heart Failure NYHA 3, Anemia, Acute Kidney Injury    1.) Severe Aortic Stenosis: Will continue to monitor CBC and creatinine. Will schedule BAV when CBC stabilized, as his current H and H is low and would put his BAV at high risk. Would not TAVR at this time.  2.) Acute on Chronic Combined Heart Failure: Monitor I and O's, daily weight. Would not diurese at this time in the setting of his worsening renal function. Heart failure service input appreciated.  3.) Anemia: Likely secondary to hemolysis. Hematology recommendations appreciated, follow up hemolytic work up. Transfuse only if needed.  4.) Acute Kidney injury: possibly from MAI. Avoid nephrotoxic medications or IV Contrast. Renal input appreciated.

## 2019-12-23 NOTE — CHART NOTE - NSCHARTNOTEFT_GEN_A_CORE
Nutrition Follow Up Note  Patient seen for mild malnutrition follow up    Per medical record pt is 85yo male with PMH chronic anemia, HFrEF (40%), Afib on coumadin, T2DM (no home meds), HLD, PVD, Meniere's Disease, carotid stenosis, LBBB on EKG, CAD, presented for elective RHC/LHC for TAVR evaluation for severe AS, admitted for acute on chronic CHF and anemia workup, plan for TVAR Mon 12/23. Pt had rapid response called 12/19 for hypoxia. Pt now NPO after midnight 12/22 pending TVAR or BAV procedure today (12/23).     Source: Aide at bedside and medical record    Diet : NPO after midnight 12/22. Previously DASH/TLC, Fluid Restriction 1000ml + Ensure Enlive x 1/day (provides 350 kcal, 20 g protein)     Patient observed resting in bed. Aide reports poor PO intake over the weekend. States pt enjoyed scrambled eggs in the morning. Per flow sheets pt consuming 0-50% of documented meals. Per aide pt enjoyed Ensure Enlive, however pt noted to be on prednisone currently; Aide amenable to changing Ensure to Glucerna once PO diet is back in place. Aid reports pt does not like salad as some ingredients are difficult for pt to chew. Declined texture change; Says they will choose the foods pt wants. Reports no difficulty swallowing. States no N+V. Per flow sheets last BM was 12/20. Gave brief education on the effects of steroids on blood glucose levels.     Enteral /Parenteral Nutrition: N/A    Weights: (12/22) 130.2 pounds bed scale (12/18) 125.2 pounds standing weight (12/15) 120.5 pounds standing weight  ?accuracy of 12/22 bed scale weight.    Pertinent Medications: Protonix, Deltasone, senna, ferrous sulfate, folic acid, Toprol XL  Pertinent Labs: (12/23) Sodium 131 <L>,  <H>, Creatinine 4.12 <H>, Glucose 170 <H>, ALT/SGPT 7 <L>, eGFR 12 <L>, Phosphorous 6.1 <H> (12/22) Finger Sticks 300 <H> (12/21) Finger Sticks 311 <H>    Skin per nursing documentation: no pressure injuries   Edema: none noted per flow sheets    Estimated Needs:   [x] no change since previous assessment: (based on 56.7kg weight from 12/18): 30-35 kcal/kg = 9578-5261 kcal/day; 1.2-1.4 g/kg= 68.04-79.38g protein/day  [ ] recalculated:     Previous Nutrition Diagnosis: Previous Nutrition Diagnosis: Malnutrition Mild in the context of chronic disease  Nutrition Diagnosis is: care plan in progress; Being addressed with addition of supplements and PO intake encouragement    New Nutrition Diagnosis: N/A      Interventions:     Recommend  1) Once PO diet reinitiated recommend liberalize diet to Low Sodium. Defer fluids to team. - Spoke to team  2) Recommend add Glucerna x 1/day (provides 220 kcal, 10 g protein) to optimize PO intake. - Spoke to team  3) Pt's aide made aware RD remains available.   4) Continue to monitor diet, weight, labs, skin integrity, food preferences, and further educational needs.      RD remains available upon request and will follow up per protocol.  Clarice Arias, Dietetic Intern (Pager #[phone]) Nutrition Follow Up Note  Patient seen for mild malnutrition follow up    Per medical record pt is 83yo male with PMH chronic anemia, HFrEF (40%), Afib on coumadin, T2DM (no home meds), HLD, PVD, Meniere's Disease, carotid stenosis, LBBB on EKG, CAD, presented for elective RHC/LHC for TAVR evaluation for severe AS, admitted for acute on chronic CHF and anemia workup, plan for TVAR Mon 12/23. Pt had rapid response called 12/19 for hypoxia. Pt now NPO after midnight 12/22 pending TVAR or BAV procedure today (12/23). Nephrology now following for JUHI.    Source: Aide at bedside and medical record    Diet : NPO after midnight 12/22. Previously DASH/TLC, Fluid Restriction 1000ml + Ensure Enlive x 1/day (provides 350 kcal, 20 g protein)     Patient observed resting in bed. Aide reports poor PO intake over the weekend. States pt enjoyed scrambled eggs in the morning. Per flow sheets pt consuming 0-50% of documented meals. Per aide pt enjoyed Ensure Enlive, however pt noted to be on prednisone currently; Aide amenable to changing Ensure to Glucerna once PO diet is back in place. Aid reports pt does not like salad as some ingredients are difficult for pt to chew. Declined texture change; Says they will choose the foods pt wants. Reports no difficulty swallowing. States no N+V. Per flow sheets last BM was 12/20. Gave brief education on the effects of steroids on blood glucose levels.     Enteral /Parenteral Nutrition: N/A    Weights: (12/22) 130.2 pounds bed scale (12/18) 125.2 pounds standing weight (12/15) 120.5 pounds standing weight  ?accuracy of 12/22 bed scale weight.    Pertinent Medications: Protonix, Deltasone, senna, ferrous sulfate, folic acid, Toprol XL  Pertinent Labs: (12/23) Sodium 131 <L>,  <H>, Creatinine 4.12 <H>, Glucose 170 <H>, ALT/SGPT 7 <L>, eGFR 12 <L>, Phosphorous 6.1 <H> (12/22) Finger Sticks 300 <H> (12/21) Finger Sticks 311 <H>    Skin per nursing documentation: no pressure injuries   Edema: none noted per flow sheets    Estimated Needs:   [x] no change since previous assessment: (based on 56.7kg weight from 12/18): 30-35 kcal/kg = 5867-9104 kcal/day; 1.2-1.4 g/kg= 68.04-79.38g protein/day  [ ] recalculated:     Previous Nutrition Diagnosis: Previous Nutrition Diagnosis: Malnutrition Mild in the context of chronic disease  Nutrition Diagnosis is: care plan in progress; Being addressed with addition of supplements and PO intake encouragement    New Nutrition Diagnosis: N/A    Interventions:     Recommend  1) Once PO diet reinitiated recommend liberalize diet to Low Sodium. Defer fluids to team. - Spoke to team  2) Recommend add Nepro x 1/day (provides 425 kcal, 19 g protein) to optimize PO intake. Adjust as needed - Spoke to team  3) Pt's aide made aware RD remains available.   4) Continue to monitor diet, weight, labs, skin integrity, food preferences, and further educational needs.      RD remains available upon request and will follow up per protocol.  Clarice Arias, Dietetic Intern (Pager #[phone]) Nutrition Follow Up Note  Patient seen for mild malnutrition follow up    Per medical record pt is 83yo male with PMH chronic anemia, HFrEF (40%), Afib on coumadin, T2DM (no home meds), HLD, PVD, Meniere's Disease, carotid stenosis, LBBB on EKG, CAD, presented for elective RHC/LHC for TAVR evaluation for severe AS, admitted for acute on chronic CHF and anemia workup, plan for TVAR Mon 12/23. Pt had rapid response called 12/19 for hypoxia. Pt now NPO after midnight 12/22 pending TVAR or BAV procedure today (12/23). Nephrology now following for JUHI.    Source: Aide at bedside and medical record    Diet : NPO after midnight 12/22. Previously DASH/TLC, Fluid Restriction 1000ml + Ensure Enlive x 1/day (provides 350 kcal, 20 g protein)     Patient observed resting in bed. Aide reports poor PO intake over the weekend. States pt enjoyed scrambled eggs in the morning. Per flow sheets pt consuming 0-50% of documented meals. Per aide pt enjoyed Ensure Enlive, however pt noted to be on prednisone currently and with high Phosphorous levels; Aide amenable to changing Ensure to Nepro once PO diet is back in place. Aid reports pt does not like salad as some ingredients are difficult for pt to chew. Declined texture change; Says they will choose the foods pt wants. Reports no difficulty swallowing. States no N+V. Per flow sheets last BM was 12/20. Gave brief education on the effects of steroids on blood glucose levels.     Enteral /Parenteral Nutrition: N/A    Weights: (12/22) 130.2 pounds bed scale (12/18) 125.2 pounds standing weight (12/15) 120.5 pounds standing weight  ?accuracy of 12/22 bed scale weight.    Pertinent Medications: Protonix, Deltasone, senna, ferrous sulfate, folic acid, Toprol XL  Pertinent Labs: (12/23) Sodium 131 <L>,  <H>, Creatinine 4.12 <H>, Glucose 170 <H>, ALT/SGPT 7 <L>, eGFR 12 <L>, Phosphorous 6.1 <H> (12/22) Finger Sticks 300 <H> (12/21) Finger Sticks 311 <H>    Skin per nursing documentation: no pressure injuries   Edema: none noted per flow sheets    Estimated Needs:   [x] no change since previous assessment: (based on 56.7kg weight from 12/18): 30-35 kcal/kg = 2269-6173 kcal/day; 1.2-1.4 g/kg= 68.04-79.38g protein/day  [ ] recalculated:     Previous Nutrition Diagnosis: Previous Nutrition Diagnosis: Malnutrition Mild in the context of chronic disease  Nutrition Diagnosis is: care plan in progress; Being addressed with addition of supplements and PO intake encouragement    New Nutrition Diagnosis: N/A    Interventions:     Recommend  1) Once PO diet reinitiated recommend liberalize diet to Low Sodium. Defer fluids to team. - Spoke to team  2) Recommend add Nepro x 1/day (provides 425 kcal, 19 g protein) to optimize PO intake. Adjust as needed - Spoke to team  3) Pt's aide made aware RD remains available.   4) Continue to monitor diet, weight, labs, skin integrity, food preferences, and further educational needs.      RD remains available upon request and will follow up per protocol.  Clarice Arias, Dietetic Intern (Pager #[phone])

## 2019-12-23 NOTE — PROGRESS NOTE ADULT - ATTENDING COMMENTS
84M PMH with multiple medical comorbidities, significant cardiac history, presented for elective RHC/LHC for TAVR evaluation for severe AS, admitted for volume overload and anemia workup for TVAR planned on 12/23. Course complicated by possible transfusion rxn with RRT 12/19 for hypoxia. Patient with evidence of hemolysis with +Maribell for C3 and IgG (although with negative eluate, there is clinical evidence for hemolysis), awaiting cold agglutinin titer currently on steroid therapy. Monitor CBC daily for improvement, transfuse only for symptomatic anemia at this point. Will add Rituxan if cold agglutinin titer elevated. Awaiting CT C/A/P non-contrast.

## 2019-12-23 NOTE — PROGRESS NOTE ADULT - PROBLEM SELECTOR PLAN 5
Pt on coumadin at home, on Heparin gtt in anticipation for possible TAVR. If no procedure, transition to coumadin   - CHADSVASc score of 6  - Continue with toprol 25mg qd. Digoxin has been held.   - Monitor on telemetry

## 2019-12-23 NOTE — PROGRESS NOTE ADULT - PROBLEM SELECTOR PLAN 9
DVT: heparin gtt for TAVR/BAV procedure  Diet: Consistent carb and DASH, NPO except meds for possible procedure gracia  Dispo: PT reeval after TAVR. DVT: heparin gtt for TAVR/BAV procedure  Diet: Consistent carb and DASH; f/u cardio on when to make NPO in anticipation of procedure  Dispo: PT reeval after TAVR.

## 2019-12-23 NOTE — PROGRESS NOTE ADULT - ASSESSMENT
83 YO M with a history of ACC/AHA Stage C ischemic cardiomyopathy (LV 4.5 cm, EF 30%), severe AS (58/30/0.6), chronic AF on coumadin, and CAD s/p PCI to RCA and LAD with prior tamponade during coronary intervention who is admitted with decompensated heart failure after R/LHC with plans for optimization prior to consideration of TAVR. He underwent CT coronaries on 12/17 as a part of TAVR evaluation. His volume status has improved with diuretics. Now, his renal function has worsened in the setting of contrast use with CT.    Pertinent recent studies.  RHC: 12/13 RA 10, PA 52/21/36, PCWP 23, CO/CI 4.34/CI 2.64, PA sat 47.1, Ao Sat 98%  LHC: 12/13 occluded ostial RCA from ISR, 70% OM1, moderate LAD disease   TTE: 12/13 LV 4.5 cm, EF 30%, severe AS (58/30/0.6) from calcific valve with severely restricted opening, moderate MR, LVOT VTI 15 cm, dyssynchronous septal movement, normal RV function

## 2019-12-23 NOTE — PROGRESS NOTE ADULT - SUBJECTIVE AND OBJECTIVE BOX
Subjective: No current complaints. He feels well.     Medications:  ALBUTerol    90 MICROgram(s) HFA Inhaler 2 Puff(s) Inhalation every 6 hours PRN  atorvastatin 40 milliGRAM(s) Oral at bedtime  clopidogrel Tablet 75 milliGRAM(s) Oral daily  dextrose 40% Gel 15 Gram(s) Oral once PRN  dextrose 5%. 1000 milliLiter(s) IV Continuous <Continuous>  dextrose 50% Injectable 12.5 Gram(s) IV Push once  dextrose 50% Injectable 25 Gram(s) IV Push once  dextrose 50% Injectable 25 Gram(s) IV Push once  ferrous    sulfate 325 milliGRAM(s) Oral daily  fluticasone propionate 50 MICROgram(s)/spray Nasal Spray 1 Spray(s) Both Nostrils daily  folic acid 1 milliGRAM(s) Oral daily  glucagon  Injectable 1 milliGRAM(s) IntraMuscular once PRN  guaiFENesin  milliGRAM(s) Oral every 12 hours PRN  heparin  Infusion.  Unit(s)/Hr IV Continuous <Continuous>  heparin  Injectable 4500 Unit(s) IV Push every 6 hours PRN  heparin  Injectable 2000 Unit(s) IV Push every 6 hours PRN  insulin lispro (HumaLOG) corrective regimen sliding scale   SubCutaneous three times a day before meals  insulin lispro (HumaLOG) corrective regimen sliding scale   SubCutaneous at bedtime  meclizine 12.5 milliGRAM(s) Oral three times a day PRN  melatonin 6 milliGRAM(s) Oral at bedtime  metoprolol succinate ER 25 milliGRAM(s) Oral daily  pantoprazole    Tablet 40 milliGRAM(s) Oral before breakfast  predniSONE   Tablet 60 milliGRAM(s) Oral daily  senna 2 Tablet(s) Oral at bedtime  sodium chloride 0.9% lock flush 3 milliLiter(s) IV Push every 8 hours  tiotropium 18 MICROgram(s) Capsule 1 Capsule(s) Inhalation daily      Physical Exam:    Vital Signs Last 24 Hrs  T(C): 36.4 (23 Dec 2019 11:51), Max: 36.6 (22 Dec 2019 20:38)  T(F): 97.6 (23 Dec 2019 11:51), Max: 97.8 (22 Dec 2019 20:38)  HR: 73 (23 Dec 2019 16:17) (64 - 73)  BP: 125/69 (23 Dec 2019 16:17) (96/54 - 125/69)  RR: 18 (23 Dec 2019 11:51) (17 - 18)  SpO2: 96% (23 Dec 2019 16:17) (96% - 99%)     Daily     I&O's Summary    22 Dec 2019 07:01  -  23 Dec 2019 07:00  --------------------------------------------------------  IN: 720 mL / OUT: 400 mL / NET: 320 mL    23 Dec 2019 07:01  -  23 Dec 2019 20:14  --------------------------------------------------------  IN: 370 mL / OUT: 150 mL / NET: 220 mL    General: No distress. Comfortable.  HEENT: EOM intact.  Neck: Neck supple. JVP moderate to severely elevated. No masses  Chest: Clear to auscultation bilaterally  CV: Normal S1 and S2. No murmurs, rub, or gallops. Radial pulses normal.  Abdomen: Soft, non-distended, non-tender  Skin: No rashes or skin breakdown  Neurology: Alert and oriented times three. Sensation intact  Psych: Affect normal    Labs:                        6.1    8.92  )-----------( 215      ( 23 Dec 2019 07:45 )             19.0     12-23    131<L>  |  95<L>  |  102<H>  ----------------------------<  170<H>  4.5   |  19<L>  |  4.12<H>    Ca    8.6      23 Dec 2019 06:34  Phos  6.1     12-23  Mg     2.6     12-23    TPro  6.6  /  Alb  3.1<L>  /  TBili  1.3<H>  /  DBili  x   /  AST  10  /  ALT  7<L>  /  AlkPhos  56  12-23    PT/INR - ( 23 Dec 2019 08:18 )   PT: 12.4 sec;   INR: 1.09 ratio         PTT - ( 23 Dec 2019 15:47 )  PTT:66.5 sec      Lactate Dehydrogenase, Serum: 239 U/L (12-23 @ 06:34)  Lactate Dehydrogenase, Serum: 255 U/L (12-22 @ 06:46)  Lactate Dehydrogenase, Serum: 293 U/L (12-21 @ 06:51)

## 2019-12-23 NOTE — PROGRESS NOTE ADULT - PROBLEM SELECTOR PLAN 1
Pt presented with DYER, with recent TTE 12/10 EF 30% with severe AS, with LV dysfunction, prior Echo in 6/2019 was 42%. Exam notable for 3+ LE edema up to b/l thigh, b/l rales, likely related to severe AS, s/p diuresis, currently appear near euvolemia.   -CXR: loculated fluid at left major fissure. Repeat CXR 12/16 showed clear lungs  - dc IV Lasix 80mg given JUHI  - Strict Ins and Outs, daily weight, fluid restriction to 1L/day  - C/w toprol 25mg qd  - Follow up HF  recs Pt presented with DYER, with recent TTE 12/10 EF 30% with severe AS, with LV dysfunction, prior Echo in 6/2019 was 42%. Exam notable for 3+ LE edema up to b/l thigh, b/l rales, likely related to severe AS  - previously diuresed with lasix, d/c'd earlier as patient currently euvolemic and evidence of JUHI  - Strict Ins and Outs, daily weight, fluid restriction to 1L/day  - C/w toprol 25mg qd  - Follow up HF recs

## 2019-12-23 NOTE — PROGRESS NOTE ADULT - SUBJECTIVE AND OBJECTIVE BOX
PROGRESS NOTE:   ************************************************  Authored by: Noam Du MD PGY1  Internal Medicine  Pager: [facility]: [phone]  *************************************************    Patient is a 84y old  Male who presents with a chief complaint of CHF exacerbation (22 Dec 2019 19:39)      SUBJECTIVE / OVERNIGHT EVENTS: The patient was seen and examined at bedside.     REVIEW OF SYSTEMS:  As above, otherwise negative.    MEDICATIONS  (STANDING):  atorvastatin 40 milliGRAM(s) Oral at bedtime  clopidogrel Tablet 75 milliGRAM(s) Oral daily  ferrous    sulfate 325 milliGRAM(s) Oral daily  fluticasone propionate 50 MICROgram(s)/spray Nasal Spray 1 Spray(s) Both Nostrils daily  folic acid 1 milliGRAM(s) Oral daily  heparin  Infusion.  Unit(s)/Hr (11 mL/Hr) IV Continuous <Continuous>  melatonin 6 milliGRAM(s) Oral at bedtime  metoprolol succinate ER 25 milliGRAM(s) Oral daily  pantoprazole    Tablet 40 milliGRAM(s) Oral before breakfast  predniSONE   Tablet 60 milliGRAM(s) Oral daily  senna 2 Tablet(s) Oral at bedtime  sodium chloride 0.9% lock flush 3 milliLiter(s) IV Push every 8 hours  tiotropium 18 MICROgram(s) Capsule 1 Capsule(s) Inhalation daily    MEDICATIONS  (PRN):  ALBUTerol    90 MICROgram(s) HFA Inhaler 2 Puff(s) Inhalation every 6 hours PRN Shortness of Breath and/or Wheezing  guaiFENesin  milliGRAM(s) Oral every 12 hours PRN Cough  heparin  Injectable 4500 Unit(s) IV Push every 6 hours PRN For aPTT less than 40  heparin  Injectable 2000 Unit(s) IV Push every 6 hours PRN For aPTT between 40 - 57  meclizine 12.5 milliGRAM(s) Oral three times a day PRN Dizziness      CAPILLARY BLOOD GLUCOSE      POCT Blood Glucose.: 300 mg/dL (22 Dec 2019 22:43)    I&O's Summary    22 Dec 2019 07:01  -  23 Dec 2019 07:00  --------------------------------------------------------  IN: 720 mL / OUT: 400 mL / NET: 320 mL        PHYSICAL EXAM:  Vital Signs Last 24 Hrs  T(C): 36.4 (23 Dec 2019 05:04), Max: 36.6 (22 Dec 2019 20:38)  T(F): 97.6 (23 Dec 2019 05:04), Max: 97.8 (22 Dec 2019 20:38)  HR: 72 (23 Dec 2019 05:04) (64 - 73)  BP: 96/54 (23 Dec 2019 05:04) (95/61 - 99/58)  BP(mean): --  RR: 18 (23 Dec 2019 05:04) (17 - 18)  SpO2: 99% (23 Dec 2019 05:04) (96% - 99%)    CONSTITUTIONAL: NAD, resting comfortably  RESPIRATORY: Normal respiratory effort; lungs are clear to auscultation bilaterally; no wheezes/rales/rhonchi  CARDIOVASCULAR: Regular rate and rhythm, normal S1 and S2, no murmur/rub/gallop;  ABDOMEN: Nontender to palpation, normoactive bowel sounds, no rebound/guarding; No hepatosplenomegaly  EXTREMITIES: No edema; 2+ peripheral pulses; no clubbing or cyanosis of digits; no joint swelling or tenderness to palpation  NEURO/PSYCH: A+Ox3; affect appropriate; no focal neuro deficits appreciated    LABS:                        6.4    5.34  )-----------( 190      ( 22 Dec 2019 09:01 )             19.6     12-23    131<L>  |  95<L>  |  102<H>  ----------------------------<  170<H>  4.5   |  19<L>  |  4.12<H>    Ca    8.6      23 Dec 2019 06:34  Phos  6.1     12-23  Mg     2.6     12-23    TPro  6.6  /  Alb  3.1<L>  /  TBili  1.3<H>  /  DBili  x   /  AST  10  /  ALT  7<L>  /  AlkPhos  56  12-23    PTT - ( 22 Dec 2019 08:57 )  PTT:88.5 sec            RADIOLOGY & ADDITIONAL TESTS:  Results Reviewed: Yes  Imaging Personally Reviewed: Yes  Electrocardiogram Personally Reviewed: Yes    COORDINATION OF CARE:  Care Discussed with Consultants/Other Providers [Y/N]: Y  Prior or Outpatient Records Reviewed [Y/N]: Y PROGRESS NOTE:   ************************************************  Authored by: Noam Du MD PGY1  Internal Medicine  Pager: [facility]: [phone]  *************************************************    Patient is a 84y old  Male who presents with a chief complaint of CHF exacerbation (22 Dec 2019 19:39)      SUBJECTIVE / OVERNIGHT EVENTS: The patient was seen and examined at bedside.     REVIEW OF SYSTEMS:  As above, otherwise negative.    MEDICATIONS  (STANDING):  atorvastatin 40 milliGRAM(s) Oral at bedtime  clopidogrel Tablet 75 milliGRAM(s) Oral daily  ferrous    sulfate 325 milliGRAM(s) Oral daily  fluticasone propionate 50 MICROgram(s)/spray Nasal Spray 1 Spray(s) Both Nostrils daily  folic acid 1 milliGRAM(s) Oral daily  heparin  Infusion.  Unit(s)/Hr (11 mL/Hr) IV Continuous <Continuous>  melatonin 6 milliGRAM(s) Oral at bedtime  metoprolol succinate ER 25 milliGRAM(s) Oral daily  pantoprazole    Tablet 40 milliGRAM(s) Oral before breakfast  predniSONE   Tablet 60 milliGRAM(s) Oral daily  senna 2 Tablet(s) Oral at bedtime  sodium chloride 0.9% lock flush 3 milliLiter(s) IV Push every 8 hours  tiotropium 18 MICROgram(s) Capsule 1 Capsule(s) Inhalation daily    MEDICATIONS  (PRN):  ALBUTerol    90 MICROgram(s) HFA Inhaler 2 Puff(s) Inhalation every 6 hours PRN Shortness of Breath and/or Wheezing  guaiFENesin  milliGRAM(s) Oral every 12 hours PRN Cough  heparin  Injectable 4500 Unit(s) IV Push every 6 hours PRN For aPTT less than 40  heparin  Injectable 2000 Unit(s) IV Push every 6 hours PRN For aPTT between 40 - 57  meclizine 12.5 milliGRAM(s) Oral three times a day PRN Dizziness      CAPILLARY BLOOD GLUCOSE      POCT Blood Glucose.: 300 mg/dL (22 Dec 2019 22:43)    I&O's Summary    22 Dec 2019 07:01  -  23 Dec 2019 07:00  --------------------------------------------------------  IN: 720 mL / OUT: 400 mL / NET: 320 mL      PHYSICAL EXAM:  Vital Signs Last 24 Hrs  T(C): 36.4 (23 Dec 2019 05:04), Max: 36.6 (22 Dec 2019 20:38)  T(F): 97.6 (23 Dec 2019 05:04), Max: 97.8 (22 Dec 2019 20:38)  HR: 72 (23 Dec 2019 05:04) (64 - 73)  BP: 96/54 (23 Dec 2019 05:04) (95/61 - 99/58)  BP(mean): --  RR: 18 (23 Dec 2019 05:04) (17 - 18)  SpO2: 99% (23 Dec 2019 05:04) (96% - 99%)    CONSTITUTIONAL: NAD, resting comfortably  RESPIRATORY: Normal respiratory effort; lungs are clear to auscultation bilaterally; no wheezes/rales/rhonchi  CARDIOVASCULAR: Regular rate and rhythm, normal S1 and S2, no murmur/rub/gallop;  ABDOMEN: Nontender to palpation, normoactive bowel sounds, no rebound/guarding; No hepatosplenomegaly  EXTREMITIES: No edema; 2+ peripheral pulses; no clubbing or cyanosis of digits; no joint swelling or tenderness to palpation  NEURO/PSYCH: A+Ox3; affect appropriate; no focal neuro deficits appreciated    LABS:                        6.4    5.34  )-----------( 190      ( 22 Dec 2019 09:01 )             19.6     12-23    131<L>  |  95<L>  |  102<H>  ----------------------------<  170<H>  4.5   |  19<L>  |  4.12<H>    Ca    8.6      23 Dec 2019 06:34  Phos  6.1     12-23  Mg     2.6     12-23    TPro  6.6  /  Alb  3.1<L>  /  TBili  1.3<H>  /  DBili  x   /  AST  10  /  ALT  7<L>  /  AlkPhos  56  12-23    PTT - ( 22 Dec 2019 08:57 )  PTT:88.5 sec      RADIOLOGY & ADDITIONAL TESTS:  Results Reviewed: Yes  Imaging Personally Reviewed: Yes  Electrocardiogram Personally Reviewed: Yes    COORDINATION OF CARE:  Care Discussed with Consultants/Other Providers [Y/N]: Y  Prior or Outpatient Records Reviewed [Y/N]: Y PROGRESS NOTE:   ************************************************  Authored by: Noam Du MD PGY1  Internal Medicine  Pager: [facility]: [phone]  *************************************************    Patient is a 84y old  Male who presents with a chief complaint of CHF exacerbation (22 Dec 2019 19:39)      SUBJECTIVE / OVERNIGHT EVENTS: No acute events overnight. The patient was seen and examined at bedside. Patient notes feeling "jittery" and anxious after nurse told him that his blood sugars were ~300 this morning. Feels slightly weak. Otherwise denies acute complaints. He specifically denies fever/chills, headache, chest pain, SOB, palpitations, abdominal pain, nausea/vomiting, dysuria, changes in urinary or bowel habits, joint pain/swelling.    REVIEW OF SYSTEMS:  As above, otherwise negative.    MEDICATIONS  (STANDING):  atorvastatin 40 milliGRAM(s) Oral at bedtime  clopidogrel Tablet 75 milliGRAM(s) Oral daily  ferrous    sulfate 325 milliGRAM(s) Oral daily  fluticasone propionate 50 MICROgram(s)/spray Nasal Spray 1 Spray(s) Both Nostrils daily  folic acid 1 milliGRAM(s) Oral daily  heparin  Infusion.  Unit(s)/Hr (11 mL/Hr) IV Continuous <Continuous>  melatonin 6 milliGRAM(s) Oral at bedtime  metoprolol succinate ER 25 milliGRAM(s) Oral daily  pantoprazole    Tablet 40 milliGRAM(s) Oral before breakfast  predniSONE   Tablet 60 milliGRAM(s) Oral daily  senna 2 Tablet(s) Oral at bedtime  sodium chloride 0.9% lock flush 3 milliLiter(s) IV Push every 8 hours  tiotropium 18 MICROgram(s) Capsule 1 Capsule(s) Inhalation daily    MEDICATIONS  (PRN):  ALBUTerol    90 MICROgram(s) HFA Inhaler 2 Puff(s) Inhalation every 6 hours PRN Shortness of Breath and/or Wheezing  guaiFENesin  milliGRAM(s) Oral every 12 hours PRN Cough  heparin  Injectable 4500 Unit(s) IV Push every 6 hours PRN For aPTT less than 40  heparin  Injectable 2000 Unit(s) IV Push every 6 hours PRN For aPTT between 40 - 57  meclizine 12.5 milliGRAM(s) Oral three times a day PRN Dizziness      POCT Blood Glucose.: 300 mg/dL (22 Dec 2019 22:43)    I&O's Summary    22 Dec 2019 07:01  -  23 Dec 2019 07:00  --------------------------------------------------------  IN: 720 mL / OUT: 400 mL / NET: 320 mL      PHYSICAL EXAM:  Vital Signs Last 24 Hrs  T(C): 36.4 (23 Dec 2019 05:04), Max: 36.6 (22 Dec 2019 20:38)  T(F): 97.6 (23 Dec 2019 05:04), Max: 97.8 (22 Dec 2019 20:38)  HR: 72 (23 Dec 2019 05:04) (64 - 73)  BP: 96/54 (23 Dec 2019 05:04) (95/61 - 99/58)  BP(mean): --  RR: 18 (23 Dec 2019 05:04) (17 - 18)  SpO2: 99% (23 Dec 2019 05:04) (96% - 99%)    CONSTITUTIONAL: NAD, resting comfortably  HEENT:  Atraumatic, Normocephalic, EOMI, conjunctiva and sclera clear  RESPIRATORY: Normal respiratory effort; lungs are clear to auscultation bilaterally; no wheezes/rales/rhonchi  CARDIOVASCULAR: irregularly irregular; +systolic murmur; no rub/gallop;  ABDOMEN: Nontender to palpation, normoactive bowel sounds, no rebound/guarding; No hepatosplenomegaly  EXTREMITIES: No edema; 2+ peripheral pulses; no clubbing or cyanosis of digits; no joint swelling or tenderness to palpation  NEURO/PSYCH: A+Ox3; affect appropriate; no focal neuro deficits appreciated    LABS:                        6.4    5.34  )-----------( 190      ( 22 Dec 2019 09:01 )             19.6     12-23    131<L>  |  95<L>  |  102<H>  ----------------------------<  170<H>  4.5   |  19<L>  |  4.12<H>    Ca    8.6      23 Dec 2019 06:34  Phos  6.1     12-23  Mg     2.6     12-23    TPro  6.6  /  Alb  3.1<L>  /  TBili  1.3<H>  /  DBili  x   /  AST  10  /  ALT  7<L>  /  AlkPhos  56  12-23    PTT - ( 22 Dec 2019 08:57 )  PTT:88.5 sec      RADIOLOGY & ADDITIONAL TESTS:  Results Reviewed: Yes  Imaging Personally Reviewed: Yes  Electrocardiogram Personally Reviewed: Yes    COORDINATION OF CARE:  Care Discussed with Consultants/Other Providers [Y/N]: Y  Prior or Outpatient Records Reviewed [Y/N]: Y

## 2019-12-23 NOTE — PROGRESS NOTE ADULT - ASSESSMENT
83 yo M with hx Afib (Coumadin), DM, HLD, CAD (stent 2014 and 2015 in mLAD and RCA), HFrEF (40%), sever aortic stenosis was admitted to [facility] on 12/13/19 after being referred by Cardiologist Dr. Becker for  decompensated heart failure after R/LHC with plans for optimization prior to consideration of TAVR. Nephrology consulted for JUHI.

## 2019-12-23 NOTE — PROGRESS NOTE ADULT - ATTENDING COMMENTS
-Patient seen/examined on 12/23/19. Case/plan discussed with the intern and resident as reviewed/edited by me above and in any comments below. -Patient seen/examined on 12/23/19. Case/plan discussed with the intern and resident as reviewed/edited by me above and in any comments below.  -Discussed with renal. Will give a little fluids.   -F/u with CTS.   -Insulin added for elevation in BG from the steroids.

## 2019-12-24 DIAGNOSIS — E87.1 HYPO-OSMOLALITY AND HYPONATREMIA: ICD-10-CM

## 2019-12-24 LAB
ALBUMIN SERPL ELPH-MCNC: 3.1 G/DL — LOW (ref 3.3–5)
ALP SERPL-CCNC: 58 U/L — SIGNIFICANT CHANGE UP (ref 40–120)
ALT FLD-CCNC: 9 U/L — LOW (ref 10–45)
ANA TITR SER: NEGATIVE — SIGNIFICANT CHANGE UP
ANION GAP SERPL CALC-SCNC: 15 MMOL/L — SIGNIFICANT CHANGE UP (ref 5–17)
APTT BLD: 104.1 SEC — HIGH (ref 27.5–36.3)
APTT BLD: 76.3 SEC — HIGH (ref 27.5–36.3)
AST SERPL-CCNC: 9 U/L — LOW (ref 10–40)
BASOPHILS # BLD AUTO: 0.01 K/UL — SIGNIFICANT CHANGE UP (ref 0–0.2)
BASOPHILS NFR BLD AUTO: 0.1 % — SIGNIFICANT CHANGE UP (ref 0–2)
BILIRUB SERPL-MCNC: 1.3 MG/DL — HIGH (ref 0.2–1.2)
BUN SERPL-MCNC: 104 MG/DL — HIGH (ref 7–23)
CA TITR SERPL: SIGNIFICANT CHANGE UP
CALCIUM SERPL-MCNC: 8.4 MG/DL — SIGNIFICANT CHANGE UP (ref 8.4–10.5)
CHLORIDE SERPL-SCNC: 94 MMOL/L — LOW (ref 96–108)
CO2 SERPL-SCNC: 18 MMOL/L — LOW (ref 22–31)
CREAT SERPL-MCNC: 4.12 MG/DL — HIGH (ref 0.5–1.3)
EOSINOPHIL # BLD AUTO: 0.02 K/UL — SIGNIFICANT CHANGE UP (ref 0–0.5)
EOSINOPHIL NFR BLD AUTO: 0.3 % — SIGNIFICANT CHANGE UP (ref 0–6)
GLUCOSE BLDC GLUCOMTR-MCNC: 180 MG/DL — HIGH (ref 70–99)
GLUCOSE BLDC GLUCOMTR-MCNC: 180 MG/DL — HIGH (ref 70–99)
GLUCOSE BLDC GLUCOMTR-MCNC: 261 MG/DL — HIGH (ref 70–99)
GLUCOSE BLDC GLUCOMTR-MCNC: 353 MG/DL — HIGH (ref 70–99)
GLUCOSE BLDC GLUCOMTR-MCNC: 353 MG/DL — HIGH (ref 70–99)
GLUCOSE SERPL-MCNC: 165 MG/DL — HIGH (ref 70–99)
HCT VFR BLD CALC: 20 % — CRITICAL LOW (ref 39–50)
HGB BLD-MCNC: 6.4 G/DL — CRITICAL LOW (ref 13–17)
IMM GRANULOCYTES NFR BLD AUTO: 3.1 % — HIGH (ref 0–1.5)
INR BLD: 1.06 RATIO — SIGNIFICANT CHANGE UP (ref 0.88–1.16)
LDH SERPL L TO P-CCNC: 239 U/L — SIGNIFICANT CHANGE UP (ref 50–242)
LYMPHOCYTES # BLD AUTO: 0.91 K/UL — LOW (ref 1–3.3)
LYMPHOCYTES # BLD AUTO: 12.8 % — LOW (ref 13–44)
MAGNESIUM SERPL-MCNC: 2.6 MG/DL — SIGNIFICANT CHANGE UP (ref 1.6–2.6)
MCHC RBC-ENTMCNC: 30.3 PG — SIGNIFICANT CHANGE UP (ref 27–34)
MCHC RBC-ENTMCNC: 32 GM/DL — SIGNIFICANT CHANGE UP (ref 32–36)
MCV RBC AUTO: 94.8 FL — SIGNIFICANT CHANGE UP (ref 80–100)
MONOCYTES # BLD AUTO: 0.44 K/UL — SIGNIFICANT CHANGE UP (ref 0–0.9)
MONOCYTES NFR BLD AUTO: 6.2 % — SIGNIFICANT CHANGE UP (ref 2–14)
NEUTROPHILS # BLD AUTO: 5.5 K/UL — SIGNIFICANT CHANGE UP (ref 1.8–7.4)
NEUTROPHILS NFR BLD AUTO: 77.5 % — HIGH (ref 43–77)
PHOSPHATE SERPL-MCNC: 6.3 MG/DL — HIGH (ref 2.5–4.5)
PLATELET # BLD AUTO: 207 K/UL — SIGNIFICANT CHANGE UP (ref 150–400)
POTASSIUM SERPL-MCNC: 4.4 MMOL/L — SIGNIFICANT CHANGE UP (ref 3.5–5.3)
POTASSIUM SERPL-SCNC: 4.4 MMOL/L — SIGNIFICANT CHANGE UP (ref 3.5–5.3)
PROT SERPL-MCNC: 6.8 G/DL — SIGNIFICANT CHANGE UP (ref 6–8.3)
PROTHROM AB SERPL-ACNC: 12 SEC — SIGNIFICANT CHANGE UP (ref 10–13.1)
RBC # BLD: 2.11 M/UL — LOW (ref 4.2–5.8)
RBC # BLD: 2.11 M/UL — LOW (ref 4.2–5.8)
RBC # FLD: 18.3 % — HIGH (ref 10.3–14.5)
RETICS #: 176 K/UL — HIGH (ref 25–125)
RETICS/RBC NFR: 8.3 % — HIGH (ref 0.5–2.5)
SODIUM SERPL-SCNC: 127 MMOL/L — LOW (ref 135–145)
URATE SERPL-MCNC: 15.2 MG/DL — HIGH (ref 3.4–8.8)
WBC # BLD: 7.1 K/UL — SIGNIFICANT CHANGE UP (ref 3.8–10.5)
WBC # FLD AUTO: 7.1 K/UL — SIGNIFICANT CHANGE UP (ref 3.8–10.5)

## 2019-12-24 PROCEDURE — 74176 CT ABD & PELVIS W/O CONTRAST: CPT | Mod: 26

## 2019-12-24 PROCEDURE — 99233 SBSQ HOSP IP/OBS HIGH 50: CPT | Mod: GC

## 2019-12-24 PROCEDURE — 71250 CT THORAX DX C-: CPT | Mod: 26

## 2019-12-24 PROCEDURE — 99233 SBSQ HOSP IP/OBS HIGH 50: CPT

## 2019-12-24 RX ORDER — INSULIN LISPRO 100/ML
5 VIAL (ML) SUBCUTANEOUS ONCE
Refills: 0 | Status: COMPLETED | OUTPATIENT
Start: 2019-12-24 | End: 2019-12-24

## 2019-12-24 RX ORDER — INSULIN LISPRO 100/ML
3 VIAL (ML) SUBCUTANEOUS
Refills: 0 | Status: DISCONTINUED | OUTPATIENT
Start: 2019-12-24 | End: 2019-12-25

## 2019-12-24 RX ADMIN — ATORVASTATIN CALCIUM 40 MILLIGRAM(S): 80 TABLET, FILM COATED ORAL at 21:37

## 2019-12-24 RX ADMIN — HEPARIN SODIUM 800 UNIT(S)/HR: 5000 INJECTION INTRAVENOUS; SUBCUTANEOUS at 10:57

## 2019-12-24 RX ADMIN — SODIUM CHLORIDE 3 MILLILITER(S): 9 INJECTION INTRAMUSCULAR; INTRAVENOUS; SUBCUTANEOUS at 15:57

## 2019-12-24 RX ADMIN — TIOTROPIUM BROMIDE 1 CAPSULE(S): 18 CAPSULE ORAL; RESPIRATORY (INHALATION) at 11:22

## 2019-12-24 RX ADMIN — CLOPIDOGREL BISULFATE 75 MILLIGRAM(S): 75 TABLET, FILM COATED ORAL at 11:21

## 2019-12-24 RX ADMIN — Medication 1: at 08:17

## 2019-12-24 RX ADMIN — Medication 1 SPRAY(S): at 11:22

## 2019-12-24 RX ADMIN — Medication 25 MILLIGRAM(S): at 05:23

## 2019-12-24 RX ADMIN — SODIUM CHLORIDE 3 MILLILITER(S): 9 INJECTION INTRAMUSCULAR; INTRAVENOUS; SUBCUTANEOUS at 21:37

## 2019-12-24 RX ADMIN — SODIUM CHLORIDE 3 MILLILITER(S): 9 INJECTION INTRAMUSCULAR; INTRAVENOUS; SUBCUTANEOUS at 05:06

## 2019-12-24 RX ADMIN — HEPARIN SODIUM 800 UNIT(S)/HR: 5000 INJECTION INTRAVENOUS; SUBCUTANEOUS at 18:56

## 2019-12-24 RX ADMIN — Medication 6 MILLIGRAM(S): at 21:37

## 2019-12-24 RX ADMIN — Medication 3: at 17:01

## 2019-12-24 RX ADMIN — PANTOPRAZOLE SODIUM 40 MILLIGRAM(S): 20 TABLET, DELAYED RELEASE ORAL at 05:23

## 2019-12-24 RX ADMIN — Medication 5: at 11:56

## 2019-12-24 RX ADMIN — Medication 5 UNIT(S): at 11:57

## 2019-12-24 RX ADMIN — Medication 325 MILLIGRAM(S): at 11:21

## 2019-12-24 RX ADMIN — Medication 1 MILLIGRAM(S): at 11:21

## 2019-12-24 RX ADMIN — Medication 60 MILLIGRAM(S): at 05:23

## 2019-12-24 RX ADMIN — SENNA PLUS 2 TABLET(S): 8.6 TABLET ORAL at 21:37

## 2019-12-24 NOTE — PROGRESS NOTE ADULT - SUBJECTIVE AND OBJECTIVE BOX
Subjective: No overnight events. He is stable. He has no dyspnea at rest, but remains weak.     Medications:  ALBUTerol    90 MICROgram(s) HFA Inhaler 2 Puff(s) Inhalation every 6 hours PRN  atorvastatin 40 milliGRAM(s) Oral at bedtime  clopidogrel Tablet 75 milliGRAM(s) Oral daily  dextrose 40% Gel 15 Gram(s) Oral once PRN  dextrose 5%. 1000 milliLiter(s) IV Continuous <Continuous>  dextrose 50% Injectable 12.5 Gram(s) IV Push once  dextrose 50% Injectable 25 Gram(s) IV Push once  dextrose 50% Injectable 25 Gram(s) IV Push once  ferrous    sulfate 325 milliGRAM(s) Oral daily  fluticasone propionate 50 MICROgram(s)/spray Nasal Spray 1 Spray(s) Both Nostrils daily  folic acid 1 milliGRAM(s) Oral daily  glucagon  Injectable 1 milliGRAM(s) IntraMuscular once PRN  guaiFENesin  milliGRAM(s) Oral every 12 hours PRN  heparin  Infusion.  Unit(s)/Hr IV Continuous <Continuous>  heparin  Injectable 4500 Unit(s) IV Push every 6 hours PRN  heparin  Injectable 2000 Unit(s) IV Push every 6 hours PRN  insulin lispro (HumaLOG) corrective regimen sliding scale   SubCutaneous three times a day before meals  insulin lispro (HumaLOG) corrective regimen sliding scale   SubCutaneous at bedtime  meclizine 12.5 milliGRAM(s) Oral three times a day PRN  melatonin 6 milliGRAM(s) Oral at bedtime  metoprolol succinate ER 25 milliGRAM(s) Oral daily  pantoprazole    Tablet 40 milliGRAM(s) Oral before breakfast  predniSONE   Tablet 60 milliGRAM(s) Oral daily  senna 2 Tablet(s) Oral at bedtime  sodium chloride 0.9% lock flush 3 milliLiter(s) IV Push every 8 hours  tiotropium 18 MICROgram(s) Capsule 1 Capsule(s) Inhalation daily      Physical Exam:    Vital Signs Last 24 Hrs  T(C): 36.6 (24 Dec 2019 11:57), Max: 36.6 (24 Dec 2019 11:57)  T(F): 97.9 (24 Dec 2019 11:57), Max: 97.9 (24 Dec 2019 11:57)  HR: 69 (24 Dec 2019 11:57) (60 - 69)  BP: 97/45 (24 Dec 2019 11:57) (96/56 - 104/66)  RR: 18 (24 Dec 2019 11:57) (17 - 18)  SpO2: 96% (24 Dec 2019 11:57) (96% - 100%)    Daily Weight in k.6 (24 Dec 2019 08:25)    I&O's Summary    23 Dec 2019 07:  -  24 Dec 2019 07:00  --------------------------------------------------------  IN: 850 mL / OUT: 700 mL / NET: 150 mL    24 Dec 2019 07:01  -  24 Dec 2019 18:15  --------------------------------------------------------  IN: 565 mL / OUT: 0 mL / NET: 565 mL    General: No distress. Comfortable.  HEENT: EOM intact.  Neck: Neck supple. JVP severely elevated. No masses  Chest: Clear to auscultation bilaterally  CV: Normal S1. III/VI high pitched systolic murmur heard best at RUSB. No rubs or gallops. Radial pulses normal. No edema  Abdomen: Soft, non-distended, non-tender  Skin: No rashes or skin breakdown.   Neurology: Alert and oriented times three. Sensation intact  Psych: Affect normal    Labs:                        6.4    7.10  )-----------( 207      ( 24 Dec 2019 07:45 )             20.0     12-24    127<L>  |  94<L>  |  104<H>  ----------------------------<  165<H>  4.4   |  18<L>  |  4.12<H>    Ca    8.4      24 Dec 2019 06:29  Phos  6.3     12-24  Mg     2.6     12-24    TPro  6.8  /  Alb  3.1<L>  /  TBili  1.3<H>  /  DBili  x   /  AST  9<L>  /  ALT  9<L>  /  AlkPhos  58  12-24    PT/INR - ( 24 Dec 2019 08:34 )   PT: 12.0 sec;   INR: 1.06 ratio    PTT - ( 24 Dec 2019 17:33 )  PTT:76.3 sec    Lactate Dehydrogenase, Serum: 239 U/L ( @ 06:29)  Lactate Dehydrogenase, Serum: 239 U/L ( @ 06:34)  Lactate Dehydrogenase, Serum: 255 U/L ( @ 06:46)

## 2019-12-24 NOTE — PROGRESS NOTE ADULT - SUBJECTIVE AND OBJECTIVE BOX
[facility] Division of Kidney Diseases & Hypertension  FOLLOW UP NOTE  -----------------------------------------------------------------------------  Chief Complaint:Aortic valve disorder      24 hour events/subjective:    Patient seen this morning without subjective complaints  Labs and charts reviewed. Serum Creatinine stable   Serum Na: 127 today     PAST HISTORY  --------------------------------------------------------------------------------  No significant changes to PMH, PSH, FHx, SHx, unless otherwise noted    ALLERGIES & MEDICATIONS  --------------------------------------------------------------------------------  Allergies    No Known Allergies    Intolerances      Standing Inpatient Medications  atorvastatin 40 milliGRAM(s) Oral at bedtime  clopidogrel Tablet 75 milliGRAM(s) Oral daily  dextrose 5%. 1000 milliLiter(s) IV Continuous <Continuous>  dextrose 50% Injectable 12.5 Gram(s) IV Push once  dextrose 50% Injectable 25 Gram(s) IV Push once  dextrose 50% Injectable 25 Gram(s) IV Push once  ferrous    sulfate 325 milliGRAM(s) Oral daily  fluticasone propionate 50 MICROgram(s)/spray Nasal Spray 1 Spray(s) Both Nostrils daily  folic acid 1 milliGRAM(s) Oral daily  heparin  Infusion.  Unit(s)/Hr IV Continuous <Continuous>  insulin lispro (HumaLOG) corrective regimen sliding scale   SubCutaneous three times a day before meals  insulin lispro (HumaLOG) corrective regimen sliding scale   SubCutaneous at bedtime  melatonin 6 milliGRAM(s) Oral at bedtime  metoprolol succinate ER 25 milliGRAM(s) Oral daily  pantoprazole    Tablet 40 milliGRAM(s) Oral before breakfast  predniSONE   Tablet 60 milliGRAM(s) Oral daily  senna 2 Tablet(s) Oral at bedtime  sodium chloride 0.9% lock flush 3 milliLiter(s) IV Push every 8 hours  tiotropium 18 MICROgram(s) Capsule 1 Capsule(s) Inhalation daily    PRN Inpatient Medications  ALBUTerol    90 MICROgram(s) HFA Inhaler 2 Puff(s) Inhalation every 6 hours PRN  dextrose 40% Gel 15 Gram(s) Oral once PRN  glucagon  Injectable 1 milliGRAM(s) IntraMuscular once PRN  guaiFENesin  milliGRAM(s) Oral every 12 hours PRN  heparin  Injectable 4500 Unit(s) IV Push every 6 hours PRN  heparin  Injectable 2000 Unit(s) IV Push every 6 hours PRN  meclizine 12.5 milliGRAM(s) Oral three times a day PRN      REVIEW OF SYSTEMS  --------------------------------------------------------------------------------  Gen: No  fevers/chills, weakness  Skin: No rashes  Head/Eyes/Ears/Mouth: No headache;   Respiratory: + DYER  CV: No chest pain,   GI: No abdominal pain  : No increased frequency, dysuria, hematuria, nocturia  MSK: + Leg swelling  Neuro: No dizziness/lightheadedness, weakness, seizures    All other systems were reviewed and are negative, except as noted.    VITALS/PHYSICAL EXAM  --------------------------------------------------------------------------------  T(C): 36.2 (12-24-19 @ 04:36), Max: 36.4 (12-23-19 @ 11:51)  HR: 60 (12-24-19 @ 04:36) (60 - 73)  BP: 96/56 (12-24-19 @ 04:36) (96/56 - 125/69)  RR: 18 (12-24-19 @ 04:36) (17 - 18)  SpO2: 100% (12-24-19 @ 04:36) (96% - 100%)  Wt(kg): --        12-23-19 @ 07:01  -  12-24-19 @ 07:00  --------------------------------------------------------  IN: 850 mL / OUT: 700 mL / NET: 150 mL    Pysical Exam:  	Gen: NAD, well-appearing  	HEENT: supple neck  	Pulm: CTA B/L  	CV:  S1S2  	Abd: +BS, soft   	Ext: No B/L Lower ext edema  	Neuro: No focal deficits  	Skin: Warm, without rashes  	Vascular access: none      LABS/STUDIES  --------------------------------------------------------------------------------              6.4    7.10  >-----------<  207      [12-24-19 @ 07:45]              20.0     127  |  94  |  104  ----------------------------<  165      [12-24-19 @ 06:29]  4.4   |  18  |  4.12        Ca     8.4     [12-24-19 @ 06:29]      Mg     2.6     [12-24-19 @ 06:29]      Phos  6.3     [12-24-19 @ 06:29]    TPro  6.8  /  Alb  3.1  /  TBili  1.3  /  DBili  x   /  AST  9   /  ALT  9   /  AlkPhos  58  [12-24-19 @ 06:29]    PT/INR: PT 12.4 , INR 1.09       [12-23-19 @ 08:18]  PTT: 98.2       [12-23-19 @ 22:43]    Uric acid 15.2      [12-24-19 @ 06:29]        [12-24-19 @ 06:29]    Creatinine Trend:  SCr 4.12 [12-24 @ 06:29]  SCr 4.12 [12-23 @ 06:34]  SCr 3.45 [12-22 @ 06:46]  SCr 3.19 [12-21 @ 06:51]  SCr 2.49 [12-20 @ 06:33]    Urinalysis - [12-23-19 @ 10:28]      Color Yellow / Appearance Clear / SG 1.018 / pH 5.0      Gluc Negative / Ketone Negative  / Bili Negative / Urobili Negative       Blood Negative / Protein Negative / Leuk Est Small / Nitrite Negative      RBC 6 / WBC 4 / Hyaline 1 / Gran  / Sq Epi  / Non Sq Epi 0 / Bacteria Negative    Urine Creatinine 131      [12-21-19 @ 00:40]  Urine Protein 11      [12-21-19 @ 22:31]  Urine Sodium <35      [12-23-19 @ 10:28]  Urine Urea Nitrogen 775      [12-21-19 @ 02:30]  Urine Osmolality 439      [12-23-19 @ 10:28]        Rheumatoid Factor <10      [12-21-19 @ 20:17]  Free Light Chains: kappa 13.58, lambda 8.44, ratio = 1.61      [12-21 @ 20:14]  Immunofixation Serum:   Weak IgG Kappa Band Identified    Reference Range: None Detected      [12-21-19 @ 20:14]  SPEP Interpretation: Weak Gamma-Migrating Paraprotein Identified      [12-21-19 @ 20:14]

## 2019-12-24 NOTE — PROGRESS NOTE ADULT - ATTENDING COMMENTS
JUHI/ATN, CKD III, AS  Renal insult in setting of transfusion and diuretics; would be late for contrast (but  may be concern for atheroembolic?)  No edema    Creatinine stable at 4.1  Worsened hyponatremia, Na 127; change diluent in heparin to saline (from D5)  Metabolic acidosis and hyperphosphatemia - mild  Weight has gone up progressively -- may need reinitiation of diuretics in next day (at this point, no renal contraindication to starting if clinically justified otherwise)  Check complement C3 and C4  Will follow, remainder per fellow JUHI/ATN, CKD III, AS  Renal insult in setting of transfusion and may be contrast-related as well  No edema    Creatinine stable at 4.1  Worsened hyponatremia, Na 127; change diluent in heparin to saline (from D5)  Metabolic acidosis and hyperphosphatemia - mild  Weight has gone up progressively -- may need reinitiation of diuretics in next day (at this point, no renal contraindication to starting if clinically justified otherwise)  Check complement C3 and C4  Will follow, remainder per fellow

## 2019-12-24 NOTE — PROGRESS NOTE ADULT - PROBLEM SELECTOR PLAN 5
Pt on coumadin at home, on Heparin gtt in anticipation for possible TAVR. If no procedure, transition to coumadin   - CHADSVASc score of 6  - Continue with toprol 25mg qd. Digoxin has been held.   - Monitor on telemetry Pt on coumadin at home, on Heparin gtt in anticipation for possible valvuloplasty. If no procedure, transition to coumadin   - CHADSVASc score of 6  - Continue with toprol 25mg qd. Digoxin has been held.   - Monitor on telemetry

## 2019-12-24 NOTE — PROGRESS NOTE ADULT - PROBLEM SELECTOR PLAN 1
Overall not volume expanded, but with elevated right sided filling pressures. He will not benefit from giving additional volume.   -Continue metoprolol succinate 25 mg daily.  - daily standing weight and strict I's and O's. - Resume IV diuretics 40 mg IV BID.   - Continue metoprolol succinate 25 mg daily.  - daily standing weight and strict I's and O's.

## 2019-12-24 NOTE — PROGRESS NOTE ADULT - ATTENDING COMMENTS
-Patient/seen examined on 12/24/19. Case/plan discussed with the intern and resident as reviewed/edited by me above and in any comments below. Will resume IV diuretics given gradual increase in volume and physical exam findings. 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 due to the aortic stenosis and not an autoimmune manifestation. Plan for balloon valvuloplasty tomorrow.     Please call me with questions at [phone].

## 2019-12-24 NOTE — PROGRESS NOTE ADULT - PROBLEM SELECTOR PLAN 1
Pt presented with DYER, with recent TTE 12/10 EF 30% with severe AS, with LV dysfunction, prior Echo in 6/2019 was 42%. Exam notable for 3+ LE edema up to b/l thigh, b/l rales, likely related to severe AS  - previously diuresed with lasix, d/c'd earlier as patient currently euvolemic and evidence of JUHI  - Strict Ins and Outs, daily weight, fluid restriction to 1L/day  - C/w toprol 25mg qd  - Follow up HF recs Pt presented with DYER, with recent TTE 12/10 EF 30% with severe AS, with LV dysfunction, prior Echo in 6/2019 was 42%. Exam notable for 3+ LE edema up to b/l thigh, b/l rales, likely related to severe AS  - previously diuresed with lasix, d/c'd earlier as patient currently euvolemic and evidence of JUHI  - Strict Ins and Outs, daily weight, fluid restriction to 1L/day  - C/w toprol 25mg qd  - no additional fluids as patient has right HF per HF service  - Follow up HF recs Pt presented with DYER, with recent TTE 12/10 EF 30% with severe AS, with LV dysfunction, prior Echo in 6/2019 was 42%. Exam notable for 3+ LE edema up to b/l thigh, b/l rales, likely related to severe AS  - previously diuresed with lasix, d/c'd earlier as patient currently euvolemic and evidence of JUHI  - Strict Ins and Outs, daily weight, fluid restriction to 1L/day  - C/w toprol 25mg qd  - no additional fluids as patient has right HF per HF service  - Follow up additional HF recs

## 2019-12-24 NOTE — PROGRESS NOTE ADULT - PROBLEM SELECTOR PLAN 3
Hb 7.9, baseline 8-9 in 2015, follows with outside hematologist, Dr. 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  -f/u cold agglutinin titer, ALMA DELIA, RF (neg)  -daily CBC with diff, retic count and LDH  -Added PPI while on steroids, heparin drip, and plavix.  -f/u SPEP, UPEP, serum FLC - Hb 7.9, baseline 8-9 in 2015, follows with outside hematologist, Dr. Miranda in [place]  - s/p febrile nonhemolytic transfusion rxn during 1/2U prbc  - will get CT scan chest/abdomen/pelvis to r/o malignancy that could be contributing to possible hemolysis    - Holding transfusion unless symptomatic per heme recs  - If symptomatic, will give IV 40 lasix with 50 IV benadryl and 650PO tylenol prior to 1/2U prbc over 3 hours per blood transfusion  -Direct alisson positive for IgG and C3  - continue prendisone for warm hemolytic anemia.  - cold agglutinin titer, ALMA DELIA, RF all negative  -daily CBC with diff, retic count and LDH  -Added PPI while on steroids, heparin drip, and plavix - Hb 7.9, baseline 8-9 in 2015, follows with outside hematologist, Dr. Miranda in [place]  - s/p febrile nonhemolytic transfusion rxn during 1/2U prbc  - will get CT scan chest/abdomen/pelvis to r/o malignancy that could be contributing to possible hemolysis  - Holding transfusion unless symptomatic per heme recs  - If symptomatic, will give IV 40 lasix with 50 IV benadryl and 650PO tylenol prior to 1/2U prbc over 3 hours per blood transfusion  -Direct alisson positive for IgG and C3  - continue prednisone for warm hemolytic anemia.  - cold agglutinin titer, ALMA DELIA, RF all negative  -daily CBC with diff, retic count and LDH  -Added PPI while on steroids, heparin drip, and plavix

## 2019-12-24 NOTE — PROGRESS NOTE ADULT - PROBLEM SELECTOR PLAN 4
2/2 contrast from R/[facility] with diuresis. Cr initial 1.3 (baseline Cr 0.85 from 2015)  - holding valsartan until Cr stabilizes.   -Lasix held.   - trend Cr and avoid nephrotoxin meds. Hold fluids for now  - monitor I/Os, daily weight  - f/u nephrology recs 2/2 contrast from R/[facility] with diuresis. Cr initial 1.3 (baseline Cr 0.85 from 2015)  - holding valsartan until Cr stabilizes.   -Lasix held.   - trend Cr and avoid nephrotoxin meds. Hold fluids for now  - monitor I/Os, daily weight  - f/u nephrology, HF recs

## 2019-12-24 NOTE — PROGRESS NOTE ADULT - PROBLEM SELECTOR PLAN 6
- he meets IIa indications for CRT given low EF with LBBB and concurrent AF.   - would consider evaluation after BAV and/or TAVR - he meets IIa indications for CRT given low EF with LBBB and concurrent AF.

## 2019-12-24 NOTE — PROGRESS NOTE ADULT - ASSESSMENT
Mr. Latham is an 84 year old man with ACC/AHA Stage C ischemic cardiomyopathy (LVDD 4.5 cm, EF 30%), severe AS (58/30/0.6), chronic AF on coumadin, and CAD s/p PCI to RCA and LAD with prior tamponade during coronary intervention who is admitted with decompensated heart failure after R/LHC with plans for optimization prior to consideration of TAVR. He underwent CT coronaries on 12/17 as a part of TAVR evaluation and suffered from MAI. His renal function appears to have stabilized, but he has severely elevated JVP.     I discussed his course with Dr. Becker from the structural heart team. He is not an acceptable candidate at this time for TAVR, but we will tentatively plan on balloon valvuloplasty Thursday if his renal function remains stable.

## 2019-12-24 NOTE — PROGRESS NOTE ADULT - PROBLEM SELECTOR PLAN 1
Patient with Non-oliguric JUHI secondary to diuretic use and recent contrast exposure. His blood pressures has been borderline low recently. I would suspect ongoing renal failure currently could be pre-renal. 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 BUN elevated can be explained by steroids.  - Hyperuricemia and hyperphosphatemia can be explained by ongoing renal failure  - Continue to hold nephrotoxic medications such as diuretics, RCA and ARB/ACE  - Continue to monitor renal function and electrolytes   - No indication for HD as of yet.   - Dose medications as per eGFR.  - Would avoid sudden hemodynamic shift in BP and achieve goal SBP>100 Patient with Non-oliguric JUHI with ATN. His blood pressures has been borderline low recently.     Recommendations  - Heme/Onc recs noted with concern for hemolysis with high LDH, low haptoglobin, and elevated retic count  - Maribell test positive for IgG and C3  - Serum creatinine increasing. BUN elevated can be explained by steroids.  - Hyperuricemia and hyperphosphatemia can be explained by ongoing renal failure  - Continue to hold nephrotoxic medications such as RCA and ARB/ACE  - Continue to monitor renal function and electrolytes   - No indication for HD as of yet.   - Dose medications as per eGFR.  - Would avoid sudden hemodynamic shift in BP and achieve goal SBP>100

## 2019-12-24 NOTE — PROGRESS NOTE ADULT - PROBLEM SELECTOR PLAN 8
- not on home medication, diet controlled  - A1c 5.6  - will start correctional scale insulin as patient's glucose 300 this morning. - not on home medication, diet controlled  - A1c 5.6  continues to have elevated glucose, will start standing basal/bolus insulin given continued hyperglycemia - not on home medication, diet controlled  - A1c 5.6  continues to have elevated glucose, will start standing basal/bolus insulin given continued hyperglycemia. Adjust as necessary.

## 2019-12-24 NOTE — PROGRESS NOTE ADULT - PROBLEM SELECTOR PLAN 2
Patient with Hypo-osmolar hyponatremia. Currently appears euvolemic. Patient with Hypo-osmolar hyponatremia. Currently appears euvolemic.  - Switch base medication solution to NS rather than D5 (Heparin drip)  - trend Serum sodium for now. Repeat BMP tonight  - Recheck Urine Osm with urine electrolytes tomorrow 12/25

## 2019-12-24 NOTE — PROGRESS NOTE ADULT - ATTENDING COMMENTS
-Patient/seen examined on 12/24/19. Case/plan discussed with the intern and resident as reviewed/edited by me above and in any comments below. -Patient/seen examined on 12/24/19. Case/plan discussed with the intern and resident as reviewed/edited by me above and in any comments below.  -OOB to chair.   -F/u CHF, renal, heme recs.   -Monitor Na closely. Likely partly some pseudohyponatremia in setting of hyperglycemia.

## 2019-12-24 NOTE — PROGRESS NOTE ADULT - SUBJECTIVE AND OBJECTIVE BOX
PROGRESS NOTE:   ************************************************  Authored by: Noam Du MD PGY1  Internal Medicine  Pager: [facility]: [phone]  *************************************************    Patient is a 84y old  Male who presents with a chief complaint of CHF exacerbation (23 Dec 2019 20:14)      SUBJECTIVE / OVERNIGHT EVENTS: The patient was seen and examined at bedside.     REVIEW OF SYSTEMS:  As above, otherwise negative.    MEDICATIONS  (STANDING):  atorvastatin 40 milliGRAM(s) Oral at bedtime  clopidogrel Tablet 75 milliGRAM(s) Oral daily  dextrose 5%. 1000 milliLiter(s) (50 mL/Hr) IV Continuous <Continuous>  dextrose 50% Injectable 12.5 Gram(s) IV Push once  dextrose 50% Injectable 25 Gram(s) IV Push once  dextrose 50% Injectable 25 Gram(s) IV Push once  ferrous    sulfate 325 milliGRAM(s) Oral daily  fluticasone propionate 50 MICROgram(s)/spray Nasal Spray 1 Spray(s) Both Nostrils daily  folic acid 1 milliGRAM(s) Oral daily  heparin  Infusion.  Unit(s)/Hr (11 mL/Hr) IV Continuous <Continuous>  insulin lispro (HumaLOG) corrective regimen sliding scale   SubCutaneous three times a day before meals  insulin lispro (HumaLOG) corrective regimen sliding scale   SubCutaneous at bedtime  melatonin 6 milliGRAM(s) Oral at bedtime  metoprolol succinate ER 25 milliGRAM(s) Oral daily  pantoprazole    Tablet 40 milliGRAM(s) Oral before breakfast  predniSONE   Tablet 60 milliGRAM(s) Oral daily  senna 2 Tablet(s) Oral at bedtime  sodium chloride 0.9% lock flush 3 milliLiter(s) IV Push every 8 hours  tiotropium 18 MICROgram(s) Capsule 1 Capsule(s) Inhalation daily    MEDICATIONS  (PRN):  ALBUTerol    90 MICROgram(s) HFA Inhaler 2 Puff(s) Inhalation every 6 hours PRN Shortness of Breath and/or Wheezing  dextrose 40% Gel 15 Gram(s) Oral once PRN Blood Glucose LESS THAN 70 milliGRAM(s)/deciliter  glucagon  Injectable 1 milliGRAM(s) IntraMuscular once PRN Glucose LESS THAN 70 milligrams/deciliter  guaiFENesin  milliGRAM(s) Oral every 12 hours PRN Cough  heparin  Injectable 4500 Unit(s) IV Push every 6 hours PRN For aPTT less than 40  heparin  Injectable 2000 Unit(s) IV Push every 6 hours PRN For aPTT between 40 - 57  meclizine 12.5 milliGRAM(s) Oral three times a day PRN Dizziness      CAPILLARY BLOOD GLUCOSE      POCT Blood Glucose.: 241 mg/dL (23 Dec 2019 21:13)  POCT Blood Glucose.: 260 mg/dL (23 Dec 2019 16:28)  POCT Blood Glucose.: 205 mg/dL (23 Dec 2019 11:31)    I&O's Summary    23 Dec 2019 07:01  -  24 Dec 2019 07:00  --------------------------------------------------------  IN: 850 mL / OUT: 700 mL / NET: 150 mL        PHYSICAL EXAM:  Vital Signs Last 24 Hrs  T(C): 36.2 (24 Dec 2019 04:36), Max: 36.4 (23 Dec 2019 11:51)  T(F): 97.2 (24 Dec 2019 04:36), Max: 97.6 (23 Dec 2019 11:51)  HR: 60 (24 Dec 2019 04:36) (60 - 73)  BP: 96/56 (24 Dec 2019 04:36) (96/56 - 125/69)  BP(mean): --  RR: 18 (24 Dec 2019 04:36) (17 - 18)  SpO2: 100% (24 Dec 2019 04:36) (96% - 100%)    CONSTITUTIONAL: NAD, resting comfortably  RESPIRATORY: Normal respiratory effort; lungs are clear to auscultation bilaterally; no wheezes/rales/rhonchi  CARDIOVASCULAR: Regular rate and rhythm, normal S1 and S2, no murmur/rub/gallop;  ABDOMEN: Nontender to palpation, normoactive bowel sounds, no rebound/guarding; No hepatosplenomegaly  EXTREMITIES: No edema; 2+ peripheral pulses; no clubbing or cyanosis of digits; no joint swelling or tenderness to palpation  NEURO/PSYCH: A+Ox3; affect appropriate; no focal neuro deficits appreciated    LABS:                        6.1    8.92  )-----------( 215      ( 23 Dec 2019 07:45 )             19.0     12    131<L>  |  95<L>  |  102<H>  ----------------------------<  170<H>  4.5   |  19<L>  |  4.12<H>    Ca    8.6      23 Dec 2019 06:34  Phos  6.1       Mg     2.6         TPro  6.6  /  Alb  3.1<L>  /  TBili  1.3<H>  /  DBili  x   /  AST  10  /  ALT  7<L>  /  AlkPhos  56      PT/INR - ( 23 Dec 2019 08:18 )   PT: 12.4 sec;   INR: 1.09 ratio         PTT - ( 23 Dec 2019 22:43 )  PTT:98.2 sec      Urinalysis Basic - ( 23 Dec 2019 10:28 )    Color: Yellow / Appearance: Clear / S.018 / pH: x  Gluc: x / Ketone: Negative  / Bili: Negative / Urobili: Negative   Blood: x / Protein: Negative / Nitrite: Negative   Leuk Esterase: Small / RBC: 6 /hpf / WBC 4 /HPF   Sq Epi: x / Non Sq Epi: 0 /hpf / Bacteria: Negative          RADIOLOGY & ADDITIONAL TESTS:  Results Reviewed: Yes  Imaging Personally Reviewed: Yes  Electrocardiogram Personally Reviewed: Yes    COORDINATION OF CARE:  Care Discussed with Consultants/Other Providers [Y/N]: Y  Prior or Outpatient Records Reviewed [Y/N]: Y PROGRESS NOTE:   ************************************************  Authored by: Noam Du MD PGY1  Internal Medicine  Pager: [facility]: [phone]  *************************************************    Patient is a 84y old  Male who presents with a chief complaint of CHF exacerbation (23 Dec 2019 20:14)      SUBJECTIVE / OVERNIGHT EVENTS: The patient was seen and examined at bedside.     REVIEW OF SYSTEMS:  As above, otherwise negative.    MEDICATIONS  (STANDING):  atorvastatin 40 milliGRAM(s) Oral at bedtime  clopidogrel Tablet 75 milliGRAM(s) Oral daily  dextrose 5%. 1000 milliLiter(s) (50 mL/Hr) IV Continuous <Continuous>  dextrose 50% Injectable 12.5 Gram(s) IV Push once  dextrose 50% Injectable 25 Gram(s) IV Push once  dextrose 50% Injectable 25 Gram(s) IV Push once  ferrous    sulfate 325 milliGRAM(s) Oral daily  fluticasone propionate 50 MICROgram(s)/spray Nasal Spray 1 Spray(s) Both Nostrils daily  folic acid 1 milliGRAM(s) Oral daily  heparin  Infusion.  Unit(s)/Hr (11 mL/Hr) IV Continuous <Continuous>  insulin lispro (HumaLOG) corrective regimen sliding scale   SubCutaneous three times a day before meals  insulin lispro (HumaLOG) corrective regimen sliding scale   SubCutaneous at bedtime  melatonin 6 milliGRAM(s) Oral at bedtime  metoprolol succinate ER 25 milliGRAM(s) Oral daily  pantoprazole    Tablet 40 milliGRAM(s) Oral before breakfast  predniSONE   Tablet 60 milliGRAM(s) Oral daily  senna 2 Tablet(s) Oral at bedtime  sodium chloride 0.9% lock flush 3 milliLiter(s) IV Push every 8 hours  tiotropium 18 MICROgram(s) Capsule 1 Capsule(s) Inhalation daily    MEDICATIONS  (PRN):  ALBUTerol    90 MICROgram(s) HFA Inhaler 2 Puff(s) Inhalation every 6 hours PRN Shortness of Breath and/or Wheezing  dextrose 40% Gel 15 Gram(s) Oral once PRN Blood Glucose LESS THAN 70 milliGRAM(s)/deciliter  glucagon  Injectable 1 milliGRAM(s) IntraMuscular once PRN Glucose LESS THAN 70 milligrams/deciliter  guaiFENesin  milliGRAM(s) Oral every 12 hours PRN Cough  heparin  Injectable 4500 Unit(s) IV Push every 6 hours PRN For aPTT less than 40  heparin  Injectable 2000 Unit(s) IV Push every 6 hours PRN For aPTT between 40 - 57  meclizine 12.5 milliGRAM(s) Oral three times a day PRN Dizziness      CAPILLARY BLOOD GLUCOSE      POCT Blood Glucose.: 241 mg/dL (23 Dec 2019 21:13)  POCT Blood Glucose.: 260 mg/dL (23 Dec 2019 16:28)  POCT Blood Glucose.: 205 mg/dL (23 Dec 2019 11:31)    I&O's Summary    23 Dec 2019 07:01  -  24 Dec 2019 07:00  --------------------------------------------------------  IN: 850 mL / OUT: 700 mL / NET: 150 mL        PHYSICAL EXAM:  Vital Signs Last 24 Hrs  T(C): 36.2 (24 Dec 2019 04:36), Max: 36.4 (23 Dec 2019 11:51)  T(F): 97.2 (24 Dec 2019 04:36), Max: 97.6 (23 Dec 2019 11:51)  HR: 60 (24 Dec 2019 04:36) (60 - 73)  BP: 96/56 (24 Dec 2019 04:36) (96/56 - 125/69)  BP(mean): --  RR: 18 (24 Dec 2019 04:36) (17 - 18)  SpO2: 100% (24 Dec 2019 04:36) (96% - 100%)    CONSTITUTIONAL: NAD, resting comfortably  RESPIRATORY: Normal respiratory effort; lungs are clear to auscultation bilaterally; no wheezes/rales/rhonchi  CARDIOVASCULAR: Regular rate and rhythm, normal S1 and S2, no murmur/rub/gallop;  ABDOMEN: Nontender to palpation, normoactive bowel sounds, no rebound/guarding; No hepatosplenomegaly  EXTREMITIES: No edema; 2+ peripheral pulses; no clubbing or cyanosis of digits; no joint swelling or tenderness to palpation  NEURO/PSYCH: A+Ox3; affect appropriate; no focal neuro deficits appreciated    LABS:                        6.1    8.92  )-----------( 215      ( 23 Dec 2019 07:45 )             19.0     12    131<L>  |  95<L>  |  102<H>  ----------------------------<  170<H>  4.5   |  19<L>  |  4.12<H>    Ca    8.6      23 Dec 2019 06:34  Phos  6.1       Mg     2.6         TPro  6.6  /  Alb  3.1<L>  /  TBili  1.3<H>  /  DBili  x   /  AST  10  /  ALT  7<L>  /  AlkPhos  56      PT/INR - ( 23 Dec 2019 08:18 )   PT: 12.4 sec;   INR: 1.09 ratio         PTT - ( 23 Dec 2019 22:43 )  PTT:98.2 sec      Urinalysis Basic - ( 23 Dec 2019 10:28 )    Color: Yellow / Appearance: Clear / S.018 / pH: x  Gluc: x / Ketone: Negative  / Bili: Negative / Urobili: Negative   Blood: x / Protein: Negative / Nitrite: Negative   Leuk Esterase: Small / RBC: 6 /hpf / WBC 4 /HPF   Sq Epi: x / Non Sq Epi: 0 /hpf / Bacteria: Negative      RADIOLOGY & ADDITIONAL TESTS:  Results Reviewed: Yes  Imaging Personally Reviewed: Yes  Electrocardiogram Personally Reviewed: Yes    COORDINATION OF CARE:  Care Discussed with Consultants/Other Providers [Y/N]: Y  Prior or Outpatient Records Reviewed [Y/N]: Y PROGRESS NOTE:   ************************************************  Authored by: Noam Du MD PGY1  Internal Medicine  Pager: [facility]: [phone]  *************************************************    Patient is a 84y old  Male who presents with a chief complaint of CHF exacerbation (23 Dec 2019 20:14)      SUBJECTIVE / OVERNIGHT EVENTS: No acute events overnight. The patient was seen and examined at bedside. States that he hasn't had the jitteriness that he experienced the prior evening. He does not have any acute complaints, and specifically denies fever/chills, headache, chest pain, SOB, palpitations, abdominal pain, nausea/vomiting, dysuria, changes in urinary or bowel habits, joint pain/swelling, weakness.     REVIEW OF SYSTEMS:  As above, otherwise negative.    MEDICATIONS  (STANDING):  atorvastatin 40 milliGRAM(s) Oral at bedtime  clopidogrel Tablet 75 milliGRAM(s) Oral daily  dextrose 5%. 1000 milliLiter(s) (50 mL/Hr) IV Continuous <Continuous>  dextrose 50% Injectable 12.5 Gram(s) IV Push once  dextrose 50% Injectable 25 Gram(s) IV Push once  dextrose 50% Injectable 25 Gram(s) IV Push once  ferrous    sulfate 325 milliGRAM(s) Oral daily  fluticasone propionate 50 MICROgram(s)/spray Nasal Spray 1 Spray(s) Both Nostrils daily  folic acid 1 milliGRAM(s) Oral daily  heparin  Infusion.  Unit(s)/Hr (11 mL/Hr) IV Continuous <Continuous>  insulin lispro (HumaLOG) corrective regimen sliding scale   SubCutaneous three times a day before meals  insulin lispro (HumaLOG) corrective regimen sliding scale   SubCutaneous at bedtime  melatonin 6 milliGRAM(s) Oral at bedtime  metoprolol succinate ER 25 milliGRAM(s) Oral daily  pantoprazole    Tablet 40 milliGRAM(s) Oral before breakfast  predniSONE   Tablet 60 milliGRAM(s) Oral daily  senna 2 Tablet(s) Oral at bedtime  sodium chloride 0.9% lock flush 3 milliLiter(s) IV Push every 8 hours  tiotropium 18 MICROgram(s) Capsule 1 Capsule(s) Inhalation daily    MEDICATIONS  (PRN):  ALBUTerol    90 MICROgram(s) HFA Inhaler 2 Puff(s) Inhalation every 6 hours PRN Shortness of Breath and/or Wheezing  dextrose 40% Gel 15 Gram(s) Oral once PRN Blood Glucose LESS THAN 70 milliGRAM(s)/deciliter  glucagon  Injectable 1 milliGRAM(s) IntraMuscular once PRN Glucose LESS THAN 70 milligrams/deciliter  guaiFENesin  milliGRAM(s) Oral every 12 hours PRN Cough  heparin  Injectable 4500 Unit(s) IV Push every 6 hours PRN For aPTT less than 40  heparin  Injectable 2000 Unit(s) IV Push every 6 hours PRN For aPTT between 40 - 57  meclizine 12.5 milliGRAM(s) Oral three times a day PRN Dizziness      CAPILLARY BLOOD GLUCOSE      POCT Blood Glucose.: 241 mg/dL (23 Dec 2019 21:13)  POCT Blood Glucose.: 260 mg/dL (23 Dec 2019 16:28)  POCT Blood Glucose.: 205 mg/dL (23 Dec 2019 11:31)    I&O's Summary    23 Dec 2019 07:01  -  24 Dec 2019 07:00  --------------------------------------------------------  IN: 850 mL / OUT: 700 mL / NET: 150 mL        PHYSICAL EXAM:  Vital Signs Last 24 Hrs  T(C): 36.2 (24 Dec 2019 04:36), Max: 36.4 (23 Dec 2019 11:51)  T(F): 97.2 (24 Dec 2019 04:36), Max: 97.6 (23 Dec 2019 11:51)  HR: 60 (24 Dec 2019 04:36) (60 - 73)  BP: 96/56 (24 Dec 2019 04:36) (96/56 - 125/69)  BP(mean): --  RR: 18 (24 Dec 2019 04:36) (17 - 18)  SpO2: 100% (24 Dec 2019 04:36) (96% - 100%)    CONSTITUTIONAL: NAD, resting comfortably  HEENT:  Atraumatic, Normocephalic, EOMI, conjunctiva and sclera clear  RESPIRATORY: Normal respiratory effort; lungs are clear to auscultation bilaterally; no wheezes/rales/rhonchi  CARDIOVASCULAR: irregularly irregular; +systolic murmur; no rub/gallop;  ABDOMEN: Nontender to palpation, normoactive bowel sounds, no rebound/guarding; No hepatosplenomegaly  EXTREMITIES: No edema; 2+ peripheral pulses; no clubbing or cyanosis of digits; no joint swelling or tenderness to palpation  NEURO/PSYCH: A+Ox3; affect appropriate; no focal neuro deficits appreciated    LABS:                        6.1    8.92  )-----------( 215      ( 23 Dec 2019 07:45 )             19.0     12    131<L>  |  95<L>  |  102<H>  ----------------------------<  170<H>  4.5   |  19<L>  |  4.12<H>    Ca    8.6      23 Dec 2019 06:34  Phos  6.1       Mg     2.6         TPro  6.6  /  Alb  3.1<L>  /  TBili  1.3<H>  /  DBili  x   /  AST  10  /  ALT  7<L>  /  AlkPhos  56      PT/INR - ( 23 Dec 2019 08:18 )   PT: 12.4 sec;   INR: 1.09 ratio         PTT - ( 23 Dec 2019 22:43 )  PTT:98.2 sec      Urinalysis Basic - ( 23 Dec 2019 10:28 )    Color: Yellow / Appearance: Clear / S.018 / pH: x  Gluc: x / Ketone: Negative  / Bili: Negative / Urobili: Negative   Blood: x / Protein: Negative / Nitrite: Negative   Leuk Esterase: Small / RBC: 6 /hpf / WBC 4 /HPF   Sq Epi: x / Non Sq Epi: 0 /hpf / Bacteria: Negative      RADIOLOGY & ADDITIONAL TESTS:  Results Reviewed: Yes  Imaging Personally Reviewed: Yes  Electrocardiogram Personally Reviewed: Yes    COORDINATION OF CARE:  Care Discussed with Consultants/Other Providers [Y/N]: Y  Prior or Outpatient Records Reviewed [Y/N]: Y

## 2019-12-24 NOTE — PROGRESS NOTE ADULT - PROBLEM SELECTOR PLAN 9
DVT: heparin gtt for TAVR/BAV procedure  Diet: Consistent carb and DASH; f/u cardio on when to make NPO in anticipation of procedure  Dispo: PT reeval after TAVR. DVT: heparin gtt for TAVR/BAV procedure  Diet: Consistent carb and DASH; f/u cardio on when to make NPO in anticipation of procedure  Dispo: PT reeval after valvuloplasty.

## 2019-12-24 NOTE — PROGRESS NOTE ADULT - ASSESSMENT
83 yo M with hx Afib (Coumadin), DM, HLD, CAD (stent 2014 and 2015 in mLAD and RCA), HFrEF (40%), sever aortic stenosis was admitted to [facility] on 12/13/19 after being referred by Cardiologist Dr. Becker for  decompensated heart failure after R/LHC with plans for optimization prior to consideration of TAVR. Nephrology consulted for JUHI.

## 2019-12-24 NOTE — PROGRESS NOTE ADULT - PROBLEM SELECTOR PLAN 2
TTE showing severe aortic stenosis, symptomatic with SOB on exertion  - Structural heart following, holding an TAVR/balloon valvulopasty given decreased Hgb  - Monitor on tele  - possible TAVR/valvuloplasty TTE showing severe aortic stenosis, symptomatic with SOB on exertion  - Structural heart following, holding on TAVR/balloon valvulopasty given decreased Hgb  - Monitor on tele  - possible valvuloplasty if Hgb rises to > 8 per cardio TTE showing severe aortic stenosis, symptomatic with SOB on exertion  - Structural heart following, holding on TAVR/balloon valvuloplasty given decreased Hgb  - Monitor on tele  - possible valvuloplasty if Hgb rises to > 8 per cardio

## 2019-12-25 LAB
% GAMMA, URINE: 9 % — SIGNIFICANT CHANGE UP
ALBUMIN 24H MFR UR ELPH: 16.9 % — SIGNIFICANT CHANGE UP
ALBUMIN SERPL ELPH-MCNC: 3.2 G/DL — LOW (ref 3.3–5)
ALP SERPL-CCNC: 55 U/L — SIGNIFICANT CHANGE UP (ref 40–120)
ALPHA1 GLOB 24H MFR UR ELPH: 33.6 % — SIGNIFICANT CHANGE UP
ALPHA2 GLOB 24H MFR UR ELPH: 16 % — SIGNIFICANT CHANGE UP
ALT FLD-CCNC: 8 U/L — LOW (ref 10–45)
ANION GAP SERPL CALC-SCNC: 17 MMOL/L — SIGNIFICANT CHANGE UP (ref 5–17)
APTT BLD: 73.5 SEC — HIGH (ref 27.5–36.3)
APTT BLD: 75.3 SEC — HIGH (ref 27.5–36.3)
AST SERPL-CCNC: 6 U/L — LOW (ref 10–40)
B-GLOBULIN 24H MFR UR ELPH: 24.5 % — SIGNIFICANT CHANGE UP
BASOPHILS # BLD AUTO: 0.02 K/UL — SIGNIFICANT CHANGE UP (ref 0–0.2)
BASOPHILS NFR BLD AUTO: 0.3 % — SIGNIFICANT CHANGE UP (ref 0–2)
BILIRUB SERPL-MCNC: 1.2 MG/DL — SIGNIFICANT CHANGE UP (ref 0.2–1.2)
BUN SERPL-MCNC: 113 MG/DL — HIGH (ref 7–23)
CALCIUM SERPL-MCNC: 8.5 MG/DL — SIGNIFICANT CHANGE UP (ref 8.4–10.5)
CHLORIDE SERPL-SCNC: 98 MMOL/L — SIGNIFICANT CHANGE UP (ref 96–108)
CO2 SERPL-SCNC: 18 MMOL/L — LOW (ref 22–31)
CREAT SERPL-MCNC: 3.68 MG/DL — HIGH (ref 0.5–1.3)
EOSINOPHIL # BLD AUTO: 0.01 K/UL — SIGNIFICANT CHANGE UP (ref 0–0.5)
EOSINOPHIL NFR BLD AUTO: 0.1 % — SIGNIFICANT CHANGE UP (ref 0–6)
GLUCOSE BLDC GLUCOMTR-MCNC: 174 MG/DL — HIGH (ref 70–99)
GLUCOSE BLDC GLUCOMTR-MCNC: 186 MG/DL — HIGH (ref 70–99)
GLUCOSE BLDC GLUCOMTR-MCNC: 258 MG/DL — HIGH (ref 70–99)
GLUCOSE BLDC GLUCOMTR-MCNC: 320 MG/DL — HIGH (ref 70–99)
GLUCOSE SERPL-MCNC: 165 MG/DL — HIGH (ref 70–99)
HCT VFR BLD CALC: 20.3 % — CRITICAL LOW (ref 39–50)
HGB BLD-MCNC: 6.6 G/DL — CRITICAL LOW (ref 13–17)
IMM GRANULOCYTES NFR BLD AUTO: 4.6 % — HIGH (ref 0–1.5)
INR BLD: 1.08 RATIO — SIGNIFICANT CHANGE UP (ref 0.88–1.16)
INTERPRETATION 24H UR IFE-IMP: SIGNIFICANT CHANGE UP
INTERPRETATION 24H UR IFE-IMP: SIGNIFICANT CHANGE UP
LDH SERPL L TO P-CCNC: 224 U/L — SIGNIFICANT CHANGE UP (ref 50–242)
LYMPHOCYTES # BLD AUTO: 0.97 K/UL — LOW (ref 1–3.3)
LYMPHOCYTES # BLD AUTO: 12.9 % — LOW (ref 13–44)
M PROTEIN 24H UR ELPH-MRATE: SIGNIFICANT CHANGE UP
MAGNESIUM SERPL-MCNC: 2.6 MG/DL — SIGNIFICANT CHANGE UP (ref 1.6–2.6)
MCHC RBC-ENTMCNC: 30.7 PG — SIGNIFICANT CHANGE UP (ref 27–34)
MCHC RBC-ENTMCNC: 32.5 GM/DL — SIGNIFICANT CHANGE UP (ref 32–36)
MCV RBC AUTO: 94.4 FL — SIGNIFICANT CHANGE UP (ref 80–100)
MONOCYTES # BLD AUTO: 0.49 K/UL — SIGNIFICANT CHANGE UP (ref 0–0.9)
MONOCYTES NFR BLD AUTO: 6.5 % — SIGNIFICANT CHANGE UP (ref 2–14)
NEUTROPHILS # BLD AUTO: 5.69 K/UL — SIGNIFICANT CHANGE UP (ref 1.8–7.4)
NEUTROPHILS NFR BLD AUTO: 75.6 % — SIGNIFICANT CHANGE UP (ref 43–77)
PHOSPHATE SERPL-MCNC: 5.7 MG/DL — HIGH (ref 2.5–4.5)
PLATELET # BLD AUTO: 200 K/UL — SIGNIFICANT CHANGE UP (ref 150–400)
POTASSIUM SERPL-MCNC: 4.5 MMOL/L — SIGNIFICANT CHANGE UP (ref 3.5–5.3)
POTASSIUM SERPL-SCNC: 4.5 MMOL/L — SIGNIFICANT CHANGE UP (ref 3.5–5.3)
PROT ?TM UR-MCNC: 11 MG/DL — SIGNIFICANT CHANGE UP (ref 0–12)
PROT PATTERN 24H UR ELPH-IMP: SIGNIFICANT CHANGE UP
PROT SERPL-MCNC: 6.7 G/DL — SIGNIFICANT CHANGE UP (ref 6–8.3)
PROTHROM AB SERPL-ACNC: 12.4 SEC — SIGNIFICANT CHANGE UP (ref 10–13.1)
RBC # BLD: 2.15 M/UL — LOW (ref 4.2–5.8)
RBC # FLD: 18.5 % — HIGH (ref 10.3–14.5)
SODIUM SERPL-SCNC: 133 MMOL/L — LOW (ref 135–145)
WBC # BLD: 7.53 K/UL — SIGNIFICANT CHANGE UP (ref 3.8–10.5)
WBC # FLD AUTO: 7.53 K/UL — SIGNIFICANT CHANGE UP (ref 3.8–10.5)

## 2019-12-25 PROCEDURE — 99233 SBSQ HOSP IP/OBS HIGH 50: CPT | Mod: GC

## 2019-12-25 PROCEDURE — 99233 SBSQ HOSP IP/OBS HIGH 50: CPT

## 2019-12-25 PROCEDURE — 99232 SBSQ HOSP IP/OBS MODERATE 35: CPT | Mod: GC

## 2019-12-25 RX ORDER — FUROSEMIDE 40 MG
40 TABLET ORAL
Refills: 0 | Status: DISCONTINUED | OUTPATIENT
Start: 2019-12-25 | End: 2019-12-26

## 2019-12-25 RX ORDER — INSULIN LISPRO 100/ML
5 VIAL (ML) SUBCUTANEOUS
Refills: 0 | Status: DISCONTINUED | OUTPATIENT
Start: 2019-12-25 | End: 2019-12-26

## 2019-12-25 RX ADMIN — HEPARIN SODIUM 800 UNIT(S)/HR: 5000 INJECTION INTRAVENOUS; SUBCUTANEOUS at 02:20

## 2019-12-25 RX ADMIN — PANTOPRAZOLE SODIUM 40 MILLIGRAM(S): 20 TABLET, DELAYED RELEASE ORAL at 06:00

## 2019-12-25 RX ADMIN — Medication 60 MILLIGRAM(S): at 06:00

## 2019-12-25 RX ADMIN — Medication 1 MILLIGRAM(S): at 11:06

## 2019-12-25 RX ADMIN — Medication 1: at 08:02

## 2019-12-25 RX ADMIN — Medication 3: at 17:32

## 2019-12-25 RX ADMIN — Medication 6 MILLIGRAM(S): at 21:02

## 2019-12-25 RX ADMIN — SODIUM CHLORIDE 3 MILLILITER(S): 9 INJECTION INTRAMUSCULAR; INTRAVENOUS; SUBCUTANEOUS at 05:56

## 2019-12-25 RX ADMIN — Medication 40 MILLIGRAM(S): at 17:29

## 2019-12-25 RX ADMIN — ATORVASTATIN CALCIUM 40 MILLIGRAM(S): 80 TABLET, FILM COATED ORAL at 21:02

## 2019-12-25 RX ADMIN — SODIUM CHLORIDE 3 MILLILITER(S): 9 INJECTION INTRAMUSCULAR; INTRAVENOUS; SUBCUTANEOUS at 21:04

## 2019-12-25 RX ADMIN — SODIUM CHLORIDE 3 MILLILITER(S): 9 INJECTION INTRAMUSCULAR; INTRAVENOUS; SUBCUTANEOUS at 14:38

## 2019-12-25 RX ADMIN — CLOPIDOGREL BISULFATE 75 MILLIGRAM(S): 75 TABLET, FILM COATED ORAL at 11:06

## 2019-12-25 RX ADMIN — TIOTROPIUM BROMIDE 1 CAPSULE(S): 18 CAPSULE ORAL; RESPIRATORY (INHALATION) at 11:08

## 2019-12-25 RX ADMIN — Medication 5 UNIT(S): at 12:22

## 2019-12-25 RX ADMIN — Medication 5 UNIT(S): at 17:32

## 2019-12-25 RX ADMIN — Medication 325 MILLIGRAM(S): at 11:06

## 2019-12-25 RX ADMIN — Medication 1 SPRAY(S): at 11:07

## 2019-12-25 RX ADMIN — Medication 3 UNIT(S): at 08:01

## 2019-12-25 RX ADMIN — Medication 4: at 12:10

## 2019-12-25 RX ADMIN — Medication 25 MILLIGRAM(S): at 06:00

## 2019-12-25 NOTE — PROGRESS NOTE ADULT - PROBLEM SELECTOR PLAN 2
TTE showing severe aortic stenosis, symptomatic with SOB on exertion  - Structural heart following, holding on TAVR/balloon valvulopasty given decreased Hgb  - Monitor on tele  - possible valvuloplasty if Hgb rises to > 8 per cardio TTE showing severe aortic stenosis, symptomatic with SOB on exertion  - Structural heart following, plan for balloon valvulopasty tomorrow despite continued low Hgb  - NPO at midnight for valvuloplasty tomorrow  - send type and screen as patient likely to get blood tranfusions during procedure tomorrow  - continue to monitor on tele

## 2019-12-25 NOTE — PROGRESS NOTE ADULT - ASSESSMENT
83 yo M with hx Afib (Coumadin), DM, HLD, CAD (stent 2014 and 2015 in mLAD and RCA), HFrEF (40%), sever aortic stenosis was admitted to [facility] on 12/13/19 after being referred by Cardiologist Dr. Becker for  decompensated heart failure after R/LHC with plans for optimization prior to consideration of TAVR. Nephrology consulted for JUHI.

## 2019-12-25 NOTE — PROGRESS NOTE ADULT - PROBLEM SELECTOR PLAN 3
- Hb 7.9, baseline 8-9 in 2015, follows with outside hematologist, Dr. Miranda in [place]  - s/p febrile nonhemolytic transfusion rxn during 1/2U prbc  - will get CT scan chest/abdomen/pelvis to r/o malignancy that could be contributing to possible hemolysis    - Holding transfusion unless symptomatic per heme recs  - If symptomatic, will give IV 40 lasix with 50 IV benadryl and 650PO tylenol prior to 1/2U prbc over 3 hours per blood transfusion  -Direct alisson positive for IgG and C3  - continue prendisone for warm hemolytic anemia.  - cold agglutinin titer, ALMA DELIA, RF all negative  -daily CBC with diff, retic count and LDH  -Added PPI while on steroids, heparin drip, and plavix - Hb 7.9, baseline 8-9 in 2015, follows with outside hematologist, Dr. Miranda in [place]  - s/p febrile nonhemolytic transfusion rxn during 1/2U prbc  - no active malignancy seen on CT scans yesterday, prelim read  - Holding transfusion unless symptomatic per heme recs  - If symptomatic, will give IV 40 lasix with 50 IV benadryl and 650PO tylenol prior to 1/2U prbc over 3 hours per blood transfusion  -Direct alisson positive for IgG and C3  - continue prendisone for warm hemolytic anemia.  - cold agglutinin titer, ALMA DELIA, RF all negative  -daily CBC with diff, retic count and LDH  -Added PPI while on steroids, heparin drip, and plavix

## 2019-12-25 NOTE — PROGRESS NOTE ADULT - PROBLEM SELECTOR PLAN 4
2/2 contrast from R/[facility] with diuresis. Cr initial 1.3 (baseline Cr 0.85 from 2015)  - holding valsartan until Cr stabilizes.   -Lasix held.   - trend Cr and avoid nephrotoxin meds. Hold fluids for now  - monitor I/Os, daily weight  - f/u nephrology, HF recs 2/2 contrast from R/[facility] with diuresis. (baseline Cr 0.85 from 2015)  - Cr currently 3.68, improved from 4.12  - holding valsartan until Cr stabilizes.   -Lasix held.   - trend Cr and avoid nephrotoxin meds. Hold fluids for now  - monitor I/Os, daily weight  - f/u nephrology, HF recs

## 2019-12-25 NOTE — PROGRESS NOTE ADULT - PROBLEM SELECTOR PLAN 1
Patient with Non-oliguric JUHI with ATN. His blood pressures has been borderline low recently.     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 BUN elevated can be explained by steroids.  - Hyperuricemia and hyperphosphatemia can be explained by ongoing renal failure  - Continue to hold nephrotoxic medications such as RCA and ARB/ACE  - Continue to monitor renal function and electrolytes   - No indication for HD as of yet.   - Dose medications as per eGFR.  - Would avoid sudden hemodynamic shift in BP and achieve goal SBP>100

## 2019-12-25 NOTE — PROGRESS NOTE ADULT - SUBJECTIVE AND OBJECTIVE BOX
PROGRESS NOTE:   ************************************************  Authored by: Noam Du MD PGY1  Internal Medicine  Pager: [facility]: [phone]  *************************************************    Patient is a 84y old  Male who presents with a chief complaint of CHF exacerbation (25 Dec 2019 07:17)      SUBJECTIVE / OVERNIGHT EVENTS: The patient was seen and examined at bedside.     REVIEW OF SYSTEMS:  As above, otherwise negative.    MEDICATIONS  (STANDING):  atorvastatin 40 milliGRAM(s) Oral at bedtime  clopidogrel Tablet 75 milliGRAM(s) Oral daily  dextrose 5%. 1000 milliLiter(s) (50 mL/Hr) IV Continuous <Continuous>  dextrose 50% Injectable 12.5 Gram(s) IV Push once  dextrose 50% Injectable 25 Gram(s) IV Push once  dextrose 50% Injectable 25 Gram(s) IV Push once  ferrous    sulfate 325 milliGRAM(s) Oral daily  fluticasone propionate 50 MICROgram(s)/spray Nasal Spray 1 Spray(s) Both Nostrils daily  folic acid 1 milliGRAM(s) Oral daily  heparin  Infusion.  Unit(s)/Hr (11 mL/Hr) IV Continuous <Continuous>  insulin lispro (HumaLOG) corrective regimen sliding scale   SubCutaneous three times a day before meals  insulin lispro (HumaLOG) corrective regimen sliding scale   SubCutaneous at bedtime  insulin lispro Injectable (HumaLOG) 3 Unit(s) SubCutaneous three times a day before meals  melatonin 6 milliGRAM(s) Oral at bedtime  metoprolol succinate ER 25 milliGRAM(s) Oral daily  pantoprazole    Tablet 40 milliGRAM(s) Oral before breakfast  predniSONE   Tablet 60 milliGRAM(s) Oral daily  senna 2 Tablet(s) Oral at bedtime  sodium chloride 0.9% lock flush 3 milliLiter(s) IV Push every 8 hours  tiotropium 18 MICROgram(s) Capsule 1 Capsule(s) Inhalation daily    MEDICATIONS  (PRN):  ALBUTerol    90 MICROgram(s) HFA Inhaler 2 Puff(s) Inhalation every 6 hours PRN Shortness of Breath and/or Wheezing  dextrose 40% Gel 15 Gram(s) Oral once PRN Blood Glucose LESS THAN 70 milliGRAM(s)/deciliter  glucagon  Injectable 1 milliGRAM(s) IntraMuscular once PRN Glucose LESS THAN 70 milligrams/deciliter  guaiFENesin  milliGRAM(s) Oral every 12 hours PRN Cough  heparin  Injectable 4500 Unit(s) IV Push every 6 hours PRN For aPTT less than 40  heparin  Injectable 2000 Unit(s) IV Push every 6 hours PRN For aPTT between 40 - 57  meclizine 12.5 milliGRAM(s) Oral three times a day PRN Dizziness      CAPILLARY BLOOD GLUCOSE      POCT Blood Glucose.: 180 mg/dL (24 Dec 2019 21:38)  POCT Blood Glucose.: 261 mg/dL (24 Dec 2019 16:48)  POCT Blood Glucose.: 353 mg/dL (24 Dec 2019 11:47)  POCT Blood Glucose.: 353 mg/dL (24 Dec 2019 11:44)  POCT Blood Glucose.: 180 mg/dL (24 Dec 2019 08:06)    I&O's Summary    24 Dec 2019 07:01  -  25 Dec 2019 07:00  --------------------------------------------------------  IN: 1111 mL / OUT: 400 mL / NET: 711 mL        PHYSICAL EXAM:  Vital Signs Last 24 Hrs  T(C): 36.3 (25 Dec 2019 04:30), Max: 36.6 (24 Dec 2019 11:57)  T(F): 97.4 (25 Dec 2019 04:30), Max: 97.9 (24 Dec 2019 11:57)  HR: 56 (25 Dec 2019 04:30) (56 - 69)  BP: 97/63 (25 Dec 2019 04:30) (97/45 - 109/58)  BP(mean): --  RR: 17 (25 Dec 2019 04:30) (17 - 18)  SpO2: 100% (25 Dec 2019 04:30) (96% - 100%)    CONSTITUTIONAL: NAD, resting comfortably  RESPIRATORY: Normal respiratory effort; lungs are clear to auscultation bilaterally; no wheezes/rales/rhonchi  CARDIOVASCULAR: Regular rate and rhythm, normal S1 and S2, no murmur/rub/gallop;  ABDOMEN: Nontender to palpation, normoactive bowel sounds, no rebound/guarding; No hepatosplenomegaly  EXTREMITIES: No edema; 2+ peripheral pulses; no clubbing or cyanosis of digits; no joint swelling or tenderness to palpation  NEURO/PSYCH: A+Ox3; affect appropriate; no focal neuro deficits appreciated    LABS:                        6.4    7.10  )-----------( 207      ( 24 Dec 2019 07:45 )             20.0     12-25    133<L>  |  98  |  113<H>  ----------------------------<  165<H>  4.5   |  18<L>  |  3.68<H>    Ca    8.5      25 Dec 2019 05:50  Phos  5.7     12-25  Mg     2.6     12-25    TPro  6.7  /  Alb  3.2<L>  /  TBili  1.2  /  DBili  x   /  AST  6<L>  /  ALT  8<L>  /  AlkPhos  55  12-25    PT/INR - ( 24 Dec 2019 08:34 )   PT: 12.0 sec;   INR: 1.06 ratio         PTT - ( 25 Dec 2019 01:24 )  PTT:75.3 sec      Urinalysis Basic - ( 23 Dec 2019 10:28 )    Color: Yellow / Appearance: Clear / S.018 / pH: x  Gluc: x / Ketone: Negative  / Bili: Negative / Urobili: Negative   Blood: x / Protein: Negative / Nitrite: Negative   Leuk Esterase: Small / RBC: 6 /hpf / WBC 4 /HPF   Sq Epi: x / Non Sq Epi: 0 /hpf / Bacteria: Negative        RADIOLOGY & ADDITIONAL TESTS:  Results Reviewed: Yes  Imaging Personally Reviewed: Yes  Electrocardiogram Personally Reviewed: Yes    COORDINATION OF CARE:  Care Discussed with Consultants/Other Providers [Y/N]: Y  Prior or Outpatient Records Reviewed [Y/N]: Y PROGRESS NOTE:   ************************************************  Authored by: Noam Du MD PGY1  Internal Medicine  Pager: [facility]: [phone]  *************************************************    Patient is a 84y old  Male who presents with a chief complaint of CHF exacerbation (25 Dec 2019 07:17)      SUBJECTIVE / OVERNIGHT EVENTS: No acute events overnight. The patient was seen and examined at bedside. States he feels okay, wondering if the procedure is going to happen or not. Understands that his blood tests are still showing anemia. Denies any acute complaints; he specifically denies fever/chills, headache, chest pain, SOB, palpitations, abdominal pain, nausea/vomiting, dysuria, changes in urinary or bowel habits, joint pain/swelling, weakness.     REVIEW OF SYSTEMS:  As above, otherwise negative.    MEDICATIONS  (STANDING):  atorvastatin 40 milliGRAM(s) Oral at bedtime  clopidogrel Tablet 75 milliGRAM(s) Oral daily  dextrose 5%. 1000 milliLiter(s) (50 mL/Hr) IV Continuous <Continuous>  dextrose 50% Injectable 12.5 Gram(s) IV Push once  dextrose 50% Injectable 25 Gram(s) IV Push once  dextrose 50% Injectable 25 Gram(s) IV Push once  ferrous    sulfate 325 milliGRAM(s) Oral daily  fluticasone propionate 50 MICROgram(s)/spray Nasal Spray 1 Spray(s) Both Nostrils daily  folic acid 1 milliGRAM(s) Oral daily  heparin  Infusion.  Unit(s)/Hr (11 mL/Hr) IV Continuous <Continuous>  insulin lispro (HumaLOG) corrective regimen sliding scale   SubCutaneous three times a day before meals  insulin lispro (HumaLOG) corrective regimen sliding scale   SubCutaneous at bedtime  insulin lispro Injectable (HumaLOG) 3 Unit(s) SubCutaneous three times a day before meals  melatonin 6 milliGRAM(s) Oral at bedtime  metoprolol succinate ER 25 milliGRAM(s) Oral daily  pantoprazole    Tablet 40 milliGRAM(s) Oral before breakfast  predniSONE   Tablet 60 milliGRAM(s) Oral daily  senna 2 Tablet(s) Oral at bedtime  sodium chloride 0.9% lock flush 3 milliLiter(s) IV Push every 8 hours  tiotropium 18 MICROgram(s) Capsule 1 Capsule(s) Inhalation daily    MEDICATIONS  (PRN):  ALBUTerol    90 MICROgram(s) HFA Inhaler 2 Puff(s) Inhalation every 6 hours PRN Shortness of Breath and/or Wheezing  dextrose 40% Gel 15 Gram(s) Oral once PRN Blood Glucose LESS THAN 70 milliGRAM(s)/deciliter  glucagon  Injectable 1 milliGRAM(s) IntraMuscular once PRN Glucose LESS THAN 70 milligrams/deciliter  guaiFENesin  milliGRAM(s) Oral every 12 hours PRN Cough  heparin  Injectable 4500 Unit(s) IV Push every 6 hours PRN For aPTT less than 40  heparin  Injectable 2000 Unit(s) IV Push every 6 hours PRN For aPTT between 40 - 57  meclizine 12.5 milliGRAM(s) Oral three times a day PRN Dizziness      CAPILLARY BLOOD GLUCOSE      POCT Blood Glucose.: 180 mg/dL (24 Dec 2019 21:38)  POCT Blood Glucose.: 261 mg/dL (24 Dec 2019 16:48)  POCT Blood Glucose.: 353 mg/dL (24 Dec 2019 11:47)  POCT Blood Glucose.: 353 mg/dL (24 Dec 2019 11:44)  POCT Blood Glucose.: 180 mg/dL (24 Dec 2019 08:06)    I&O's Summary    24 Dec 2019 07:01  -  25 Dec 2019 07:00  --------------------------------------------------------  IN: 1111 mL / OUT: 400 mL / NET: 711 mL        PHYSICAL EXAM:  Vital Signs Last 24 Hrs  T(C): 36.3 (25 Dec 2019 04:30), Max: 36.6 (24 Dec 2019 11:57)  T(F): 97.4 (25 Dec 2019 04:30), Max: 97.9 (24 Dec 2019 11:57)  HR: 56 (25 Dec 2019 04:30) (56 - 69)  BP: 97/63 (25 Dec 2019 04:30) (97/45 - 109/58)  BP(mean): --  RR: 17 (25 Dec 2019 04:30) (17 - 18)  SpO2: 100% (25 Dec 2019 04:30) (96% - 100%)    HEENT:  Atraumatic, Normocephalic, EOMI, conjunctiva and sclera clear  RESPIRATORY: Normal respiratory effort; lungs are clear to auscultation bilaterally; no wheezes/rales/rhonchi  CARDIOVASCULAR: irregularly irregular; +systolic murmur; no rub/gallop;  ABDOMEN: Nontender to palpation, normoactive bowel sounds, no rebound/guarding; No hepatosplenomegaly  EXTREMITIES: No edema; 2+ peripheral pulses; no clubbing or cyanosis of digits; no joint swelling or tenderness to palpation  NEURO/PSYCH: A+Ox3; affect appropriate; no focal neuro deficits appreciated  SKIN: extensive ecchymosis on left upper extremity, nontender, no active bleeding; no other rashes/lesions noted    LABS:                        6.4    7.10  )-----------( 207      ( 24 Dec 2019 07:45 )             20.0     12-25    133<L>  |  98  |  113<H>  ----------------------------<  165<H>  4.5   |  18<L>  |  3.68<H>    Ca    8.5      25 Dec 2019 05:50  Phos  5.7     12-25  Mg     2.6     12-25    TPro  6.7  /  Alb  3.2<L>  /  TBili  1.2  /  DBili  x   /  AST  6<L>  /  ALT  8<L>  /  AlkPhos  55  12-25    PT/INR - ( 24 Dec 2019 08:34 )   PT: 12.0 sec;   INR: 1.06 ratio         PTT - ( 25 Dec 2019 01:24 )  PTT:75.3 sec      Urinalysis Basic - ( 23 Dec 2019 10:28 )    Color: Yellow / Appearance: Clear / S.018 / pH: x  Gluc: x / Ketone: Negative  / Bili: Negative / Urobili: Negative   Blood: x / Protein: Negative / Nitrite: Negative   Leuk Esterase: Small / RBC: 6 /hpf / WBC 4 /HPF   Sq Epi: x / Non Sq Epi: 0 /hpf / Bacteria: Negative        RADIOLOGY & ADDITIONAL TESTS:  Results Reviewed: Yes  Imaging Personally Reviewed: Yes  Electrocardiogram Personally Reviewed: Yes    COORDINATION OF CARE:  Care Discussed with Consultants/Other Providers [Y/N]: Y  Prior or Outpatient Records Reviewed [Y/N]: Y

## 2019-12-25 NOTE — PROGRESS NOTE ADULT - PROBLEM SELECTOR PLAN 1
Pt presented with DYER, with recent TTE 12/10 EF 30% with severe AS, with LV dysfunction, prior Echo in 6/2019 was 42%. Exam notable for 3+ LE edema up to b/l thigh, b/l rales, likely related to severe AS  - previously diuresed with lasix, d/c'd earlier as patient currently euvolemic and evidence of JUHI  - Strict Ins and Outs, daily weight, fluid restriction to 1L/day  - C/w toprol 25mg qd  - no additional fluids as patient has right HF per HF service  - Follow up additional HF recs

## 2019-12-25 NOTE — PROGRESS NOTE ADULT - SUBJECTIVE AND OBJECTIVE BOX
NOTE: All current Cardiology Service and Contact Information can be found at [email or web address], password: [email or web address]  Please feel free to contact me directly via text or call at [phone] with any questions or concerns.     Subjective:    Medications:  ALBUTerol    90 MICROgram(s) HFA Inhaler 2 Puff(s) Inhalation every 6 hours PRN  atorvastatin 40 milliGRAM(s) Oral at bedtime  clopidogrel Tablet 75 milliGRAM(s) Oral daily  dextrose 40% Gel 15 Gram(s) Oral once PRN  dextrose 5%. 1000 milliLiter(s) IV Continuous <Continuous>  dextrose 50% Injectable 12.5 Gram(s) IV Push once  dextrose 50% Injectable 25 Gram(s) IV Push once  dextrose 50% Injectable 25 Gram(s) IV Push once  ferrous    sulfate 325 milliGRAM(s) Oral daily  fluticasone propionate 50 MICROgram(s)/spray Nasal Spray 1 Spray(s) Both Nostrils daily  folic acid 1 milliGRAM(s) Oral daily  glucagon  Injectable 1 milliGRAM(s) IntraMuscular once PRN  guaiFENesin  milliGRAM(s) Oral every 12 hours PRN  heparin  Infusion.  Unit(s)/Hr IV Continuous <Continuous>  heparin  Injectable 4500 Unit(s) IV Push every 6 hours PRN  heparin  Injectable 2000 Unit(s) IV Push every 6 hours PRN  insulin lispro (HumaLOG) corrective regimen sliding scale   SubCutaneous three times a day before meals  insulin lispro (HumaLOG) corrective regimen sliding scale   SubCutaneous at bedtime  insulin lispro Injectable (HumaLOG) 3 Unit(s) SubCutaneous three times a day before meals  meclizine 12.5 milliGRAM(s) Oral three times a day PRN  melatonin 6 milliGRAM(s) Oral at bedtime  metoprolol succinate ER 25 milliGRAM(s) Oral daily  pantoprazole    Tablet 40 milliGRAM(s) Oral before breakfast  predniSONE   Tablet 60 milliGRAM(s) Oral daily  senna 2 Tablet(s) Oral at bedtime  sodium chloride 0.9% lock flush 3 milliLiter(s) IV Push every 8 hours  tiotropium 18 MICROgram(s) Capsule 1 Capsule(s) Inhalation daily    Vitals:  T(C): 36.3 (12-25-19 @ 04:30), Max: 36.6 (12-24-19 @ 11:57)  HR: 56 (12-25-19 @ 04:30) (56 - 69)  BP: 97/63 (12-25-19 @ 04:30) (97/45 - 109/58)  RR: 17 (12-25-19 @ 04:30) (17 - 18)  SpO2: 100% (12-25-19 @ 04:30) (96% - 100%)          I&O's Summary    24 Dec 2019 07:01  -  25 Dec 2019 07:00  --------------------------------------------------------  IN: 1111 mL / OUT: 400 mL / NET: 711 mL        Physical Exam:  Appearance: No Acute Distress  HEENT: JVP ___ cm H2O, no HJR  Cardiovascular: RRR, Normal S1 S2, No murmurs/rubs/gallops  Respiratory: Clear to auscultation bilaterally  Gastrointestinal: Soft, Non-tender, non-distended	  Skin: no skin lesions  Neurologic: Non-focal  Extremities: No LE edema, warm and well perfused  Psychiatry: A & O x 3, Mood & affect appropriate      Labs:                        6.6    7.53  )-----------( 200      ( 25 Dec 2019 08:49 )             20.3     12-25    133<L>  |  98  |  113<H>  ----------------------------<  165<H>  4.5   |  18<L>  |  3.68<H>    Ca    8.5      25 Dec 2019 05:50  Phos  5.7     12-25  Mg     2.6     12-25    TPro  6.7  /  Alb  3.2<L>  /  TBili  1.2  /  DBili  x   /  AST  6<L>  /  ALT  8<L>  /  AlkPhos  55  12-25      PT/INR - ( 25 Dec 2019 08:30 )   PT: 12.4 sec;   INR: 1.08 ratio         PTT - ( 25 Dec 2019 08:30 )  PTT:73.5 sec          Lactate Dehydrogenase, Serum: 224 U/L (12-25 @ 05:50)  Lactate Dehydrogenase, Serum: 239 U/L (12-24 @ 06:29)  Lactate Dehydrogenase, Serum: 239 U/L (12-23 @ 06:34) NOTE: All current Cardiology Service and Contact Information can be found at [email or web address], password: [email or web address]  Please feel free to contact me directly via text or call at [phone] with any questions or concerns.     Subjective: No acute events overnight. No acute complaints.     Medications:  ALBUTerol    90 MICROgram(s) HFA Inhaler 2 Puff(s) Inhalation every 6 hours PRN  atorvastatin 40 milliGRAM(s) Oral at bedtime  clopidogrel Tablet 75 milliGRAM(s) Oral daily  dextrose 40% Gel 15 Gram(s) Oral once PRN  dextrose 5%. 1000 milliLiter(s) IV Continuous <Continuous>  dextrose 50% Injectable 12.5 Gram(s) IV Push once  dextrose 50% Injectable 25 Gram(s) IV Push once  dextrose 50% Injectable 25 Gram(s) IV Push once  ferrous    sulfate 325 milliGRAM(s) Oral daily  fluticasone propionate 50 MICROgram(s)/spray Nasal Spray 1 Spray(s) Both Nostrils daily  folic acid 1 milliGRAM(s) Oral daily  glucagon  Injectable 1 milliGRAM(s) IntraMuscular once PRN  guaiFENesin  milliGRAM(s) Oral every 12 hours PRN  heparin  Infusion.  Unit(s)/Hr IV Continuous <Continuous>  heparin  Injectable 4500 Unit(s) IV Push every 6 hours PRN  heparin  Injectable 2000 Unit(s) IV Push every 6 hours PRN  insulin lispro (HumaLOG) corrective regimen sliding scale   SubCutaneous three times a day before meals  insulin lispro (HumaLOG) corrective regimen sliding scale   SubCutaneous at bedtime  insulin lispro Injectable (HumaLOG) 3 Unit(s) SubCutaneous three times a day before meals  meclizine 12.5 milliGRAM(s) Oral three times a day PRN  melatonin 6 milliGRAM(s) Oral at bedtime  metoprolol succinate ER 25 milliGRAM(s) Oral daily  pantoprazole    Tablet 40 milliGRAM(s) Oral before breakfast  predniSONE   Tablet 60 milliGRAM(s) Oral daily  senna 2 Tablet(s) Oral at bedtime  sodium chloride 0.9% lock flush 3 milliLiter(s) IV Push every 8 hours  tiotropium 18 MICROgram(s) Capsule 1 Capsule(s) Inhalation daily    Vitals:  T(C): 36.3 (12-25-19 @ 04:30), Max: 36.6 (12-24-19 @ 11:57)  HR: 56 (12-25-19 @ 04:30) (56 - 69)  BP: 97/63 (12-25-19 @ 04:30) (97/45 - 109/58)  RR: 17 (12-25-19 @ 04:30) (17 - 18)  SpO2: 100% (12-25-19 @ 04:30) (96% - 100%)          I&O's Summary    24 Dec 2019 07:01  -  25 Dec 2019 07:00  --------------------------------------------------------  IN: 1111 mL / OUT: 400 mL / NET: 711 mL        Physical Exam:  Appearance: No Acute Distress  HEENT: JVP moderately elevated   Cardiovascular: RRR, high-pitched systolic murmur loudest at RUSB   Respiratory: Clear to auscultation bilaterally  Gastrointestinal: Soft, Non-tender, non-distended	  Skin: no skin lesions  Neurologic: Non-focal  Extremities: No LE edema, warm and well perfused  Psychiatry: A & O x 3, Mood & affect appropriate      Labs:                        6.6    7.53  )-----------( 200      ( 25 Dec 2019 08:49 )             20.3     12-25    133<L>  |  98  |  113<H>  ----------------------------<  165<H>  4.5   |  18<L>  |  3.68<H>    Ca    8.5      25 Dec 2019 05:50  Phos  5.7     12-25  Mg     2.6     12-25    TPro  6.7  /  Alb  3.2<L>  /  TBili  1.2  /  DBili  x   /  AST  6<L>  /  ALT  8<L>  /  AlkPhos  55  12-25      PT/INR - ( 25 Dec 2019 08:30 )   PT: 12.4 sec;   INR: 1.08 ratio         PTT - ( 25 Dec 2019 08:30 )  PTT:73.5 sec          Lactate Dehydrogenase, Serum: 224 U/L (12-25 @ 05:50)  Lactate Dehydrogenase, Serum: 239 U/L (12-24 @ 06:29)  Lactate Dehydrogenase, Serum: 239 U/L (12-23 @ 06:34)

## 2019-12-25 NOTE — PROGRESS NOTE ADULT - SUBJECTIVE AND OBJECTIVE BOX
[facility] Division of Kidney Diseases & Hypertension  FOLLOW UP NOTE  ------------------------------------------------------------------------------  Chief Complaint:Aortic valve disorder      24 hour events/subjective:    Patient seen. No complaints  No acute events overnight  Serum creatinine improving  Na; 133       PAST HISTORY  --------------------------------------------------------------------------------  No significant changes to PMH, PSH, FHx, SHx, unless otherwise noted    ALLERGIES & MEDICATIONS  --------------------------------------------------------------------------------  Allergies    No Known Allergies    Intolerances      Standing Inpatient Medications  atorvastatin 40 milliGRAM(s) Oral at bedtime  clopidogrel Tablet 75 milliGRAM(s) Oral daily  dextrose 5%. 1000 milliLiter(s) IV Continuous <Continuous>  dextrose 50% Injectable 12.5 Gram(s) IV Push once  dextrose 50% Injectable 25 Gram(s) IV Push once  dextrose 50% Injectable 25 Gram(s) IV Push once  ferrous    sulfate 325 milliGRAM(s) Oral daily  fluticasone propionate 50 MICROgram(s)/spray Nasal Spray 1 Spray(s) Both Nostrils daily  folic acid 1 milliGRAM(s) Oral daily  heparin  Infusion.  Unit(s)/Hr IV Continuous <Continuous>  insulin lispro (HumaLOG) corrective regimen sliding scale   SubCutaneous three times a day before meals  insulin lispro (HumaLOG) corrective regimen sliding scale   SubCutaneous at bedtime  insulin lispro Injectable (HumaLOG) 3 Unit(s) SubCutaneous three times a day before meals  melatonin 6 milliGRAM(s) Oral at bedtime  metoprolol succinate ER 25 milliGRAM(s) Oral daily  pantoprazole    Tablet 40 milliGRAM(s) Oral before breakfast  predniSONE   Tablet 60 milliGRAM(s) Oral daily  senna 2 Tablet(s) Oral at bedtime  sodium chloride 0.9% lock flush 3 milliLiter(s) IV Push every 8 hours  tiotropium 18 MICROgram(s) Capsule 1 Capsule(s) Inhalation daily    PRN Inpatient Medications  ALBUTerol    90 MICROgram(s) HFA Inhaler 2 Puff(s) Inhalation every 6 hours PRN  dextrose 40% Gel 15 Gram(s) Oral once PRN  glucagon  Injectable 1 milliGRAM(s) IntraMuscular once PRN  guaiFENesin  milliGRAM(s) Oral every 12 hours PRN  heparin  Injectable 4500 Unit(s) IV Push every 6 hours PRN  heparin  Injectable 2000 Unit(s) IV Push every 6 hours PRN  meclizine 12.5 milliGRAM(s) Oral three times a day PRN      REVIEW OF SYSTEMS  --------------------------------------------------------------------------------  Gen: No  fevers/chills  Skin: No rashes  Head/Eyes/Ears/Mouth: No headache; Normal hearing; Normal vision w/o blurriness  Respiratory: No dyspnea, cough, wheezing, hemoptysis  CV: No chest pain, PND, orthopnea  GI: No abdominal pain, diarrhea, constipation, nausea, vomiting  : No increased frequency, dysuria, hematuria, nocturia  MSK: No joint pain/swelling; no back pain; no edema  Neuro: No dizziness/lightheadedness, weakness, seizures, numbness, tingling      All other systems were reviewed and are negative, except as noted.    VITALS/PHYSICAL EXAM  --------------------------------------------------------------------------------  T(C): 36.3 (12-25-19 @ 04:30), Max: 36.6 (12-24-19 @ 11:57)  HR: 56 (12-25-19 @ 04:30) (56 - 69)  BP: 97/63 (12-25-19 @ 04:30) (97/45 - 109/58)  RR: 17 (12-25-19 @ 04:30) (17 - 18)  SpO2: 100% (12-25-19 @ 04:30) (96% - 100%)  Wt(kg): --        12-24-19 @ 07:01  -  12-25-19 @ 07:00  --------------------------------------------------------  IN: 1111 mL / OUT: 400 mL / NET: 711 mL      Pysical Exam:  	Gen: NAD, well-appearing  	HEENT: supple neck  	Pulm: CTA B/L  	CV:  S1S2  	Abd: +BS, soft   	Ext: No B/L Lower ext edema  	Neuro: No focal deficits  	Skin: Warm, without rashes  	Vascular access: none    LABS/STUDIES  --------------------------------------------------------------------------------              6.4    7.10  >-----------<  207      [12-24-19 @ 07:45]              20.0     133  |  98  |  113  ----------------------------<  165      [12-25-19 @ 05:50]  4.5   |  18  |  3.68        Ca     8.5     [12-25-19 @ 05:50]      Mg     2.6     [12-25-19 @ 05:50]      Phos  5.7     [12-25-19 @ 05:50]    TPro  6.7  /  Alb  3.2  /  TBili  1.2  /  DBili  x   /  AST  6   /  ALT  8   /  AlkPhos  55  [12-25-19 @ 05:50]    PT/INR: PT 12.0 , INR 1.06       [12-24-19 @ 08:34]  PTT: 75.3       [12-25-19 @ 01:24]    Uric acid 15.2      [12-24-19 @ 06:29]        [12-25-19 @ 05:50]    Creatinine Trend:  SCr 3.68 [12-25 @ 05:50]  SCr 4.12 [12-24 @ 06:29]  SCr 4.12 [12-23 @ 06:34]  SCr 3.45 [12-22 @ 06:46]  SCr 3.19 [12-21 @ 06:51]    Urinalysis - [12-23-19 @ 10:28]      Color Yellow / Appearance Clear / SG 1.018 / pH 5.0      Gluc Negative / Ketone Negative  / Bili Negative / Urobili Negative       Blood Negative / Protein Negative / Leuk Est Small / Nitrite Negative      RBC 6 / WBC 4 / Hyaline 1 / Gran  / Sq Epi  / Non Sq Epi 0 / Bacteria Negative    Urine Creatinine 131      [12-21-19 @ 00:40]  Urine Protein 11      [12-21-19 @ 22:31]  Urine Sodium <35      [12-23-19 @ 10:28]  Urine Urea Nitrogen 775      [12-21-19 @ 02:30]  Urine Osmolality 439      [12-23-19 @ 10:28]        ALMA DELIA: titer Negative, pattern --      [12-21-19 @ 19:45]  Rheumatoid Factor <10      [12-21-19 @ 20:17]  Free Light Chains: kappa 13.58, lambda 8.44, ratio = 1.61      [12-21 @ 20:14]  Immunofixation Serum:   Weak IgG Kappa Band Identified    Reference Range: None Detected      [12-21-19 @ 20:14]  SPEP Interpretation: Weak Gamma-Migrating Paraprotein Identified      [12-21-19 @ 20:14]

## 2019-12-25 NOTE — PROGRESS NOTE ADULT - PROBLEM SELECTOR PLAN 9
DVT: heparin gtt for TAVR/BAV procedure  Diet: Consistent carb and DASH; f/u cardio on when to make NPO in anticipation of procedure  Dispo: PT reeval after valvuloplasty. DVT: heparin gtt for TAVR/BAV procedure  Diet: Consistent carb and DASH; NPO at midnight tonight for procedure tomorrow  Dispo: PT reeval after valvuloplasty.

## 2019-12-25 NOTE — PROGRESS NOTE ADULT - PROBLEM SELECTOR PLAN 5
Pt on coumadin at home, on Heparin gtt in anticipation for possible valvuloplasty. If no procedure, transition to coumadin   - CHADSVASc score of 6  - Continue with toprol 25mg qd. Digoxin has been held.   - Monitor on telemetry

## 2019-12-25 NOTE — PROGRESS NOTE ADULT - ASSESSMENT
Mr. Latham is an 84 year old man with ACC/AHA Stage C ischemic cardiomyopathy (LVDD 4.5 cm, EF 30%), severe AS (58/30/0.6), chronic AF on coumadin, and CAD s/p PCI to RCA and LAD with prior tamponade during coronary intervention who is admitted with decompensated heart failure after R/LHC with plans for optimization prior to consideration of TAVR. He underwent CT coronaries on 12/17 as a part of TAVR evaluation and suffered from MAI. His renal function appears to have stabilized, but he has severely elevated JVP.     I discussed his course with Dr. Becker from the structural heart team. He is not an acceptable candidate at this time for TAVR, but we will tentatively plan on balloon valvuloplasty Thursday if his renal function remains stable. Mr. Latham is an 84 year old man with ACC/AHA Stage C ischemic cardiomyopathy (LVDD 4.5 cm, EF 30%), severe AS (58/30/0.6), chronic AF on coumadin, and CAD s/p PCI to RCA and LAD with prior tamponade during coronary intervention who is admitted with decompensated heart failure after R/LHC with plans for optimization prior to consideration of TAVR. He underwent CT coronaries on 12/17 as a part of TAVR evaluation and suffered from MAI. His renal function appears to have stabilized, but he has elevated JVP. He is not an acceptable candidate at this time for TAVR, but we will plan on balloon valvuloplasty tomorrow.

## 2019-12-25 NOTE — PROGRESS NOTE ADULT - PROBLEM SELECTOR PLAN 1
Overall not volume expanded, but with elevated right sided filling pressures. He will not benefit from giving additional volume.   -Continue metoprolol succinate 25 mg daily.  - daily standing weight and strict I's and O's.

## 2019-12-25 NOTE — PROGRESS NOTE ADULT - SUBJECTIVE AND OBJECTIVE BOX
Mr Latham was admitted for the management of acute on chronic decompensated heart failure in the setting of low output / low gradient aortic stenosis and chronic systolic heart failure (ischemic CM).  His course has been complicated by acute on chronic renal failure (likely MAI and low flow/ATN) and critical anemia (with some concern for hemolysis).  Our initial intention was to proceed with TAVR, however I do not think he would fare well with such intervention at this time.  As such, I have offered to proceed with a BAV tomorrow (either palliative or as a bridge to TAVR, TBD), which can be done without contrast.  Given his severe frailty, critical anemia, and tenuous respiratory status, I will do this in OR 23 with Cardiac Anesthesia.  I have notified Anesthesia and they recommended I speak to the Cardiac Anesthesiologist covering OR 23 tomorrow morning.  I have added the case on with OR booking.  Please make sure he is NPO after midnight and that his type and cross are up to date as I intend to transfuse PRBC in the OR during the procedure.  I have discussed this plan in detail with Mr Latham and he is amenable to proceeding.  Discussed with Dr Nieto as well.

## 2019-12-25 NOTE — PROGRESS NOTE ADULT - PROBLEM SELECTOR PLAN 2
Patient with Hypo-osmolar hyponatremia. Currently appears euvolemic.  - Switch base medication solution to NS rather than D5 (Heparin drip)  - trend Serum sodium for now. Repeat BMP tonight  - Recheck Urine Osm with urine electrolytes today

## 2019-12-25 NOTE — PROGRESS NOTE ADULT - PROBLEM SELECTOR PLAN 7
-home rosuvastatin 10mg  -Atorvastatin here. -home rosuvastatin 10mg  -Atorvastatin while in hospital

## 2019-12-25 NOTE — PROGRESS NOTE ADULT - PROBLEM SELECTOR PLAN 8
- not on home medication, diet controlled  - A1c 5.6  continues to have elevated glucose, will start standing basal/bolus insulin given continued hyperglycemia - not on home medication, diet controlled  - A1c 5.6  - morning sugars ok, later sugars elevated -> need for mealtime insulin in hospital  - currently getting bolus 3 units humalog with meals, trend sugars and adjust insulin as needed

## 2019-12-26 PROBLEM — R06.09 OTHER FORMS OF DYSPNEA: Chronic | Status: ACTIVE | Noted: 2019-12-13

## 2019-12-26 PROBLEM — I44.7 LEFT BUNDLE-BRANCH BLOCK, UNSPECIFIED: Chronic | Status: ACTIVE | Noted: 2019-12-13

## 2019-12-26 PROBLEM — D64.9 ANEMIA, UNSPECIFIED: Chronic | Status: ACTIVE | Noted: 2019-12-13

## 2019-12-26 LAB
ALBUMIN SERPL ELPH-MCNC: 3.1 G/DL — LOW (ref 3.3–5)
ALBUMIN SERPL ELPH-MCNC: 3.5 G/DL — SIGNIFICANT CHANGE UP (ref 3.3–5)
ALP SERPL-CCNC: 56 U/L — SIGNIFICANT CHANGE UP (ref 40–120)
ALP SERPL-CCNC: 58 U/L — SIGNIFICANT CHANGE UP (ref 40–120)
ALT FLD-CCNC: 11 U/L — SIGNIFICANT CHANGE UP (ref 10–45)
ALT FLD-CCNC: 9 U/L — LOW (ref 10–45)
ANION GAP SERPL CALC-SCNC: 17 MMOL/L — SIGNIFICANT CHANGE UP (ref 5–17)
ANION GAP SERPL CALC-SCNC: 18 MMOL/L — HIGH (ref 5–17)
APTT BLD: 26.9 SEC — LOW (ref 27.5–36.3)
APTT BLD: 88.6 SEC — HIGH (ref 27.5–36.3)
AST SERPL-CCNC: 7 U/L — LOW (ref 10–40)
AST SERPL-CCNC: 9 U/L — LOW (ref 10–40)
BASOPHILS # BLD AUTO: 0.01 K/UL — SIGNIFICANT CHANGE UP (ref 0–0.2)
BASOPHILS NFR BLD AUTO: 0.1 % — SIGNIFICANT CHANGE UP (ref 0–2)
BILIRUB SERPL-MCNC: 1.2 MG/DL — SIGNIFICANT CHANGE UP (ref 0.2–1.2)
BILIRUB SERPL-MCNC: 1.5 MG/DL — HIGH (ref 0.2–1.2)
BLD GP AB SCN SERPL QL: NEGATIVE — SIGNIFICANT CHANGE UP
BUN SERPL-MCNC: 108 MG/DL — HIGH (ref 7–23)
BUN SERPL-MCNC: 122 MG/DL — HIGH (ref 7–23)
C3 SERPL-MCNC: 75 MG/DL — LOW (ref 81–157)
C4 SERPL-MCNC: 2 MG/DL — LOW (ref 13–39)
CALCIUM SERPL-MCNC: 8.6 MG/DL — SIGNIFICANT CHANGE UP (ref 8.4–10.5)
CALCIUM SERPL-MCNC: 8.6 MG/DL — SIGNIFICANT CHANGE UP (ref 8.4–10.5)
CHLORIDE SERPL-SCNC: 95 MMOL/L — LOW (ref 96–108)
CHLORIDE SERPL-SCNC: 99 MMOL/L — SIGNIFICANT CHANGE UP (ref 96–108)
CK MB CFR SERPL CALC: 2.7 NG/ML — SIGNIFICANT CHANGE UP (ref 0–6.7)
CK SERPL-CCNC: 20 U/L — LOW (ref 30–200)
CO2 SERPL-SCNC: 18 MMOL/L — LOW (ref 22–31)
CO2 SERPL-SCNC: 19 MMOL/L — LOW (ref 22–31)
CREAT SERPL-MCNC: 3.11 MG/DL — HIGH (ref 0.5–1.3)
CREAT SERPL-MCNC: 3.58 MG/DL — HIGH (ref 0.5–1.3)
EOSINOPHIL # BLD AUTO: 0.03 K/UL — SIGNIFICANT CHANGE UP (ref 0–0.5)
EOSINOPHIL NFR BLD AUTO: 0.4 % — SIGNIFICANT CHANGE UP (ref 0–6)
GLUCOSE BLDC GLUCOMTR-MCNC: 181 MG/DL — HIGH (ref 70–99)
GLUCOSE BLDC GLUCOMTR-MCNC: 191 MG/DL — HIGH (ref 70–99)
GLUCOSE BLDC GLUCOMTR-MCNC: 242 MG/DL — HIGH (ref 70–99)
GLUCOSE BLDC GLUCOMTR-MCNC: 243 MG/DL — HIGH (ref 70–99)
GLUCOSE SERPL-MCNC: 150 MG/DL — HIGH (ref 70–99)
GLUCOSE SERPL-MCNC: 167 MG/DL — HIGH (ref 70–99)
HCT VFR BLD CALC: 20 % — CRITICAL LOW (ref 39–50)
HCT VFR BLD CALC: 23.3 % — LOW (ref 39–50)
HGB BLD-MCNC: 6.5 G/DL — CRITICAL LOW (ref 13–17)
HGB BLD-MCNC: 7.5 G/DL — LOW (ref 13–17)
IMM GRANULOCYTES NFR BLD AUTO: 4.7 % — HIGH (ref 0–1.5)
INR BLD: 1.06 RATIO — SIGNIFICANT CHANGE UP (ref 0.88–1.16)
INR BLD: 1.1 RATIO — SIGNIFICANT CHANGE UP (ref 0.88–1.16)
LDH SERPL L TO P-CCNC: 226 U/L — SIGNIFICANT CHANGE UP (ref 50–242)
LYMPHOCYTES # BLD AUTO: 1.24 K/UL — SIGNIFICANT CHANGE UP (ref 1–3.3)
LYMPHOCYTES # BLD AUTO: 15.2 % — SIGNIFICANT CHANGE UP (ref 13–44)
MAGNESIUM SERPL-MCNC: 2.5 MG/DL — SIGNIFICANT CHANGE UP (ref 1.6–2.6)
MAGNESIUM SERPL-MCNC: 2.6 MG/DL — SIGNIFICANT CHANGE UP (ref 1.6–2.6)
MCHC RBC-ENTMCNC: 30.2 PG — SIGNIFICANT CHANGE UP (ref 27–34)
MCHC RBC-ENTMCNC: 30.4 PG — SIGNIFICANT CHANGE UP (ref 27–34)
MCHC RBC-ENTMCNC: 32.2 GM/DL — SIGNIFICANT CHANGE UP (ref 32–36)
MCHC RBC-ENTMCNC: 32.5 GM/DL — SIGNIFICANT CHANGE UP (ref 32–36)
MCV RBC AUTO: 93 FL — SIGNIFICANT CHANGE UP (ref 80–100)
MCV RBC AUTO: 94.3 FL — SIGNIFICANT CHANGE UP (ref 80–100)
MONOCYTES # BLD AUTO: 0.49 K/UL — SIGNIFICANT CHANGE UP (ref 0–0.9)
MONOCYTES NFR BLD AUTO: 6 % — SIGNIFICANT CHANGE UP (ref 2–14)
NEUTROPHILS # BLD AUTO: 6 K/UL — SIGNIFICANT CHANGE UP (ref 1.8–7.4)
NEUTROPHILS NFR BLD AUTO: 73.6 % — SIGNIFICANT CHANGE UP (ref 43–77)
NRBC # BLD: 0 /100 WBCS — SIGNIFICANT CHANGE UP (ref 0–0)
PHOSPHATE SERPL-MCNC: 5 MG/DL — HIGH (ref 2.5–4.5)
PHOSPHATE SERPL-MCNC: 5.6 MG/DL — HIGH (ref 2.5–4.5)
PLATELET # BLD AUTO: 196 K/UL — SIGNIFICANT CHANGE UP (ref 150–400)
PLATELET # BLD AUTO: 241 K/UL — SIGNIFICANT CHANGE UP (ref 150–400)
POTASSIUM SERPL-MCNC: 4.4 MMOL/L — SIGNIFICANT CHANGE UP (ref 3.5–5.3)
POTASSIUM SERPL-MCNC: 4.5 MMOL/L — SIGNIFICANT CHANGE UP (ref 3.5–5.3)
POTASSIUM SERPL-SCNC: 4.4 MMOL/L — SIGNIFICANT CHANGE UP (ref 3.5–5.3)
POTASSIUM SERPL-SCNC: 4.5 MMOL/L — SIGNIFICANT CHANGE UP (ref 3.5–5.3)
PROT SERPL-MCNC: 6.6 G/DL — SIGNIFICANT CHANGE UP (ref 6–8.3)
PROT SERPL-MCNC: 6.8 G/DL — SIGNIFICANT CHANGE UP (ref 6–8.3)
PROTHROM AB SERPL-ACNC: 12.2 SEC — SIGNIFICANT CHANGE UP (ref 10–12.9)
PROTHROM AB SERPL-ACNC: 12.6 SEC — SIGNIFICANT CHANGE UP (ref 10–13.1)
RBC # BLD: 2.15 M/UL — LOW (ref 4.2–5.8)
RBC # BLD: 2.47 M/UL — LOW (ref 4.2–5.8)
RBC # FLD: 18.3 % — HIGH (ref 10.3–14.5)
RBC # FLD: 18.4 % — HIGH (ref 10.3–14.5)
RH IG SCN BLD-IMP: POSITIVE — SIGNIFICANT CHANGE UP
SODIUM SERPL-SCNC: 130 MMOL/L — LOW (ref 135–145)
SODIUM SERPL-SCNC: 136 MMOL/L — SIGNIFICANT CHANGE UP (ref 135–145)
TROPONIN T, HIGH SENSITIVITY RESULT: 39 NG/L — SIGNIFICANT CHANGE UP (ref 0–51)
WBC # BLD: 7.9 K/UL — SIGNIFICANT CHANGE UP (ref 3.8–10.5)
WBC # BLD: 8.15 K/UL — SIGNIFICANT CHANGE UP (ref 3.8–10.5)
WBC # FLD AUTO: 7.9 K/UL — SIGNIFICANT CHANGE UP (ref 3.8–10.5)
WBC # FLD AUTO: 8.15 K/UL — SIGNIFICANT CHANGE UP (ref 3.8–10.5)

## 2019-12-26 PROCEDURE — 99291 CRITICAL CARE FIRST HOUR: CPT

## 2019-12-26 PROCEDURE — 99233 SBSQ HOSP IP/OBS HIGH 50: CPT | Mod: GC

## 2019-12-26 PROCEDURE — 71045 X-RAY EXAM CHEST 1 VIEW: CPT | Mod: 26

## 2019-12-26 PROCEDURE — 99232 SBSQ HOSP IP/OBS MODERATE 35: CPT

## 2019-12-26 PROCEDURE — 99233 SBSQ HOSP IP/OBS HIGH 50: CPT

## 2019-12-26 PROCEDURE — 92986 REVISION OF AORTIC VALVE: CPT

## 2019-12-26 RX ORDER — DEXTROSE 50 % IN WATER 50 %
12.5 SYRINGE (ML) INTRAVENOUS ONCE
Refills: 0 | Status: DISCONTINUED | OUTPATIENT
Start: 2019-12-26 | End: 2020-01-03

## 2019-12-26 RX ORDER — ATORVASTATIN CALCIUM 80 MG/1
40 TABLET, FILM COATED ORAL AT BEDTIME
Refills: 0 | Status: DISCONTINUED | OUTPATIENT
Start: 2019-12-26 | End: 2020-01-03

## 2019-12-26 RX ORDER — INSULIN GLARGINE 100 [IU]/ML
3 INJECTION, SOLUTION SUBCUTANEOUS AT BEDTIME
Refills: 0 | Status: DISCONTINUED | OUTPATIENT
Start: 2019-12-26 | End: 2019-12-26

## 2019-12-26 RX ORDER — SODIUM CHLORIDE 9 MG/ML
1000 INJECTION INTRAMUSCULAR; INTRAVENOUS; SUBCUTANEOUS
Refills: 0 | Status: DISCONTINUED | OUTPATIENT
Start: 2019-12-26 | End: 2020-01-03

## 2019-12-26 RX ORDER — SODIUM CHLORIDE 9 MG/ML
1000 INJECTION, SOLUTION INTRAVENOUS
Refills: 0 | Status: DISCONTINUED | OUTPATIENT
Start: 2019-12-26 | End: 2020-01-03

## 2019-12-26 RX ORDER — SENNA PLUS 8.6 MG/1
2 TABLET ORAL AT BEDTIME
Refills: 0 | Status: DISCONTINUED | OUTPATIENT
Start: 2019-12-26 | End: 2020-01-03

## 2019-12-26 RX ORDER — INSULIN LISPRO 100/ML
VIAL (ML) SUBCUTANEOUS AT BEDTIME
Refills: 0 | Status: DISCONTINUED | OUTPATIENT
Start: 2019-12-26 | End: 2019-12-27

## 2019-12-26 RX ORDER — FLUTICASONE PROPIONATE 50 MCG
1 SPRAY, SUSPENSION NASAL
Refills: 0 | Status: DISCONTINUED | OUTPATIENT
Start: 2019-12-26 | End: 2020-01-03

## 2019-12-26 RX ORDER — GLUCAGON INJECTION, SOLUTION 0.5 MG/.1ML
1 INJECTION, SOLUTION SUBCUTANEOUS ONCE
Refills: 0 | Status: DISCONTINUED | OUTPATIENT
Start: 2019-12-26 | End: 2020-01-03

## 2019-12-26 RX ORDER — DEXTROSE 50 % IN WATER 50 %
15 SYRINGE (ML) INTRAVENOUS ONCE
Refills: 0 | Status: DISCONTINUED | OUTPATIENT
Start: 2019-12-26 | End: 2020-01-03

## 2019-12-26 RX ORDER — POLYETHYLENE GLYCOL 3350 17 G/17G
17 POWDER, FOR SOLUTION ORAL DAILY
Refills: 0 | Status: DISCONTINUED | OUTPATIENT
Start: 2019-12-26 | End: 2020-01-03

## 2019-12-26 RX ORDER — LANOLIN ALCOHOL/MO/W.PET/CERES
5 CREAM (GRAM) TOPICAL AT BEDTIME
Refills: 0 | Status: DISCONTINUED | OUTPATIENT
Start: 2019-12-26 | End: 2020-01-03

## 2019-12-26 RX ORDER — INSULIN LISPRO 100/ML
VIAL (ML) SUBCUTANEOUS
Refills: 0 | Status: DISCONTINUED | OUTPATIENT
Start: 2019-12-26 | End: 2019-12-27

## 2019-12-26 RX ORDER — FERROUS SULFATE 325(65) MG
325 TABLET ORAL DAILY
Refills: 0 | Status: DISCONTINUED | OUTPATIENT
Start: 2019-12-26 | End: 2020-01-03

## 2019-12-26 RX ORDER — ACETAMINOPHEN 500 MG
650 TABLET ORAL EVERY 6 HOURS
Refills: 0 | Status: DISCONTINUED | OUTPATIENT
Start: 2019-12-26 | End: 2020-01-03

## 2019-12-26 RX ORDER — FOLIC ACID 0.8 MG
1 TABLET ORAL DAILY
Refills: 0 | Status: DISCONTINUED | OUTPATIENT
Start: 2019-12-26 | End: 2020-01-03

## 2019-12-26 RX ORDER — ALBUTEROL 90 UG/1
2 AEROSOL, METERED ORAL EVERY 6 HOURS
Refills: 0 | Status: DISCONTINUED | OUTPATIENT
Start: 2019-12-26 | End: 2020-01-03

## 2019-12-26 RX ORDER — TIOTROPIUM BROMIDE 18 UG/1
1 CAPSULE ORAL; RESPIRATORY (INHALATION) DAILY
Refills: 0 | Status: DISCONTINUED | OUTPATIENT
Start: 2019-12-26 | End: 2020-01-03

## 2019-12-26 RX ORDER — PANTOPRAZOLE SODIUM 20 MG/1
40 TABLET, DELAYED RELEASE ORAL
Refills: 0 | Status: DISCONTINUED | OUTPATIENT
Start: 2019-12-26 | End: 2020-01-03

## 2019-12-26 RX ORDER — FUROSEMIDE 40 MG
40 TABLET ORAL ONCE
Refills: 0 | Status: COMPLETED | OUTPATIENT
Start: 2019-12-26 | End: 2019-12-27

## 2019-12-26 RX ADMIN — Medication 1 MILLIGRAM(S): at 11:21

## 2019-12-26 RX ADMIN — Medication 2: at 17:18

## 2019-12-26 RX ADMIN — SODIUM CHLORIDE 3 MILLILITER(S): 9 INJECTION INTRAMUSCULAR; INTRAVENOUS; SUBCUTANEOUS at 05:06

## 2019-12-26 RX ADMIN — TIOTROPIUM BROMIDE 1 CAPSULE(S): 18 CAPSULE ORAL; RESPIRATORY (INHALATION) at 11:21

## 2019-12-26 RX ADMIN — Medication 25 MILLIGRAM(S): at 05:12

## 2019-12-26 RX ADMIN — Medication 325 MILLIGRAM(S): at 11:21

## 2019-12-26 RX ADMIN — Medication 1 SPRAY(S): at 11:21

## 2019-12-26 RX ADMIN — PANTOPRAZOLE SODIUM 40 MILLIGRAM(S): 20 TABLET, DELAYED RELEASE ORAL at 05:12

## 2019-12-26 RX ADMIN — ATORVASTATIN CALCIUM 40 MILLIGRAM(S): 80 TABLET, FILM COATED ORAL at 22:35

## 2019-12-26 RX ADMIN — SENNA PLUS 2 TABLET(S): 8.6 TABLET ORAL at 22:35

## 2019-12-26 RX ADMIN — Medication 2: at 22:35

## 2019-12-26 RX ADMIN — CLOPIDOGREL BISULFATE 75 MILLIGRAM(S): 75 TABLET, FILM COATED ORAL at 11:21

## 2019-12-26 RX ADMIN — Medication 2: at 12:08

## 2019-12-26 RX ADMIN — Medication 1: at 06:34

## 2019-12-26 RX ADMIN — Medication 40 MILLIGRAM(S): at 17:18

## 2019-12-26 RX ADMIN — Medication 60 MILLIGRAM(S): at 05:12

## 2019-12-26 RX ADMIN — SODIUM CHLORIDE 3 MILLILITER(S): 9 INJECTION INTRAMUSCULAR; INTRAVENOUS; SUBCUTANEOUS at 13:05

## 2019-12-26 RX ADMIN — Medication 40 MILLIGRAM(S): at 05:13

## 2019-12-26 RX ADMIN — Medication 5 MILLIGRAM(S): at 22:35

## 2019-12-26 RX ADMIN — HEPARIN SODIUM 800 UNIT(S)/HR: 5000 INJECTION INTRAVENOUS; SUBCUTANEOUS at 10:07

## 2019-12-26 NOTE — PROGRESS NOTE ADULT - ATTENDING COMMENTS
JUHI/ATN, CKD III, hyponatremia, AS  Creatinine now 3.6, from peak 4.1 (baseline 1.4)  No edema    Diuresis gingerly as needed  Dose medications for GFR 15-20  Avoid ACEi/ARB for now  Remainder per fellow, will follow

## 2019-12-26 NOTE — PROGRESS NOTE ADULT - PROBLEM SELECTOR PLAN 4
2/2 contrast from R/[facility] with diuresis. (baseline Cr 0.85 from 2015)  - Cr currently 3.68, improved from 4.12  - holding valsartan until Cr stabilizes.   -Lasix held.   - trend Cr and avoid nephrotoxin meds. Hold fluids for now  - monitor I/Os, daily weight  - f/u nephrology, HF recs 2/2 contrast from R/[facility] with diuresis. (baseline Cr 0.85 from 2015)  - Cr currently improving  - holding valsartan until Cr stabilizes.   -Lasix restarted 40 IV BID  - trend Cr and avoid nephrotoxin meds. Hold fluids for now  - monitor I/Os, daily weight  - f/u nephrology, HF recs

## 2019-12-26 NOTE — PROGRESS NOTE ADULT - PROBLEM SELECTOR PLAN 2
TTE showing severe aortic stenosis, symptomatic with SOB on exertion  - Structural heart following, plan for balloon valvulopasty tomorrow despite continued low Hgb  - NPO at midnight for valvuloplasty tomorrow  - send type and screen as patient likely to get blood tranfusions during procedure tomorrow  - continue to monitor on tele TTE showing severe aortic stenosis, symptomatic with SOB on exertion  - Structural heart following, plan for balloon valvulopasty today  - type and screen available this am  - continue to monitor on tele

## 2019-12-26 NOTE — PROGRESS NOTE ADULT - SUBJECTIVE AND OBJECTIVE BOX
PROGRESS NOTE:   ************************************************  Authored by: Noam Du MD PGY1  Internal Medicine  Pager: [facility]: [phone]  *************************************************    Patient is a 84y old  Male who presents with a chief complaint of CHF exacerbation (25 Dec 2019 10:17)      SUBJECTIVE / OVERNIGHT EVENTS: The patient was seen and examined at bedside.     REVIEW OF SYSTEMS:  As above, otherwise negative.    MEDICATIONS  (STANDING):  atorvastatin 40 milliGRAM(s) Oral at bedtime  clopidogrel Tablet 75 milliGRAM(s) Oral daily  dextrose 5%. 1000 milliLiter(s) (50 mL/Hr) IV Continuous <Continuous>  dextrose 50% Injectable 12.5 Gram(s) IV Push once  dextrose 50% Injectable 25 Gram(s) IV Push once  dextrose 50% Injectable 25 Gram(s) IV Push once  ferrous    sulfate 325 milliGRAM(s) Oral daily  fluticasone propionate 50 MICROgram(s)/spray Nasal Spray 1 Spray(s) Both Nostrils daily  folic acid 1 milliGRAM(s) Oral daily  furosemide   Injectable 40 milliGRAM(s) IV Push two times a day  heparin  Infusion.  Unit(s)/Hr (11 mL/Hr) IV Continuous <Continuous>  insulin lispro (HumaLOG) corrective regimen sliding scale   SubCutaneous three times a day before meals  insulin lispro (HumaLOG) corrective regimen sliding scale   SubCutaneous at bedtime  insulin lispro Injectable (HumaLOG) 5 Unit(s) SubCutaneous three times a day before meals  melatonin 6 milliGRAM(s) Oral at bedtime  metoprolol succinate ER 25 milliGRAM(s) Oral daily  pantoprazole    Tablet 40 milliGRAM(s) Oral before breakfast  predniSONE   Tablet 60 milliGRAM(s) Oral daily  senna 2 Tablet(s) Oral at bedtime  sodium chloride 0.9% lock flush 3 milliLiter(s) IV Push every 8 hours  tiotropium 18 MICROgram(s) Capsule 1 Capsule(s) Inhalation daily    MEDICATIONS  (PRN):  ALBUTerol    90 MICROgram(s) HFA Inhaler 2 Puff(s) Inhalation every 6 hours PRN Shortness of Breath and/or Wheezing  dextrose 40% Gel 15 Gram(s) Oral once PRN Blood Glucose LESS THAN 70 milliGRAM(s)/deciliter  glucagon  Injectable 1 milliGRAM(s) IntraMuscular once PRN Glucose LESS THAN 70 milligrams/deciliter  guaiFENesin  milliGRAM(s) Oral every 12 hours PRN Cough  heparin  Injectable 4500 Unit(s) IV Push every 6 hours PRN For aPTT less than 40  heparin  Injectable 2000 Unit(s) IV Push every 6 hours PRN For aPTT between 40 - 57  meclizine 12.5 milliGRAM(s) Oral three times a day PRN Dizziness      CAPILLARY BLOOD GLUCOSE      POCT Blood Glucose.: 191 mg/dL (26 Dec 2019 07:30)  POCT Blood Glucose.: 181 mg/dL (26 Dec 2019 06:31)  POCT Blood Glucose.: 186 mg/dL (25 Dec 2019 21:04)  POCT Blood Glucose.: 258 mg/dL (25 Dec 2019 17:30)  POCT Blood Glucose.: 320 mg/dL (25 Dec 2019 12:02)  POCT Blood Glucose.: 174 mg/dL (25 Dec 2019 07:53)    I&O's Summary    25 Dec 2019 07:01  -  26 Dec 2019 07:00  --------------------------------------------------------  IN: 1143 mL / OUT: 2000 mL / NET: -857 mL        PHYSICAL EXAM:  Vital Signs Last 24 Hrs  T(C): 36.5 (26 Dec 2019 04:24), Max: 36.8 (25 Dec 2019 20:03)  T(F): 97.7 (26 Dec 2019 04:24), Max: 98.2 (25 Dec 2019 20:03)  HR: 65 (26 Dec 2019 04:24) (64 - 76)  BP: 116/76 (26 Dec 2019 04:24) (98/61 - 116/76)  BP(mean): --  RR: 18 (26 Dec 2019 04:24) (18 - 18)  SpO2: 99% (26 Dec 2019 04:24) (94% - 100%)    CONSTITUTIONAL: NAD, resting comfortably  RESPIRATORY: Normal respiratory effort; lungs are clear to auscultation bilaterally; no wheezes/rales/rhonchi  CARDIOVASCULAR: Regular rate and rhythm, normal S1 and S2, no murmur/rub/gallop;  ABDOMEN: Nontender to palpation, normoactive bowel sounds, no rebound/guarding; No hepatosplenomegaly  EXTREMITIES: No edema; 2+ peripheral pulses; no clubbing or cyanosis of digits; no joint swelling or tenderness to palpation  NEURO/PSYCH: A+Ox3; affect appropriate; no focal neuro deficits appreciated    LABS:                        6.6    7.53  )-----------( 200      ( 25 Dec 2019 08:49 )             20.3     12-25    133<L>  |  98  |  113<H>  ----------------------------<  165<H>  4.5   |  18<L>  |  3.68<H>    Ca    8.5      25 Dec 2019 05:50  Phos  5.7     12-25  Mg     2.6     12-25    TPro  6.7  /  Alb  3.2<L>  /  TBili  1.2  /  DBili  x   /  AST  6<L>  /  ALT  8<L>  /  AlkPhos  55  12-25    PT/INR - ( 25 Dec 2019 08:30 )   PT: 12.4 sec;   INR: 1.08 ratio         PTT - ( 25 Dec 2019 08:30 )  PTT:73.5 sec        RADIOLOGY & ADDITIONAL TESTS:  Results Reviewed: Yes  Imaging Personally Reviewed: Yes  Electrocardiogram Personally Reviewed: Yes    COORDINATION OF CARE:  Care Discussed with Consultants/Other Providers [Y/N]: Y  Prior or Outpatient Records Reviewed [Y/N]: Y PROGRESS NOTE:   ************************************************  Authored by: Noam Du MD PGY1  Internal Medicine  Pager: [facility]: [phone]  *************************************************    Patient is a 84y old  Male who presents with a chief complaint of CHF exacerbation (25 Dec 2019 10:17)      SUBJECTIVE / OVERNIGHT EVENTS: No acute events overnight. Afib on tele, no acute events. The patient was seen and examined at bedside. Patient states that he feels okay with no acute complaints; specifically denies fever/chills, headache, chest pain, SOB, palpitations, abdominal pain, nausea/vomiting, dysuria, changes in urinary or bowel habits, joint pain/swelling, weakness. Understands that he is planned for valvuloplasty procedure today.    REVIEW OF SYSTEMS:  As above, otherwise negative.    MEDICATIONS  (STANDING):  atorvastatin 40 milliGRAM(s) Oral at bedtime  clopidogrel Tablet 75 milliGRAM(s) Oral daily  dextrose 5%. 1000 milliLiter(s) (50 mL/Hr) IV Continuous <Continuous>  dextrose 50% Injectable 12.5 Gram(s) IV Push once  dextrose 50% Injectable 25 Gram(s) IV Push once  dextrose 50% Injectable 25 Gram(s) IV Push once  ferrous    sulfate 325 milliGRAM(s) Oral daily  fluticasone propionate 50 MICROgram(s)/spray Nasal Spray 1 Spray(s) Both Nostrils daily  folic acid 1 milliGRAM(s) Oral daily  furosemide   Injectable 40 milliGRAM(s) IV Push two times a day  heparin  Infusion.  Unit(s)/Hr (11 mL/Hr) IV Continuous <Continuous>  insulin lispro (HumaLOG) corrective regimen sliding scale   SubCutaneous three times a day before meals  insulin lispro (HumaLOG) corrective regimen sliding scale   SubCutaneous at bedtime  insulin lispro Injectable (HumaLOG) 5 Unit(s) SubCutaneous three times a day before meals  melatonin 6 milliGRAM(s) Oral at bedtime  metoprolol succinate ER 25 milliGRAM(s) Oral daily  pantoprazole    Tablet 40 milliGRAM(s) Oral before breakfast  predniSONE   Tablet 60 milliGRAM(s) Oral daily  senna 2 Tablet(s) Oral at bedtime  sodium chloride 0.9% lock flush 3 milliLiter(s) IV Push every 8 hours  tiotropium 18 MICROgram(s) Capsule 1 Capsule(s) Inhalation daily    MEDICATIONS  (PRN):  ALBUTerol    90 MICROgram(s) HFA Inhaler 2 Puff(s) Inhalation every 6 hours PRN Shortness of Breath and/or Wheezing  dextrose 40% Gel 15 Gram(s) Oral once PRN Blood Glucose LESS THAN 70 milliGRAM(s)/deciliter  glucagon  Injectable 1 milliGRAM(s) IntraMuscular once PRN Glucose LESS THAN 70 milligrams/deciliter  guaiFENesin  milliGRAM(s) Oral every 12 hours PRN Cough  heparin  Injectable 4500 Unit(s) IV Push every 6 hours PRN For aPTT less than 40  heparin  Injectable 2000 Unit(s) IV Push every 6 hours PRN For aPTT between 40 - 57  meclizine 12.5 milliGRAM(s) Oral three times a day PRN Dizziness      CAPILLARY BLOOD GLUCOSE      POCT Blood Glucose.: 191 mg/dL (26 Dec 2019 07:30)  POCT Blood Glucose.: 181 mg/dL (26 Dec 2019 06:31)  POCT Blood Glucose.: 186 mg/dL (25 Dec 2019 21:04)  POCT Blood Glucose.: 258 mg/dL (25 Dec 2019 17:30)  POCT Blood Glucose.: 320 mg/dL (25 Dec 2019 12:02)  POCT Blood Glucose.: 174 mg/dL (25 Dec 2019 07:53)    I&O's Summary    25 Dec 2019 07:01  -  26 Dec 2019 07:00  --------------------------------------------------------  IN: 1143 mL / OUT: 2000 mL / NET: -857 mL        PHYSICAL EXAM:  Vital Signs Last 24 Hrs  T(C): 36.5 (26 Dec 2019 04:24), Max: 36.8 (25 Dec 2019 20:03)  T(F): 97.7 (26 Dec 2019 04:24), Max: 98.2 (25 Dec 2019 20:03)  HR: 65 (26 Dec 2019 04:24) (64 - 76)  BP: 116/76 (26 Dec 2019 04:24) (98/61 - 116/76)  BP(mean): --  RR: 18 (26 Dec 2019 04:24) (18 - 18)  SpO2: 99% (26 Dec 2019 04:24) (94% - 100%)    GENERAL: NAD, resting in bed comfortably  HEENT:  Atraumatic, Normocephalic, EOMI, conjunctiva and sclera clear  RESPIRATORY: Normal respiratory effort; lungs are clear to auscultation bilaterally; no wheezes/rales/rhonchi  CARDIOVASCULAR: irregularly irregular; +systolic murmur; no rub/gallop;  ABDOMEN: Nontender to palpation, normoactive bowel sounds, no rebound/guarding; No hepatosplenomegaly  EXTREMITIES: No edema; 2+ peripheral pulses; no clubbing or cyanosis of digits; no joint swelling or tenderness to palpation  NEURO/PSYCH: A+Ox3; affect appropriate; no focal neuro deficits appreciated  SKIN: extensive ecchymosis on left upper extremity, nontender, no active bleeding; no other rashes/lesions noted    LABS:                        6.6    7.53  )-----------( 200      ( 25 Dec 2019 08:49 )             20.3     12-25    133<L>  |  98  |  113<H>  ----------------------------<  165<H>  4.5   |  18<L>  |  3.68<H>    Ca    8.5      25 Dec 2019 05:50  Phos  5.7     12-25  Mg     2.6     12-25    TPro  6.7  /  Alb  3.2<L>  /  TBili  1.2  /  DBili  x   /  AST  6<L>  /  ALT  8<L>  /  AlkPhos  55  12-25    PT/INR - ( 25 Dec 2019 08:30 )   PT: 12.4 sec;   INR: 1.08 ratio         PTT - ( 25 Dec 2019 08:30 )  PTT:73.5 sec        RADIOLOGY & ADDITIONAL TESTS:  Results Reviewed: Yes  Imaging Personally Reviewed: Yes  Electrocardiogram Personally Reviewed: Yes    COORDINATION OF CARE:  Care Discussed with Consultants/Other Providers [Y/N]: Y  Prior or Outpatient Records Reviewed [Y/N]: Y

## 2019-12-26 NOTE — PROGRESS NOTE ADULT - SUBJECTIVE AND OBJECTIVE BOX
[facility] Division of Kidney Diseases & Hypertension  FOLLOW UP NOTE  ------------------------------------------------------------------------------  Chief Complaint:Aortic valve disorder      24 hour events/subjective:    Patient seen this morning without complaints  planned for BAV today.   SCr: 3.58 today improving  Serum Na: 130    PAST HISTORY  --------------------------------------------------------------------------------  No significant changes to PMH, PSH, FHx, SHx, unless otherwise noted    ALLERGIES & MEDICATIONS  --------------------------------------------------------------------------------  Allergies    No Known Allergies    Intolerances      Standing Inpatient Medications  atorvastatin 40 milliGRAM(s) Oral at bedtime  clopidogrel Tablet 75 milliGRAM(s) Oral daily  dextrose 5%. 1000 milliLiter(s) IV Continuous <Continuous>  dextrose 50% Injectable 12.5 Gram(s) IV Push once  dextrose 50% Injectable 25 Gram(s) IV Push once  dextrose 50% Injectable 25 Gram(s) IV Push once  ferrous    sulfate 325 milliGRAM(s) Oral daily  fluticasone propionate 50 MICROgram(s)/spray Nasal Spray 1 Spray(s) Both Nostrils daily  folic acid 1 milliGRAM(s) Oral daily  furosemide   Injectable 40 milliGRAM(s) IV Push two times a day  heparin  Infusion.  Unit(s)/Hr IV Continuous <Continuous>  insulin lispro (HumaLOG) corrective regimen sliding scale   SubCutaneous three times a day before meals  insulin lispro (HumaLOG) corrective regimen sliding scale   SubCutaneous at bedtime  insulin lispro Injectable (HumaLOG) 5 Unit(s) SubCutaneous three times a day before meals  melatonin 6 milliGRAM(s) Oral at bedtime  metoprolol succinate ER 25 milliGRAM(s) Oral daily  pantoprazole    Tablet 40 milliGRAM(s) Oral before breakfast  predniSONE   Tablet 60 milliGRAM(s) Oral daily  senna 2 Tablet(s) Oral at bedtime  sodium chloride 0.9% lock flush 3 milliLiter(s) IV Push every 8 hours  tiotropium 18 MICROgram(s) Capsule 1 Capsule(s) Inhalation daily    PRN Inpatient Medications  ALBUTerol    90 MICROgram(s) HFA Inhaler 2 Puff(s) Inhalation every 6 hours PRN  dextrose 40% Gel 15 Gram(s) Oral once PRN  glucagon  Injectable 1 milliGRAM(s) IntraMuscular once PRN  guaiFENesin  milliGRAM(s) Oral every 12 hours PRN  heparin  Injectable 4500 Unit(s) IV Push every 6 hours PRN  heparin  Injectable 2000 Unit(s) IV Push every 6 hours PRN  meclizine 12.5 milliGRAM(s) Oral three times a day PRN      Pysical Exam:  	Gen: NAD, well-appearing  	HEENT: supple neck  	Pulm: CTA B/L  	CV:  S1S2  	Abd: +BS, soft   	Ext: No B/L Lower ext edema  	Neuro: No focal deficits  	Skin: Warm, without rashes  	Vascular access: none    VITALS/PHYSICAL EXAM  --------------------------------------------------------------------------------  T(C): 36.5 (12-26-19 @ 04:24), Max: 36.8 (12-25-19 @ 20:03)  HR: 65 (12-26-19 @ 04:24) (65 - 76)  BP: 116/76 (12-26-19 @ 04:24) (101/65 - 116/76)  RR: 18 (12-26-19 @ 04:24) (18 - 18)  SpO2: 99% (12-26-19 @ 04:24) (94% - 99%)  Wt(kg): --        12-25-19 @ 07:01  -  12-26-19 @ 07:00  --------------------------------------------------------  IN: 1143 mL / OUT: 2000 mL / NET: -857 mL    12-26-19 @ 07:01  -  12-26-19 @ 11:59  --------------------------------------------------------  IN: 32 mL / OUT: 450 mL / NET: -418 mL      Physical Exam:  	Gen: NAD, well-appearing  	HEENT: PERRL, supple neck, clear oropharynx  	Pulm: CTA B/L  	CV: RRR, S1S2;  	Back: No spinal or CVA tenderness  	Abd: +BS, soft, nontender/nondistended  	: No suprapubic tenderness                      Extremities: no bilateral LE edema noted.                       Neuro: No focal deficits, intact gait  	Skin: Warm, without rashes  	Vascular access:    LABS/STUDIES  --------------------------------------------------------------------------------              6.5    8.15  >-----------<  196      [12-26-19 @ 08:53]              20.0     130  |  95  |  122  ----------------------------<  167      [12-26-19 @ 06:53]  4.4   |  18  |  3.58        Ca     8.6     [12-26-19 @ 06:53]      Mg     2.6     [12-26-19 @ 06:53]      Phos  5.0     [12-26-19 @ 06:53]    TPro  6.6  /  Alb  3.1  /  TBili  1.2  /  DBili  x   /  AST  7   /  ALT  9   /  AlkPhos  56  [12-26-19 @ 06:53]    PT/INR: PT 12.6 , INR 1.10       [12-26-19 @ 08:46]  PTT: 88.6       [12-26-19 @ 08:46]          [12-26-19 @ 06:53]    Creatinine Trend:  SCr 3.58 [12-26 @ 06:53]  SCr 3.68 [12-25 @ 05:50]  SCr 4.12 [12-24 @ 06:29]  SCr 4.12 [12-23 @ 06:34]  SCr 3.45 [12-22 @ 06:46]    Urinalysis - [12-23-19 @ 10:28]      Color Yellow / Appearance Clear / SG 1.018 / pH 5.0      Gluc Negative / Ketone Negative  / Bili Negative / Urobili Negative       Blood Negative / Protein Negative / Leuk Est Small / Nitrite Negative      RBC 6 / WBC 4 / Hyaline 1 / Gran  / Sq Epi  / Non Sq Epi 0 / Bacteria Negative    Urine Creatinine 131      [12-21-19 @ 00:40]  Urine Protein 11      [12-21-19 @ 22:31]  Urine Sodium <35      [12-23-19 @ 10:28]  Urine Urea Nitrogen 775      [12-21-19 @ 02:30]  Urine Osmolality 439      [12-23-19 @ 10:28]        ALMA DELIA: titer Negative, pattern --      [12-21-19 @ 19:45]  Rheumatoid Factor <10      [12-21-19 @ 20:17]  Free Light Chains: kappa 13.58, lambda 8.44, ratio = 1.61      [12-21 @ 20:14]  Immunofixation Serum:   Weak IgG Kappa Band Identified    Reference Range: None Detected      [12-21-19 @ 20:14]  SPEP Interpretation: Weak Gamma-Migrating Paraprotein Identified      [12-21-19 @ 20:14]  Immunofixation Urine: Reference Range: None Detected      [12-21-19 @ 22:31]  UPEP Interpretation: Normal Electrophoresis Pattern      [12-21-19 @ 22:31]

## 2019-12-26 NOTE — PROGRESS NOTE ADULT - PROBLEM SELECTOR PLAN 1
Pt presented with DYER, with recent TTE 12/10 EF 30% with severe AS, with LV dysfunction, prior Echo in 6/2019 was 42%. Exam notable for 3+ LE edema up to b/l thigh, b/l rales, likely related to severe AS  - previously diuresed with lasix, d/c'd earlier as patient currently euvolemic and evidence of JUHI  - Strict Ins and Outs, daily weight, fluid restriction to 1L/day  - C/w toprol 25mg qd  - no additional fluids as patient has right HF per HF service  - Follow up additional HF recs Pt presented with DYER, with recent TTE 12/10 EF 30% with severe AS, with LV dysfunction, prior Echo in 6/2019 was 42%. Exam notable for 3+ LE edema up to b/l thigh, b/l rales, likely related to severe AS  - lasix restarted per renal/cardio recs, 40 IV BID  - Strict Ins and Outs, daily weight, fluid restriction to 1L/day  - C/w toprol 25mg qd  - no additional fluids as patient has right HF per HF service  - Follow up additional HF recs

## 2019-12-26 NOTE — PROGRESS NOTE ADULT - PROBLEM SELECTOR PLAN 1
- Continue IV diuretics  - Continue metoprolol succinate 25 mg daily.  - Daily standing weight and strict I's and O's.

## 2019-12-26 NOTE — PROGRESS NOTE ADULT - PROBLEM SELECTOR PLAN 6
- he meets IIa indications for CRT given low EF with LBBB and concurrent AF.   - Will consider pending development of course.

## 2019-12-26 NOTE — PROGRESS NOTE ADULT - ASSESSMENT
84M PMH chronic anemia, HFrEF (40%), Afib on coumadin, T2DM, HLD, PVD, Menieres Disease, carotid stenosis, LBBB on EKG, CAD s/p PCI to mLAD in June 2014 and PCI to pRCA that was complicated by perforation and cardiogenic shock in May of 2015 presented for elective RHC/LHC for TAVR evaluation for severe AS, admitted for acute on on chronic CHF and anemia workup for TVAR or balloon aortic vavluoplasty Mon 12/23. Course complicated by possible transfusion rxn with RRT 12/19 for hypoxia. Hematology consulted for hx of chronic anemia.     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   - CT C/A/P unremarkable for malignancy.   - Continue folic acid  - Patient pending balloon valvuloplasty tonight.   - DO NOT transfuse pt unless absolutely necessary and symptomatic as pt's anemia is hemolytic in nature  - Trend daily CBC with diff, retic count and LDH  - No DIC noted on labs   - will follow closely with you. Will once again review peripheral smear, possible that valve pathology may also be involved in patient's hemolytic picture.

## 2019-12-26 NOTE — PROGRESS NOTE ADULT - PROBLEM SELECTOR PLAN 9
DVT: heparin gtt for TAVR/BAV procedure  Diet: Consistent carb and DASH; NPO at midnight tonight for procedure tomorrow  Dispo: PT reeval after valvuloplasty. DVT: heparin gtt for TAVR/BAV procedure  Diet: NPO for procedure, consistent carb and DASH diet after procedure  Dispo: PT reeval after valvuloplasty.

## 2019-12-26 NOTE — PROVIDER CONTACT NOTE (CRITICAL VALUE NOTIFICATION) - ASSESSMENT
Pt. stable at this time.
vitals stable , denied any s/s of bleeding at this time
Patient is A&O x 4; VSS. No s/s of bleeding or respiratory distress.
Patient is A&O x 4. VSS.
Patient is A&O x 4; VSS. No s/s of bleeding.
Pt A&Ox4,VSS. Pt denies dizziness, SOB, CP or other s/s of anemia at this time. Pt currently on heparin gtt @8cc/hr-therapeutic at this time. Fall/bleeding precautions maintained.
VSS No distress noted.  Denies any discomfort.  No s/s of SOB noted.
VSS, No distress noted.  Denies any discomfort.
VSS. Patient denies dizziness, SOB.
no bleeding noted at this time
vitals noted , RRT on going

## 2019-12-26 NOTE — PROGRESS NOTE ADULT - SUBJECTIVE AND OBJECTIVE BOX
INTERVAL HPI/OVERNIGHT EVENTS:  Patient S&E at bedside. No o/n events, patient reports he is feeling OK at this time. No dyspnea at rest.     PAST MEDICAL & SURGICAL HISTORY:  Anemia  LBBB (left bundle branch block)  DYER (dyspnea on exertion)  Coronary disease: PCI  CHF (congestive heart failure)  Former smoker  Meniere disease  HLD (hyperlipidemia)  DM (diabetes mellitus)  Atrial fibrillation  HTN (hypertension)  Lipoma  History of skin graft  Status post appendectomy      FAMILY HISTORY:  Family history of brain tumor (Sibling)  Family history of heart attack (Child)  Family history of CHF (congestive heart failure)  Family history of lung cancer      REVIEW OF SYSTEMS  General: No fevers, no chills, no fatigue, no weight loss  Skin: No rash  ENMT: No sore throat, no dysphagia, no mouth sores	  Respiratory and Thorax: No dyspnea, no cough, no wheezing  Cardiovascular: No chest pain, no palpitations  Gastrointestinal:	No N/V/D, no abdominal pain  Genitourinary: No dysuria  Musculoskeletal:	No joint pain, no muscle pain  Neurological:	No dizziness, no neuropathy  Psychiatric: No depression or anxiety  Hematology/Lymphatics: No easy bleeding or bruising, no swollen nodes noticed.       VITAL SIGNS:  T(F): 98.2 (12-27-19 @ 08:00)  HR: 82 (12-27-19 @ 11:00)  BP: 135/67 (12-27-19 @ 11:00)  RR: 20 (12-27-19 @ 11:00)  SpO2: 100% (12-27-19 @ 11:00)  Wt(kg): --    PHYSICAL EXAM:    Constitutional: cachexia  Eyes: EOMI, sclera non-icteric  Neck: supple, no masses, no JVD  Respiratory: CTA b/l, good air entry b/l  Cardiovascular: RRR, harsh systolic murmur  Gastrointestinal: soft, NTND, no masses palpable, + BS, no hepatosplenomegaly  Extremities: no c/c/e but significant bruising on b/l upper extremities.   Neurological: AAOx3      MEDICATIONS  (STANDING):  aspirin enteric coated 81 milliGRAM(s) Oral daily  atorvastatin 40 milliGRAM(s) Oral at bedtime  dextrose 5%. 1000 milliLiter(s) (50 mL/Hr) IV Continuous <Continuous>  dextrose 50% Injectable 12.5 Gram(s) IV Push once  ferrous    sulfate 325 milliGRAM(s) Oral daily  fluticasone propionate 50 MICROgram(s)/spray Nasal Spray 1 Spray(s) Both Nostrils two times a day  folic acid 1 milliGRAM(s) Oral daily  insulin lispro (HumaLOG) corrective regimen sliding scale   SubCutaneous three times a day before meals  insulin lispro (HumaLOG) corrective regimen sliding scale   SubCutaneous at bedtime  melatonin 5 milliGRAM(s) Oral at bedtime  pantoprazole    Tablet 40 milliGRAM(s) Oral before breakfast  polyethylene glycol 3350 17 Gram(s) Oral daily  predniSONE   Tablet 60 milliGRAM(s) Oral daily  senna 2 Tablet(s) Oral at bedtime  sodium chloride 0.9%. 1000 milliLiter(s) (10 mL/Hr) IV Continuous <Continuous>  tiotropium 18 MICROgram(s) Capsule 1 Capsule(s) Inhalation daily    MEDICATIONS  (PRN):  acetaminophen   Tablet .. 650 milliGRAM(s) Oral every 6 hours PRN Temp greater or equal to 38.5C (101.3F), Mild Pain (1 - 3)  ALBUTerol    90 MICROgram(s) HFA Inhaler 2 Puff(s) Inhalation every 6 hours PRN Shortness of Breath and/or Wheezing  dextrose 40% Gel 15 Gram(s) Oral once PRN Blood Glucose LESS THAN 70 milliGRAM(s)/deciLiter  glucagon  Injectable 1 milliGRAM(s) IntraMuscular once PRN Glucose <70 milliGRAM(s)/deciLiter      Allergies    No Known Allergies    Intolerances        LABS:  Complete Blood Count + Automated Diff (12.26.19 @ 08:53)    WBC Count: 8.15 K/uL    RBC Count: 2.15 M/uL    Hemoglobin: 6.5: Specimen integrity verified. g/dL    Hematocrit: 20.0 %    Mean Cell Volume: 93.0 fl    Mean Cell Hemoglobin: 30.2 pg    Mean Cell Hemoglobin Conc: 32.5 gm/dL    Red Cell Distrib Width: 18.3 %    Platelet Count - Automated: 196 K/uL    Auto Neutrophil #: 6.00 K/uL    Auto Lymphocyte #: 1.24 K/uL    Auto Monocyte #: 0.49 K/uL    Auto Eosinophil #: 0.03 K/uL    Auto Basophil #: 0.01 K/uL    Auto Neutrophil %: 73.6: Differential percentages must be correlated with absolute numbers for  clinical significance. %    Auto Lymphocyte %: 15.2 %    Auto Monocyte %: 6.0 %    Auto Eosinophil %: 0.4 %    Auto Basophil %: 0.1 %    Auto Immature Granulocyte %: 4.7 %                 Comprehensive Metabolic Panel (12.26.19 @ 06:53)    Sodium, Serum: 130 mmol/L    Potassium, Serum: 4.4 mmol/L    Chloride, Serum: 95 mmol/L    Carbon Dioxide, Serum: 18 mmol/L    Anion Gap, Serum: 17 mmol/L    Blood Urea Nitrogen, Serum: 122: test repeated mg/dL    Creatinine, Serum: 3.58 mg/dL    Glucose, Serum: 167 mg/dL    Calcium, Total Serum: 8.6 mg/dL    Protein Total, Serum: 6.6 g/dL    Albumin, Serum: 3.1 g/dL    Bilirubin Total, Serum: 1.2 mg/dL    Alkaline Phosphatase, Serum: 56 U/L    Aspartate Aminotransferase (AST/SGOT): 7 U/L    Alanine Aminotransferase (ALT/SGPT): 9 U/L    eGFR if Non : 15: Interpretative comment  The units for eGFR are mL/min/1.73M2 (normalized body surface area). The  eGFR is calculated from a serum creatinine using the CKD-EPI equation.  Other variables required for calculation are race, age and sex. Among  patients with chronic kidney disease (CKD), the eGFR is useful in  determining the stage of disease according to KDOQI CKD classification.  All eGFR results are reported numerically with the following  interpretation.          GFR                    With                 Without     (ml/min/1.73 m2)    Kidney Damage       Kidney Damage        >= 90                    Stage 1                     Normal        60-89                    Stage 2                     Decreased GFR        30-59     Stage 3                     Stage 3        15-29                    Stage 4                     Stage 4        < 15                      Stage 5                     Stage 5  Each stage of CKD assumes that the associated GFR level has been in  effect for at least 3 months. Determination of stages one and two (with  eGFR > 59 ml/min/m2) requires estimation of kidney damage for at least 3  months as defined by structural or functional abnormalities.  Limitations: All estimates of GFR will be less accurate for patients at  extremes of muscle mass (including but not limited to frail elderly,  critically ill, or cancer patients), those with unusual diets, and those  with conditions associated with reduced secretion or extrarenal  elimination of creatinine. The eGFR equation is not recommended for use  in patients with unstable creatinine levels. mL/min/1.73M2    eGFR if African American: 17 mL/min/1.73M2    Cold Agglutinin Titer, Serum: <1:32 (12.21.19 @ 19:51)        RADIOLOGY & ADDITIONAL TESTS:  Studies reviewed.    < from: CT Abdomen and Pelvis No Cont (12.24.19 @ 18:03) >  IMPRESSION: Limited examination without oral or intravenous contrast.    Enlarged prostate gland.    Circumferential urinary bladder wall thickening. Correlation with urinalysis is recommended.    Mild bibasilar atelectasis, right greater than left.  Trace bilateral pleural effusions.    Cardiomegaly.    Diffuse anasarca.    Cholelithiasis.    Small volume ascites.    < end of copied text >

## 2019-12-26 NOTE — PROVIDER CONTACT NOTE (CRITICAL VALUE NOTIFICATION) - RECOMMENDATIONS
MD made aware
MD contacted and aware.
MD made aware
Make MD aware.
Provider made aware.
Provider made aware.
none at this time

## 2019-12-26 NOTE — PROGRESS NOTE ADULT - SUBJECTIVE AND OBJECTIVE BOX
JESUS MORALES  MRN-[id number]  Patient is a 84y old  Male who presents with a chief complaint of CHF exacerbation (26 Dec 2019 16:26)    HPI:  83 yo M with hx Afib (Coumadin), DM, HLD, CAD (stent 2014 and 2015 in mLAD and RCA), HFrEF (40%) was referred by cardiologist for CHF exacerbation. Patient states that since Thanksgiving he has had worsening SOB. Patient ambulates with walker with assistance of Mary Kay MARK present at bedside, and recently has become more dyspneic with mild activities around the house. Additionally, his legs became swollen and he reports 6-7lb weight gain in last month. Patient uses 2 pillows to sleep at night but denies any difficulty breathing at night. Patient was seeing cardiology for TAVR evaluation for severe aortic stenosis. This past Tuesday, 12/10 patient went to cardiologist, Dr. Becker who increased torsemide from 20 mg to 40mg daily and recommended patient to come to hospital for further optimization of HF but patient declined. Echo from 12/10 shows sever aortic stenosis and EF 30%. Patient came today for a scheduled RHC/LHC prior to TAVR. Patient was found to have LE swelling and anemia with Hb of 7.9 and was admitted to medicine for further management.   Patient denies chest pain but complains of intermittent palpitations. Denies PND, orthopnea, dizziness, syncope. Patient complains of increased yellow phlegm production, but no cough, fever, sick contacts or travel history. Denies any history of COPD, never on home O2.  Echo 12/5 showed LVEF 28%. Repeat echo 12/10 demonstrated LVEF 30%    Admission labs: Hb 7.9 INR 1.49 (13 Dec 2019 12:28)      Surgery/Hospital course:    Today:    REVIEW OF SYSTEMS:  Gen: No fever  EYES/ENT: No visual changes;  No vertigo or throat pain   NECK: No pain   RES:  No shortness of breath or Cough  Chest: + incisional pain  CARD: No chest pain   GI: No abdominal pain  : No dysuria  NEURO: No weakness  SKIN: No itching, rashes     Physical Exam:  Vital Signs Last 24 Hrs  T(C): 35.6 (26 Dec 2019 21:00), Max: 36.5 (26 Dec 2019 04:24)  T(F): 96 (26 Dec 2019 21:00), Max: 97.7 (26 Dec 2019 04:24)  HR: 74 (26 Dec 2019 21:45) (65 - 79)  BP: 98/55 (26 Dec 2019 21:45) (98/46 - 116/76)  BP(mean): 74 (26 Dec 2019 21:45) (67 - 74)  RR: 18 (26 Dec 2019 21:45) (16 - 21)  SpO2: 100% (26 Dec 2019 21:45) (99% - 100%)  Gen:  Awake, alert   CNS: non focal 	  Neck: no JVD  RES : clear , no wheezing    Chest:   + chest tubes                     CVS: Regular  rhythm. Normal S1/S2  Abd: Soft, non-distended. Bowel sounds present.  Skin: No rash.  Ext:  no edema, A Line  PSY:  ============================I/O===========================   I&O's Detail    25 Dec 2019 07:01  -  26 Dec 2019 07:00  --------------------------------------------------------  IN:    heparin  Infusion.: 88 mL    Oral Fluid: 1055 mL  Total IN: 1143 mL    OUT:    Voided: 2000 mL  Total OUT: 2000 mL    Total NET: -857 mL      26 Dec 2019 07:01  -  26 Dec 2019 23:05  --------------------------------------------------------  IN:    heparin  Infusion.: 80 mL  Total IN: 80 mL    OUT:    Voided: 1150 mL  Total OUT: 1150 mL    Total NET: -1070 mL        ============================ LABS =========================                        7.5    7.90  )-----------( 241      ( 26 Dec 2019 21:23 )             23.3     12-26    136  |  99  |  108<H>  ----------------------------<  150<H>  4.5   |  19<L>  |  3.11<H>    Ca    8.6      26 Dec 2019 21:23  Phos  5.6     12-26  Mg     2.5     12-26    TPro  6.8  /  Alb  3.5  /  TBili  1.5<H>  /  DBili  x   /  AST  9<L>  /  ALT  11  /  AlkPhos  58  12-26    LIVER FUNCTIONS - ( 26 Dec 2019 21:23 )  Alb: 3.5 g/dL / Pro: 6.8 g/dL / ALK PHOS: 58 U/L / ALT: 11 U/L / AST: 9 U/L / GGT: x           PT/INR - ( 26 Dec 2019 21:23 )   PT: 12.2 sec;   INR: 1.06 ratio         PTT - ( 26 Dec 2019 21:23 )  PTT:26.9 sec      ======================Micro/Rad/Cardio=================  Culture: Reviewed   CXR: Reviewed  Echo:Reviewed  ======================================================  PAST MEDICAL & SURGICAL HISTORY:  Anemia  LBBB (left bundle branch block)  DYER (dyspnea on exertion)  Coronary disease: PCI  CHF (congestive heart failure)  Former smoker  Meniere disease  HLD (hyperlipidemia)  DM (diabetes mellitus)  Atrial fibrillation  HTN (hypertension)  Lipoma  History of skin graft  Status post appendectomy    ====================ASSESMENT ==============  CNS:  RES:  CVS:  Hem:  Amish:  GI:  Endo:  ID:  Skin  Plan:  ====================== NEUROLOGY=====================  acetaminophen   Tablet .. 650 milliGRAM(s) Oral every 6 hours PRN Temp greater or equal to 38.5C (101.3F), Mild Pain (1 - 3)  melatonin 5 milliGRAM(s) Oral at bedtime    ==================== RESPIRATORY======================  Mechanical Ventilation:    ALBUTerol    90 MICROgram(s) HFA Inhaler 2 Puff(s) Inhalation every 6 hours PRN Shortness of Breath and/or Wheezing  tiotropium 18 MICROgram(s) Capsule 1 Capsule(s) Inhalation daily    ====================CARDIOVASCULAR==================    ===================HEMATOLOGIC/ONC ===================    ===================== RENAL =========================  [place] for monitoring urine output    ==================== GASTROINTESTINAL===================  dextrose 5%. 1000 milliLiter(s) (50 mL/Hr) IV Continuous <Continuous>  ferrous    sulfate 325 milliGRAM(s) Oral daily  folic acid 1 milliGRAM(s) Oral daily  pantoprazole    Tablet 40 milliGRAM(s) Oral before breakfast  polyethylene glycol 3350 17 Gram(s) Oral daily  senna 2 Tablet(s) Oral at bedtime  sodium chloride 0.9%. 1000 milliLiter(s) (10 mL/Hr) IV Continuous <Continuous>    =======================    ENDOCRINE  =====================  atorvastatin 40 milliGRAM(s) Oral at bedtime  dextrose 40% Gel 15 Gram(s) Oral once PRN Blood Glucose LESS THAN 70 milliGRAM(s)/deciLiter  dextrose 50% Injectable 12.5 Gram(s) IV Push once  glucagon  Injectable 1 milliGRAM(s) IntraMuscular once PRN Glucose <70 milliGRAM(s)/deciLiter  insulin lispro (HumaLOG) corrective regimen sliding scale   SubCutaneous three times a day before meals  insulin lispro (HumaLOG) corrective regimen sliding scale   SubCutaneous at bedtime  predniSONE   Tablet 60 milliGRAM(s) Oral daily    ========================INFECTIOUS DISEASE================    .crit JESUS MORALES  MRN-[id number]  Patient is a 84y old  Male who presents with a chief complaint of CHF exacerbation (26 Dec 2019 16:26)    HPI:  83 yo M with hx Afib (Coumadin), DM, HLD, CAD (stent 2014 and 2015 in mLAD and RCA), HFrEF (40%) was referred by cardiologist for CHF exacerbation. Patient states that since Thanksgiving he has had worsening SOB. Patient ambulates with walker with assistance of Mary Kay MARK present at bedside, and recently has become more dyspneic with mild activities around the house. Additionally, his legs became swollen and he reports 6-7lb weight gain in last month. Patient uses 2 pillows to sleep at night but denies any difficulty breathing at night. Patient was seeing cardiology for TAVR evaluation for severe aortic stenosis. This past Tuesday, 12/10 patient went to cardiologist, Dr. Becker who increased torsemide from 20 mg to 40mg daily and recommended patient to come to hospital for further optimization of HF but patient declined. Echo from 12/10 shows sever aortic stenosis and EF 30%. Patient came today for a scheduled RHC/LHC prior to TAVR. Patient was found to have LE swelling and anemia with Hb of 7.9 and was admitted to medicine for further management.   Patient denies chest pain but complains of intermittent palpitations. Denies PND, orthopnea, dizziness, syncope. Patient complains of increased yellow phlegm production, but no cough, fever, sick contacts or travel history. Denies any history of COPD, never on home O2.  Echo 12/5 showed LVEF 28%. Repeat echo 12/10 demonstrated LVEF 30%    Admission labs: Hb 7.9 INR 1.49 (13 Dec 2019 12:28)      Surgery/Hospital course:  12/26/2019 Aortic valve Valvuloplasty    pt with extensive comorbidities . Valvuloplasty was done instead of TAVR.  severe anemia , getting one PRBC transfusion.   pt denies chest pain or sob. feels the same as before the procedure    REVIEW OF SYSTEMS:  Gen: No fever  RES:  No shortness of breath on O2  CARD: No chest pain   GI: No abdominal pain  : No dysuria  NEURO: No weakness  SKIN: No itching, rashes     Physical Exam:  Vital Signs Last 24 Hrs  T(C): 35.6 (26 Dec 2019 21:00), Max: 36.5 (26 Dec 2019 04:24)  T(F): 96 (26 Dec 2019 21:00), Max: 97.7 (26 Dec 2019 04:24)  HR: 74 (26 Dec 2019 21:45) (65 - 79)  BP: 98/55 (26 Dec 2019 21:45) (98/46 - 116/76)  BP(mean): 74 (26 Dec 2019 21:45) (67 - 74)  RR: 18 (26 Dec 2019 21:45) (16 - 21)  SpO2: 100% (26 Dec 2019 21:45) (99% - 100%)  Gen:  Awake, alert   CNS: non focal 	  Neck: no JVD  RES : basilar rales             CVS: IRRegular  rhythm. Normal S1/S2  Abd: Soft, non-distended. Bowel sounds present.  Skin: No rash.  Ext:  no edema, A Line  ============================I/O=====================   I&O's Detail    25 Dec 2019 07:01  -  26 Dec 2019 07:00  --------------------------------------------------------  IN:    heparin  Infusion.: 88 mL    Oral Fluid: 1055 mL  Total IN: 1143 mL    OUT:    Voided: 2000 mL  Total OUT: 2000 mL    Total NET: -857 mL      26 Dec 2019 07:01  -  26 Dec 2019 23:05  --------------------------------------------------------  IN:    heparin  Infusion.: 80 mL  Total IN: 80 mL    OUT:    Voided: 1150 mL  Total OUT: 1150 mL    Total NET: -1070 mL        ============================ LABS =========================                        7.5    7.90  )-----------( 241      ( 26 Dec 2019 21:23 )             23.3     12-26    136  |  99  |  108<H>  ----------------------------<  150<H>  4.5   |  19<L>  |  3.11<H>    Ca    8.6      26 Dec 2019 21:23  Phos  5.6     12-26  Mg     2.5     12-26    TPro  6.8  /  Alb  3.5  /  TBili  1.5<H>  /  DBili  x   /  AST  9<L>  /  ALT  11  /  AlkPhos  58  12-26    LIVER FUNCTIONS - ( 26 Dec 2019 21:23 ) Alb: 3.5 g/dL / Pro: 6.8 g/dL / ALK PHOS: 58 U/L / ALT: 11 U/L / AST: 9 U/L / GGT: x           PT/INR - ( 26 Dec 2019 21:23 )   PT: 12.2 sec;   INR: 1.06 ratio  PTT - ( 26 Dec 2019 21:23 )  PTT:26.9 sec      ======================Micro/Rad/Cardio=================  CXR: ordered  Echo:Reviewed  ======================================================  PAST MEDICAL & SURGICAL HISTORY:  Anemia  LBBB (left bundle branch block)  DYER (dyspnea on exertion)  Coronary disease: PCI  CHF (congestive heart failure)  Former smoker  Meniere disease  HLD (hyperlipidemia)  DM (diabetes mellitus)  Atrial fibrillation  HTN (hypertension)  Lipoma  History of skin graft  Status post appendectomy    ====================ASSESMENT ==============  12/26/2019 Aortic valve Valvuloplasty  Dyspnea  acute on chronic systolic CHF  severe anemia  Hyperglycemia  atrial fibrillation; LBBB  essential HTN  DM2      Plan:  ====================== NEUROLOGY=====================  acetaminophen   Tablet .. 650 milliGRAM(s) Oral every 6 hours PRN Temp greater or equal to 38.5C (101.3F), Mild Pain (1 - 3)  melatonin 5 milliGRAM(s) Oral at bedtime    ==================== RESPIRATORY======================  ALBUTerol    90 MICROgram(s) HFA Inhaler 2 Puff(s) Inhalation every 6 hours PRN Shortness of Breath and/or Wheezing  tiotropium 18 MICROgram(s) Capsule 1 Capsule(s) Inhalation daily    ====================CARDIOVASCULAR==================  CXR  diuresis with blood trasnfusion  ===================HEMATOLOGIC/ONC ===================  hold antiplatelts and anticoagulation , while pt is getting blood transfusion  ===================== RENAL =========================  kidney fx improving slowly   diuresis and monitor urine output    ==================== GASTROINTESTINAL===================  ferrous    sulfate 325 milliGRAM(s) Oral daily  folic acid 1 milliGRAM(s) Oral daily  pantoprazole    Tablet 40 milliGRAM(s) Oral before breakfast  polyethylene glycol 3350 17 Gram(s) Oral daily  senna 2 Tablet(s) Oral at bedtime    =======================    ENDOCRINE  =====================  atorvastatin 40 milliGRAM(s) Oral at bedtime  insulin lispro (HumaLOG) corrective regimen sliding scale   SubCutaneous three times a day before meals  insulin lispro (HumaLOG) corrective regimen sliding scale   SubCutaneous at bedtime  predniSONE   Tablet 60 milliGRAM(s) Oral daily      Patient requires continuous monitoring with bedside rhythm monitoring, pulse oximetry , monitor respiratory stataus for fluid overload while giving traand.  Care plan discussed with ICU care team.  patient remain critical; required more than usual post op care; I have spent 35 minutes providing non routine post op care.

## 2019-12-26 NOTE — PROGRESS NOTE ADULT - PROBLEM SELECTOR PLAN 8
- not on home medication, diet controlled  - A1c 5.6  - morning sugars ok, later sugars elevated -> need for mealtime insulin in hospital  - currently getting bolus 3 units humalog with meals, trend sugars and adjust insulin as needed - not on home medication, diet controlled  - A1c 5.6  - blood sugars remaining elevated, will add 3 units basal lispro with the 5 units prandial humalog  - continue to monitor FSs

## 2019-12-26 NOTE — PROGRESS NOTE ADULT - ATTENDING COMMENTS
-Patient seen/examined on 12/26/19. Case/plan discussed with the intern and resident as reviewed/edited by me above and in any comments below.  -For OR today. -Patient seen/examined on 12/26/19. Case/plan discussed with the intern and resident as reviewed/edited by me above and in any comments below.  -For OR today.  -Discussed with patient and his aide at bedside.

## 2019-12-26 NOTE — PROGRESS NOTE ADULT - ASSESSMENT
85 yo M with hx Afib (Coumadin), DM, HLD, CAD (stent 2014 and 2015 in mLAD and RCA), HFrEF (40%), sever aortic stenosis was admitted to [facility] on 12/13/19 after being referred by Cardiologist Dr. Becker for  decompensated heart failure after R/LHC with plans for optimization prior to consideration of TAVR. Nephrology consulted for JUHI.

## 2019-12-26 NOTE — PROGRESS NOTE ADULT - PROBLEM SELECTOR PLAN 5
Pt on coumadin at home, on Heparin gtt in anticipation for possible valvuloplasty. If no procedure, transition to coumadin   - CHADSVASc score of 6  - Continue with toprol 25mg qd. Digoxin has been held.   - Monitor on telemetry Pt on coumadin at home, on Heparin gtt in anticipation for possible valvuloplasty.  - will switch base fluid from D5 to NS per nephro recs  - f/u with cardio/structural heart as to when can return to coumadin   - CHADSVASc score of 6  - Continue with toprol 25mg qd. Digoxin has been held.   - Monitor on telemetry

## 2019-12-26 NOTE — PROGRESS NOTE ADULT - PROBLEM SELECTOR PLAN 3
- Hb 7.9, baseline 8-9 in 2015, follows with outside hematologist, Dr. Miranda in [place]  - s/p febrile nonhemolytic transfusion rxn during 1/2U prbc  - no active malignancy seen on CT scans yesterday, prelim read  - Holding transfusion unless symptomatic per heme recs  - If symptomatic, will give IV 40 lasix with 50 IV benadryl and 650PO tylenol prior to 1/2U prbc over 3 hours per blood transfusion  -Direct alisson positive for IgG and C3  - continue prendisone for warm hemolytic anemia.  - cold agglutinin titer, ALMA DELIA, RF all negative  -daily CBC with diff, retic count and LDH  -Added PPI while on steroids, heparin drip, and plavix

## 2019-12-26 NOTE — PROVIDER CONTACT NOTE (CRITICAL VALUE NOTIFICATION) - BACKGROUND
Pt. 86 y.o. M (hx. chronic anemia) presented for elective RHC/LHC for TAVR evaluation r/t severe AS, admitted for acute on chronic CHF and anemia workup. S/p RRT on 12/19 for febrile, nonhemolytic transfusion reaction.
pt admitted for CHF exacerabation , and for aortic valve disorder, on heparin drip
Patient admitted on 12/13/19 for aortic valve disorder.
PT admitted with DX- Aortic Valve Disorder HX- Anemia, LBBB, DM, CHF, A. Fib.
PT admitted with DX- Aortic Valve Disorder HX- CHF, LBBB, HLD, DM.
Patient admitted for TAVR workup, found to be anemic.
Patient admitted on 12/13/19 for aortic valve disorder.
Patient admitted on 12/13/19 for aortic valve disorder.
Pt admitted for Aortic valve disorder
Pt admitted for TAVR eval
pt admitted for CHF exacerabation, anemia and aortic valve   disorder ,

## 2019-12-26 NOTE — PROVIDER CONTACT NOTE (CRITICAL VALUE NOTIFICATION) - SITUATION
Critical Lab value of hemoglobin 6.4 and hematocrit 19.6 on am lab draws.
hemoglobin 6.5 hematocrit 20.6
Hemoglobin 6.4 and Hematocrit 19.9
H&H 6.1 & 19.0, transfusions currently on hold d/t transfusion reaction 12/19.
Hemoglobin 6.3 & Hematocrit 19.9
Hemoglobin 6.5/Hematocrit 20.0
Hgb 7.0
Patient hemoglobin 6.5 and hematocrit 20.7
Received labs Hgb 6.6 HcT 20.3
Received labs, HgB 6.4 HcT 20.  HgB improved from 6.1.
pt c/o of chills , SOB, while blood transfusion

## 2019-12-26 NOTE — PROGRESS NOTE ADULT - PROBLEM SELECTOR PLAN 2
Patient with Hypo-osmolar hyponatremia. Currently appears euvolemic.  - SWITCH base medication solution to NS rather than D5 (Heparin drip)  - trend Serum sodium for now.   - Can Give Lasix 40mg IV once - Use diluents with NS rather than D5  - trend Serum sodium for now  - Diurese with furosemide IV

## 2019-12-26 NOTE — CHART NOTE - NSCHARTNOTEFT_GEN_A_CORE
Nutrition Follow Up Note  Patient seen for: malnutrition follow up on 6TOW    Chart reviewed, events noted. Per chart, pt is 83yo male with PMH chronic anemia, HFrEF (40%), Afib on coumadin, T2DM (no home meds), HLD, PVD, Meniere's Disease, carotid stenosis, LBBB on EKG, CAD, presented for elective RHC/LHC for TAVR evaluation for severe AS, admitted for acute on chronic CHF and anemia workup. Pt had rapid response called  for hypoxia. Nephrology following for JUHI. Plan for valvuloplasty today, not a candidate for TAVR at this time.    Source: pt, electronic medical record     Diet : Low Sodium + 1000 ml Fluid Restriction + Nepro 1 can/day (provides additional 425 kcal, 19g protein)     Patient reports: No acute GI distress. Last BMs yesterday .     PO intake : improved recently, noted with 75-90% po intake since follow up and pt endorses this as well. States he is drinking 1 nepro daily, however dislikes mighty health shakes being provided- will discontinue. No additional food preferences to provide at this time. RD remains available.     Source for PO intake: pt, electronic medical record      Enteral /Parenteral Nutrition: n/a      Daily Weight in k.6 (12-24, bed wt), wt appears stable. Continue to monitor.  () 130.2 pounds bed scale () 125.2 pounds standing weight (12/15) 120.5 pounds standing weight    Pertinent Medications: MEDICATIONS  (STANDING):  atorvastatin 40 milliGRAM(s) Oral at bedtime  clopidogrel Tablet 75 milliGRAM(s) Oral daily  dextrose 5%. 1000 milliLiter(s) (50 mL/Hr) IV Continuous <Continuous>  dextrose 50% Injectable 12.5 Gram(s) IV Push once  dextrose 50% Injectable 25 Gram(s) IV Push once  dextrose 50% Injectable 25 Gram(s) IV Push once  ferrous    sulfate 325 milliGRAM(s) Oral daily  fluticasone propionate 50 MICROgram(s)/spray Nasal Spray 1 Spray(s) Both Nostrils daily  folic acid 1 milliGRAM(s) Oral daily  furosemide   Injectable 40 milliGRAM(s) IV Push two times a day  heparin  Infusion.  Unit(s)/Hr (11 mL/Hr) IV Continuous <Continuous>  insulin lispro (HumaLOG) corrective regimen sliding scale   SubCutaneous three times a day before meals  insulin lispro (HumaLOG) corrective regimen sliding scale   SubCutaneous at bedtime  insulin lispro Injectable (HumaLOG) 5 Unit(s) SubCutaneous three times a day before meals  melatonin 6 milliGRAM(s) Oral at bedtime  metoprolol succinate ER 25 milliGRAM(s) Oral daily  pantoprazole    Tablet 40 milliGRAM(s) Oral before breakfast  predniSONE   Tablet 60 milliGRAM(s) Oral daily  senna 2 Tablet(s) Oral at bedtime  sodium chloride 0.9% lock flush 3 milliLiter(s) IV Push every 8 hours  tiotropium 18 MICROgram(s) Capsule 1 Capsule(s) Inhalation daily    MEDICATIONS  (PRN):  ALBUTerol    90 MICROgram(s) HFA Inhaler 2 Puff(s) Inhalation every 6 hours PRN Shortness of Breath and/or Wheezing  dextrose 40% Gel 15 Gram(s) Oral once PRN Blood Glucose LESS THAN 70 milliGRAM(s)/deciliter  glucagon  Injectable 1 milliGRAM(s) IntraMuscular once PRN Glucose LESS THAN 70 milligrams/deciliter  guaiFENesin  milliGRAM(s) Oral every 12 hours PRN Cough  heparin  Injectable 4500 Unit(s) IV Push every 6 hours PRN For aPTT less than 40  heparin  Injectable 2000 Unit(s) IV Push every 6 hours PRN For aPTT between 40 - 57  meclizine 12.5 milliGRAM(s) Oral three times a day PRN Dizziness    Pertinent Labs:  @ 06:53: Na 130<L>, <H>, Cr 3.58<H>, <H>, K+ 4.4, Phos 5.0<H>, Mg 2.6, Alk Phos 56, ALT/SGPT 9<L>, AST/SGOT 7<L>, HbA1c --    Finger Sticks:  POCT Blood Glucose.: 191 mg/dL ( @ 07:30)  POCT Blood Glucose.: 181 mg/dL ( @ 06:31)  POCT Blood Glucose.: 186 mg/dL ( @ 21:04)  POCT Blood Glucose.: 258 mg/dL ( @ 17:30)  POCT Blood Glucose.: 320 mg/dL (12-25 @ 12:02)      Skin per nursing documentation: no pressure injuries noted  Edema: none noted    Estimated Needs:   [x] no change since previous assessment: (based on 56.7kg weight from ): 30-35 kcal/kg = 1894-7918 kcal/day; 1.2-1.4 g/kg= 68.04-79.38g protein/day  [ ] recalculated:     Previous Nutrition Diagnosis: Malnutrition, Mild in the context of chronic disease  Nutrition Diagnosis is: ongoing, Being addressed with addition of supplements and PO intake encouragement  Nutrition Care Plan Achieved.    New Nutrition Diagnosis: n/a    Recommend  1) Continue to provide current low sodium diet, fluids deferred to team. Monitor for need for additional restrictions, however will recommend liberal diet at this time to promote adequate po intake.  2) Continue to provide Nepro 1 can/day (provides additional 425 kcal, 19g protein) in setting of JUHI. Remove Vormetric shake. Continue to obtain/provide food preferences as feasible.    Monitoring and Evaluation:     Continue to monitor Nutritional intake, Tolerance to diet prescription, weights, labs, skin integrity    RD remains available upon request and will follow up per protocol. Litzy Díaz RD, CDN Pager: [phone] Nutrition Follow Up Note  Patient seen for: malnutrition follow up on 6TOW    Chart reviewed, events noted. Per chart, pt is 85yo male with PMH chronic anemia, HFrEF (40%), Afib on coumadin, T2DM (no home meds), HLD, PVD, Meniere's Disease, carotid stenosis, LBBB on EKG, CAD, presented for elective RHC/LHC for TAVR evaluation for severe AS, admitted for acute on chronic CHF and anemia workup. Pt had rapid response called  for hypoxia. Nephrology following for JUHI. Plan for valvuloplasty today, not a candidate for TAVR at this time.    Source: pt, electronic medical record     Diet : Low Sodium + 1000 ml Fluid Restriction + Nepro 1 can/day (provides additional 425 kcal, 19g protein)     Patient reports: No acute GI distress. Last BMs yesterday .     PO intake : improved recently, noted with 75-90% po intake since follow up and pt endorses this as well. States he is drinking 1 nepro daily, however dislikes mighty health shakes being provided- will discontinue. No additional food preferences to provide at this time. RD remains available.     Source for PO intake: pt, electronic medical record      Enteral /Parenteral Nutrition: n/a      Daily Weight in k.6 (12-24, bed wt), wt appears stable. Continue to monitor.  () 130.2 pounds bed scale () 125.2 pounds standing weight (12/15) 120.5 pounds standing weight    Pertinent Medications: MEDICATIONS  (STANDING):  atorvastatin 40 milliGRAM(s) Oral at bedtime  clopidogrel Tablet 75 milliGRAM(s) Oral daily  dextrose 5%. 1000 milliLiter(s) (50 mL/Hr) IV Continuous <Continuous>  dextrose 50% Injectable 12.5 Gram(s) IV Push once  dextrose 50% Injectable 25 Gram(s) IV Push once  dextrose 50% Injectable 25 Gram(s) IV Push once  ferrous    sulfate 325 milliGRAM(s) Oral daily  fluticasone propionate 50 MICROgram(s)/spray Nasal Spray 1 Spray(s) Both Nostrils daily  folic acid 1 milliGRAM(s) Oral daily  furosemide   Injectable 40 milliGRAM(s) IV Push two times a day  heparin  Infusion.  Unit(s)/Hr (11 mL/Hr) IV Continuous <Continuous>  insulin lispro (HumaLOG) corrective regimen sliding scale   SubCutaneous three times a day before meals  insulin lispro (HumaLOG) corrective regimen sliding scale   SubCutaneous at bedtime  insulin lispro Injectable (HumaLOG) 5 Unit(s) SubCutaneous three times a day before meals  melatonin 6 milliGRAM(s) Oral at bedtime  metoprolol succinate ER 25 milliGRAM(s) Oral daily  pantoprazole    Tablet 40 milliGRAM(s) Oral before breakfast  predniSONE   Tablet 60 milliGRAM(s) Oral daily  senna 2 Tablet(s) Oral at bedtime  sodium chloride 0.9% lock flush 3 milliLiter(s) IV Push every 8 hours  tiotropium 18 MICROgram(s) Capsule 1 Capsule(s) Inhalation daily    MEDICATIONS  (PRN):  ALBUTerol    90 MICROgram(s) HFA Inhaler 2 Puff(s) Inhalation every 6 hours PRN Shortness of Breath and/or Wheezing  dextrose 40% Gel 15 Gram(s) Oral once PRN Blood Glucose LESS THAN 70 milliGRAM(s)/deciliter  glucagon  Injectable 1 milliGRAM(s) IntraMuscular once PRN Glucose LESS THAN 70 milligrams/deciliter  guaiFENesin  milliGRAM(s) Oral every 12 hours PRN Cough  heparin  Injectable 4500 Unit(s) IV Push every 6 hours PRN For aPTT less than 40  heparin  Injectable 2000 Unit(s) IV Push every 6 hours PRN For aPTT between 40 - 57  meclizine 12.5 milliGRAM(s) Oral three times a day PRN Dizziness    Pertinent Labs:  @ 06:53: Na 130<L>, <H>, Cr 3.58<H>, <H>, K+ 4.4, Phos 5.0<H>, Mg 2.6, Alk Phos 56, ALT/SGPT 9<L>, AST/SGOT 7<L>, HbA1c --    Finger Sticks:  POCT Blood Glucose.: 191 mg/dL ( @ 07:30)  POCT Blood Glucose.: 181 mg/dL ( @ 06:31)  POCT Blood Glucose.: 186 mg/dL ( @ 21:04)  POCT Blood Glucose.: 258 mg/dL ( @ 17:30)  POCT Blood Glucose.: 320 mg/dL (12-25 @ 12:02)      Skin per nursing documentation: no pressure injuries noted  Edema: none noted    Estimated Needs:   [x] no change since previous assessment: (based on 56.7kg weight from ): 30-35 kcal/kg = 1489-6194 kcal/day; 1.2-1.4 g/kg= 68.04-79.38g protein/day  [ ] recalculated:     Previous Nutrition Diagnosis: Malnutrition, Mild in the context of chronic disease  Nutrition Diagnosis is: ongoing, Being addressed with addition of supplements and PO intake encouragement  Nutrition Care Plan Achieved.    New Nutrition Diagnosis: n/a    Recommend  1) Continue to provide current low sodium diet, recommend consider addition of Consistent Carbohydrate restriction to promote improve BG control, fluids deferred to team. Monitor for need for additional restrictions.  2) Continue to provide Nepro 1 can/day (provides additional 425 kcal, 19g protein) in setting of JUHI. Remove Bright.md shake. Continue to obtain/provide food preferences as feasible.    Monitoring and Evaluation:     Continue to monitor Nutritional intake, Tolerance to diet prescription, weights, labs, skin integrity    RD remains available upon request and will follow up per protocol. Litzy Díaz RD, CDN Pager: [phone]

## 2019-12-26 NOTE — PROGRESS NOTE ADULT - ASSESSMENT
Mr. Latham is an 84 year old man with ACC/AHA Stage C ischemic cardiomyopathy (LVDD 4.5 cm, EF 30%), severe AS (58/30/0.6), chronic AF on coumadin, and CAD s/p PCI to RCA and LAD with prior tamponade during coronary intervention who is admitted with decompensated heart failure after R/LHC with plans for optimization prior to consideration of TAVR. He underwent CT coronaries on 12/17 as a part of TAVR evaluation and suffered from MAI. His renal function appears to have stabilized, but he has elevated JVP. He is not an acceptable candidate at this time for TAVR, but we will plan on balloon valvuloplasty today.    Please call me with questions at [phone]. Plan discussed with renal and structural heart teams.

## 2019-12-26 NOTE — PROGRESS NOTE ADULT - SUBJECTIVE AND OBJECTIVE BOX
Subjective: No overnight events. He is urinating more.     Medications:  ALBUTerol    90 MICROgram(s) HFA Inhaler 2 Puff(s) Inhalation every 6 hours PRN  atorvastatin 40 milliGRAM(s) Oral at bedtime  clopidogrel Tablet 75 milliGRAM(s) Oral daily  dextrose 40% Gel 15 Gram(s) Oral once PRN  dextrose 5%. 1000 milliLiter(s) IV Continuous <Continuous>  dextrose 50% Injectable 12.5 Gram(s) IV Push once  dextrose 50% Injectable 25 Gram(s) IV Push once  dextrose 50% Injectable 25 Gram(s) IV Push once  ferrous    sulfate 325 milliGRAM(s) Oral daily  fluticasone propionate 50 MICROgram(s)/spray Nasal Spray 1 Spray(s) Both Nostrils daily  folic acid 1 milliGRAM(s) Oral daily  furosemide   Injectable 40 milliGRAM(s) IV Push two times a day  glucagon  Injectable 1 milliGRAM(s) IntraMuscular once PRN  guaiFENesin  milliGRAM(s) Oral every 12 hours PRN  heparin  Infusion.  Unit(s)/Hr IV Continuous <Continuous>  heparin  Injectable 4500 Unit(s) IV Push every 6 hours PRN  heparin  Injectable 2000 Unit(s) IV Push every 6 hours PRN  insulin glargine Injectable (LANTUS) 3 Unit(s) SubCutaneous at bedtime  insulin lispro (HumaLOG) corrective regimen sliding scale   SubCutaneous three times a day before meals  insulin lispro (HumaLOG) corrective regimen sliding scale   SubCutaneous at bedtime  insulin lispro Injectable (HumaLOG) 5 Unit(s) SubCutaneous three times a day before meals  meclizine 12.5 milliGRAM(s) Oral three times a day PRN  melatonin 6 milliGRAM(s) Oral at bedtime  metoprolol succinate ER 25 milliGRAM(s) Oral daily  pantoprazole    Tablet 40 milliGRAM(s) Oral before breakfast  predniSONE   Tablet 60 milliGRAM(s) Oral daily  senna 2 Tablet(s) Oral at bedtime  sodium chloride 0.9% lock flush 3 milliLiter(s) IV Push every 8 hours  tiotropium 18 MICROgram(s) Capsule 1 Capsule(s) Inhalation daily      Physical Exam:    Vital Signs Last 24 Hrs  T(C): 36.5 (26 Dec 2019 12:03), Max: 36.8 (25 Dec 2019 20:03)  T(F): 97.7 (26 Dec 2019 12:03), Max: 98.2 (25 Dec 2019 20:03)  HR: 75 (26 Dec 2019 12:03) (65 - 76)  BP: 100/54 (26 Dec 2019 12:03) (100/54 - 116/76)  RR: 18 (26 Dec 2019 12:03) (18 - 18)  SpO2: 100% (26 Dec 2019 12:03) (94% - 100%)       Daily Weight in k.9 (26 Dec 2019 12:21)    I&O's Summary    25 Dec 2019 07:01  -  26 Dec 2019 07:00  --------------------------------------------------------  IN: 1143 mL / OUT: 2000 mL / NET: -857 mL    26 Dec 2019 07:01  -  26 Dec 2019 16:29  --------------------------------------------------------  IN: 80 mL / OUT: 950 mL / NET: -870 mL    General: No distress. Comfortable.  HEENT: EOM intact.  Neck: Neck supple. JVP severely elevated. No masses  Chest: Clear to auscultation bilaterally  CV: Normal S1 and S2. III/VI high pitched systolic murmur at RUSB. No rubs or gallops. Radial pulses normal. Dependent edema.   Abdomen: Soft, non-distended, non-tender  Skin: No rashes or skin breakdown  Neurology: Alert and oriented times three. Sensation intact  Psych: Affect normal    Labs:                        6.5    8.15  )-----------( 196      ( 26 Dec 2019 08:53 )             20.0         130<L>  |  95<L>  |  122<H>  ----------------------------<  167<H>  4.4   |  18<L>  |  3.58<H>    Ca    8.6      26 Dec 2019 06:53  Phos  5.0       Mg     2.6         TPro  6.6  /  Alb  3.1<L>  /  TBili  1.2  /  DBili  x   /  AST  7<L>  /  ALT  9<L>  /  AlkPhos  56      PT/INR - ( 26 Dec 2019 08:46 )   PT: 12.6 sec;   INR: 1.10 ratio         PTT - ( 26 Dec 2019 08:46 )  PTT:88.6 sec          Lactate Dehydrogenase, Serum: 226 U/L ( @ 06:53)  Lactate Dehydrogenase, Serum: 224 U/L ( @ 05:50)  Lactate Dehydrogenase, Serum: 239 U/L ( @ 06:29)

## 2019-12-27 LAB
ALBUMIN SERPL ELPH-MCNC: 3.3 G/DL — SIGNIFICANT CHANGE UP (ref 3.3–5)
ALP SERPL-CCNC: 58 U/L — SIGNIFICANT CHANGE UP (ref 40–120)
ALT FLD-CCNC: 11 U/L — SIGNIFICANT CHANGE UP (ref 10–45)
ANION GAP SERPL CALC-SCNC: 19 MMOL/L — HIGH (ref 5–17)
AST SERPL-CCNC: 23 U/L — SIGNIFICANT CHANGE UP (ref 10–40)
BILIRUB SERPL-MCNC: 4.1 MG/DL — HIGH (ref 0.2–1.2)
BUN SERPL-MCNC: 116 MG/DL — HIGH (ref 7–23)
CALCIUM SERPL-MCNC: 8.7 MG/DL — SIGNIFICANT CHANGE UP (ref 8.4–10.5)
CHLORIDE SERPL-SCNC: 99 MMOL/L — SIGNIFICANT CHANGE UP (ref 96–108)
CO2 SERPL-SCNC: 19 MMOL/L — LOW (ref 22–31)
CREAT SERPL-MCNC: 2.87 MG/DL — HIGH (ref 0.5–1.3)
GLUCOSE BLDC GLUCOMTR-MCNC: 162 MG/DL — HIGH (ref 70–99)
GLUCOSE BLDC GLUCOMTR-MCNC: 222 MG/DL — HIGH (ref 70–99)
GLUCOSE BLDC GLUCOMTR-MCNC: 243 MG/DL — HIGH (ref 70–99)
GLUCOSE BLDC GLUCOMTR-MCNC: 382 MG/DL — HIGH (ref 70–99)
GLUCOSE SERPL-MCNC: 140 MG/DL — HIGH (ref 70–99)
HCT VFR BLD CALC: 24.3 % — LOW (ref 39–50)
HGB BLD-MCNC: 8.1 G/DL — LOW (ref 13–17)
MAGNESIUM SERPL-MCNC: 2.3 MG/DL — SIGNIFICANT CHANGE UP (ref 1.6–2.6)
MCHC RBC-ENTMCNC: 31.8 PG — SIGNIFICANT CHANGE UP (ref 27–34)
MCHC RBC-ENTMCNC: 33.3 GM/DL — SIGNIFICANT CHANGE UP (ref 32–36)
MCV RBC AUTO: 95.3 FL — SIGNIFICANT CHANGE UP (ref 80–100)
NRBC # BLD: 0 /100 WBCS — SIGNIFICANT CHANGE UP (ref 0–0)
PHOSPHATE SERPL-MCNC: 5 MG/DL — HIGH (ref 2.5–4.5)
PLATELET # BLD AUTO: 206 K/UL — SIGNIFICANT CHANGE UP (ref 150–400)
POTASSIUM SERPL-MCNC: 4.1 MMOL/L — SIGNIFICANT CHANGE UP (ref 3.5–5.3)
POTASSIUM SERPL-SCNC: 4.1 MMOL/L — SIGNIFICANT CHANGE UP (ref 3.5–5.3)
PROT SERPL-MCNC: 6.4 G/DL — SIGNIFICANT CHANGE UP (ref 6–8.3)
RBC # BLD: 2.55 M/UL — LOW (ref 4.2–5.8)
RBC # FLD: 19.6 % — HIGH (ref 10.3–14.5)
SODIUM SERPL-SCNC: 137 MMOL/L — SIGNIFICANT CHANGE UP (ref 135–145)
WBC # BLD: 22.62 K/UL — HIGH (ref 3.8–10.5)
WBC # FLD AUTO: 22.62 K/UL — HIGH (ref 3.8–10.5)

## 2019-12-27 PROCEDURE — 99233 SBSQ HOSP IP/OBS HIGH 50: CPT | Mod: GC

## 2019-12-27 PROCEDURE — 99233 SBSQ HOSP IP/OBS HIGH 50: CPT

## 2019-12-27 PROCEDURE — 71045 X-RAY EXAM CHEST 1 VIEW: CPT | Mod: 26

## 2019-12-27 RX ORDER — INSULIN LISPRO 100/ML
VIAL (ML) SUBCUTANEOUS
Refills: 0 | Status: DISCONTINUED | OUTPATIENT
Start: 2019-12-27 | End: 2019-12-30

## 2019-12-27 RX ORDER — INSULIN LISPRO 100/ML
VIAL (ML) SUBCUTANEOUS AT BEDTIME
Refills: 0 | Status: DISCONTINUED | OUTPATIENT
Start: 2019-12-27 | End: 2019-12-30

## 2019-12-27 RX ORDER — FUROSEMIDE 40 MG
40 TABLET ORAL DAILY
Refills: 0 | Status: DISCONTINUED | OUTPATIENT
Start: 2019-12-27 | End: 2019-12-27

## 2019-12-27 RX ORDER — INSULIN HUMAN 100 [IU]/ML
3 INJECTION, SOLUTION SUBCUTANEOUS
Qty: 100 | Refills: 0 | Status: DISCONTINUED | OUTPATIENT
Start: 2019-12-27 | End: 2019-12-27

## 2019-12-27 RX ORDER — ASPIRIN/CALCIUM CARB/MAGNESIUM 324 MG
81 TABLET ORAL DAILY
Refills: 0 | Status: DISCONTINUED | OUTPATIENT
Start: 2019-12-27 | End: 2020-01-03

## 2019-12-27 RX ORDER — SODIUM CHLORIDE 9 MG/ML
3 INJECTION INTRAMUSCULAR; INTRAVENOUS; SUBCUTANEOUS EVERY 8 HOURS
Refills: 0 | Status: DISCONTINUED | OUTPATIENT
Start: 2019-12-27 | End: 2020-01-03

## 2019-12-27 RX ADMIN — Medication 60 MILLIGRAM(S): at 05:21

## 2019-12-27 RX ADMIN — POLYETHYLENE GLYCOL 3350 17 GRAM(S): 17 POWDER, FOR SOLUTION ORAL at 11:11

## 2019-12-27 RX ADMIN — SODIUM CHLORIDE 3 MILLILITER(S): 9 INJECTION INTRAMUSCULAR; INTRAVENOUS; SUBCUTANEOUS at 21:50

## 2019-12-27 RX ADMIN — Medication 40 MILLIGRAM(S): at 01:00

## 2019-12-27 RX ADMIN — Medication 4: at 17:12

## 2019-12-27 RX ADMIN — Medication 81 MILLIGRAM(S): at 12:11

## 2019-12-27 RX ADMIN — Medication 4: at 08:33

## 2019-12-27 RX ADMIN — ATORVASTATIN CALCIUM 40 MILLIGRAM(S): 80 TABLET, FILM COATED ORAL at 21:52

## 2019-12-27 RX ADMIN — Medication 1 MILLIGRAM(S): at 11:11

## 2019-12-27 RX ADMIN — Medication 5 MILLIGRAM(S): at 21:52

## 2019-12-27 RX ADMIN — SENNA PLUS 2 TABLET(S): 8.6 TABLET ORAL at 21:53

## 2019-12-27 RX ADMIN — PANTOPRAZOLE SODIUM 40 MILLIGRAM(S): 20 TABLET, DELAYED RELEASE ORAL at 05:22

## 2019-12-27 RX ADMIN — Medication 10: at 12:11

## 2019-12-27 RX ADMIN — Medication 325 MILLIGRAM(S): at 11:10

## 2019-12-27 NOTE — PROGRESS NOTE ADULT - PROBLEM SELECTOR PLAN 1
Patient with Non-oliguric JUHI with ATN in the setting of contrast/ Diuretics.    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 BUN elevated can be explained by steroids.  - Hyperuricemia and hyperphosphatemia can be explained by ongoing renal failure  - Continue to monitor renal function and electrolytes   - No indication for dialysis  - Dose medications as per eGFR  - Diurese as needed

## 2019-12-27 NOTE — PROGRESS NOTE ADULT - SUBJECTIVE AND OBJECTIVE BOX
Interval Hx; Events Overnight:  SUBJECTIVE: " I feel okay, I have no pain "    LABS:                8.1                  137  | 19   | 116          22.62 >-----------< 206     ------------------------< 140                   24.3                 4.1  | 99   | 2.87                                         Ca 8.7   Mg 2.3   Ph 5.0            VITAL SIGNS    Telemetry:  afib 80s     Daily     Daily       Vital Signs Last 24 Hrs  T(C): 35.9 (12-27-19 @ 20:00), Max: 36.8 (12-27-19 @ 08:00)  T(F): 96.6 (12-27-19 @ 20:00), Max: 98.2 (12-27-19 @ 08:00)  HR: 79 (12-27-19 @ 20:00) (72 - 92)  BP: 140/60 (12-27-19 @ 20:00) (98/55 - 140/60)  RR: 22 (12-27-19 @ 20:00) (16 - 26)  SpO2: 99% (12-27-19 @ 20:00) (96% - 100%)             I&O's Detail    26 Dec 2019 07:01  -  27 Dec 2019 07:00  --------------------------------------------------------  IN:    heparin  Infusion.: 80 mL    Packed Red Blood Cells: 350 mL  Total IN: 430 mL    OUT:    Voided: 1900 mL  Total OUT: 1900 mL    Total NET: -1470 mL      27 Dec 2019 07:01  -  27 Dec 2019 21:29  --------------------------------------------------------  IN:    Oral Fluid: 240 mL    Packed Red Blood Cells: 350 mL  Total IN: 590 mL    OUT:    Voided: 850 mL  Total OUT: 850 mL    Total NET: -260 mL                    GLUCOSE  CAPILLARY BLOOD GLUCOSE      POCT Blood Glucose.: 243 mg/dL (27 Dec 2019 17:08)  POCT Blood Glucose.: 382 mg/dL (27 Dec 2019 12:09)  POCT Blood Glucose.: 222 mg/dL (27 Dec 2019 08:26)                    PHYSICAL EXAM      General: NAD, well appearing, in no distress  Neurology: A&O x3, non focal, no neuro deficits. Moves all extremities to command.  CV : s1 s2 RRR, no murmurs, gallops, clicks.   Lungs: clear to auscultation  Abdomen: soft, nontender, nondistended, positive bowel sounds  :    voiding         Extremities:    no  edema. + pedal pulses      Incision: b/l groin sites stable, no bleeding, no hematoma  Skin: intact, no lesions        MEDICATIONS  acetaminophen   Tablet .. 650 milliGRAM(s) Oral every 6 hours PRN  ALBUTerol    90 MICROgram(s) HFA Inhaler 2 Puff(s) Inhalation every 6 hours PRN  aspirin enteric coated 81 milliGRAM(s) Oral daily  atorvastatin 40 milliGRAM(s) Oral at bedtime  dextrose 40% Gel 15 Gram(s) Oral once PRN  dextrose 5%. 1000 milliLiter(s) IV Continuous <Continuous>  dextrose 50% Injectable 12.5 Gram(s) IV Push once  ferrous    sulfate 325 milliGRAM(s) Oral daily  fluticasone propionate 50 MICROgram(s)/spray Nasal Spray 1 Spray(s) Both Nostrils two times a day  folic acid 1 milliGRAM(s) Oral daily  glucagon  Injectable 1 milliGRAM(s) IntraMuscular once PRN  insulin lispro (HumaLOG) corrective regimen sliding scale   SubCutaneous three times a day before meals  melatonin 5 milliGRAM(s) Oral at bedtime  pantoprazole    Tablet 40 milliGRAM(s) Oral before breakfast  polyethylene glycol 3350 17 Gram(s) Oral daily  predniSONE   Tablet 60 milliGRAM(s) Oral daily  senna 2 Tablet(s) Oral at bedtime  sodium chloride 0.9% lock flush 3 milliLiter(s) IV Push every 8 hours  sodium chloride 0.9%. 1000 milliLiter(s) IV Continuous <Continuous>  tiotropium 18 MICROgram(s) Capsule 1 Capsule(s) Inhalation daily

## 2019-12-27 NOTE — PROGRESS NOTE ADULT - ASSESSMENT
83 yo M with hx Afib (Coumadin), DM, HLD, CAD (stent 2014 and 2015 in mLAD and RCA), HFrEF (40%), sever aortic stenosis was admitted to [facility] on 12/13/19 after being referred by Cardiologist Dr. 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 he is s/p BAV 12/26/19

## 2019-12-27 NOTE — PROGRESS NOTE ADULT - ASSESSMENT
83 yo M with hx Afib (Coumadin), DM2, HLD, CAD (stent 2014 and 2015 in mLAD and RCA), HFrEF (40%) severe AS, LBBB, baseline SCr 1.4, was referred by cardiologist for symptomatic CHF exacerbation. Echo from 12/10 shows severe aortic stenosis and EF 30%. Patient s/p RHC/LHC prior to scheduled TAVR. Patient was found to have worsening LE swelling and anemia with Hb of 7.9 and was admitted to medicine for further management. Pt was being optimized pre TAVR with diuretics per Heart failure service.   Pt underwent balloon aortic valvuloplasty on 12/26.    12/26 AV balloon valvuloplasty. s/p 1 PRBC  12/27 s/p PRBC x1. H/H stable 8/24 Transferred to [facility]. Continue w/ intermittent diureses.

## 2019-12-27 NOTE — PROGRESS NOTE ADULT - SUBJECTIVE AND OBJECTIVE BOX
CRITICAL CARE ATTENDING - CTICU    MEDICATIONS  (STANDING):  atorvastatin 40 milliGRAM(s) Oral at bedtime  dextrose 5%. 1000 milliLiter(s) (50 mL/Hr) IV Continuous <Continuous>  dextrose 50% Injectable 12.5 Gram(s) IV Push once  ferrous    sulfate 325 milliGRAM(s) Oral daily  fluticasone propionate 50 MICROgram(s)/spray Nasal Spray 1 Spray(s) Both Nostrils two times a day  folic acid 1 milliGRAM(s) Oral daily  insulin lispro (HumaLOG) corrective regimen sliding scale   SubCutaneous three times a day before meals  insulin lispro (HumaLOG) corrective regimen sliding scale   SubCutaneous at bedtime  melatonin 5 milliGRAM(s) Oral at bedtime  pantoprazole    Tablet 40 milliGRAM(s) Oral before breakfast  polyethylene glycol 3350 17 Gram(s) Oral daily  predniSONE   Tablet 60 milliGRAM(s) Oral daily  senna 2 Tablet(s) Oral at bedtime  sodium chloride 0.9%. 1000 milliLiter(s) (10 mL/Hr) IV Continuous <Continuous>  tiotropium 18 MICROgram(s) Capsule 1 Capsule(s) Inhalation daily                                    8.1    22.62 )-----------( 206      ( 27 Dec 2019 04:55 )             24.3           137  |  99  |  116<H>  ----------------------------<  140<H>  4.1   |  19<L>  |  2.87<H>    Ca    8.7      27 Dec 2019 04:55  Phos  5.0       Mg     2.3         TPro  6.4  /  Alb  3.3  /  TBili  4.1<H>  /  DBili  x   /  AST  23  /  ALT  11  /  AlkPhos  58        PT/INR - ( 26 Dec 2019 21:23 )   PT: 12.2 sec;   INR: 1.06 ratio         PTT - ( 26 Dec 2019 21:23 )  PTT:26.9 sec        Daily     Daily Weight in k.9 (26 Dec 2019 12:21)       @ 07:01  -   @ 07:00  --------------------------------------------------------  IN: 430 mL / OUT: 1900 mL / NET: -1470 mL        Critically Ill patient  : [ ] preoperative ,   [ x] post operative    Requires :  [x ] Arterial Line   [ x] Central Line  [ ] PA catheter  [ ] IABP  [ ] ECMO  [ ] LVAD  [ ] Ventilator  [ ] pacemaker [ ] Impella.                      [ x] ABG's     [x ] Pulse Oxymetry Monitoring  Bedside evaluation , monitoring , treatment of hemodynamics , fluids , IVP/ IVCD meds.        Diagnosis:     POD 1 - AV balloon valvuloplasty     CAD (s/p stents , )     Severe AS     Afib     Chest tube drainage     Requires chest PT, pulmonary toilet, suctioning to maintain SaO2,  patent airway and treat atelectasis.     CHF- acute [x ]   chronic [x ]    systolic [x ]   diatolic [ ]          - Echo- EF -      40s     [ ] RV dysfunction          - Cxr-cardiomegally, edema          - Clinical-  [ ]inotropes   [ ]pressors   [ ]diuresis   [ ]IABP   [ ]ECMO   [ ]LVAD   [ ]Respiratory Failure    Renal Failure - Acute Kidney Injury             Discussed with CT surgeon, Physician's Assistant - Nurse Practitioner- Critical care medicine team.   Dicussed at  AM / PM rounds.   Chart, labs , films reviewed.    Total Time: CRITICAL CARE ATTENDING - CTICU    MEDICATIONS  (STANDING):  atorvastatin 40 milliGRAM(s) Oral at bedtime  dextrose 5%. 1000 milliLiter(s) (50 mL/Hr) IV Continuous <Continuous>  dextrose 50% Injectable 12.5 Gram(s) IV Push once  ferrous    sulfate 325 milliGRAM(s) Oral daily  fluticasone propionate 50 MICROgram(s)/spray Nasal Spray 1 Spray(s) Both Nostrils two times a day  folic acid 1 milliGRAM(s) Oral daily  insulin lispro (HumaLOG) corrective regimen sliding scale   SubCutaneous three times a day before meals  insulin lispro (HumaLOG) corrective regimen sliding scale   SubCutaneous at bedtime  melatonin 5 milliGRAM(s) Oral at bedtime  pantoprazole    Tablet 40 milliGRAM(s) Oral before breakfast  polyethylene glycol 3350 17 Gram(s) Oral daily  predniSONE   Tablet 60 milliGRAM(s) Oral daily  senna 2 Tablet(s) Oral at bedtime  sodium chloride 0.9%. 1000 milliLiter(s) (10 mL/Hr) IV Continuous <Continuous>  tiotropium 18 MICROgram(s) Capsule 1 Capsule(s) Inhalation daily                                    8.1    22.62 )-----------( 206      ( 27 Dec 2019 04:55 )             24.3           137  |  99  |  116<H>  ----------------------------<  140<H>  4.1   |  19<L>  |  2.87<H>    Ca    8.7      27 Dec 2019 04:55  Phos  5.0       Mg     2.3         TPro  6.4  /  Alb  3.3  /  TBili  4.1<H>  /  DBili  x   /  AST  23  /  ALT  11  /  AlkPhos  58        PT/INR - ( 26 Dec 2019 21:23 )   PT: 12.2 sec;   INR: 1.06 ratio         PTT - ( 26 Dec 2019 21:23 )  PTT:26.9 sec        Daily     Daily Weight in k.9 (26 Dec 2019 12:21)       @ 07:01  -   @ 07:00  --------------------------------------------------------  IN: 430 mL / OUT: 1900 mL / NET: -1470 mL        Critically Ill patient  : [ ] preoperative ,   [ x] post operative    Requires :  [x ] Arterial Line   [ x] Central Line  [ ] PA catheter  [ ] IABP  [ ] ECMO  [ ] LVAD  [ ] Ventilator  [ ] pacemaker [ ] Impella.                      [ x] ABG's     [x ] Pulse Oxymetry Monitoring  Bedside evaluation , monitoring , treatment of hemodynamics , fluids , IVP/ IVCD meds.        Diagnosis:     POD 1 - AV balloon valvuloplasty     CAD (s/p stents , )     Severe AS     Afib     Requires chest PT, pulmonary toilet, suctioning to maintain SaO2,  patent airway and treat atelectasis.     CHF- acute [x ]   chronic [x ]    systolic [x ]   diatolic [ ]          - Echo- EF -      40s     [ ] RV dysfunction          - Cxr-cardiomegally, edema          - Clinical-  [ ]inotropes   [ ]pressors   [ x]diuresis   [ ]IABP   [ ]ECMO   [ ]LVAD   [ ]Respiratory Failure    Renal Failure - Acute Kidney Injury    DM     LBBB    Anticoagulated on Coumadin                Discussed with CT surgeon, Physician's Assistant - Nurse Practitioner- Critical care medicine team.   Dicussed at  AM / PM rounds.   Chart, labs , films reviewed.    Total Time: 30 min

## 2019-12-27 NOTE — PROGRESS NOTE ADULT - PROBLEM SELECTOR PLAN 1
- Continue w/ intermittent diureses, monitor SCr  - monitor volume status  - Daily standing weight and strict I's and O's. - on intermittent diureses, monitor SCr  - torsemide 20 mg PO to start 12/28   - monitor volume status  - Daily standing weight and strict I's and O's.

## 2019-12-27 NOTE — PROGRESS NOTE ADULT - ASSESSMENT
Mr. Latham is an 84 year old man with ACC/AHA Stage C ischemic cardiomyopathy (LVDD 4.5 cm, EF 30%), severe AS (58/30/0.6), chronic AF on coumadin, and CAD s/p PCI to RCA and LAD with prior tamponade during coronary intervention who is admitted with decompensated heart failure after R/LHC with plans for optimization prior to consideration of TAVR. He underwent CT coronaries on 12/17 as a part of TAVR evaluation and suffered from MAI. His renal function is improving. He underwent balloon aortic valvuloplasty last evening with good result and decrease in transaortic gradients. His right sided filling pressures are improved.     Please call me with questions at [phone]. Plan discussed in multidisciplinary rounds with CTU team and CT surgery as well as structural heart team.

## 2019-12-27 NOTE — PROGRESS NOTE ADULT - SUBJECTIVE AND OBJECTIVE BOX
[facility] Division of Kidney Diseases & Hypertension  FOLLOW UP NOTE  --------------------------------------------------------------------------------  Chief Complaint:Aortic valve disorder      24 hour events/subjective:    Patient seen this morning s/p BAV  Feels well without any subjective complaints  Non-oliguric  Renal function improving     PAST HISTORY  --------------------------------------------------------------------------------  No significant changes to PMH, PSH, FHx, SHx, unless otherwise noted    ALLERGIES & MEDICATIONS  --------------------------------------------------------------------------------  Allergies    No Known Allergies    Intolerances      Standing Inpatient Medications  atorvastatin 40 milliGRAM(s) Oral at bedtime  dextrose 5%. 1000 milliLiter(s) IV Continuous <Continuous>  dextrose 50% Injectable 12.5 Gram(s) IV Push once  ferrous    sulfate 325 milliGRAM(s) Oral daily  fluticasone propionate 50 MICROgram(s)/spray Nasal Spray 1 Spray(s) Both Nostrils two times a day  folic acid 1 milliGRAM(s) Oral daily  insulin lispro (HumaLOG) corrective regimen sliding scale   SubCutaneous three times a day before meals  insulin lispro (HumaLOG) corrective regimen sliding scale   SubCutaneous at bedtime  melatonin 5 milliGRAM(s) Oral at bedtime  pantoprazole    Tablet 40 milliGRAM(s) Oral before breakfast  polyethylene glycol 3350 17 Gram(s) Oral daily  predniSONE   Tablet 60 milliGRAM(s) Oral daily  senna 2 Tablet(s) Oral at bedtime  sodium chloride 0.9%. 1000 milliLiter(s) IV Continuous <Continuous>  tiotropium 18 MICROgram(s) Capsule 1 Capsule(s) Inhalation daily    PRN Inpatient Medications  acetaminophen   Tablet .. 650 milliGRAM(s) Oral every 6 hours PRN  ALBUTerol    90 MICROgram(s) HFA Inhaler 2 Puff(s) Inhalation every 6 hours PRN  dextrose 40% Gel 15 Gram(s) Oral once PRN  glucagon  Injectable 1 milliGRAM(s) IntraMuscular once PRN      REVIEW OF SYSTEMS  --------------------------------------------------------------------------------  Gen: No  fevers/chills, weakness  Skin: No rashes  Head/Eyes/Ears/Mouth: No headache;   Respiratory: no SOB  CV: No chest pain,   GI: No abdominal pain  : No increased frequency, dysuria, hematuria, nocturia  MSK: + Leg swelling  Neuro: No dizziness/lightheadedness, weakness, seizures    All other systems were reviewed and are negative, except as noted.    VITALS/PHYSICAL EXAM  --------------------------------------------------------------------------------  T(C): 36.8 (12-27-19 @ 08:00), Max: 36.8 (12-27-19 @ 08:00)  HR: 74 (12-27-19 @ 08:00) (70 - 86)  BP: 114/58 (12-27-19 @ 08:00) (98/46 - 114/58)  RR: 16 (12-27-19 @ 08:00) (16 - 23)  SpO2: 100% (12-27-19 @ 08:00) (99% - 100%)  Wt(kg): --        12-26-19 @ 07:01  -  12-27-19 @ 07:00  --------------------------------------------------------  IN: 430 mL / OUT: 1900 mL / NET: -1470 mL      Physical Exam:  	Gen: NAD, well-appearing  	HEENT: supple neck  	Pulm: CTA B/L  	CV:  S1S2  	Abd: +BS, soft   	Ext: No B/L Lower ext edema  	Neuro: No focal deficits  	Skin: Warm, without rashes  	Vascular access: none    LABS/STUDIES  --------------------------------------------------------------------------------              8.1    22.62 >-----------<  206      [12-27-19 @ 04:55]              24.3     137  |  99  |  116  ----------------------------<  140      [12-27-19 @ 04:55]  4.1   |  19  |  2.87        Ca     8.7     [12-27-19 @ 04:55]      Mg     2.3     [12-27-19 @ 04:55]      Phos  5.0     [12-27-19 @ 04:55]    TPro  6.4  /  Alb  3.3  /  TBili  4.1  /  DBili  x   /  AST  23  /  ALT  11  /  AlkPhos  58  [12-27-19 @ 04:55]    PT/INR: PT 12.2 , INR 1.06       [12-26-19 @ 21:23]  PTT: 26.9       [12-26-19 @ 21:23]    CK 20      [12-26-19 @ 21:23]        [12-26-19 @ 06:53]    Creatinine Trend:  SCr 2.87 [12-27 @ 04:55]  SCr 3.11 [12-26 @ 21:23]  SCr 3.58 [12-26 @ 06:53]  SCr 3.68 [12-25 @ 05:50]  SCr 4.12 [12-24 @ 06:29]    Urinalysis - [12-23-19 @ 10:28]      Color Yellow / Appearance Clear / SG 1.018 / pH 5.0      Gluc Negative / Ketone Negative  / Bili Negative / Urobili Negative       Blood Negative / Protein Negative / Leuk Est Small / Nitrite Negative      RBC 6 / WBC 4 / Hyaline 1 / Gran  / Sq Epi  / Non Sq Epi 0 / Bacteria Negative    Urine Creatinine 131      [12-21-19 @ 00:40]  Urine Protein 11      [12-21-19 @ 22:31]  Urine Sodium <35      [12-23-19 @ 10:28]  Urine Urea Nitrogen 775      [12-21-19 @ 02:30]  Urine Osmolality 439      [12-23-19 @ 10:28]        ALMA DELIA: titer Negative, pattern --      [12-21-19 @ 19:45]  C3 Complement 75      [12-26-19 @ 08:50]  C4 Complement 2      [12-26-19 @ 08:50]  Rheumatoid Factor <10      [12-21-19 @ 20:17]  Free Light Chains: kappa 13.58, lambda 8.44, ratio = 1.61      [12-21 @ 20:14]  Immunofixation Serum:   Weak IgG Kappa Band Identified    Reference Range: None Detected      [12-21-19 @ 20:14]  SPEP Interpretation: Weak Gamma-Migrating Paraprotein Identified      [12-21-19 @ 20:14]  Immunofixation Urine: Reference Range: None Detected      [12-21-19 @ 22:31]  UPEP Interpretation: Normal Electrophoresis Pattern      [12-21-19 @ 22:31]

## 2019-12-27 NOTE — PROGRESS NOTE ADULT - ATTENDING COMMENTS
JUHI/ATN, CKD III, hyponatremia, AS  Creatinine now 2.9, from peak 4.1 (baseline 1.4)  Mild edema  S/p BAV    Diuresis as needed  Dose medications for GFR 20  Avoid ACEi/ARB for now  Remainder per fellow, will follow

## 2019-12-27 NOTE — PROGRESS NOTE ADULT - SUBJECTIVE AND OBJECTIVE BOX
Subjective: No current complaints. He feels well.     Medications:  acetaminophen   Tablet .. 650 milliGRAM(s) Oral every 6 hours PRN  ALBUTerol    90 MICROgram(s) HFA Inhaler 2 Puff(s) Inhalation every 6 hours PRN  atorvastatin 40 milliGRAM(s) Oral at bedtime  ferrous    sulfate 325 milliGRAM(s) Oral daily  fluticasone propionate 50 MICROgram(s)/spray Nasal Spray 1 Spray(s) Both Nostrils two times a day  folic acid 1 milliGRAM(s) Oral daily  glucagon  Injectable 1 milliGRAM(s) IntraMuscular once PRN  insulin lispro (HumaLOG) corrective regimen sliding scale   SubCutaneous three times a day before meals  insulin lispro (HumaLOG) corrective regimen sliding scale   SubCutaneous at bedtime  melatonin 5 milliGRAM(s) Oral at bedtime  pantoprazole    Tablet 40 milliGRAM(s) Oral before breakfast  polyethylene glycol 3350 17 Gram(s) Oral daily  predniSONE   Tablet 60 milliGRAM(s) Oral daily  senna 2 Tablet(s) Oral at bedtime  sodium chloride 0.9%. 1000 milliLiter(s) IV Continuous <Continuous>  tiotropium 18 MICROgram(s) Capsule 1 Capsule(s) Inhalation daily      Physical Exam:    Vital Signs Last 24 Hrs  T(C): 36.6 (27 Dec 2019 04:00), Max: 36.6 (27 Dec 2019 04:00)  T(F): 97.8 (27 Dec 2019 04:00), Max: 97.8 (27 Dec 2019 04:00)  HR: 86 (27 Dec 2019 04:00) (70 - 86)  BP: 100/62 (27 Dec 2019 04:00) (98/46 - 110/60)  BP(mean): 77 (27 Dec 2019 00:00) (67 - 77)  RR: 19 (27 Dec 2019 04:00) (16 - 23)  SpO2: 99% (27 Dec 2019 04:00) (99% - 100%)    Daily     Daily Weight in k.9 (26 Dec 2019 12:21)    I&O's Summary    26 Dec 2019 07:01  -  27 Dec 2019 07:00  --------------------------------------------------------  IN: 430 mL / OUT: 1900 mL / NET: -1470 mL    General: No distress. Comfortable.  HEENT: EOM intact.  Neck: Neck supple. JVP mildly elevated. No masses  Chest: Crackles heard in lower lung fields.   CV: RRR with normal S1 and S2. III/VI high pitched murmur at RUSB. No rubs or gallops. Radial pulses normal. Edema at ankles.   Abdomen: Soft, non-distended, non-tender  Skin: No rashes or skin breakdown  Neurology: Alert and oriented times three. Sensation intact  Psych: Affect normal    Labs:                        8.1    22.62 )-----------( 206      ( 27 Dec 2019 04:55 )             24.3         137  |  99  |  116<H>  ----------------------------<  140<H>  4.1   |  19<L>  |  2.87<H>    Ca    8.7      27 Dec 2019 04:55  Phos  5.0       Mg     2.3         TPro  6.4  /  Alb  3.3  /  TBili  4.1<H>  /  DBili  x   /  AST  23  /  ALT  11  /  AlkPhos  58      PT/INR - ( 26 Dec 2019 21:23 )   PT: 12.2 sec;   INR: 1.06 ratio         PTT - ( 26 Dec 2019 21:23 )  PTT:26.9 sec  CARDIAC MARKERS ( 26 Dec 2019 21:23 )  x     / x     / 20 U/L / x     / 2.7 ng/mL    Lactate Dehydrogenase, Serum: 226 U/L ( @ 06:53)  Lactate Dehydrogenase, Serum: 224 U/L ( @ 05:50)

## 2019-12-27 NOTE — PROGRESS NOTE ADULT - PROBLEM SELECTOR PLAN 4
- No evidence of iron deficiency anemia. Possibly due to hemolysis in the setting of severe AS.  - monitor H/H , transfuse as indicated

## 2019-12-27 NOTE — PROGRESS NOTE ADULT - PROBLEM SELECTOR PLAN 4
- No evidence of iron deficiency anemia. Possibly due to hemolysis in the setting of severe AS.   -Blood bank and Heme note appreciated. 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 Awaiting cold agglutinin titer. Currently on steroids.  Avoid further transfusions.

## 2019-12-28 LAB
ALBUMIN SERPL ELPH-MCNC: 3.3 G/DL — SIGNIFICANT CHANGE UP (ref 3.3–5)
ALP SERPL-CCNC: 61 U/L — SIGNIFICANT CHANGE UP (ref 40–120)
ALT FLD-CCNC: 10 U/L — SIGNIFICANT CHANGE UP (ref 10–45)
ANION GAP SERPL CALC-SCNC: 17 MMOL/L — SIGNIFICANT CHANGE UP (ref 5–17)
AST SERPL-CCNC: 17 U/L — SIGNIFICANT CHANGE UP (ref 10–40)
BILIRUB SERPL-MCNC: 4.2 MG/DL — HIGH (ref 0.2–1.2)
BUN SERPL-MCNC: 120 MG/DL — HIGH (ref 7–23)
CALCIUM SERPL-MCNC: 8.7 MG/DL — SIGNIFICANT CHANGE UP (ref 8.4–10.5)
CHLORIDE SERPL-SCNC: 98 MMOL/L — SIGNIFICANT CHANGE UP (ref 96–108)
CO2 SERPL-SCNC: 20 MMOL/L — LOW (ref 22–31)
CREAT SERPL-MCNC: 2.69 MG/DL — HIGH (ref 0.5–1.3)
GLUCOSE BLDC GLUCOMTR-MCNC: 188 MG/DL — HIGH (ref 70–99)
GLUCOSE BLDC GLUCOMTR-MCNC: 265 MG/DL — HIGH (ref 70–99)
GLUCOSE BLDC GLUCOMTR-MCNC: 286 MG/DL — HIGH (ref 70–99)
GLUCOSE BLDC GLUCOMTR-MCNC: 296 MG/DL — HIGH (ref 70–99)
GLUCOSE SERPL-MCNC: 167 MG/DL — HIGH (ref 70–99)
HCT VFR BLD CALC: 24.9 % — LOW (ref 39–50)
HGB BLD-MCNC: 8.1 G/DL — LOW (ref 13–17)
INR BLD: 1.08 RATIO — SIGNIFICANT CHANGE UP (ref 0.88–1.16)
MAGNESIUM SERPL-MCNC: 2.4 MG/DL — SIGNIFICANT CHANGE UP (ref 1.6–2.6)
MCHC RBC-ENTMCNC: 31 PG — SIGNIFICANT CHANGE UP (ref 27–34)
MCHC RBC-ENTMCNC: 32.5 GM/DL — SIGNIFICANT CHANGE UP (ref 32–36)
MCV RBC AUTO: 95.4 FL — SIGNIFICANT CHANGE UP (ref 80–100)
NRBC # BLD: 0 /100 WBCS — SIGNIFICANT CHANGE UP (ref 0–0)
PLATELET # BLD AUTO: 149 K/UL — LOW (ref 150–400)
POTASSIUM SERPL-MCNC: 4.7 MMOL/L — SIGNIFICANT CHANGE UP (ref 3.5–5.3)
POTASSIUM SERPL-SCNC: 4.7 MMOL/L — SIGNIFICANT CHANGE UP (ref 3.5–5.3)
PROT SERPL-MCNC: 6.7 G/DL — SIGNIFICANT CHANGE UP (ref 6–8.3)
PROTHROM AB SERPL-ACNC: 12.4 SEC — SIGNIFICANT CHANGE UP (ref 10–12.9)
RBC # BLD: 2.61 M/UL — LOW (ref 4.2–5.8)
RBC # FLD: 19.1 % — HIGH (ref 10.3–14.5)
SODIUM SERPL-SCNC: 135 MMOL/L — SIGNIFICANT CHANGE UP (ref 135–145)
WBC # BLD: 13.09 K/UL — HIGH (ref 3.8–10.5)
WBC # FLD AUTO: 13.09 K/UL — HIGH (ref 3.8–10.5)

## 2019-12-28 PROCEDURE — 99024 POSTOP FOLLOW-UP VISIT: CPT

## 2019-12-28 PROCEDURE — 99233 SBSQ HOSP IP/OBS HIGH 50: CPT

## 2019-12-28 PROCEDURE — 71045 X-RAY EXAM CHEST 1 VIEW: CPT | Mod: 26

## 2019-12-28 PROCEDURE — 93010 ELECTROCARDIOGRAM REPORT: CPT

## 2019-12-28 RX ORDER — WARFARIN SODIUM 2.5 MG/1
5 TABLET ORAL ONCE
Refills: 0 | Status: COMPLETED | OUTPATIENT
Start: 2019-12-28 | End: 2019-12-28

## 2019-12-28 RX ORDER — FUROSEMIDE 40 MG
20 TABLET ORAL DAILY
Refills: 0 | Status: DISCONTINUED | OUTPATIENT
Start: 2019-12-28 | End: 2019-12-28

## 2019-12-28 RX ORDER — FUROSEMIDE 40 MG
40 TABLET ORAL
Refills: 0 | Status: DISCONTINUED | OUTPATIENT
Start: 2019-12-28 | End: 2019-12-31

## 2019-12-28 RX ORDER — FUROSEMIDE 40 MG
20 TABLET ORAL ONCE
Refills: 0 | Status: COMPLETED | OUTPATIENT
Start: 2019-12-28 | End: 2019-12-28

## 2019-12-28 RX ADMIN — Medication 20 MILLIGRAM(S): at 06:09

## 2019-12-28 RX ADMIN — Medication 60 MILLIGRAM(S): at 06:09

## 2019-12-28 RX ADMIN — Medication 5 MILLIGRAM(S): at 21:44

## 2019-12-28 RX ADMIN — Medication 2: at 21:47

## 2019-12-28 RX ADMIN — SODIUM CHLORIDE 3 MILLILITER(S): 9 INJECTION INTRAMUSCULAR; INTRAVENOUS; SUBCUTANEOUS at 06:08

## 2019-12-28 RX ADMIN — Medication 20 MILLIGRAM(S): at 14:06

## 2019-12-28 RX ADMIN — Medication 40 MILLIGRAM(S): at 17:59

## 2019-12-28 RX ADMIN — Medication 1 MILLIGRAM(S): at 12:05

## 2019-12-28 RX ADMIN — Medication 2: at 08:21

## 2019-12-28 RX ADMIN — SENNA PLUS 2 TABLET(S): 8.6 TABLET ORAL at 21:45

## 2019-12-28 RX ADMIN — ATORVASTATIN CALCIUM 40 MILLIGRAM(S): 80 TABLET, FILM COATED ORAL at 21:44

## 2019-12-28 RX ADMIN — Medication 1 SPRAY(S): at 08:21

## 2019-12-28 RX ADMIN — Medication 325 MILLIGRAM(S): at 12:05

## 2019-12-28 RX ADMIN — Medication 6: at 12:05

## 2019-12-28 RX ADMIN — Medication 20 MILLIGRAM(S): at 12:05

## 2019-12-28 RX ADMIN — PANTOPRAZOLE SODIUM 40 MILLIGRAM(S): 20 TABLET, DELAYED RELEASE ORAL at 06:09

## 2019-12-28 RX ADMIN — WARFARIN SODIUM 5 MILLIGRAM(S): 2.5 TABLET ORAL at 21:44

## 2019-12-28 RX ADMIN — Medication 6: at 18:05

## 2019-12-28 RX ADMIN — SODIUM CHLORIDE 3 MILLILITER(S): 9 INJECTION INTRAMUSCULAR; INTRAVENOUS; SUBCUTANEOUS at 13:52

## 2019-12-28 RX ADMIN — SODIUM CHLORIDE 3 MILLILITER(S): 9 INJECTION INTRAMUSCULAR; INTRAVENOUS; SUBCUTANEOUS at 21:50

## 2019-12-28 RX ADMIN — TIOTROPIUM BROMIDE 1 CAPSULE(S): 18 CAPSULE ORAL; RESPIRATORY (INHALATION) at 17:59

## 2019-12-28 RX ADMIN — Medication 1 SPRAY(S): at 18:01

## 2019-12-28 RX ADMIN — POLYETHYLENE GLYCOL 3350 17 GRAM(S): 17 POWDER, FOR SOLUTION ORAL at 12:05

## 2019-12-28 RX ADMIN — Medication 81 MILLIGRAM(S): at 12:04

## 2019-12-28 NOTE — PROGRESS NOTE ADULT - SUBJECTIVE AND OBJECTIVE BOX
INTERVAL HPI/OVERNIGHT EVENTS:  Patient S&E at bedside. No o/n events, patient reports he is feeling well. No dyspnea at rest.         REVIEW OF SYSTEMS  General: No fevers, no chills, no fatigue, no weight loss  Skin: No rash  ENMT: No sore throat, no dysphagia, no mouth sores	  Respiratory and Thorax: No dyspnea, no cough, no wheezing  Cardiovascular: No chest pain, no palpitations  Gastrointestinal:	No N/V/D, no abdominal pain  Genitourinary: No dysuria  Musculoskeletal:	No joint pain, no muscle pain  Neurological:	No dizziness, no neuropathy  Psychiatric: No depression or anxiety  Hematology/Lymphatics: No easy bleeding or bruising, no swollen nodes noticed.       ICU Vital Signs Last 24 Hrs  T(C): 36.4 (28 Dec 2019 05:00), Max: 36.6 (27 Dec 2019 21:45)  T(F): 97.5 (28 Dec 2019 05:00), Max: 97.9 (27 Dec 2019 21:45)  HR: 79 (28 Dec 2019 05:00) (74 - 92)  BP: 108/66 (28 Dec 2019 05:00) (101/56 - 140/60)  BP(mean): 80 (28 Dec 2019 05:00) (73 - 98)  ABP: --  ABP(mean): --  RR: 18 (28 Dec 2019 05:00) (17 - 26)  SpO2: 98% (28 Dec 2019 05:00) (96% - 99%)    PHYSICAL EXAM:    Constitutional: cachexia  Eyes: EOMI, sclera non-icteric  Neck: supple, no masses, no JVD  Respiratory: CTA b/l, good air entry b/l  Cardiovascular: RRR, harsh systolic murmur  Gastrointestinal: soft, NTND, no masses palpable, + BS, no hepatosplenomegaly  Extremities: no c/c/e but significant bruising on b/l upper extremities.   Neurological: AAOx3      MEDICATIONS  (STANDING):  aspirin enteric coated 81 milliGRAM(s) Oral daily  atorvastatin 40 milliGRAM(s) Oral at bedtime  dextrose 5%. 1000 milliLiter(s) (50 mL/Hr) IV Continuous <Continuous>  dextrose 50% Injectable 12.5 Gram(s) IV Push once  ferrous    sulfate 325 milliGRAM(s) Oral daily  fluticasone propionate 50 MICROgram(s)/spray Nasal Spray 1 Spray(s) Both Nostrils two times a day  folic acid 1 milliGRAM(s) Oral daily  furosemide   Injectable 20 milliGRAM(s) IV Push daily  insulin lispro (HumaLOG) corrective regimen sliding scale   SubCutaneous at bedtime  insulin lispro (HumaLOG) corrective regimen sliding scale   SubCutaneous three times a day before meals  melatonin 5 milliGRAM(s) Oral at bedtime  pantoprazole    Tablet 40 milliGRAM(s) Oral before breakfast  polyethylene glycol 3350 17 Gram(s) Oral daily  predniSONE   Tablet 60 milliGRAM(s) Oral daily  senna 2 Tablet(s) Oral at bedtime  sodium chloride 0.9% lock flush 3 milliLiter(s) IV Push every 8 hours  sodium chloride 0.9%. 1000 milliLiter(s) (10 mL/Hr) IV Continuous <Continuous>  tiotropium 18 MICROgram(s) Capsule 1 Capsule(s) Inhalation daily  warfarin 5 milliGRAM(s) Oral once    MEDICATIONS  (PRN):  acetaminophen   Tablet .. 650 milliGRAM(s) Oral every 6 hours PRN Temp greater or equal to 38.5C (101.3F), Mild Pain (1 - 3)  ALBUTerol    90 MICROgram(s) HFA Inhaler 2 Puff(s) Inhalation every 6 hours PRN Shortness of Breath and/or Wheezing  dextrose 40% Gel 15 Gram(s) Oral once PRN Blood Glucose LESS THAN 70 milliGRAM(s)/deciLiter  glucagon  Injectable 1 milliGRAM(s) IntraMuscular once PRN Glucose <70 milliGRAM(s)/deciLiter         Complete Blood Count (12.28.19 @ 06:41)    Nucleated RBC: 0 /100 WBCs    WBC Count: 13.09 K/uL    RBC Count: 2.61 M/uL    Hemoglobin: 8.1 g/dL    Hematocrit: 24.9 %    Mean Cell Volume: 95.4 fl    Mean Cell Hemoglobin: 31.0 pg    Mean Cell Hemoglobin Conc: 32.5 gm/dL    Red Cell Distrib Width: 19.1 %    Platelet Count - Automated: 149 K/uL      Comprehensive Metabolic Panel (12.28.19 @ 06:41)    Sodium, Serum: 135 mmol/L    Potassium, Serum: 4.7 mmol/L    Chloride, Serum: 98 mmol/L    Carbon Dioxide, Serum: 20 mmol/L    Anion Gap, Serum: 17 mmol/L    Blood Urea Nitrogen, Serum: 120: TEST REPEATED. mg/dL    Creatinine, Serum: 2.69 mg/dL    Glucose, Serum: 167 mg/dL    Calcium, Total Serum: 8.7 mg/dL    Protein Total, Serum: 6.7 g/dL    Albumin, Serum: 3.3 g/dL    Bilirubin Total, Serum: 4.2 mg/dL    Alkaline Phosphatase, Serum: 61 U/L    Aspartate Aminotransferase (AST/SGOT): 17 U/L    Alanine Aminotransferase (ALT/SGPT): 10 U/L    eGFR if Non : 21: Interpretative comment  The units for eGFR are mL/min/1.73M2 (normalized body surface area). The  eGFR is calculated from a serum creatinine using the CKD-EPI equation.  Other variables required for calculation are race, age and sex. Among  patients with chronic kidney disease (CKD), the eGFR is useful in  Magnesium, Serum (12.28.19 @ 06:41)    Magnesium, Serum: 2.4 mg/dL    determining the stage of disease according to KDOQI CKD classification.  All eGFR results are reported numerically with the following  interpretation.          GFR                    With                 Without     (ml/min/1.73 m2)    Kidney Damage       Kidney Damage        >= 90                    Stage 1                     Normal        60-89                    Stage 2                     Decreased GFR        30-59     Stage 3                     Stage 3        15-29                    Stage 4                     Stage 4        < 15                      Stage 5                     Stage 5  Each stage of CKD assumes that the associated GFR level has been in  effect for at least 3 months. Determination of stages one and two (with  eGFR > 59 ml/min/m2) requires estimation of kidney damage for at least 3  months as defined by structural or functional abnormalities.  Limitations: All estimates of GFR will be less accurate for patients at  extremes of muscle mass (including but not limited to frail elderly,  critically ill, or cancer patients), those with unusual diets, and those  with conditions associated with reduced secretion or extrarenal  elimination of creatinine. The eGFR equation is not recommended for use  in patients with unstable creatinine levels. mL/min/1.73M2    eGFR if African American: 24 mL/min/1.73M2    Prothrombin Time and INR, Plasma in AM (12.28.19 @ 06:41)    Prothrombin Time, Plasma: 12.4: Effective October 30th, 2018 the reference range for PT has changed. sec    INR: 1.08: Recommended ranges for therapeutic INR:    2.0-3.0 for most medical and surgical thromboembolic states    2.0-3.0 for atrial fibrillation    2.0-3.0 for bileaflet mechanical valve in aortic position    2.5-3.5 for mechanical heart valves    Chest 2004;[id number]  The presence of direct thrombin inhibitors (argatroban, refludan)  may falsely increase results. ratio            RADIOLOGY & ADDITIONAL TESTS:  Studies reviewed.    < from: CT Abdomen and Pelvis No Cont (12.24.19 @ 18:03) >  IMPRESSION: Limited examination without oral or intravenous contrast.    Enlarged prostate gland.    Circumferential urinary bladder wall thickening. Correlation with urinalysis is recommended.    Mild bibasilar atelectasis, right greater than left.  Trace bilateral pleural effusions.    Cardiomegaly.    Diffuse anasarca.    Cholelithiasis.    Small volume ascites.    < end of copied text >

## 2019-12-28 NOTE — PROGRESS NOTE ADULT - SUBJECTIVE AND OBJECTIVE BOX
Subjective: He feels well today without complaints other than weakness.     Medications:  acetaminophen   Tablet .. 650 milliGRAM(s) Oral every 6 hours PRN  ALBUTerol    90 MICROgram(s) HFA Inhaler 2 Puff(s) Inhalation every 6 hours PRN  aspirin enteric coated 81 milliGRAM(s) Oral daily  atorvastatin 40 milliGRAM(s) Oral at bedtime  ferrous    sulfate 325 milliGRAM(s) Oral daily  fluticasone propionate 50 MICROgram(s)/spray Nasal Spray 1 Spray(s) Both Nostrils two times a day  folic acid 1 milliGRAM(s) Oral daily  glucagon  Injectable 1 milliGRAM(s) IntraMuscular once PRN  insulin lispro (HumaLOG) corrective regimen sliding scale   SubCutaneous three times a day before meals  insulin lispro (HumaLOG) corrective regimen sliding scale   SubCutaneous at bedtime  melatonin 5 milliGRAM(s) Oral at bedtime  pantoprazole    Tablet 40 milliGRAM(s) Oral before breakfast  polyethylene glycol 3350 17 Gram(s) Oral daily  predniSONE   Tablet 60 milliGRAM(s) Oral daily  senna 2 Tablet(s) Oral at bedtime  tiotropium 18 MICROgram(s) Capsule 1 Capsule(s) Inhalation daily    Physical Exam:    Vital Signs Last 24 Hrs  T(C): 36.4 (28 Dec 2019 05:00), Max: 36.6 (27 Dec 2019 21:45)  T(F): 97.5 (28 Dec 2019 05:00), Max: 97.9 (27 Dec 2019 21:45)  HR: 79 (28 Dec 2019 05:00) (74 - 92)  BP: 108/66 (28 Dec 2019 05:00) (101/56 - 140/60)  BP(mean): 80 (28 Dec 2019 05:00) (73 - 98)  RR: 18 (28 Dec 2019 05:00) (17 - 26)  SpO2: 98% (28 Dec 2019 05:00) (96% - 99%)    I&O's Summary    27 Dec 2019 07:01  -  28 Dec 2019 07:00  --------------------------------------------------------  IN: 830 mL / OUT: 1450 mL / NET: -620 mL    General: No distress. Comfortable.  HEENT: EOM intact.  Neck: Neck supple. JVP severely elevated. No masses  Chest: Clear to auscultation bilaterally  CV: RRR with normal S1 and S2. III/VI systolic murmur heard best at RUSB. No rubs or gallops. Radial pulses normal.  Abdomen: Soft, non-distended, non-tender  Skin: No rashes or skin breakdown  Neurology: Alert and oriented times three. Sensation intact  Psych: Affect normal    Labs:                        8.1    13.09 )-----------( 149      ( 28 Dec 2019 06:41 )             24.9     12-28    135  |  98  |  120<H>  ----------------------------<  167<H>  4.7   |  20<L>  |  2.69<H>    Ca    8.7      28 Dec 2019 06:41  Phos  5.0     12-27  Mg     2.4     12-28    TPro  6.7  /  Alb  3.3  /  TBili  4.2<H>  /  DBili  x   /  AST  17  /  ALT  10  /  AlkPhos  61  12-28    PT/INR - ( 28 Dec 2019 06:41 )   PT: 12.4 sec;   INR: 1.08 ratio    PTT - ( 26 Dec 2019 21:23 )  PTT:26.9 sec  CARDIAC MARKERS ( 26 Dec 2019 21:23 )  x     / x     / 20 U/L / x     / 2.7 ng/mL    Lactate Dehydrogenase, Serum: 226 U/L (12-26 @ 06:53)

## 2019-12-28 NOTE — PROGRESS NOTE ADULT - SUBJECTIVE AND OBJECTIVE BOX
VITAL SIGNS    Telemetry:    Vital Signs Last 24 Hrs  T(C): 36.4 (12-28-19 @ 05:00), Max: 36.6 (12-27-19 @ 21:45)  T(F): 97.5 (12-28-19 @ 05:00), Max: 97.9 (12-27-19 @ 21:45)  HR: 79 (12-28-19 @ 05:00) (74 - 92)  BP: 108/66 (12-28-19 @ 05:00) (101/56 - 140/60)  RR: 18 (12-28-19 @ 05:00) (17 - 26)  SpO2: 98% (12-28-19 @ 05:00) (96% - 100%)            12-27 @ 07:01  -  12-28 @ 07:00  --------------------------------------------------------  IN: 830 mL / OUT: 1450 mL / NET: -620 mL       Daily     Daily   Admit Wt: Drug Dosing Weight  Height (cm): 165.1 (13 Dec 2019 07:31)  Weight (kg): 58.5 (13 Dec 2019 07:31)  BMI (kg/m2): 21.5 (13 Dec 2019 07:31)  BSA (m2): 1.64 (13 Dec 2019 07:31)     Daily     LABS  12-28    135  |  98  |  120<H>  ----------------------------<  167<H>  4.7   |  20<L>  |  2.69<H>    Ca    8.7      28 Dec 2019 06:41  Phos  5.0     12-27  Mg     2.4     12-28    TPro  6.7  /  Alb  3.3  /  TBili  4.2<H>  /  DBili  x   /  AST  17  /  ALT  10  /  AlkPhos  61  12-28                                 8.1    13.09 )-----------( 149      ( 28 Dec 2019 06:41 )             24.9          PT/INR - ( 28 Dec 2019 06:41 )   PT: 12.4 sec;   INR: 1.08 ratio         PTT - ( 26 Dec 2019 21:23 )  PTT:26.9 sec        Bilirubin Total, Serum: 4.2 mg/dL (12-28 @ 06:41)    CAPILLARY BLOOD GLUCOSE      POCT Blood Glucose.: 188 mg/dL (28 Dec 2019 07:55)  POCT Blood Glucose.: 162 mg/dL (27 Dec 2019 21:54)  POCT Blood Glucose.: 243 mg/dL (27 Dec 2019 17:08)  POCT Blood Glucose.: 382 mg/dL (27 Dec 2019 12:09)          Drains:     MS       [  ]   [  ]            L Pleural [  ]            R Pleural  [  ]            ERICKA  [  ]           Jackson  [  ]    Pacing Wires      [  ]   Settings:                                  Isolated  [  ]                    CXR:      MEDICATIONS  acetaminophen   Tablet .. 650 milliGRAM(s) Oral every 6 hours PRN  ALBUTerol    90 MICROgram(s) HFA Inhaler 2 Puff(s) Inhalation every 6 hours PRN  aspirin enteric coated 81 milliGRAM(s) Oral daily  atorvastatin 40 milliGRAM(s) Oral at bedtime  dextrose 40% Gel 15 Gram(s) Oral once PRN  dextrose 5%. 1000 milliLiter(s) IV Continuous <Continuous>  dextrose 50% Injectable 12.5 Gram(s) IV Push once  ferrous    sulfate 325 milliGRAM(s) Oral daily  fluticasone propionate 50 MICROgram(s)/spray Nasal Spray 1 Spray(s) Both Nostrils two times a day  folic acid 1 milliGRAM(s) Oral daily  glucagon  Injectable 1 milliGRAM(s) IntraMuscular once PRN  insulin lispro (HumaLOG) corrective regimen sliding scale   SubCutaneous three times a day before meals  insulin lispro (HumaLOG) corrective regimen sliding scale   SubCutaneous at bedtime  melatonin 5 milliGRAM(s) Oral at bedtime  pantoprazole    Tablet 40 milliGRAM(s) Oral before breakfast  polyethylene glycol 3350 17 Gram(s) Oral daily  predniSONE   Tablet 60 milliGRAM(s) Oral daily  senna 2 Tablet(s) Oral at bedtime  sodium chloride 0.9% lock flush 3 milliLiter(s) IV Push every 8 hours  sodium chloride 0.9%. 1000 milliLiter(s) IV Continuous <Continuous>  tiotropium 18 MICROgram(s) Capsule 1 Capsule(s) Inhalation daily  torsemide 20 milliGRAM(s) Oral daily      PHYSICAL EXAM      Neurology: alert and oriented x 3, nonfocal, no gross deficits  CV :S1S2  Sternal Wound :  CDI , Stable  Lungs: cta  Abdomen: soft, nontender, nondistended, positive bowel sounds, last bowel movement   :  void     Extremities:   warm to touch, b/l groins stable               PAST MEDICAL & SURGICAL HISTORY:  Anemia  LBBB (left bundle branch block)  DYER (dyspnea on exertion)  Coronary disease: PCI  CHF (congestive heart failure)  Former smoker  Meniere disease  HLD (hyperlipidemia)  DM (diabetes mellitus)  Atrial fibrillation  HTN (hypertension)  Lipoma  History of skin graft  Status post appendectomy                 Discussed with Cardiothoracic Team at AM rounds.

## 2019-12-28 NOTE — PROGRESS NOTE ADULT - ASSESSMENT
Mr. Latham is an 84 year old man with ACC/AHA Stage C ischemic cardiomyopathy (LVDD 4.5 cm, EF 30%), severe AS (58/30/0.6), chronic AF on coumadin, and CAD s/p PCI to RCA and LAD with prior tamponade during coronary intervention who is admitted with decompensated heart failure after R/LHC with plans for optimization prior to consideration of TAVR. He underwent CT coronaries on 12/17 as a part of TAVR evaluation and suffered from MAI. His renal function is improving. He underwent balloon aortic valvuloplasty on 12/26 with good result and decrease in transaortic gradients. His right sided filling pressures are improved, but remain elevated.     Please call me with questions at [phone]. Plan discussed in multidisciplinary rounds with CTU team and CT surgery as well as structural heart team.

## 2019-12-28 NOTE — PROGRESS NOTE ADULT - PROBLEM SELECTOR PLAN 1
- Change back to IV lasix 40 mg BID.   - Continue metoprolol succinate 25 mg daily.  - Daily standing weight and strict I's and O's.

## 2019-12-28 NOTE — PROGRESS NOTE ADULT - PROBLEM SELECTOR PLAN 1
- on intermittent diureses, monitor SCr  - torsemide 20 mg PO to start 12/28   - monitor volume status  - Daily standing weight and strict I's and O's.

## 2019-12-28 NOTE — PROGRESS NOTE ADULT - ASSESSMENT
85 yo M with hx Afib (Coumadin), DM2, HLD, CAD (stent 2014 and 2015 in mLAD and RCA), HFrEF (40%) severe AS, LBBB, baseline SCr 1.4, was referred by cardiologist for symptomatic CHF exacerbation. Echo from 12/10 shows severe aortic stenosis and EF 30%. Patient s/p RHC/LHC prior to scheduled TAVR. Patient was found to have worsening LE swelling and anemia with Hb of 7.9 and was admitted to medicine for further management. Pt was being optimized pre TAVR with diuretics per Heart failure service.   Pt underwent balloon aortic valvuloplasty on 12/26.    12/26 AV balloon valvuloplasty. s/p 1 PRBC  12/27 s/p PRBC x1. H/H stable 8/24 Transferred to 2 [facility] floor. Continue w/ intermittent diureses.    12/28: creatinine improving.

## 2019-12-28 NOTE — PROGRESS NOTE ADULT - ASSESSMENT
Assessment and Plan:   · Assessment		  83 yo M PMH chronic anemia, HFrEF (40%), Afib on coumadin, T2DM, HLD, PVD, Menieres Disease, carotid stenosis, LBBB on EKG, CAD s/p PCI to mLAD in June 2014 and PCI to pRCA that was complicated by perforation and cardiogenic shock in May of 2015 presented for elective RHC/LHC for TAVR evaluation for severe AS, admitted for acute on on chronic CHF and anemia workup for TVAR or balloon aortic vavluoplasty Mon 12/23. Course complicated by possible transfusion rxn with RRT 12/19 for hypoxia. Hematology consulted for hx of chronic anemia.     # Warm AIHA:  Hemoglobin stable 8.1g/dl.   -- Steroids started on 12/21 dosed 1mg/kg daily- please continue at this time and follow daily CBC.   - CT C/A/P unremarkable for malignancy.   - Continue folic acid  - DO NOT transfuse pt unless absolutely necessary and symptomatic as pt's anemia is hemolytic in nature  - CHECK  daily CBC with diff, retic count and LDH  - No DIC noted on labs

## 2019-12-28 NOTE — PROGRESS NOTE ADULT - ATTENDING COMMENTS
Mr Latham has demonstrated significant clinical improvement since the BAV on Thursday.  His Cr is coming down (appears to be plateauing in the mid to high 2's), his Hb/Hct have been stable since a 1 unit transfusion of PRBC's, his AS is now moderate by TTE, his lungs are essentially clear, and his right sided filling pressures have come down.  CHF team (Dr Nieto) input is greatly appreciated, and we will follow their diuretic regimen recommendations.  He is weak, frail, and severely deconditioned--I expect he will be assessed for an inpatient rehab facility, where our hope is that he will gain strength, maintain nutrition, and improve his mobility, all of which we would like to see before proceeding with TAVR (certainly his renal function is also a critical factor in proceeding).  Trevin Becker MD

## 2019-12-29 LAB
ANION GAP SERPL CALC-SCNC: 15 MMOL/L — SIGNIFICANT CHANGE UP (ref 5–17)
BASOPHILS # BLD AUTO: 0.01 K/UL — SIGNIFICANT CHANGE UP (ref 0–0.2)
BASOPHILS NFR BLD AUTO: 0.1 % — SIGNIFICANT CHANGE UP (ref 0–2)
BUN SERPL-MCNC: 109 MG/DL — HIGH (ref 7–23)
CALCIUM SERPL-MCNC: 8.1 MG/DL — LOW (ref 8.4–10.5)
CHLORIDE SERPL-SCNC: 97 MMOL/L — SIGNIFICANT CHANGE UP (ref 96–108)
CO2 SERPL-SCNC: 19 MMOL/L — LOW (ref 22–31)
CREAT SERPL-MCNC: 2.37 MG/DL — HIGH (ref 0.5–1.3)
EOSINOPHIL # BLD AUTO: 0.02 K/UL — SIGNIFICANT CHANGE UP (ref 0–0.5)
EOSINOPHIL NFR BLD AUTO: 0.2 % — SIGNIFICANT CHANGE UP (ref 0–6)
GLUCOSE BLDC GLUCOMTR-MCNC: 170 MG/DL — HIGH (ref 70–99)
GLUCOSE BLDC GLUCOMTR-MCNC: 211 MG/DL — HIGH (ref 70–99)
GLUCOSE BLDC GLUCOMTR-MCNC: 221 MG/DL — HIGH (ref 70–99)
GLUCOSE BLDC GLUCOMTR-MCNC: 286 MG/DL — HIGH (ref 70–99)
GLUCOSE SERPL-MCNC: 211 MG/DL — HIGH (ref 70–99)
HCT VFR BLD CALC: 22.8 % — LOW (ref 39–50)
HCT VFR BLD CALC: 23.1 % — LOW (ref 39–50)
HGB BLD-MCNC: 7.5 G/DL — LOW (ref 13–17)
HGB BLD-MCNC: 7.5 G/DL — LOW (ref 13–17)
IMM GRANULOCYTES NFR BLD AUTO: 5.6 % — HIGH (ref 0–1.5)
INR BLD: 1 RATIO — SIGNIFICANT CHANGE UP (ref 0.88–1.16)
LYMPHOCYTES # BLD AUTO: 1.41 K/UL — SIGNIFICANT CHANGE UP (ref 1–3.3)
LYMPHOCYTES # BLD AUTO: 11.1 % — LOW (ref 13–44)
MCHC RBC-ENTMCNC: 31 PG — SIGNIFICANT CHANGE UP (ref 27–34)
MCHC RBC-ENTMCNC: 31.6 PG — SIGNIFICANT CHANGE UP (ref 27–34)
MCHC RBC-ENTMCNC: 32.5 GM/DL — SIGNIFICANT CHANGE UP (ref 32–36)
MCHC RBC-ENTMCNC: 32.9 GM/DL — SIGNIFICANT CHANGE UP (ref 32–36)
MCV RBC AUTO: 95.5 FL — SIGNIFICANT CHANGE UP (ref 80–100)
MCV RBC AUTO: 96.2 FL — SIGNIFICANT CHANGE UP (ref 80–100)
MONOCYTES # BLD AUTO: 0.48 K/UL — SIGNIFICANT CHANGE UP (ref 0–0.9)
MONOCYTES NFR BLD AUTO: 3.8 % — SIGNIFICANT CHANGE UP (ref 2–14)
NEUTROPHILS # BLD AUTO: 10.11 K/UL — HIGH (ref 1.8–7.4)
NEUTROPHILS NFR BLD AUTO: 79.2 % — HIGH (ref 43–77)
NRBC # BLD: 0 /100 WBCS — SIGNIFICANT CHANGE UP (ref 0–0)
NRBC # BLD: 0 /100 WBCS — SIGNIFICANT CHANGE UP (ref 0–0)
PLATELET # BLD AUTO: 135 K/UL — LOW (ref 150–400)
PLATELET # BLD AUTO: 153 K/UL — SIGNIFICANT CHANGE UP (ref 150–400)
POTASSIUM SERPL-MCNC: 4.6 MMOL/L — SIGNIFICANT CHANGE UP (ref 3.5–5.3)
POTASSIUM SERPL-SCNC: 4.6 MMOL/L — SIGNIFICANT CHANGE UP (ref 3.5–5.3)
PROTHROM AB SERPL-ACNC: 11.5 SEC — SIGNIFICANT CHANGE UP (ref 10–13.1)
RBC # BLD: 2.37 M/UL — LOW (ref 4.2–5.8)
RBC # BLD: 2.42 M/UL — LOW (ref 4.2–5.8)
RBC # FLD: 18.5 % — HIGH (ref 10.3–14.5)
RBC # FLD: 18.5 % — HIGH (ref 10.3–14.5)
SODIUM SERPL-SCNC: 131 MMOL/L — LOW (ref 135–145)
WBC # BLD: 12.74 K/UL — HIGH (ref 3.8–10.5)
WBC # BLD: 14.72 K/UL — HIGH (ref 3.8–10.5)
WBC # FLD AUTO: 12.74 K/UL — HIGH (ref 3.8–10.5)
WBC # FLD AUTO: 14.72 K/UL — HIGH (ref 3.8–10.5)

## 2019-12-29 PROCEDURE — 99232 SBSQ HOSP IP/OBS MODERATE 35: CPT

## 2019-12-29 PROCEDURE — 99024 POSTOP FOLLOW-UP VISIT: CPT

## 2019-12-29 PROCEDURE — 93010 ELECTROCARDIOGRAM REPORT: CPT

## 2019-12-29 PROCEDURE — 99233 SBSQ HOSP IP/OBS HIGH 50: CPT

## 2019-12-29 PROCEDURE — 71045 X-RAY EXAM CHEST 1 VIEW: CPT | Mod: 26

## 2019-12-29 RX ORDER — INSULIN LISPRO 100/ML
5 VIAL (ML) SUBCUTANEOUS
Refills: 0 | Status: DISCONTINUED | OUTPATIENT
Start: 2019-12-29 | End: 2019-12-30

## 2019-12-29 RX ORDER — INSULIN GLARGINE 100 [IU]/ML
7 INJECTION, SOLUTION SUBCUTANEOUS AT BEDTIME
Refills: 0 | Status: DISCONTINUED | OUTPATIENT
Start: 2019-12-29 | End: 2019-12-30

## 2019-12-29 RX ORDER — WARFARIN SODIUM 2.5 MG/1
5 TABLET ORAL ONCE
Refills: 0 | Status: COMPLETED | OUTPATIENT
Start: 2019-12-29 | End: 2019-12-29

## 2019-12-29 RX ADMIN — INSULIN GLARGINE 7 UNIT(S): 100 INJECTION, SOLUTION SUBCUTANEOUS at 21:57

## 2019-12-29 RX ADMIN — SODIUM CHLORIDE 3 MILLILITER(S): 9 INJECTION INTRAMUSCULAR; INTRAVENOUS; SUBCUTANEOUS at 16:43

## 2019-12-29 RX ADMIN — Medication 4: at 16:50

## 2019-12-29 RX ADMIN — POLYETHYLENE GLYCOL 3350 17 GRAM(S): 17 POWDER, FOR SOLUTION ORAL at 09:08

## 2019-12-29 RX ADMIN — Medication 40 MILLIGRAM(S): at 06:26

## 2019-12-29 RX ADMIN — WARFARIN SODIUM 5 MILLIGRAM(S): 2.5 TABLET ORAL at 21:57

## 2019-12-29 RX ADMIN — SODIUM CHLORIDE 3 MILLILITER(S): 9 INJECTION INTRAMUSCULAR; INTRAVENOUS; SUBCUTANEOUS at 21:54

## 2019-12-29 RX ADMIN — PANTOPRAZOLE SODIUM 40 MILLIGRAM(S): 20 TABLET, DELAYED RELEASE ORAL at 06:26

## 2019-12-29 RX ADMIN — Medication 4: at 09:07

## 2019-12-29 RX ADMIN — Medication 40 MILLIGRAM(S): at 18:06

## 2019-12-29 RX ADMIN — Medication 5 UNIT(S): at 18:07

## 2019-12-29 RX ADMIN — SODIUM CHLORIDE 3 MILLILITER(S): 9 INJECTION INTRAMUSCULAR; INTRAVENOUS; SUBCUTANEOUS at 06:26

## 2019-12-29 RX ADMIN — Medication 81 MILLIGRAM(S): at 09:08

## 2019-12-29 RX ADMIN — Medication 5 MILLIGRAM(S): at 21:57

## 2019-12-29 RX ADMIN — Medication 1 SPRAY(S): at 18:06

## 2019-12-29 RX ADMIN — TIOTROPIUM BROMIDE 1 CAPSULE(S): 18 CAPSULE ORAL; RESPIRATORY (INHALATION) at 09:08

## 2019-12-29 RX ADMIN — Medication 1 SPRAY(S): at 06:25

## 2019-12-29 RX ADMIN — Medication 6: at 12:28

## 2019-12-29 RX ADMIN — Medication 1 MILLIGRAM(S): at 09:08

## 2019-12-29 RX ADMIN — ATORVASTATIN CALCIUM 40 MILLIGRAM(S): 80 TABLET, FILM COATED ORAL at 21:57

## 2019-12-29 RX ADMIN — Medication 5 UNIT(S): at 12:28

## 2019-12-29 RX ADMIN — Medication 325 MILLIGRAM(S): at 09:08

## 2019-12-29 RX ADMIN — Medication 60 MILLIGRAM(S): at 06:26

## 2019-12-29 NOTE — PROGRESS NOTE ADULT - SUBJECTIVE AND OBJECTIVE BOX
VITAL SIGNS    Telemetry:    Vital Signs Last 24 Hrs  T(C): 36.3 (19 @ 05:36), Max: 36.7 (19 @ 19:18)  T(F): 97.3 (19 @ 05:36), Max: 98.1 (19 @ 19:18)  HR: 72 (19 @ 05:36) (72 - 84)  BP: 110/69 (19 @ 05:36) (105/64 - 110/69)  RR: 18 (19 @ 05:36) (18 - 18)  SpO2: 99% (19 @ 05:36) (96% - 99%)             @ 07:01  -   @ 07:00  --------------------------------------------------------  IN: 360 mL / OUT: 2450 mL / NET: -2090 mL     @ 07:01  -   @ 10:51  --------------------------------------------------------  IN: 240 mL / OUT: 0 mL / NET: 240 mL       Daily     Daily Weight in k.2 (29 Dec 2019 08:13)  Admit Wt: Drug Dosing Weight  Height (cm): 165.1 (13 Dec 2019 07:31)  Weight (kg): 58.5 (13 Dec 2019 07:31)  BMI (kg/m2): 21.5 (13 Dec 2019 07:31)  BSA (m2): 1.64 (13 Dec 2019 07:31)     Daily Weight in k.2 (19 @ 08:13)    LABS      131<L>  |  97  |  109<H>  ----------------------------<  211<H>  4.6   |  19<L>  |  2.37<H>    Ca    8.1<L>      29 Dec 2019 06:21  Mg     2.4         TPro  6.7  /  Alb  3.3  /  TBili  4.2<H>  /  DBili  x   /  AST  17  /  ALT  10  /  AlkPhos  61  12                                 7.5    12.74 )-----------( 135      ( 29 Dec 2019 08:40 )             22.8          PT/INR - ( 29 Dec 2019 09:00 )   PT: 11.5 sec;   INR: 1.00 ratio                   CAPILLARY BLOOD GLUCOSE      POCT Blood Glucose.: 211 mg/dL (29 Dec 2019 08:31)  POCT Blood Glucose.: 265 mg/dL (28 Dec 2019 21:28)  POCT Blood Glucose.: 286 mg/dL (28 Dec 2019 18:03)  POCT Blood Glucose.: 296 mg/dL (28 Dec 2019 11:46)          Drains:     MS       [  ]   [  ]            L Pleural [  ]            R Pleural  [  ]            ERICKA  [  ]           Jackson  [  ]    Pacing Wires      [  ]   Settings:                                  Isolated  [  ]                    CXR:      MEDICATIONS  acetaminophen   Tablet .. 650 milliGRAM(s) Oral every 6 hours PRN  ALBUTerol    90 MICROgram(s) HFA Inhaler 2 Puff(s) Inhalation every 6 hours PRN  aspirin enteric coated 81 milliGRAM(s) Oral daily  atorvastatin 40 milliGRAM(s) Oral at bedtime  dextrose 40% Gel 15 Gram(s) Oral once PRN  dextrose 5%. 1000 milliLiter(s) IV Continuous <Continuous>  dextrose 50% Injectable 12.5 Gram(s) IV Push once  ferrous    sulfate 325 milliGRAM(s) Oral daily  fluticasone propionate 50 MICROgram(s)/spray Nasal Spray 1 Spray(s) Both Nostrils two times a day  folic acid 1 milliGRAM(s) Oral daily  furosemide   Injectable 40 milliGRAM(s) IV Push two times a day  glucagon  Injectable 1 milliGRAM(s) IntraMuscular once PRN  insulin glargine Injectable (LANTUS) 7 Unit(s) SubCutaneous at bedtime  insulin lispro (HumaLOG) corrective regimen sliding scale   SubCutaneous at bedtime  insulin lispro (HumaLOG) corrective regimen sliding scale   SubCutaneous three times a day before meals  insulin lispro Injectable (HumaLOG) 5 Unit(s) SubCutaneous three times a day before meals  melatonin 5 milliGRAM(s) Oral at bedtime  pantoprazole    Tablet 40 milliGRAM(s) Oral before breakfast  polyethylene glycol 3350 17 Gram(s) Oral daily  predniSONE   Tablet 60 milliGRAM(s) Oral daily  senna 2 Tablet(s) Oral at bedtime  sodium chloride 0.9% lock flush 3 milliLiter(s) IV Push every 8 hours  sodium chloride 0.9%. 1000 milliLiter(s) IV Continuous <Continuous>  tiotropium 18 MICROgram(s) Capsule 1 Capsule(s) Inhalation daily      PHYSICAL EXAM      Neurology: alert and oriented x 3, nonfocal, no gross deficits  CV :S1S2  Sternal Wound :  CDI , Stable  Lungs: cta  Abdomen: soft, nontender, nondistended, positive bowel sounds, last bowel movement   :  voids     Extremities:   warm to touch no edema               PAST MEDICAL & SURGICAL HISTORY:  Anemia  LBBB (left bundle branch block)  DYER (dyspnea on exertion)  Coronary disease: PCI  CHF (congestive heart failure)  Former smoker  Meniere disease  HLD (hyperlipidemia)  DM (diabetes mellitus)  Atrial fibrillation  HTN (hypertension)  Lipoma  History of skin graft  Status post appendectomy                 Discussed with Cardiothoracic Team at AM rounds.

## 2019-12-29 NOTE — PROGRESS NOTE ADULT - PROBLEM SELECTOR PLAN 1
- Continue IV lasix 40 mg BID.   - Continue metoprolol succinate 25 mg daily.  - Daily standing weight and strict I's and O's.

## 2019-12-29 NOTE — PROGRESS NOTE ADULT - PROBLEM SELECTOR PLAN 1
- on intermittent diureses, monitor SCr  -Lasix iv 40 bid  - monitor volume status  - Daily standing weight and strict I's and O's.

## 2019-12-29 NOTE — PROGRESS NOTE ADULT - ASSESSMENT
Mr. Latham is an 84 year old man with ACC/AHA Stage C ischemic cardiomyopathy (LVDD 4.5 cm, EF 30%), severe AS (58/30/0.6), chronic AF on coumadin, and CAD s/p PCI to RCA and LAD with prior tamponade during coronary intervention who is admitted with decompensated heart failure after R/LHC with plans for optimization prior to consideration of TAVR. He underwent CT coronaries on 12/17 as a part of TAVR evaluation and suffered from MAI. His renal function is improving. He underwent balloon aortic valvuloplasty on 12/26 with good result and decrease in transaortic gradients. His right sided filling pressures are improved, but remain elevated.     Please call me with questions at [phone]. Plan discussed in multidisciplinary rounds with 2 [facility] team and CT surgery.

## 2019-12-29 NOTE — PROGRESS NOTE ADULT - ASSESSMENT
85 yo M with hx Afib (Coumadin), DM2, HLD, CAD (stent 2014 and 2015 in mLAD and RCA), HFrEF (40%) severe AS, LBBB, baseline SCr 1.4, was referred by cardiologist for symptomatic CHF exacerbation. Echo from 12/10 shows severe aortic stenosis and EF 30%. Patient s/p RHC/LHC prior to scheduled TAVR. Patient was found to have worsening LE swelling and anemia with Hb of 7.9 and was admitted to medicine for further management. Pt was being optimized pre TAVR with diuretics per Heart failure service.   Pt underwent balloon aortic valvuloplasty on 12/26.    12/26 AV balloon valvuloplasty. s/p 1 PRBC  12/27 s/p PRBC x1. H/H stable 8/24 Transferred to 2 [facility] floor. Continue w/ intermittent diureses.    12/28: creatinine improving.   12/29: Creatinine continues to improve. Pt recommended for rehab. Also lives alone. Discharge when medically stable. Diuretics still being adjusted. Insulin added to regimen as blood sugars are high likely 2/2 addition of prednisone.

## 2019-12-29 NOTE — PROGRESS NOTE ADULT - ATTENDING COMMENTS
Mr Latham appears clinically stable.  His CBC is drifting down without overt bleeding.  As per Hematology, we are not transfusing him.  He remains on oral steroids.  Warfarin was resumed for chronic AFIB (with a LBBB).  His renal function is improving.  He remains on IV Lasix, and will consider transitioning to PO this week as per CHF.  Ambulating with PT.  My goal would be for discharge when clinically stable to a rehab facility (I would start the process with SW/ tomorrow) in hopes of improving his mobility and functional status.  We would then bring him back to the outpatient [facility] for repeat assessment and labs within 2weeks--pending that visit, we will determine if it is appropriate to proceed with TAVR at that time.  Trevin Becker MD

## 2019-12-29 NOTE — PROGRESS NOTE ADULT - SUBJECTIVE AND OBJECTIVE BOX
Subjective: No current complaints.     Medications:  acetaminophen   Tablet .. 650 milliGRAM(s) Oral every 6 hours PRN  ALBUTerol    90 MICROgram(s) HFA Inhaler 2 Puff(s) Inhalation every 6 hours PRN  aspirin enteric coated 81 milliGRAM(s) Oral daily  atorvastatin 40 milliGRAM(s) Oral at bedtime  dextrose 40% Gel 15 Gram(s) Oral once PRN  dextrose 5%. 1000 milliLiter(s) IV Continuous <Continuous>  dextrose 50% Injectable 12.5 Gram(s) IV Push once  ferrous    sulfate 325 milliGRAM(s) Oral daily  fluticasone propionate 50 MICROgram(s)/spray Nasal Spray 1 Spray(s) Both Nostrils two times a day  folic acid 1 milliGRAM(s) Oral daily  furosemide   Injectable 40 milliGRAM(s) IV Push two times a day  glucagon  Injectable 1 milliGRAM(s) IntraMuscular once PRN  insulin glargine Injectable (LANTUS) 7 Unit(s) SubCutaneous at bedtime  insulin lispro (HumaLOG) corrective regimen sliding scale   SubCutaneous at bedtime  insulin lispro (HumaLOG) corrective regimen sliding scale   SubCutaneous three times a day before meals  insulin lispro Injectable (HumaLOG) 5 Unit(s) SubCutaneous three times a day before meals  melatonin 5 milliGRAM(s) Oral at bedtime  pantoprazole    Tablet 40 milliGRAM(s) Oral before breakfast  polyethylene glycol 3350 17 Gram(s) Oral daily  predniSONE   Tablet 60 milliGRAM(s) Oral daily  senna 2 Tablet(s) Oral at bedtime  sodium chloride 0.9% lock flush 3 milliLiter(s) IV Push every 8 hours  sodium chloride 0.9%. 1000 milliLiter(s) IV Continuous <Continuous>  tiotropium 18 MICROgram(s) Capsule 1 Capsule(s) Inhalation daily      Physical Exam:    Vital Signs Last 24 Hrs  T(C): 36.3 (29 Dec 2019 05:36), Max: 36.7 (28 Dec 2019 19:18)  T(F): 97.3 (29 Dec 2019 05:36), Max: 98.1 (28 Dec 2019 19:18)  HR: 72 (29 Dec 2019 05:36) (72 - 84)  BP: 110/69 (29 Dec 2019 05:36) (105/64 - 110/69)  BP(mean): 78 (28 Dec 2019 19:18) (78 - 78)  RR: 18 (29 Dec 2019 05:36) (18 - 18)  SpO2: 99% (29 Dec 2019 05:36) (96% - 99%)    Daily Weight in k.2 (29 Dec 2019 08:13)    I&O's Summary    28 Dec 2019 07:01  -  29 Dec 2019 07:00  --------------------------------------------------------  IN: 360 mL / OUT: 2450 mL / NET: -2090 mL    General: No distress. Comfortable.  HEENT: EOM intact.  Neck: Neck supple. JVP mildly elevated. No masses  Chest: Clear to auscultation bilaterally  CV: Normal S1 and S2. III/VI high pitched murmur at RUSB. No rubs or gallops. Radial pulses normal. Trace edema at ankles.   Abdomen: Soft, non-distended, non-tender  Skin: No rashes or skin breakdown. Ecchymoses on forearms.   Neurology: Alert and oriented times three. Sensation intact  Psych: Affect normal    Labs:                        7.5    12.74 )-----------( 135      ( 29 Dec 2019 08:40 )             22.8         131<L>  |  97  |  109<H>  ----------------------------<  211<H>  4.6   |  19<L>  |  2.37<H>    Ca    8.1<L>      29 Dec 2019 06:21  Mg     2.4         TPro  6.7  /  Alb  3.3  /  TBili  4.2<H>  /  DBili  x   /  AST  17  /  ALT  10  /  AlkPhos  61      PT/INR - ( 28 Dec 2019 06:41 )   PT: 12.4 sec;   INR: 1.08 ratio

## 2019-12-29 NOTE — PROGRESS NOTE ADULT - PROBLEM SELECTOR PLAN 4
- No evidence of iron deficiency anemia.   -Blood bank and Heme note appreciated. 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 Awaiting cold agglutinin titer. Currently on steroids.  Avoid further transfusions.

## 2019-12-30 ENCOUNTER — APPOINTMENT (OUTPATIENT)
Dept: CARDIOLOGY | Facility: CLINIC | Age: 84
End: 2019-12-30

## 2019-12-30 DIAGNOSIS — I10 ESSENTIAL (PRIMARY) HYPERTENSION: ICD-10-CM

## 2019-12-30 LAB
ANION GAP SERPL CALC-SCNC: 14 MMOL/L — SIGNIFICANT CHANGE UP (ref 5–17)
BASOPHILS # BLD AUTO: 0 K/UL — SIGNIFICANT CHANGE UP (ref 0–0.2)
BASOPHILS NFR BLD AUTO: 0 % — SIGNIFICANT CHANGE UP (ref 0–2)
BUN SERPL-MCNC: 104 MG/DL — HIGH (ref 7–23)
CALCIUM SERPL-MCNC: 8 MG/DL — LOW (ref 8.4–10.5)
CHLORIDE SERPL-SCNC: 99 MMOL/L — SIGNIFICANT CHANGE UP (ref 96–108)
CO2 SERPL-SCNC: 19 MMOL/L — LOW (ref 22–31)
CREAT SERPL-MCNC: 1.91 MG/DL — HIGH (ref 0.5–1.3)
EOSINOPHIL # BLD AUTO: 0.11 K/UL — SIGNIFICANT CHANGE UP (ref 0–0.5)
EOSINOPHIL NFR BLD AUTO: 0.9 % — SIGNIFICANT CHANGE UP (ref 0–6)
GLUCOSE BLDC GLUCOMTR-MCNC: 141 MG/DL — HIGH (ref 70–99)
GLUCOSE BLDC GLUCOMTR-MCNC: 180 MG/DL — HIGH (ref 70–99)
GLUCOSE BLDC GLUCOMTR-MCNC: 201 MG/DL — HIGH (ref 70–99)
GLUCOSE BLDC GLUCOMTR-MCNC: 207 MG/DL — HIGH (ref 70–99)
GLUCOSE SERPL-MCNC: 163 MG/DL — HIGH (ref 70–99)
HCT VFR BLD CALC: 24 % — LOW (ref 39–50)
HGB BLD-MCNC: 8.1 G/DL — LOW (ref 13–17)
INR BLD: 1 RATIO — SIGNIFICANT CHANGE UP (ref 0.88–1.16)
LYMPHOCYTES # BLD AUTO: 1.16 K/UL — SIGNIFICANT CHANGE UP (ref 1–3.3)
LYMPHOCYTES # BLD AUTO: 9.6 % — LOW (ref 13–44)
MCHC RBC-ENTMCNC: 32 PG — SIGNIFICANT CHANGE UP (ref 27–34)
MCHC RBC-ENTMCNC: 33.8 GM/DL — SIGNIFICANT CHANGE UP (ref 32–36)
MCV RBC AUTO: 94.9 FL — SIGNIFICANT CHANGE UP (ref 80–100)
MONOCYTES # BLD AUTO: 0.31 K/UL — SIGNIFICANT CHANGE UP (ref 0–0.9)
MONOCYTES NFR BLD AUTO: 2.6 % — SIGNIFICANT CHANGE UP (ref 2–14)
NEUTROPHILS # BLD AUTO: 9.79 K/UL — HIGH (ref 1.8–7.4)
NEUTROPHILS NFR BLD AUTO: 80.9 % — HIGH (ref 43–77)
PLATELET # BLD AUTO: 145 K/UL — LOW (ref 150–400)
POTASSIUM SERPL-MCNC: 4.5 MMOL/L — SIGNIFICANT CHANGE UP (ref 3.5–5.3)
POTASSIUM SERPL-SCNC: 4.5 MMOL/L — SIGNIFICANT CHANGE UP (ref 3.5–5.3)
PROTHROM AB SERPL-ACNC: 11.4 SEC — SIGNIFICANT CHANGE UP (ref 10–13.1)
RBC # BLD: 2.53 M/UL — LOW (ref 4.2–5.8)
RBC # FLD: 18.4 % — HIGH (ref 10.3–14.5)
SODIUM SERPL-SCNC: 132 MMOL/L — LOW (ref 135–145)
WBC # BLD: 12.1 K/UL — HIGH (ref 3.8–10.5)
WBC # FLD AUTO: 12.1 K/UL — HIGH (ref 3.8–10.5)

## 2019-12-30 PROCEDURE — 99233 SBSQ HOSP IP/OBS HIGH 50: CPT

## 2019-12-30 PROCEDURE — 93010 ELECTROCARDIOGRAM REPORT: CPT

## 2019-12-30 RX ORDER — INSULIN GLARGINE 100 [IU]/ML
10 INJECTION, SOLUTION SUBCUTANEOUS AT BEDTIME
Refills: 0 | Status: DISCONTINUED | OUTPATIENT
Start: 2019-12-30 | End: 2020-01-03

## 2019-12-30 RX ORDER — WARFARIN SODIUM 2.5 MG/1
7.5 TABLET ORAL ONCE
Refills: 0 | Status: COMPLETED | OUTPATIENT
Start: 2019-12-30 | End: 2019-12-30

## 2019-12-30 RX ORDER — INSULIN LISPRO 100/ML
VIAL (ML) SUBCUTANEOUS
Refills: 0 | Status: DISCONTINUED | OUTPATIENT
Start: 2019-12-30 | End: 2020-01-03

## 2019-12-30 RX ORDER — INSULIN LISPRO 100/ML
VIAL (ML) SUBCUTANEOUS AT BEDTIME
Refills: 0 | Status: DISCONTINUED | OUTPATIENT
Start: 2019-12-30 | End: 2020-01-03

## 2019-12-30 RX ORDER — INSULIN LISPRO 100/ML
8 VIAL (ML) SUBCUTANEOUS
Refills: 0 | Status: DISCONTINUED | OUTPATIENT
Start: 2019-12-30 | End: 2019-12-30

## 2019-12-30 RX ORDER — INSULIN LISPRO 100/ML
7 VIAL (ML) SUBCUTANEOUS
Refills: 0 | Status: DISCONTINUED | OUTPATIENT
Start: 2019-12-30 | End: 2020-01-03

## 2019-12-30 RX ORDER — INSULIN GLARGINE 100 [IU]/ML
12 INJECTION, SOLUTION SUBCUTANEOUS AT BEDTIME
Refills: 0 | Status: DISCONTINUED | OUTPATIENT
Start: 2019-12-30 | End: 2019-12-30

## 2019-12-30 RX ORDER — SIMETHICONE 80 MG/1
80 TABLET, CHEWABLE ORAL ONCE
Refills: 0 | Status: COMPLETED | OUTPATIENT
Start: 2019-12-30 | End: 2019-12-30

## 2019-12-30 RX ADMIN — PANTOPRAZOLE SODIUM 40 MILLIGRAM(S): 20 TABLET, DELAYED RELEASE ORAL at 05:02

## 2019-12-30 RX ADMIN — INSULIN GLARGINE 10 UNIT(S): 100 INJECTION, SOLUTION SUBCUTANEOUS at 21:51

## 2019-12-30 RX ADMIN — Medication 1 MILLIGRAM(S): at 08:12

## 2019-12-30 RX ADMIN — ATORVASTATIN CALCIUM 40 MILLIGRAM(S): 80 TABLET, FILM COATED ORAL at 21:28

## 2019-12-30 RX ADMIN — Medication 40 MILLIGRAM(S): at 05:02

## 2019-12-30 RX ADMIN — SODIUM CHLORIDE 3 MILLILITER(S): 9 INJECTION INTRAMUSCULAR; INTRAVENOUS; SUBCUTANEOUS at 20:55

## 2019-12-30 RX ADMIN — Medication 2: at 08:11

## 2019-12-30 RX ADMIN — SODIUM CHLORIDE 3 MILLILITER(S): 9 INJECTION INTRAMUSCULAR; INTRAVENOUS; SUBCUTANEOUS at 12:28

## 2019-12-30 RX ADMIN — Medication 81 MILLIGRAM(S): at 08:12

## 2019-12-30 RX ADMIN — Medication 1 SPRAY(S): at 17:58

## 2019-12-30 RX ADMIN — SODIUM CHLORIDE 3 MILLILITER(S): 9 INJECTION INTRAMUSCULAR; INTRAVENOUS; SUBCUTANEOUS at 05:03

## 2019-12-30 RX ADMIN — Medication 60 MILLIGRAM(S): at 05:03

## 2019-12-30 RX ADMIN — SIMETHICONE 80 MILLIGRAM(S): 80 TABLET, CHEWABLE ORAL at 15:32

## 2019-12-30 RX ADMIN — Medication 1 SPRAY(S): at 05:03

## 2019-12-30 RX ADMIN — Medication 2: at 17:56

## 2019-12-30 RX ADMIN — Medication 4: at 12:26

## 2019-12-30 RX ADMIN — Medication 5 UNIT(S): at 08:11

## 2019-12-30 RX ADMIN — WARFARIN SODIUM 7.5 MILLIGRAM(S): 2.5 TABLET ORAL at 21:28

## 2019-12-30 RX ADMIN — Medication 325 MILLIGRAM(S): at 08:12

## 2019-12-30 RX ADMIN — Medication 7 UNIT(S): at 17:57

## 2019-12-30 RX ADMIN — Medication 40 MILLIGRAM(S): at 17:57

## 2019-12-30 RX ADMIN — Medication 5 MILLIGRAM(S): at 21:28

## 2019-12-30 RX ADMIN — TIOTROPIUM BROMIDE 1 CAPSULE(S): 18 CAPSULE ORAL; RESPIRATORY (INHALATION) at 08:12

## 2019-12-30 NOTE — PROGRESS NOTE ADULT - PROBLEM SELECTOR PLAN 4
- No evidence of iron deficiency anemia. Possibly due to hemolysis in the setting of severe AS.  - monitor H/H , transfuse as indicated  -Heme following--cont on steroids and folic acid

## 2019-12-30 NOTE — CONSULT NOTE ADULT - PROBLEM SELECTOR RECOMMENDATION 9
Will increase Lantus to 10u at bedtime.  Will increase Humalog to 7u before each meal and adjust coverage to be low-scale.  Will continue monitoring FS and FU.  Patient counseled for compliance with consistent low carb diet and exercise as tolerated outpatient. Will increase Lantus to 10u at bedtime.  Will increase Humalog to 7u before each meal and adjust coverage to be low-scale.  Will continue monitoring FS and FU.

## 2019-12-30 NOTE — PROGRESS NOTE ADULT - PROBLEM SELECTOR PLAN 3
- S/P BAV with decrease in gradients. Continue to monitor.  -Pt to undergo TAVR weeks after discharge from rehab

## 2019-12-30 NOTE — PROGRESS NOTE ADULT - SUBJECTIVE AND OBJECTIVE BOX
Subjective: Pt states "I feel good." Denies any CP, SOB, N/V and palpitations. No acute events overnight.     VITAL SIGNS  Telemetry:  A-fib @ 68 bpm   Vital Signs Last 24 Hrs  T(C): 36.4 (19 @ 04:39), Max: 36.4 (19 @ 19:49)  T(F): 97.5 (19 @ 04:39), Max: 97.5 (19 @ 19:49)  HR: 72 (19 @ 04:39) (72 - 80)  BP: 110/50 (19 @ 04:39) (96/62 - 110/50)  RR: 18 (19 @ 04:39) (18 - 18)  SpO2: 100% (19 @ 04:39) (100% - 100%)          @ 07:01  -   @ 07:00  --------------------------------------------------------  IN: 1080 mL / OUT: 2500 mL / NET: -1420 mL   @ 07:01  -   @ 10:12  --------------------------------------------------------  IN: 240 mL / OUT: 50 mL / NET: 190 mL     Daily Weight in k.4 (30 Dec 2019 08:07)    CAPILLARY BLOOD GLUCOSE  POCT Blood Glucose.: 180 mg/dL (30 Dec 2019 08:03)  POCT Blood Glucose.: 170 mg/dL (29 Dec 2019 21:50)  POCT Blood Glucose.: 221 mg/dL (29 Dec 2019 16:46)  POCT Blood Glucose.: 286 mg/dL (29 Dec 2019 12:23)        MEDICATIONS  acetaminophen   Tablet .. 650 milliGRAM(s) Oral every 6 hours PRN  ALBUTerol    90 MICROgram(s) HFA Inhaler 2 Puff(s) Inhalation every 6 hours PRN  aspirin enteric coated 81 milliGRAM(s) Oral daily  atorvastatin 40 milliGRAM(s) Oral at bedtime  dextrose 40% Gel 15 Gram(s) Oral once PRN  dextrose 5%. 1000 milliLiter(s) IV Continuous <Continuous>  dextrose 50% Injectable 12.5 Gram(s) IV Push once  ferrous    sulfate 325 milliGRAM(s) Oral daily  fluticasone propionate 50 MICROgram(s)/spray Nasal Spray 1 Spray(s) Both Nostrils two times a day  folic acid 1 milliGRAM(s) Oral daily  furosemide   Injectable 40 milliGRAM(s) IV Push two times a day  glucagon  Injectable 1 milliGRAM(s) IntraMuscular once PRN  insulin glargine Injectable (LANTUS) 7 Unit(s) SubCutaneous at bedtime  insulin lispro (HumaLOG) corrective regimen sliding scale   SubCutaneous at bedtime  insulin lispro (HumaLOG) corrective regimen sliding scale   SubCutaneous three times a day before meals  insulin lispro Injectable (HumaLOG) 5 Unit(s) SubCutaneous three times a day before meals  melatonin 5 milliGRAM(s) Oral at bedtime  pantoprazole    Tablet 40 milliGRAM(s) Oral before breakfast  polyethylene glycol 3350 17 Gram(s) Oral daily  predniSONE   Tablet 60 milliGRAM(s) Oral daily  senna 2 Tablet(s) Oral at bedtime  sodium chloride 0.9% lock flush 3 milliLiter(s) IV Push every 8 hours  sodium chloride 0.9%. 1000 milliLiter(s) IV Continuous <Continuous>  tiotropium 18 MICROgram(s) Capsule 1 Capsule(s) Inhalation daily  warfarin 7.5 milliGRAM(s) Oral once    PHYSICAL EXAM  Neurology: A&Ox3, nonfocal, no gross deficits  CV : irregularly irregular rhythm, regular rate, +S1S2, (+) systolic murmur to R & LUSB  Lungs: Respirations non-labored, CTA B/L BS  Abdomen: Soft, NT/ND, +BSx4Q, last BM on , (-)N/V/D  : Condom catheter in place with clear urine  Extremities: 1+ RLE edema, trace LLE edema, negative calf tenderness, +PP   Groin sites: B/L groin puncture sites without hematoma, CDI, LUCI    LABS      132<L>  |  99  |  104<H>  ----------------------------<  163<H>  4.5   |  19<L>  |  1.91<H>    Ca    8.0<L>      30 Dec 2019 05:33                      8.1    12.10 )-----------( 145      ( 30 Dec 2019 05:33 )             24.0          PT/INR - ( 30 Dec 2019 08:37 )   PT: 11.4 sec;   INR: 1.00 ratio      PAST MEDICAL & SURGICAL HISTORY:  Anemia  LBBB (left bundle branch block)  DYER (dyspnea on exertion)  Coronary disease: PCI  CHF (congestive heart failure)  Former smoker  Meniere disease  HLD (hyperlipidemia)  DM (diabetes mellitus)  Atrial fibrillation  HTN (hypertension)  Lipoma  History of skin graft  Status post appendectomy     Physical Therapy Rec:   Home  [  ]   Home w/ PT  [  ]  Rehab  [ X ]    Discussed with Dr. Becker.

## 2019-12-30 NOTE — PROGRESS NOTE ADULT - SUBJECTIVE AND OBJECTIVE BOX
Interval History:  feels well  denies CP/SOB  renal function improving     Medications:  acetaminophen   Tablet .. 650 milliGRAM(s) Oral every 6 hours PRN  ALBUTerol    90 MICROgram(s) HFA Inhaler 2 Puff(s) Inhalation every 6 hours PRN  aspirin enteric coated 81 milliGRAM(s) Oral daily  atorvastatin 40 milliGRAM(s) Oral at bedtime  dextrose 40% Gel 15 Gram(s) Oral once PRN  dextrose 5%. 1000 milliLiter(s) IV Continuous <Continuous>  dextrose 50% Injectable 12.5 Gram(s) IV Push once  ferrous    sulfate 325 milliGRAM(s) Oral daily  fluticasone propionate 50 MICROgram(s)/spray Nasal Spray 1 Spray(s) Both Nostrils two times a day  folic acid 1 milliGRAM(s) Oral daily  furosemide   Injectable 40 milliGRAM(s) IV Push two times a day  glucagon  Injectable 1 milliGRAM(s) IntraMuscular once PRN  insulin glargine Injectable (LANTUS) 10 Unit(s) SubCutaneous at bedtime  insulin lispro (HumaLOG) corrective regimen sliding scale   SubCutaneous three times a day before meals  insulin lispro (HumaLOG) corrective regimen sliding scale   SubCutaneous at bedtime  insulin lispro Injectable (HumaLOG) 7 Unit(s) SubCutaneous three times a day before meals  melatonin 5 milliGRAM(s) Oral at bedtime  pantoprazole    Tablet 40 milliGRAM(s) Oral before breakfast  polyethylene glycol 3350 17 Gram(s) Oral daily  predniSONE   Tablet 60 milliGRAM(s) Oral daily  senna 2 Tablet(s) Oral at bedtime  sodium chloride 0.9% lock flush 3 milliLiter(s) IV Push every 8 hours  sodium chloride 0.9%. 1000 milliLiter(s) IV Continuous <Continuous>  tiotropium 18 MICROgram(s) Capsule 1 Capsule(s) Inhalation daily      Vitals:  T(C): 36.8 (19 @ 19:08), Max: 36.8 (19 @ 19:08)  HR: 86 (19 @ 19:08) (72 - 97)  BP: 113/66 (19 @ 19:08) (110/50 - 117/59)  BP(mean): 82 (19 @ 19:08) (82 - 82)  RR: 18 (12-30-19 @ 19:08) (18 - 18)  SpO2: 99% (19 @ 19:08) (99% - 100%)    Daily     Daily Weight in k.4 (30 Dec 2019 08:07)        I&O's Summary    29 Dec 2019 07:01  -  30 Dec 2019 07:00  --------------------------------------------------------  IN: 1080 mL / OUT: 2500 mL / NET: -1420 mL    30 Dec 2019 07:01  -  30 Dec 2019 22:04  --------------------------------------------------------  IN: 840 mL / OUT: 450 mL / NET: 390 mL        Physical Exam:  Appearance: No Acute Distress  HEENT: PERRL  Neck: JVD approx 12-14 cm  Cardiovascular: grade III/VI systolic mumur  Respiratory: Clear to auscultation bilaterally  Gastrointestinal: Soft, Non-tender	  Skin: No cyanosis	  Neurologic: Non-focal  Extremities: trace LE edema  Psychiatry: A & O x 3, Mood & affect appropriate    Labs:                        8.1    12.10 )-----------( 145      ( 30 Dec 2019 05:33 )             24.0     12    132<L>  |  99  |  104<H>  ----------------------------<  163<H>  4.5   |  19<L>  |  1.91<H>    Ca    8.0<L>      30 Dec 2019 05:33      PT/INR - ( 30 Dec 2019 08:37 )   PT: 11.4 sec;   INR: 1.00 ratio

## 2019-12-30 NOTE — PROGRESS NOTE ADULT - ASSESSMENT
83 yo M with hx Afib (Coumadin), DM2, HLD, CAD (stent 2014 and 2015 in mLAD and RCA), HFrEF (40%) severe AS, LBBB, baseline SCr 1.4, was referred by cardiologist for symptomatic CHF exacerbation. Echo from 12/10 shows severe aortic stenosis and EF 30%. Patient s/p RHC/LHC prior to scheduled TAVR. Patient was found to have worsening LE swelling and anemia with Hb of 7.9 and was admitted to medicine for further management. Pt was being optimized pre TAVR with diuretics per Heart failure service.   Pt underwent balloon aortic valvuloplasty on 12/26.    12/26 AV balloon valvuloplasty. s/p 1 PRBC  12/27 s/p PRBC x1. H/H stable 8/24 Transferred to [facility]. Continue w/ intermittent diureses.    12/28: creatinine improving.   12/29: Creatinine continues to improve. Pt recommended for rehab. Also lives alone. Discharge when medically stable. Diuretics still being adjusted. Insulin added to regimen as blood sugars are high likely 2/2 addition of prednisone.   12/30: VSS. Continued improvement of BUN and creatinine. Cont on Lasix 40mg IV BID. Lantus and humalog added for elevated sugars, Dr. Cobb to evaluate pt today for insulin regimen. INR of 1.0, will dose 7.5 of Coumadin today in accordance with his home Coumadin schedule (7.5mg on M/W/F, 5mg on T/Th/Sat/Sun). Cont patient on prednisone 60 and folic acid as per heme, improvement of H&H today at 8.1 & 24.   Discharge planning-rehab once medically stable.

## 2019-12-30 NOTE — PROGRESS NOTE ADULT - ASSESSMENT
Mr. Latham is an 84 year old man with ACC/AHA Stage C ischemic cardiomyopathy (LVDD 4.5 cm, EF 30%), severe AS (58/30/0.6), chronic AF on coumadin, and CAD s/p PCI to RCA and LAD with prior tamponade during coronary intervention who is admitted with decompensated heart failure after R/LHC with plans for optimization prior to consideration of TAVR. He underwent CT coronaries on 12/17 as a part of TAVR evaluation and suffered from MAI. His renal function is improving since. He underwent balloon aortic valvuloplasty on 12/26 with good result and decrease in transaortic gradients. His right sided filling pressures are improved, but remain elevated.

## 2019-12-30 NOTE — CONSULT NOTE ADULT - SUBJECTIVE AND OBJECTIVE BOX
HPI:  83 yo M with hx Afib (Coumadin), DM, HLD, CAD (stent 2014 and 2015 in mLAD and RCA), HFrEF (40%) was referred by cardiologist for CHF exacerbation. Patient states that since Thanksgiving he has had worsening SOB. Patient ambulates with walker with assistance of Mary Kay MARK present at bedside, and recently has become more dyspneic with mild activities around the house. Additionally, his legs became swollen and he reports 6-7lb weight gain in last month. Patient uses 2 pillows to sleep at night but denies any difficulty breathing at night. Patient was seeing cardiology for TAVR evaluation for severe aortic stenosis. This past Tuesday, 12/10 patient went to cardiologist, Dr. Becker who increased torsemide from 20 mg to 40mg daily and recommended patient to come to hospital for further optimization of HF but patient declined. Echo from 12/10 shows sever aortic stenosis and EF 30%. Patient came today for a scheduled RHC/LHC prior to TAVR. Patient was found to have LE swelling and anemia with Hb of 7.9 and was admitted to medicine for further management.   Patient denies chest pain but complains of intermittent palpitations. Denies PND, orthopnea, dizziness, syncope. Patient complains of increased yellow phlegm production, but no cough, fever, sick contacts or travel history. Denies any history of COPD, never on home O2.  Echo 12/5 showed LVEF 28%. Repeat echo 12/10 demonstrated LVEF 30%    Admission labs: Hb 7.9 INR 1.49 (13 Dec 2019 12:28)    Patient has history of diabetes, A1C 5.6%, his Metformin was d/c in the past, patient manages his DM with diet and exercise, no recent hypoglycemic episodes, no polyuria polydipsia. Patient follows up with PCP for diabetes management.  Endo was consulted for glycemic control.    PAST MEDICAL & SURGICAL HISTORY:  Anemia  LBBB (left bundle branch block)  DYER (dyspnea on exertion)  Coronary disease: PCI  CHF (congestive heart failure)  Former smoker  Meniere disease  HLD (hyperlipidemia)  DM (diabetes mellitus)  Atrial fibrillation  HTN (hypertension)  Lipoma  History of skin graft  Status post appendectomy      FAMILY HISTORY:  Family history of brain tumor (Sibling)  Family history of heart attack (Child)  Family history of CHF (congestive heart failure)  Family history of lung cancer      Social History:    Outpatient Medications:    MEDICATIONS  (STANDING):  aspirin enteric coated 81 milliGRAM(s) Oral daily  atorvastatin 40 milliGRAM(s) Oral at bedtime  dextrose 5%. 1000 milliLiter(s) (50 mL/Hr) IV Continuous <Continuous>  dextrose 50% Injectable 12.5 Gram(s) IV Push once  ferrous    sulfate 325 milliGRAM(s) Oral daily  fluticasone propionate 50 MICROgram(s)/spray Nasal Spray 1 Spray(s) Both Nostrils two times a day  folic acid 1 milliGRAM(s) Oral daily  furosemide   Injectable 40 milliGRAM(s) IV Push two times a day  insulin glargine Injectable (LANTUS) 12 Unit(s) SubCutaneous at bedtime  insulin lispro (HumaLOG) corrective regimen sliding scale   SubCutaneous at bedtime  insulin lispro (HumaLOG) corrective regimen sliding scale   SubCutaneous three times a day before meals  insulin lispro Injectable (HumaLOG) 8 Unit(s) SubCutaneous three times a day before meals  melatonin 5 milliGRAM(s) Oral at bedtime  pantoprazole    Tablet 40 milliGRAM(s) Oral before breakfast  polyethylene glycol 3350 17 Gram(s) Oral daily  predniSONE   Tablet 60 milliGRAM(s) Oral daily  senna 2 Tablet(s) Oral at bedtime  sodium chloride 0.9% lock flush 3 milliLiter(s) IV Push every 8 hours  sodium chloride 0.9%. 1000 milliLiter(s) (10 mL/Hr) IV Continuous <Continuous>  tiotropium 18 MICROgram(s) Capsule 1 Capsule(s) Inhalation daily  warfarin 7.5 milliGRAM(s) Oral once    MEDICATIONS  (PRN):  acetaminophen   Tablet .. 650 milliGRAM(s) Oral every 6 hours PRN Temp greater or equal to 38.5C (101.3F), Mild Pain (1 - 3)  ALBUTerol    90 MICROgram(s) HFA Inhaler 2 Puff(s) Inhalation every 6 hours PRN Shortness of Breath and/or Wheezing  dextrose 40% Gel 15 Gram(s) Oral once PRN Blood Glucose LESS THAN 70 milliGRAM(s)/deciLiter  glucagon  Injectable 1 milliGRAM(s) IntraMuscular once PRN Glucose <70 milliGRAM(s)/deciLiter      Allergies    No Known Allergies    Intolerances      Review of Systems:  Constitutional: No fever, no chills  Eyes: No blurry vision  Neuro: No tremors  HEENT: No pain, no neck swelling  Cardiovascular: No chest pain, no palpitations  Respiratory: Has SOB, no cough  GI: No nausea, vomiting, abdominal pain  : No dysuria  Skin: no rash  MSK: Has leg swelling.  Psych: no depression  Endocrine: no polyuria, polydipsia    ALL OTHER SYSTEMS REVIEWED AND NEGATIVE    UNABLE TO OBTAIN    PHYSICAL EXAM:  VITALS: T(C): 36.4 (12-30-19 @ 04:39)  T(F): 97.5 (12-30-19 @ 04:39), Max: 97.5 (12-29-19 @ 19:49)  HR: 92 (12-30-19 @ 10:42) (72 - 92)  BP: 114/43 (12-30-19 @ 10:42) (96/62 - 114/43)  RR:  (18 - 18)  SpO2:  (100% - 100%)  Wt(kg): --  GENERAL: NAD, well-groomed, well-developed  EYES: No proptosis, no lid lag  HEENT:  Atraumatic, Normocephalic  THYROID: Normal size, no palpable nodules  RESPIRATORY: Clear to auscultation bilaterally; No rales, rhonchi, wheezing  CARDIOVASCULAR: Si S2, No murmurs;  GI: Soft, non distended, normal bowel sounds  SKIN: Dry, intact, No rashes or lesions  MUSCULOSKELETAL: Has BL lower extremity edema.  NEURO:  no tremor, sensation decreased in feet BL,    POCT Blood Glucose.: 207 mg/dL (12-30-19 @ 12:10)  POCT Blood Glucose.: 180 mg/dL (12-30-19 @ 08:03)  POCT Blood Glucose.: 170 mg/dL (12-29-19 @ 21:50)  POCT Blood Glucose.: 221 mg/dL (12-29-19 @ 16:46)  POCT Blood Glucose.: 286 mg/dL (12-29-19 @ 12:23)  POCT Blood Glucose.: 211 mg/dL (12-29-19 @ 08:31)  POCT Blood Glucose.: 265 mg/dL (12-28-19 @ 21:28)  POCT Blood Glucose.: 286 mg/dL (12-28-19 @ 18:03)  POCT Blood Glucose.: 296 mg/dL (12-28-19 @ 11:46)  POCT Blood Glucose.: 188 mg/dL (12-28-19 @ 07:55)  POCT Blood Glucose.: 162 mg/dL (12-27-19 @ 21:54)  POCT Blood Glucose.: 243 mg/dL (12-27-19 @ 17:08)                            8.1    12.10 )-----------( 145      ( 30 Dec 2019 05:33 )             24.0       12-30    132<L>  |  99  |  104<H>  ----------------------------<  163<H>  4.5   |  19<L>  |  1.91<H>    EGFR if : 36<L>  EGFR if non : 31<L>    Ca    8.0<L>      12-30  Mg     2.4     12-28    TPro  6.7  /  Alb  3.3  /  TBili  4.2<H>  /  DBili  x   /  AST  17  /  ALT  10  /  AlkPhos  61  12-28      Thyroid Function Tests:      Hemoglobin A1C, Whole Blood: 5.6 % [4.0 - 5.6] (12-14-19 @ 10:55)          Radiology:

## 2019-12-30 NOTE — PROGRESS NOTE ADULT - PROBLEM SELECTOR PLAN 1
- on intermittent diureses, monitor SCr  -Lasix iv 40 bid  -monitor volume status  -Daily standing weight and strict I's and O's  -Discharge planning-rehab once medically stable

## 2019-12-30 NOTE — CONSULT NOTE ADULT - ASSESSMENT
Assessment  DMT2: 84y Male with DM T2 with hyperglycemia, A1C 5.6%, was on Metformin in the past, patient now manages his DM with diet/exercise, postop on low-dose insulin, blood sugars running high and not at target, no hypoglycemic episode. Patient is on prednisone, eating meals, alert and comfortable.  Aortic Stenosis: s/p TAVR, on medications, no chest pain, stable, monitored.  HTN: Controlled,  on antihypertensive medications.  HLD: Controlled, on statin.  CKD: Monitor labs/BMP.          Apolinar Cobb MD  Cell: [phone]  Office: [phone] Assessment  DMT2: 84y Male with DM T2 with hyperglycemia, A1C 5.6%, was on Metformin in the past, patient now manages his DM with diet/exercise, postop on low-dose insulin, blood sugars running high and not at target, no hypoglycemic episode.  Patient is on prednisone, eating meals, alert and comfortable.  Aortic Stenosis: s/p TAVR, on medications, no chest pain, stable, monitored.  HTN: Controlled,  on antihypertensive medications.  HLD: Controlled, on statin.  CKD: Monitor labs/BMP.          Apolinar Cobb MD  Cell: [phone]  Office: [phone]

## 2019-12-31 LAB
ANION GAP SERPL CALC-SCNC: 17 MMOL/L — SIGNIFICANT CHANGE UP (ref 5–17)
BUN SERPL-MCNC: 110 MG/DL — HIGH (ref 7–23)
CALCIUM SERPL-MCNC: 8.6 MG/DL — SIGNIFICANT CHANGE UP (ref 8.4–10.5)
CHLORIDE SERPL-SCNC: 95 MMOL/L — LOW (ref 96–108)
CO2 SERPL-SCNC: 21 MMOL/L — LOW (ref 22–31)
CREAT SERPL-MCNC: 2.06 MG/DL — HIGH (ref 0.5–1.3)
GLUCOSE BLDC GLUCOMTR-MCNC: 128 MG/DL — HIGH (ref 70–99)
GLUCOSE BLDC GLUCOMTR-MCNC: 130 MG/DL — HIGH (ref 70–99)
GLUCOSE BLDC GLUCOMTR-MCNC: 150 MG/DL — HIGH (ref 70–99)
GLUCOSE BLDC GLUCOMTR-MCNC: 153 MG/DL — HIGH (ref 70–99)
GLUCOSE SERPL-MCNC: 130 MG/DL — HIGH (ref 70–99)
HCT VFR BLD CALC: 22.2 % — LOW (ref 39–50)
HGB BLD-MCNC: 7.6 G/DL — LOW (ref 13–17)
INR BLD: 1.1 RATIO — SIGNIFICANT CHANGE UP (ref 0.88–1.16)
MCHC RBC-ENTMCNC: 32.1 PG — SIGNIFICANT CHANGE UP (ref 27–34)
MCHC RBC-ENTMCNC: 34.2 GM/DL — SIGNIFICANT CHANGE UP (ref 32–36)
MCV RBC AUTO: 93.7 FL — SIGNIFICANT CHANGE UP (ref 80–100)
NRBC # BLD: 0 /100 WBCS — SIGNIFICANT CHANGE UP (ref 0–0)
PLATELET # BLD AUTO: 142 K/UL — LOW (ref 150–400)
POTASSIUM SERPL-MCNC: 4.5 MMOL/L — SIGNIFICANT CHANGE UP (ref 3.5–5.3)
POTASSIUM SERPL-SCNC: 4.5 MMOL/L — SIGNIFICANT CHANGE UP (ref 3.5–5.3)
PROTHROM AB SERPL-ACNC: 12.7 SEC — SIGNIFICANT CHANGE UP (ref 10–12.9)
RBC # BLD: 2.37 M/UL — LOW (ref 4.2–5.8)
RBC # FLD: 18.5 % — HIGH (ref 10.3–14.5)
SODIUM SERPL-SCNC: 133 MMOL/L — LOW (ref 135–145)
WBC # BLD: 14.88 K/UL — HIGH (ref 3.8–10.5)
WBC # FLD AUTO: 14.88 K/UL — HIGH (ref 3.8–10.5)

## 2019-12-31 PROCEDURE — 99233 SBSQ HOSP IP/OBS HIGH 50: CPT | Mod: GC

## 2019-12-31 PROCEDURE — 99232 SBSQ HOSP IP/OBS MODERATE 35: CPT

## 2019-12-31 PROCEDURE — 99024 POSTOP FOLLOW-UP VISIT: CPT

## 2019-12-31 PROCEDURE — 99232 SBSQ HOSP IP/OBS MODERATE 35: CPT | Mod: GC

## 2019-12-31 PROCEDURE — 93010 ELECTROCARDIOGRAM REPORT: CPT

## 2019-12-31 RX ORDER — FUROSEMIDE 40 MG
40 TABLET ORAL ONCE
Refills: 0 | Status: COMPLETED | OUTPATIENT
Start: 2019-12-31 | End: 2019-12-31

## 2019-12-31 RX ORDER — FUROSEMIDE 40 MG
40 TABLET ORAL
Refills: 0 | Status: DISCONTINUED | OUTPATIENT
Start: 2020-01-01 | End: 2020-01-01

## 2019-12-31 RX ORDER — WARFARIN SODIUM 2.5 MG/1
7.5 TABLET ORAL ONCE
Refills: 0 | Status: COMPLETED | OUTPATIENT
Start: 2019-12-31 | End: 2019-12-31

## 2019-12-31 RX ADMIN — SODIUM CHLORIDE 3 MILLILITER(S): 9 INJECTION INTRAMUSCULAR; INTRAVENOUS; SUBCUTANEOUS at 22:27

## 2019-12-31 RX ADMIN — INSULIN GLARGINE 10 UNIT(S): 100 INJECTION, SOLUTION SUBCUTANEOUS at 22:26

## 2019-12-31 RX ADMIN — Medication 60 MILLIGRAM(S): at 05:10

## 2019-12-31 RX ADMIN — Medication 5 MILLIGRAM(S): at 22:26

## 2019-12-31 RX ADMIN — SODIUM CHLORIDE 3 MILLILITER(S): 9 INJECTION INTRAMUSCULAR; INTRAVENOUS; SUBCUTANEOUS at 13:17

## 2019-12-31 RX ADMIN — PANTOPRAZOLE SODIUM 40 MILLIGRAM(S): 20 TABLET, DELAYED RELEASE ORAL at 05:10

## 2019-12-31 RX ADMIN — Medication 81 MILLIGRAM(S): at 12:09

## 2019-12-31 RX ADMIN — Medication 7 UNIT(S): at 12:10

## 2019-12-31 RX ADMIN — Medication 7 UNIT(S): at 17:11

## 2019-12-31 RX ADMIN — Medication 1 SPRAY(S): at 05:10

## 2019-12-31 RX ADMIN — Medication 325 MILLIGRAM(S): at 12:09

## 2019-12-31 RX ADMIN — Medication 1 MILLIGRAM(S): at 12:09

## 2019-12-31 RX ADMIN — Medication 7 UNIT(S): at 08:34

## 2019-12-31 RX ADMIN — TIOTROPIUM BROMIDE 1 CAPSULE(S): 18 CAPSULE ORAL; RESPIRATORY (INHALATION) at 12:09

## 2019-12-31 RX ADMIN — ATORVASTATIN CALCIUM 40 MILLIGRAM(S): 80 TABLET, FILM COATED ORAL at 22:26

## 2019-12-31 RX ADMIN — Medication 40 MILLIGRAM(S): at 17:11

## 2019-12-31 RX ADMIN — WARFARIN SODIUM 7.5 MILLIGRAM(S): 2.5 TABLET ORAL at 22:26

## 2019-12-31 RX ADMIN — Medication 40 MILLIGRAM(S): at 05:10

## 2019-12-31 RX ADMIN — Medication 1 SPRAY(S): at 17:11

## 2019-12-31 RX ADMIN — SODIUM CHLORIDE 3 MILLILITER(S): 9 INJECTION INTRAMUSCULAR; INTRAVENOUS; SUBCUTANEOUS at 05:10

## 2019-12-31 NOTE — PROGRESS NOTE ADULT - ATTENDING COMMENTS
JUHI/ATN, CKD III, hyponatremia  Creatinine now low-2, from peak 4.1 (baseline 1.4)  He has no edema at all, his weight is alyce  S/p BAV    Given lack of edema, low weight, and elevated BUN, would consider holding diuretics for a day and then gingerly re-introducing them as low-dose orals; avoid over-diuresis  Dose medications for GFR 30  Avoid ACEi/ARB for now  Otherwise stable for d/c with outpatient medical follow up  Remainder per fellow

## 2019-12-31 NOTE — PROGRESS NOTE ADULT - PROBLEM SELECTOR PLAN 1
Will continue current insulin regimen for now. Will continue monitoring FS, log, and FU.  Patient with A1C 5.6%, blood sugars trending at target on low-dose insulin management. No need to d/c on DM meds; Will FU outpatient endo in 3 months.  Patient counseled for compliance with consistent low carb diet and exercise as tolerated outpatient. Will continue current insulin regimen for now. Will continue monitoring FS, log, and FU.

## 2019-12-31 NOTE — PROGRESS NOTE ADULT - SUBJECTIVE AND OBJECTIVE BOX
Chief complaint  Patient is a 84y old  Male who presents with a chief complaint of CHF exacerbation (31 Dec 2019 10:21)   Review of systems  Patient sitting up in chair, looks comfortable, no fever, no hypoglycemia.    Labs and Fingersticks  CAPILLARY BLOOD GLUCOSE      POCT Blood Glucose.: 128 mg/dL (31 Dec 2019 11:42)  POCT Blood Glucose.: 150 mg/dL (31 Dec 2019 08:09)  POCT Blood Glucose.: 141 mg/dL (30 Dec 2019 21:50)  POCT Blood Glucose.: 201 mg/dL (30 Dec 2019 17:02)      Anion Gap, Serum: 17 (12-31 @ 06:54)  Anion Gap, Serum: 14 (12-30 @ 05:33)      Calcium, Total Serum: 8.6 (12-31 @ 06:54)  Calcium, Total Serum: 8.0 <L> (12-30 @ 05:33)          12-31    133<L>  |  95<L>  |  110<H>  ----------------------------<  130<H>  4.5   |  21<L>  |  2.06<H>    Ca    8.6      31 Dec 2019 06:54                          7.6    14.88 )-----------( 142      ( 31 Dec 2019 06:54 )             22.2     Medications  MEDICATIONS  (STANDING):  aspirin enteric coated 81 milliGRAM(s) Oral daily  atorvastatin 40 milliGRAM(s) Oral at bedtime  dextrose 5%. 1000 milliLiter(s) (50 mL/Hr) IV Continuous <Continuous>  dextrose 50% Injectable 12.5 Gram(s) IV Push once  ferrous    sulfate 325 milliGRAM(s) Oral daily  fluticasone propionate 50 MICROgram(s)/spray Nasal Spray 1 Spray(s) Both Nostrils two times a day  folic acid 1 milliGRAM(s) Oral daily  furosemide   Injectable 40 milliGRAM(s) IV Push two times a day  insulin glargine Injectable (LANTUS) 10 Unit(s) SubCutaneous at bedtime  insulin lispro (HumaLOG) corrective regimen sliding scale   SubCutaneous three times a day before meals  insulin lispro (HumaLOG) corrective regimen sliding scale   SubCutaneous at bedtime  insulin lispro Injectable (HumaLOG) 7 Unit(s) SubCutaneous three times a day before meals  melatonin 5 milliGRAM(s) Oral at bedtime  pantoprazole    Tablet 40 milliGRAM(s) Oral before breakfast  polyethylene glycol 3350 17 Gram(s) Oral daily  predniSONE   Tablet 60 milliGRAM(s) Oral daily  senna 2 Tablet(s) Oral at bedtime  sodium chloride 0.9% lock flush 3 milliLiter(s) IV Push every 8 hours  sodium chloride 0.9%. 1000 milliLiter(s) (10 mL/Hr) IV Continuous <Continuous>  tiotropium 18 MICROgram(s) Capsule 1 Capsule(s) Inhalation daily  warfarin 7.5 milliGRAM(s) Oral once      Physical Exam  General: Patient comfortable in bed  Vital Signs Last 12 Hrs  T(F): 97.3 (12-31-19 @ 12:41), Max: 97.5 (12-31-19 @ 04:47)  HR: 88 (12-31-19 @ 12:41) (74 - 88)  BP: 103/60 (12-31-19 @ 12:41) (94/56 - 103/60)  BP(mean): 75 (12-31-19 @ 05:08) (75 - 75)  RR: 18 (12-31-19 @ 12:41) (18 - 18)  SpO2: 100% (12-31-19 @ 12:41) (100% - 100%)  Neck: No palpable thyroid nodules.  CVS: S1S2, No murmurs  Respiratory: No wheezing, no crepitations  GI: Abdomen soft, bowel sounds positive  Musculoskeletal:  edema lower extremities.   Skin: No skin rashes, no ecchymosis    Diagnostics Chief complaint  Patient is a 84y old  Male who presents with a chief complaint of CHF exacerbation (31 Dec 2019 10:21)   Review of systems  Patient sitting up in chair, looks comfortable, no fever,  no hypoglycemia.    Labs and Fingersticks  CAPILLARY BLOOD GLUCOSE      POCT Blood Glucose.: 128 mg/dL (31 Dec 2019 11:42)  POCT Blood Glucose.: 150 mg/dL (31 Dec 2019 08:09)  POCT Blood Glucose.: 141 mg/dL (30 Dec 2019 21:50)  POCT Blood Glucose.: 201 mg/dL (30 Dec 2019 17:02)      Anion Gap, Serum: 17 (12-31 @ 06:54)  Anion Gap, Serum: 14 (12-30 @ 05:33)      Calcium, Total Serum: 8.6 (12-31 @ 06:54)  Calcium, Total Serum: 8.0 <L> (12-30 @ 05:33)          12-31    133<L>  |  95<L>  |  110<H>  ----------------------------<  130<H>  4.5   |  21<L>  |  2.06<H>    Ca    8.6      31 Dec 2019 06:54                          7.6    14.88 )-----------( 142      ( 31 Dec 2019 06:54 )             22.2     Medications  MEDICATIONS  (STANDING):  aspirin enteric coated 81 milliGRAM(s) Oral daily  atorvastatin 40 milliGRAM(s) Oral at bedtime  dextrose 5%. 1000 milliLiter(s) (50 mL/Hr) IV Continuous <Continuous>  dextrose 50% Injectable 12.5 Gram(s) IV Push once  ferrous    sulfate 325 milliGRAM(s) Oral daily  fluticasone propionate 50 MICROgram(s)/spray Nasal Spray 1 Spray(s) Both Nostrils two times a day  folic acid 1 milliGRAM(s) Oral daily  furosemide   Injectable 40 milliGRAM(s) IV Push two times a day  insulin glargine Injectable (LANTUS) 10 Unit(s) SubCutaneous at bedtime  insulin lispro (HumaLOG) corrective regimen sliding scale   SubCutaneous three times a day before meals  insulin lispro (HumaLOG) corrective regimen sliding scale   SubCutaneous at bedtime  insulin lispro Injectable (HumaLOG) 7 Unit(s) SubCutaneous three times a day before meals  melatonin 5 milliGRAM(s) Oral at bedtime  pantoprazole    Tablet 40 milliGRAM(s) Oral before breakfast  polyethylene glycol 3350 17 Gram(s) Oral daily  predniSONE   Tablet 60 milliGRAM(s) Oral daily  senna 2 Tablet(s) Oral at bedtime  sodium chloride 0.9% lock flush 3 milliLiter(s) IV Push every 8 hours  sodium chloride 0.9%. 1000 milliLiter(s) (10 mL/Hr) IV Continuous <Continuous>  tiotropium 18 MICROgram(s) Capsule 1 Capsule(s) Inhalation daily  warfarin 7.5 milliGRAM(s) Oral once      Physical Exam  General: Patient comfortable in bed  Vital Signs Last 12 Hrs  T(F): 97.3 (12-31-19 @ 12:41), Max: 97.5 (12-31-19 @ 04:47)  HR: 88 (12-31-19 @ 12:41) (74 - 88)  BP: 103/60 (12-31-19 @ 12:41) (94/56 - 103/60)  BP(mean): 75 (12-31-19 @ 05:08) (75 - 75)  RR: 18 (12-31-19 @ 12:41) (18 - 18)  SpO2: 100% (12-31-19 @ 12:41) (100% - 100%)  Neck: No palpable thyroid nodules.  CVS: S1S2, No murmurs  Respiratory: No wheezing, no crepitations  GI: Abdomen soft, bowel sounds positive  Musculoskeletal:  edema lower extremities.   Skin: No skin rashes, no ecchymosis    Diagnostics

## 2019-12-31 NOTE — PROGRESS NOTE ADULT - SUBJECTIVE AND OBJECTIVE BOX
INTERVAL History:    Allergies    No Known Allergies    Intolerances        MEDICATIONS  (STANDING):  aspirin enteric coated 81 milliGRAM(s) Oral daily  atorvastatin 40 milliGRAM(s) Oral at bedtime  dextrose 5%. 1000 milliLiter(s) (50 mL/Hr) IV Continuous <Continuous>  dextrose 50% Injectable 12.5 Gram(s) IV Push once  ferrous    sulfate 325 milliGRAM(s) Oral daily  fluticasone propionate 50 MICROgram(s)/spray Nasal Spray 1 Spray(s) Both Nostrils two times a day  folic acid 1 milliGRAM(s) Oral daily  furosemide   Injectable 40 milliGRAM(s) IV Push two times a day  insulin glargine Injectable (LANTUS) 10 Unit(s) SubCutaneous at bedtime  insulin lispro (HumaLOG) corrective regimen sliding scale   SubCutaneous three times a day before meals  insulin lispro (HumaLOG) corrective regimen sliding scale   SubCutaneous at bedtime  insulin lispro Injectable (HumaLOG) 7 Unit(s) SubCutaneous three times a day before meals  melatonin 5 milliGRAM(s) Oral at bedtime  pantoprazole    Tablet 40 milliGRAM(s) Oral before breakfast  polyethylene glycol 3350 17 Gram(s) Oral daily  predniSONE   Tablet 60 milliGRAM(s) Oral daily  senna 2 Tablet(s) Oral at bedtime  sodium chloride 0.9% lock flush 3 milliLiter(s) IV Push every 8 hours  sodium chloride 0.9%. 1000 milliLiter(s) (10 mL/Hr) IV Continuous <Continuous>  tiotropium 18 MICROgram(s) Capsule 1 Capsule(s) Inhalation daily    MEDICATIONS  (PRN):  acetaminophen   Tablet .. 650 milliGRAM(s) Oral every 6 hours PRN Temp greater or equal to 38.5C (101.3F), Mild Pain (1 - 3)  ALBUTerol    90 MICROgram(s) HFA Inhaler 2 Puff(s) Inhalation every 6 hours PRN Shortness of Breath and/or Wheezing  dextrose 40% Gel 15 Gram(s) Oral once PRN Blood Glucose LESS THAN 70 milliGRAM(s)/deciLiter  glucagon  Injectable 1 milliGRAM(s) IntraMuscular once PRN Glucose <70 milliGRAM(s)/deciLiter      Vital Signs Last 24 Hrs  T(C): 36.4 (31 Dec 2019 04:47), Max: 36.8 (30 Dec 2019 19:08)  T(F): 97.5 (31 Dec 2019 04:47), Max: 98.2 (30 Dec 2019 19:08)  HR: 74 (31 Dec 2019 04:47) (74 - 97)  BP: 102/62 (31 Dec 2019 05:08) (94/56 - 117/59)  BP(mean): 75 (31 Dec 2019 05:08) (75 - 82)  RR: 18 (31 Dec 2019 04:47) (18 - 18)  SpO2: 100% (31 Dec 2019 04:47) (99% - 100%)    PHYSICAL EXAM:    General: AOX3, no acute distress  EYES: EOMI, PERRLA, conjunctiva and sclera clear  NECK: Supple, No JVD, Normal thyroid  CHEST/LUNG: Clear to auscultation bilaterally; No rales, rhonchi, or wheezing  HEART: Regular rate and rhythm; no murmurs  ABDOMEN: Soft, Nontender. BS+  LYMPH: No lymphadenopathy noted  Extremities: No peripheral edema      LABS:                        7.6    14.88 )-----------( 142      ( 31 Dec 2019 06:54 )             22.2     12-31    133<L>  |  95<L>  |  110<H>  ----------------------------<  130<H>  4.5   |  21<L>  |  2.06<H>    Ca    8.6      31 Dec 2019 06:54      PT/INR - ( 31 Dec 2019 06:54 )   PT: 12.7 sec;   INR: 1.10 ratio                 RADIOLOGY & ADDITIONAL STUDIES:    PATHOLOGY: INTERVAL History: No acute events overnight. pt was seen this morning ambulating with physical therapy. No acute complaints of pain or bleeding. He has not required blood transfusions in 4 days.     Allergies    No Known Allergies    Intolerances        MEDICATIONS  (STANDING):  aspirin enteric coated 81 milliGRAM(s) Oral daily  atorvastatin 40 milliGRAM(s) Oral at bedtime  dextrose 5%. 1000 milliLiter(s) (50 mL/Hr) IV Continuous <Continuous>  dextrose 50% Injectable 12.5 Gram(s) IV Push once  ferrous    sulfate 325 milliGRAM(s) Oral daily  fluticasone propionate 50 MICROgram(s)/spray Nasal Spray 1 Spray(s) Both Nostrils two times a day  folic acid 1 milliGRAM(s) Oral daily  furosemide   Injectable 40 milliGRAM(s) IV Push two times a day  insulin glargine Injectable (LANTUS) 10 Unit(s) SubCutaneous at bedtime  insulin lispro (HumaLOG) corrective regimen sliding scale   SubCutaneous three times a day before meals  insulin lispro (HumaLOG) corrective regimen sliding scale   SubCutaneous at bedtime  insulin lispro Injectable (HumaLOG) 7 Unit(s) SubCutaneous three times a day before meals  melatonin 5 milliGRAM(s) Oral at bedtime  pantoprazole    Tablet 40 milliGRAM(s) Oral before breakfast  polyethylene glycol 3350 17 Gram(s) Oral daily  predniSONE   Tablet 60 milliGRAM(s) Oral daily  senna 2 Tablet(s) Oral at bedtime  sodium chloride 0.9% lock flush 3 milliLiter(s) IV Push every 8 hours  sodium chloride 0.9%. 1000 milliLiter(s) (10 mL/Hr) IV Continuous <Continuous>  tiotropium 18 MICROgram(s) Capsule 1 Capsule(s) Inhalation daily    MEDICATIONS  (PRN):  acetaminophen   Tablet .. 650 milliGRAM(s) Oral every 6 hours PRN Temp greater or equal to 38.5C (101.3F), Mild Pain (1 - 3)  ALBUTerol    90 MICROgram(s) HFA Inhaler 2 Puff(s) Inhalation every 6 hours PRN Shortness of Breath and/or Wheezing  dextrose 40% Gel 15 Gram(s) Oral once PRN Blood Glucose LESS THAN 70 milliGRAM(s)/deciLiter  glucagon  Injectable 1 milliGRAM(s) IntraMuscular once PRN Glucose <70 milliGRAM(s)/deciLiter      Vital Signs Last 24 Hrs  T(C): 36.4 (31 Dec 2019 04:47), Max: 36.8 (30 Dec 2019 19:08)  T(F): 97.5 (31 Dec 2019 04:47), Max: 98.2 (30 Dec 2019 19:08)  HR: 74 (31 Dec 2019 04:47) (74 - 97)  BP: 102/62 (31 Dec 2019 05:08) (94/56 - 117/59)  BP(mean): 75 (31 Dec 2019 05:08) (75 - 82)  RR: 18 (31 Dec 2019 04:47) (18 - 18)  SpO2: 100% (31 Dec 2019 04:47) (99% - 100%)    Physical Exam:  Appearance: No Acute Distress, hard of hearing  HEENT: PERRL  Neck: JVD approx 12-14 cm  Cardiovascular: grade III/VI systolic mumur  Respiratory: Clear to auscultation bilaterally  Gastrointestinal: Soft, Non-tender	  Skin: No cyanosis	  Neurologic: Non-focal  Extremities: trace LE edema  Psychiatry: A & O x 3, Mood & affect appropriate        LABS:                        7.6    14.88 )-----------( 142      ( 31 Dec 2019 06:54 )             22.2     12-31    133<L>  |  95<L>  |  110<H>  ----------------------------<  130<H>  4.5   |  21<L>  |  2.06<H>    Ca    8.6      31 Dec 2019 06:54      PT/INR - ( 31 Dec 2019 06:54 )   PT: 12.7 sec;   INR: 1.10 ratio                 RADIOLOGY & ADDITIONAL STUDIES:    PATHOLOGY:

## 2019-12-31 NOTE — PROGRESS NOTE ADULT - ASSESSMENT
Assessment  DMT2: 84y Male with DM T2 with hyperglycemia, A1C 5.6%, was on Metformin in the past, patient now manages his DM with diet/exercise, postop on low-dose insulin, increased dose yesterday, blood sugars improving and trending within acceptable range, no hypoglycemic episodes.  Patient is on prednisone, eating meals, alert and comfortable, planning DC rehab Thursday.  Aortic Stenosis: s/p TAVR, on medications, no chest pain, stable, monitored.  HTN: Controlled,  on antihypertensive medications.  HLD: Controlled, on statin.  CKD: Monitor labs/BMP.          Apolinar Cobb MD  Cell: [phone]  Office: [phone] Assessment  DMT2: 84y Male with DM T2 with hyperglycemia, A1C 5.6%, was on Metformin in the past, patient now manages his DM with diet/exercise, postop on low-dose insulin, increased dose yesterday,  blood sugars improving and trending within acceptable range, no hypoglycemic episodes.  Patient is on prednisone, eating meals, alert and comfortable, planning DC rehab Thursday.  Aortic Stenosis: s/p TAVR, on medications, no chest pain, stable, monitored.  HTN: Controlled,  on antihypertensive medications.  HLD: Controlled, on statin.  CKD: Monitor labs/BMP.          Apolinar Cobb MD  Cell: [phone]  Office: [phone]

## 2019-12-31 NOTE — PROGRESS NOTE ADULT - ATTENDING COMMENTS
Patient with A1C 5.6%, blood sugars trending at target on low-dose insulin management. No need to d/c on DM meds; Will FU outpatient endo in 3 months.  Patient counseled for compliance with consistent low carb diet and exercise as tolerated outpatient.

## 2019-12-31 NOTE — CHART NOTE - NSCHARTNOTEFT_GEN_A_CORE
Pt seen for malnutrition follow-up.     Pt is an 84 year old male with PMH hx Afib (Coumadin), T2DM, HLD, CAD, HFrEF (40%) severe AS, LBBB, baseline SCr 1.4, was referred by cardiologist for symptomatic CHF exacerbation. Echo from 12/10 shows severe aortic stenosis and EF 30%. Pt underwent balloon aortic valvuloplasty on 12/26. Noted per CTS note pt to undergo TAVR weeks after discharge from rehab. Currently on lasix and AC on Coumadin.     Source: Patient [x]    Family [ ]     other [x] electronic medical records    Diet: Consistent CHO No snacks    Patient reports [ ] nausea  [ ] vomiting [ ] diarrhea [ ] constipation  [ ]chewing problems [ ] swallowing issues  [x] other:     PO intake:  < 50% [ ] 50-75% [ ]   % [x]  other :    Source for PO intake [x] Patient [ ] family [x] chart [ ] staff [ ] other    Enteral/Parenteral Nutrition: n/a    Current Weight: 116.4 pounds (current, standing, no edema noted). 129 pounds admit wt 12/13. Pt on lasix for diuresis.  Current BMI: 19.3 kG/m2.    Pertinent Medications: MEDICATIONS  (STANDING):  aspirin enteric coated 81 milliGRAM(s) Oral daily  atorvastatin 40 milliGRAM(s) Oral at bedtime  dextrose 5%. 1000 milliLiter(s) (50 mL/Hr) IV Continuous <Continuous>  dextrose 50% Injectable 12.5 Gram(s) IV Push once  ferrous    sulfate 325 milliGRAM(s) Oral daily  fluticasone propionate 50 MICROgram(s)/spray Nasal Spray 1 Spray(s) Both Nostrils two times a day  folic acid 1 milliGRAM(s) Oral daily  furosemide   Injectable 40 milliGRAM(s) IV Push two times a day  insulin glargine Injectable (LANTUS) 10 Unit(s) SubCutaneous at bedtime  insulin lispro (HumaLOG) corrective regimen sliding scale   SubCutaneous three times a day before meals  insulin lispro (HumaLOG) corrective regimen sliding scale   SubCutaneous at bedtime  insulin lispro Injectable (HumaLOG) 7 Unit(s) SubCutaneous three times a day before meals  melatonin 5 milliGRAM(s) Oral at bedtime  pantoprazole    Tablet 40 milliGRAM(s) Oral before breakfast  polyethylene glycol 3350 17 Gram(s) Oral daily  predniSONE   Tablet 60 milliGRAM(s) Oral daily  senna 2 Tablet(s) Oral at bedtime  sodium chloride 0.9% lock flush 3 milliLiter(s) IV Push every 8 hours  sodium chloride 0.9%. 1000 milliLiter(s) (10 mL/Hr) IV Continuous <Continuous>  tiotropium 18 MICROgram(s) Capsule 1 Capsule(s) Inhalation daily  warfarin 7.5 milliGRAM(s) Oral once    MEDICATIONS  (PRN):  acetaminophen   Tablet .. 650 milliGRAM(s) Oral every 6 hours PRN Temp greater or equal to 38.5C (101.3F), Mild Pain (1 - 3)  ALBUTerol    90 MICROgram(s) HFA Inhaler 2 Puff(s) Inhalation every 6 hours PRN Shortness of Breath and/or Wheezing  dextrose 40% Gel 15 Gram(s) Oral once PRN Blood Glucose LESS THAN 70 milliGRAM(s)/deciLiter  glucagon  Injectable 1 milliGRAM(s) IntraMuscular once PRN Glucose <70 milliGRAM(s)/deciLiter    Pertinent Labs:  12-31 Na133 mmol/L<L> Glu 130 mg/dL<H> K+ 4.5 mmol/L Cr  2.06 mg/dL<H>  mg/dL<H> 12-27 Phos 5.0 mg/dL<H> 12-28 Alb 3.3 g/dL 12-14 [id number] 5.6 %  CAPILLARY BLOOD GLUCOSE  POCT Blood Glucose.: 150 mg/dL (31 Dec 2019 08:09)  POCT Blood Glucose.: 141 mg/dL (30 Dec 2019 21:50)  POCT Blood Glucose.: 201 mg/dL (30 Dec 2019 17:02)  POCT Blood Glucose.: 207 mg/dL (30 Dec 2019 12:10)      Skin: No pressure injuries      Estimated Needs:   [x] no change since previous assessment  [ ] recalculated:       Previous Nutrition Diagnosis: Mild Malnutrition         Nutrition Diagnosis is [x] ongoing  [ ] resolved [ ] not applicable          New Nutrition Diagnosis: [x] not applicable         Interventions:     1) Recommend       Monitoring and Evaluation:     [x] PO intake [x] Tolerance to diet prescription [x] weights [x] follow up per protocol    [x] other: RD remains available: Bianka Reddy MS, RDN, CDN, CDE. #[phone] Pt seen for malnutrition follow-up. Pt reports eating very well and consuming most-all of meals, this was endorsed by PCA. Pt denies need for oral supplements, dislikes them as they are "too sweet."    Pt is an 84 year old male with PMH hx Afib (Coumadin), T2DM, HLD, CAD, HFrEF (40%) severe AS, LBBB, baseline SCr 1.4, was referred by cardiologist for symptomatic CHF exacerbation. Echo from 12/10 shows severe aortic stenosis and EF 30%. Pt underwent balloon aortic valvuloplasty on 12/26. Noted per CTS note pt to undergo TAVR weeks after discharge from rehab. Currently on lasix and AC on Coumadin.     Source: Patient [x]    Family [ ]     other [x] electronic medical records    Diet: Consistent CHO No snacks    Patient reports [ ] nausea  [ ] vomiting [ ] diarrhea [ ] constipation  [ ]chewing problems [ ] swallowing issues  [x] other: Reports "gas," chronic    PO intake:  < 50% [ ] 50-75% [ ]   % [x]  other :    Source for PO intake [x] Patient [ ] family [x] chart [ ] staff [ ] other    Enteral/Parenteral Nutrition: n/a    Current Weight: 116.4 pounds (current, standing, no edema noted). 129 pounds admit wt 12/13. Pt on lasix for diuresis.  Current BMI: 19.3 kG/m2.    Pertinent Medications: MEDICATIONS  (STANDING):  aspirin enteric coated 81 milliGRAM(s) Oral daily  atorvastatin 40 milliGRAM(s) Oral at bedtime  dextrose 5%. 1000 milliLiter(s) (50 mL/Hr) IV Continuous <Continuous>  dextrose 50% Injectable 12.5 Gram(s) IV Push once  ferrous    sulfate 325 milliGRAM(s) Oral daily  fluticasone propionate 50 MICROgram(s)/spray Nasal Spray 1 Spray(s) Both Nostrils two times a day  folic acid 1 milliGRAM(s) Oral daily  furosemide   Injectable 40 milliGRAM(s) IV Push two times a day  insulin glargine Injectable (LANTUS) 10 Unit(s) SubCutaneous at bedtime  insulin lispro (HumaLOG) corrective regimen sliding scale   SubCutaneous three times a day before meals  insulin lispro (HumaLOG) corrective regimen sliding scale   SubCutaneous at bedtime  insulin lispro Injectable (HumaLOG) 7 Unit(s) SubCutaneous three times a day before meals  melatonin 5 milliGRAM(s) Oral at bedtime  pantoprazole    Tablet 40 milliGRAM(s) Oral before breakfast  polyethylene glycol 3350 17 Gram(s) Oral daily  predniSONE   Tablet 60 milliGRAM(s) Oral daily  senna 2 Tablet(s) Oral at bedtime  sodium chloride 0.9% lock flush 3 milliLiter(s) IV Push every 8 hours  sodium chloride 0.9%. 1000 milliLiter(s) (10 mL/Hr) IV Continuous <Continuous>  tiotropium 18 MICROgram(s) Capsule 1 Capsule(s) Inhalation daily  warfarin 7.5 milliGRAM(s) Oral once    MEDICATIONS  (PRN):  acetaminophen   Tablet .. 650 milliGRAM(s) Oral every 6 hours PRN Temp greater or equal to 38.5C (101.3F), Mild Pain (1 - 3)  ALBUTerol    90 MICROgram(s) HFA Inhaler 2 Puff(s) Inhalation every 6 hours PRN Shortness of Breath and/or Wheezing  dextrose 40% Gel 15 Gram(s) Oral once PRN Blood Glucose LESS THAN 70 milliGRAM(s)/deciLiter  glucagon  Injectable 1 milliGRAM(s) IntraMuscular once PRN Glucose <70 milliGRAM(s)/deciLiter    Pertinent Labs:  12-31 Na133 mmol/L<L> Glu 130 mg/dL<H> K+ 4.5 mmol/L Cr  2.06 mg/dL<H>  mg/dL<H> 12-27 Phos 5.0 mg/dL<H> 12-28 Alb 3.3 g/dL 12-14 [id number] 5.6 %  CAPILLARY BLOOD GLUCOSE  POCT Blood Glucose.: 150 mg/dL (31 Dec 2019 08:09)  POCT Blood Glucose.: 141 mg/dL (30 Dec 2019 21:50)  POCT Blood Glucose.: 201 mg/dL (30 Dec 2019 17:02)  POCT Blood Glucose.: 207 mg/dL (30 Dec 2019 12:10)      Skin: No pressure injuries      Estimated Needs:   [x] no change since previous assessment  [ ] recalculated:       Previous Nutrition Diagnosis: Mild Malnutrition         Nutrition Diagnosis is [x] ongoing  [ ] resolved [ ] not applicable          New Nutrition Diagnosis: [x] not applicable         Interventions:     1) Recommend continue current diet order to promote glycemic control  -Food preferences obtained and honored on menu  -Pt declines addition of oral supplements at this time  2) Pt reports he is well-versed in Coumadin-diet education as he has been taking it PTA; accepts Coumadin booklet for reference as needed.       Monitoring and Evaluation:     [x] PO intake [x] Tolerance to diet prescription [x] weights [x] follow up per protocol    [x] other: RD remains available: Bianka Reddy MS, RDN, CDN, CDE. #[phone]

## 2019-12-31 NOTE — PROGRESS NOTE ADULT - SUBJECTIVE AND OBJECTIVE BOX
[facility] Division of Kidney Diseases & Hypertension  FOLLOW UP NOTE  -------------------------------------------------------------------------------  Chief Complaint:Aortic valve disorder      24 hour events/subjective:    PAtient seen without any significant subjective complaints  No acute events overnight  Renal function improving   Electrolytes grossly stable    PAST HISTORY  --------------------------------------------------------------------------------  No significant changes to PMH, PSH, FHx, SHx, unless otherwise noted    ALLERGIES & MEDICATIONS  --------------------------------------------------------------------------------  Allergies    No Known Allergies    Intolerances      Standing Inpatient Medications  aspirin enteric coated 81 milliGRAM(s) Oral daily  atorvastatin 40 milliGRAM(s) Oral at bedtime  dextrose 5%. 1000 milliLiter(s) IV Continuous <Continuous>  dextrose 50% Injectable 12.5 Gram(s) IV Push once  ferrous    sulfate 325 milliGRAM(s) Oral daily  fluticasone propionate 50 MICROgram(s)/spray Nasal Spray 1 Spray(s) Both Nostrils two times a day  folic acid 1 milliGRAM(s) Oral daily  furosemide   Injectable 40 milliGRAM(s) IV Push two times a day  insulin glargine Injectable (LANTUS) 10 Unit(s) SubCutaneous at bedtime  insulin lispro (HumaLOG) corrective regimen sliding scale   SubCutaneous three times a day before meals  insulin lispro (HumaLOG) corrective regimen sliding scale   SubCutaneous at bedtime  insulin lispro Injectable (HumaLOG) 7 Unit(s) SubCutaneous three times a day before meals  melatonin 5 milliGRAM(s) Oral at bedtime  pantoprazole    Tablet 40 milliGRAM(s) Oral before breakfast  polyethylene glycol 3350 17 Gram(s) Oral daily  predniSONE   Tablet 60 milliGRAM(s) Oral daily  senna 2 Tablet(s) Oral at bedtime  sodium chloride 0.9% lock flush 3 milliLiter(s) IV Push every 8 hours  sodium chloride 0.9%. 1000 milliLiter(s) IV Continuous <Continuous>  tiotropium 18 MICROgram(s) Capsule 1 Capsule(s) Inhalation daily  warfarin 7.5 milliGRAM(s) Oral once    PRN Inpatient Medications  acetaminophen   Tablet .. 650 milliGRAM(s) Oral every 6 hours PRN  ALBUTerol    90 MICROgram(s) HFA Inhaler 2 Puff(s) Inhalation every 6 hours PRN  dextrose 40% Gel 15 Gram(s) Oral once PRN  glucagon  Injectable 1 milliGRAM(s) IntraMuscular once PRN      REVIEW OF SYSTEMS  --------------------------------------------------------------------------------  Gen: No  fevers/chills  Skin: No rashes  Head/Eyes/Ears/Mouth: No headache; Normal hearing; Normal vision w/o blurriness  Respiratory: No dyspnea, cough, wheezing, hemoptysis  CV: No chest pain, PND, orthopnea  GI: No abdominal pain, diarrhea, constipation, nausea, vomiting  : No increased frequency, dysuria, hematuria, nocturia  MSK: No joint pain/swelling; no back pain; no edema  Neuro: No dizziness/lightheadedness, weakness, seizures, numbness, tingling      All other systems were reviewed and are negative, except as noted.    VITALS/PHYSICAL EXAM  --------------------------------------------------------------------------------  T(C): 36.3 (12-31-19 @ 12:41), Max: 36.8 (12-30-19 @ 19:08)  HR: 88 (12-31-19 @ 12:41) (74 - 88)  BP: 103/60 (12-31-19 @ 12:41) (94/56 - 113/66)  RR: 18 (12-31-19 @ 12:41) (18 - 18)  SpO2: 100% (12-31-19 @ 12:41) (99% - 100%)  Wt(kg): --        12-30-19 @ 07:01  -  12-31-19 @ 07:00  --------------------------------------------------------  IN: 1080 mL / OUT: 1700 mL / NET: -620 mL    12-31-19 @ 07:01  -  12-31-19 @ 13:44  --------------------------------------------------------  IN: 220 mL / OUT: 200 mL / NET: 20 mL        Physical Exam:  	Gen: NAD, well-appearing  	HEENT: supple neck  	Pulm: CTA B/L  	CV:  S1S2  	Abd: +BS, soft   	Ext: No B/L Lower ext edema  	Neuro: No focal deficits  	Skin: Warm, without rashes  	Vascular access: none  LABS/STUDIES  --------------------------------------------------------------------------------              7.6    14.88 >-----------<  142      [12-31-19 @ 06:54]              22.2     133  |  95  |  110  ----------------------------<  130      [12-31-19 @ 06:54]  4.5   |  21  |  2.06        Ca     8.6     [12-31-19 @ 06:54]      PT/INR: PT 12.7 , INR 1.10       [12-31-19 @ 06:54]      Creatinine Trend:  SCr 2.06 [12-31 @ 06:54]  SCr 1.91 [12-30 @ 05:33]  SCr 2.37 [12-29 @ 06:21]  SCr 2.69 [12-28 @ 06:41]  SCr 2.87 [12-27 @ 04:55]            C3 Complement 75      [12-26-19 @ 08:50]  C4 Complement 2      [12-26-19 @ 08:50]

## 2019-12-31 NOTE — PROGRESS NOTE ADULT - SUBJECTIVE AND OBJECTIVE BOX
VITAL SIGNS-Telemetry:  afib 93  Vital Signs Last 24 Hrs  T(C): 36.4 (19 @ 04:47), Max: 36.8 (19 @ 19:08)  T(F): 97.5 (19 @ 04:47), Max: 98.2 (19 @ 19:08)  HR: 74 (19 @ 04:47) (74 - 97)  BP: 102/62 (19 @ 05:08) (94/56 - 117/59)  RR: 18 (19 @ 04:47) (18 - 18)  SpO2: 100% (19 @ 04:47) (99% - 100%)          @ 07:01  -   @ 07:00  --------------------------------------------------------  IN: 1080 mL / OUT: 1700 mL / NET: -620 mL     @ 07:01  -   @ 08:56  --------------------------------------------------------  IN: 220 mL / OUT: 200 mL / NET: 20 mL    Daily     Daily Weight in k.8 (31 Dec 2019 07:44)    CAPILLARY BLOOD GLUCOSE  POCT Blood Glucose.: 150 mg/dL (31 Dec 2019 08:09)  POCT Blood Glucose.: 141 mg/dL (30 Dec 2019 21:50)  POCT Blood Glucose.: 201 mg/dL (30 Dec 2019 17:02)  POCT Blood Glucose.: 207 mg/dL (30 Dec 2019 12:10)        Coumadin    [ x] YES          [  ]      NO         Reason:   afib    PHYSICAL EXAM:  Neurology: alert and oriented x 3, nonfocal, no gross deficits  CV : S1S2  Lungs:  Abdomen: soft, nontender, nondistended, positive bowel sounds, last bowel movement         Extremities:         acetaminophen   Tablet .. 650 milliGRAM(s) Oral every 6 hours PRN  ALBUTerol    90 MICROgram(s) HFA Inhaler 2 Puff(s) Inhalation every 6 hours PRN  aspirin enteric coated 81 milliGRAM(s) Oral daily  atorvastatin 40 milliGRAM(s) Oral at bedtime  dextrose 40% Gel 15 Gram(s) Oral once PRN  dextrose 5%. 1000 milliLiter(s) IV Continuous <Continuous>  dextrose 50% Injectable 12.5 Gram(s) IV Push once  ferrous    sulfate 325 milliGRAM(s) Oral daily  fluticasone propionate 50 MICROgram(s)/spray Nasal Spray 1 Spray(s) Both Nostrils two times a day  folic acid 1 milliGRAM(s) Oral daily  furosemide   Injectable 40 milliGRAM(s) IV Push two times a day  glucagon  Injectable 1 milliGRAM(s) IntraMuscular once PRN  insulin glargine Injectable (LANTUS) 10 Unit(s) SubCutaneous at bedtime  insulin lispro (HumaLOG) corrective regimen sliding scale   SubCutaneous three times a day before meals  insulin lispro (HumaLOG) corrective regimen sliding scale   SubCutaneous at bedtime  insulin lispro Injectable (HumaLOG) 7 Unit(s) SubCutaneous three times a day before meals  melatonin 5 milliGRAM(s) Oral at bedtime  pantoprazole    Tablet 40 milliGRAM(s) Oral before breakfast  polyethylene glycol 3350 17 Gram(s) Oral daily  predniSONE   Tablet 60 milliGRAM(s) Oral daily  senna 2 Tablet(s) Oral at bedtime  sodium chloride 0.9% lock flush 3 milliLiter(s) IV Push every 8 hours  sodium chloride 0.9%. 1000 milliLiter(s) IV Continuous <Continuous>  tiotropium 18 MICROgram(s) Capsule 1 Capsule(s) Inhalation daily    Physical Therapy Rec:   Home  [  ]   Home w/ PT  [  ]  Rehab  [ x ]  Discussed with Cardiothoracic Team at AM rounds. VITAL SIGNS-Telemetry:  afib 93  Vital Signs Last 24 Hrs  T(C): 36.4 (19 @ 04:47), Max: 36.8 (19 @ 19:08)  T(F): 97.5 (19 @ 04:47), Max: 98.2 (19 @ 19:08)  HR: 74 (19 @ 04:47) (74 - 97)  BP: 102/62 (19 @ 05:08) (94/56 - 117/59)  RR: 18 (19 @ 04:47) (18 - 18)  SpO2: 100% (19 @ 04:47) (99% - 100%)          @ 07:01  -   @ 07:00  --------------------------------------------------------  IN: 1080 mL / OUT: 1700 mL / NET: -620 mL     @ 07:01  -   @ 08:56  --------------------------------------------------------  IN: 220 mL / OUT: 200 mL / NET: 20 mL    Daily     Daily Weight in k.8 (31 Dec 2019 07:44)    CAPILLARY BLOOD GLUCOSE  POCT Blood Glucose.: 150 mg/dL (31 Dec 2019 08:09)  POCT Blood Glucose.: 141 mg/dL (30 Dec 2019 21:50)  POCT Blood Glucose.: 201 mg/dL (30 Dec 2019 17:02)  POCT Blood Glucose.: 207 mg/dL (30 Dec 2019 12:10)        Coumadin    [ x] YES          [  ]      NO         Reason:   afib    PHYSICAL EXAM:  Neurology: alert and oriented x 3, nonfocal, no gross deficits  CV : S1S2  Lungs: CTA  Abdomen: soft, nontender, nondistended, positive bowel sounds, last bowel movement         Extremities:   no edema  no calf tenderness      acetaminophen   Tablet .. 650 milliGRAM(s) Oral every 6 hours PRN  ALBUTerol    90 MICROgram(s) HFA Inhaler 2 Puff(s) Inhalation every 6 hours PRN  aspirin enteric coated 81 milliGRAM(s) Oral daily  atorvastatin 40 milliGRAM(s) Oral at bedtime  dextrose 40% Gel 15 Gram(s) Oral once PRN  dextrose 5%. 1000 milliLiter(s) IV Continuous <Continuous>  dextrose 50% Injectable 12.5 Gram(s) IV Push once  ferrous    sulfate 325 milliGRAM(s) Oral daily  fluticasone propionate 50 MICROgram(s)/spray Nasal Spray 1 Spray(s) Both Nostrils two times a day  folic acid 1 milliGRAM(s) Oral daily  furosemide   Injectable 40 milliGRAM(s) IV Push two times a day  glucagon  Injectable 1 milliGRAM(s) IntraMuscular once PRN  insulin glargine Injectable (LANTUS) 10 Unit(s) SubCutaneous at bedtime  insulin lispro (HumaLOG) corrective regimen sliding scale   SubCutaneous three times a day before meals  insulin lispro (HumaLOG) corrective regimen sliding scale   SubCutaneous at bedtime  insulin lispro Injectable (HumaLOG) 7 Unit(s) SubCutaneous three times a day before meals  melatonin 5 milliGRAM(s) Oral at bedtime  pantoprazole    Tablet 40 milliGRAM(s) Oral before breakfast  polyethylene glycol 3350 17 Gram(s) Oral daily  predniSONE   Tablet 60 milliGRAM(s) Oral daily  senna 2 Tablet(s) Oral at bedtime  sodium chloride 0.9% lock flush 3 milliLiter(s) IV Push every 8 hours  sodium chloride 0.9%. 1000 milliLiter(s) IV Continuous <Continuous>  tiotropium 18 MICROgram(s) Capsule 1 Capsule(s) Inhalation daily    Physical Therapy Rec:   Home  [  ]   Home w/ PT  [  ]  Rehab  [ x ]  Discussed with Cardiothoracic Team at AM rounds.

## 2019-12-31 NOTE — PROGRESS NOTE ADULT - ASSESSMENT
Assessment and Plan:   · Assessment		  85 yo M PMH chronic anemia, HFrEF (40%), Afib on coumadin, T2DM, HLD, PVD, Menieres Disease, carotid stenosis, LBBB on EKG, CAD s/p PCI to mLAD in June 2014 and PCI to pRCA that was complicated by perforation and cardiogenic shock in May of 2015 presented for elective RHC/LHC for TAVR evaluation for severe AS, admitted for acute on on chronic CHF and anemia workup for TVAR or balloon aortic vavluoplasty Mon 12/23. Course complicated by possible transfusion rxn with RRT 12/19 for hypoxia. Hematology consulted for hx of chronic anemia.     # Warm AIHA:  Hemoglobin stable, has not required blood transfusion for several days.   -- Steroids started on 12/21 dosed 1mg/kg daily- please continue the same dose through discharge to rehab- please indicate in discharge summary that patient will resume with this same dose until he sees his private hematologist Dr. Miranda. Dr. Miranda will then taper steroids over a long period of time- usually over a period of months.   - CT C/A/P unremarkable for malignancy.   - Continue folic acid  - DO NOT transfuse pt unless absolutely necessary and symptomatic as pt's anemia is hemolytic in nature  - CHECK  daily CBC with diff, retic count and LDH  - No DIC noted on labs   - no objection to d/c to rehab on Thursday if hgb remains stable     Freda Correa MD  Hematology Oncology Fellow, PGY-4  [facility] Pager: [phone]/ [facility] Pager: [phone]

## 2019-12-31 NOTE — PROGRESS NOTE ADULT - ASSESSMENT
83 yo M with hx Afib (Coumadin), DM, HLD, CAD (stent 2014 and 2015 in mLAD and RCA), HFrEF (40%), sever aortic stenosis was admitted to [facility] on 12/13/19 after being referred by Cardiologist Dr. 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 he is s/p BAV 12/26/19. Renal function improving

## 2019-12-31 NOTE — PROGRESS NOTE ADULT - PROBLEM SELECTOR PLAN 1
Recommendations  - Heme/Onc recs noted with concern for hemolysis with high LDH, low haptoglobin, and elevated retic count  - Maribell test positive for IgG and C3  - Continue to monitor renal function and electrolytes   - Dose medications as per eGFR  - Diurese as needed  - Patient renal function appears to be improving on diuretics. Will expect continued renal recovery. Can continue diuretics as needed.  Patient will need outpatient follow-up BMP within 1 week after discharge. Nephrology team will sign off. Reconsult as needed. Thank you.

## 2019-12-31 NOTE — PROGRESS NOTE ADULT - ATTENDING COMMENTS
Ravi is clinically stable.  We are preparing to discharge him to rehab on Thursday.  In preparation for this, I am switching him to Lasix 40mg PO BID as of tomorrow morning--to assess tolerance for at least 24 hours prior to discharge.  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  Renal function is stable, new baseline around 2.  Plan to proceed with TAVR after a few weeks of rehab, hoping to improve his strength, nutrition, and mobility following a prolonged hospitalization due to CHF with significant deconditioning--though he has shown continued improvement since his BAV, which is encouraging.  Trevin Becker MD

## 2020-01-01 LAB
ANION GAP SERPL CALC-SCNC: 16 MMOL/L — SIGNIFICANT CHANGE UP (ref 5–17)
APTT BLD: 25.8 SEC — LOW (ref 27.5–36.3)
BUN SERPL-MCNC: 115 MG/DL — HIGH (ref 7–23)
CALCIUM SERPL-MCNC: 8.5 MG/DL — SIGNIFICANT CHANGE UP (ref 8.4–10.5)
CHLORIDE SERPL-SCNC: 97 MMOL/L — SIGNIFICANT CHANGE UP (ref 96–108)
CO2 SERPL-SCNC: 20 MMOL/L — LOW (ref 22–31)
CREAT SERPL-MCNC: 2 MG/DL — HIGH (ref 0.5–1.3)
GLUCOSE BLDC GLUCOMTR-MCNC: 160 MG/DL — HIGH (ref 70–99)
GLUCOSE BLDC GLUCOMTR-MCNC: 165 MG/DL — HIGH (ref 70–99)
GLUCOSE BLDC GLUCOMTR-MCNC: 220 MG/DL — HIGH (ref 70–99)
GLUCOSE BLDC GLUCOMTR-MCNC: 257 MG/DL — HIGH (ref 70–99)
GLUCOSE SERPL-MCNC: 147 MG/DL — HIGH (ref 70–99)
HCT VFR BLD CALC: 21.6 % — LOW (ref 39–50)
HGB BLD-MCNC: 7.1 G/DL — LOW (ref 13–17)
INR BLD: 1.24 RATIO — HIGH (ref 0.88–1.16)
LDH SERPL L TO P-CCNC: 356 U/L — HIGH (ref 50–242)
MCHC RBC-ENTMCNC: 30.9 PG — SIGNIFICANT CHANGE UP (ref 27–34)
MCHC RBC-ENTMCNC: 32.9 GM/DL — SIGNIFICANT CHANGE UP (ref 32–36)
MCV RBC AUTO: 93.9 FL — SIGNIFICANT CHANGE UP (ref 80–100)
NRBC # BLD: 0 /100 WBCS — SIGNIFICANT CHANGE UP (ref 0–0)
PLATELET # BLD AUTO: 146 K/UL — LOW (ref 150–400)
POTASSIUM SERPL-MCNC: 3.9 MMOL/L — SIGNIFICANT CHANGE UP (ref 3.5–5.3)
POTASSIUM SERPL-SCNC: 3.9 MMOL/L — SIGNIFICANT CHANGE UP (ref 3.5–5.3)
PROTHROM AB SERPL-ACNC: 14.2 SEC — HIGH (ref 10–12.9)
RBC # BLD: 2.3 M/UL — LOW (ref 4.2–5.8)
RBC # BLD: 2.3 M/UL — LOW (ref 4.2–5.8)
RBC # FLD: 18.5 % — HIGH (ref 10.3–14.5)
RETICS #: 145.6 K/UL — HIGH (ref 25–125)
RETICS/RBC NFR: 6.3 % — HIGH (ref 0.5–2.5)
SODIUM SERPL-SCNC: 133 MMOL/L — LOW (ref 135–145)
WBC # BLD: 13.81 K/UL — HIGH (ref 3.8–10.5)
WBC # FLD AUTO: 13.81 K/UL — HIGH (ref 3.8–10.5)

## 2020-01-01 PROCEDURE — 93010 ELECTROCARDIOGRAM REPORT: CPT

## 2020-01-01 PROCEDURE — 99024 POSTOP FOLLOW-UP VISIT: CPT

## 2020-01-01 PROCEDURE — 99233 SBSQ HOSP IP/OBS HIGH 50: CPT

## 2020-01-01 RX ORDER — WARFARIN SODIUM 2.5 MG/1
7.5 TABLET ORAL ONCE
Refills: 0 | Status: COMPLETED | OUTPATIENT
Start: 2020-01-01 | End: 2020-01-01

## 2020-01-01 RX ADMIN — Medication 1: at 08:10

## 2020-01-01 RX ADMIN — Medication 7 UNIT(S): at 17:03

## 2020-01-01 RX ADMIN — INSULIN GLARGINE 10 UNIT(S): 100 INJECTION, SOLUTION SUBCUTANEOUS at 20:55

## 2020-01-01 RX ADMIN — Medication 81 MILLIGRAM(S): at 11:56

## 2020-01-01 RX ADMIN — ATORVASTATIN CALCIUM 40 MILLIGRAM(S): 80 TABLET, FILM COATED ORAL at 22:02

## 2020-01-01 RX ADMIN — Medication 325 MILLIGRAM(S): at 11:56

## 2020-01-01 RX ADMIN — Medication 3: at 17:03

## 2020-01-01 RX ADMIN — Medication 40 MILLIGRAM(S): at 05:44

## 2020-01-01 RX ADMIN — POLYETHYLENE GLYCOL 3350 17 GRAM(S): 17 POWDER, FOR SOLUTION ORAL at 11:56

## 2020-01-01 RX ADMIN — TIOTROPIUM BROMIDE 1 CAPSULE(S): 18 CAPSULE ORAL; RESPIRATORY (INHALATION) at 11:56

## 2020-01-01 RX ADMIN — Medication 1: at 11:56

## 2020-01-01 RX ADMIN — Medication 5 MILLIGRAM(S): at 22:02

## 2020-01-01 RX ADMIN — SODIUM CHLORIDE 3 MILLILITER(S): 9 INJECTION INTRAMUSCULAR; INTRAVENOUS; SUBCUTANEOUS at 22:02

## 2020-01-01 RX ADMIN — Medication 7 UNIT(S): at 08:10

## 2020-01-01 RX ADMIN — WARFARIN SODIUM 7.5 MILLIGRAM(S): 2.5 TABLET ORAL at 22:02

## 2020-01-01 RX ADMIN — Medication 60 MILLIGRAM(S): at 05:44

## 2020-01-01 RX ADMIN — SODIUM CHLORIDE 3 MILLILITER(S): 9 INJECTION INTRAMUSCULAR; INTRAVENOUS; SUBCUTANEOUS at 13:39

## 2020-01-01 RX ADMIN — Medication 1 SPRAY(S): at 17:03

## 2020-01-01 RX ADMIN — Medication 7 UNIT(S): at 11:56

## 2020-01-01 RX ADMIN — PANTOPRAZOLE SODIUM 40 MILLIGRAM(S): 20 TABLET, DELAYED RELEASE ORAL at 05:44

## 2020-01-01 RX ADMIN — Medication 1 SPRAY(S): at 05:44

## 2020-01-01 RX ADMIN — Medication 1 MILLIGRAM(S): at 11:56

## 2020-01-01 RX ADMIN — SODIUM CHLORIDE 3 MILLILITER(S): 9 INJECTION INTRAMUSCULAR; INTRAVENOUS; SUBCUTANEOUS at 05:45

## 2020-01-01 NOTE — PROGRESS NOTE ADULT - ATTENDING COMMENTS
Mr Latham is resting comfortably in bed and reports no dyspnea or angina.  He notes having had "the best night of sleep in some time" last night.  He has no edema and his lungs are relatively clear (no rales or crackles, some globally diminished breath sounds).  Transitioned to PO Lasix.  His appetite is reportedly good.  Ambulating with assistance.  Planned for transfer to inpatient rehab tomorrow--he is very deconditioned.  Would hope to reconsider him for TAVR in the near future, pending office follow up post rehab discharge.  Trevin Becker MD

## 2020-01-01 NOTE — PROGRESS NOTE ADULT - PROBLEM SELECTOR PLAN 1
Will continue current insulin regimen for now. Will continue monitoring FS, log, and FU.  Patient with A1C 5.6%, blood sugars trending at target on low-dose insulin management. Suggest to DC home on Tradjenta 5mg daily and FU endo 4 weeks.  Patient counseled for compliance with consistent low carb diet and exercise as tolerated outpatient. Will continue current insulin regimen for now. Will continue monitoring FS, log, and FU.

## 2020-01-01 NOTE — PROGRESS NOTE ADULT - PROBLEM SELECTOR PROBLEM 6
Follow up with ANDREEA Garner @ Dr. Parker's office      In [place]   on Friday, Feb. 1, 2019          @ 1PM  [phone]      The appt with Dr. Noonan exceeds the 3 weeks request. Office gave her appt but said they would call her with a different appt date and time.    Left bundle branch block

## 2020-01-01 NOTE — PROGRESS NOTE ADULT - SUBJECTIVE AND OBJECTIVE BOX
Chief complaint  Patient is a 84y old  Male who presents with a chief complaint of CHF exacerbation (31 Dec 2019 13:45)   Review of systems  Patient in bed, looks comfortable, no fever, no hypoglycemia.    Labs and Fingersticks  CAPILLARY BLOOD GLUCOSE      POCT Blood Glucose.: 165 mg/dL (01 Jan 2020 11:53)  POCT Blood Glucose.: 160 mg/dL (01 Jan 2020 08:03)  POCT Blood Glucose.: 153 mg/dL (31 Dec 2019 22:14)  POCT Blood Glucose.: 130 mg/dL (31 Dec 2019 16:58)      Anion Gap, Serum: 16 (01-01 @ 09:23)  Anion Gap, Serum: 17 (12-31 @ 06:54)      Calcium, Total Serum: 8.5 (01-01 @ 09:23)  Calcium, Total Serum: 8.6 (12-31 @ 06:54)          01-01    133<L>  |  97  |  115<H>  ----------------------------<  147<H>  3.9   |  20<L>  |  2.00<H>    Ca    8.5      01 Jan 2020 09:23                          7.1    13.81 )-----------( 146      ( 01 Jan 2020 09:23 )             21.6     Medications  MEDICATIONS  (STANDING):  aspirin enteric coated 81 milliGRAM(s) Oral daily  atorvastatin 40 milliGRAM(s) Oral at bedtime  dextrose 5%. 1000 milliLiter(s) (50 mL/Hr) IV Continuous <Continuous>  dextrose 50% Injectable 12.5 Gram(s) IV Push once  ferrous    sulfate 325 milliGRAM(s) Oral daily  fluticasone propionate 50 MICROgram(s)/spray Nasal Spray 1 Spray(s) Both Nostrils two times a day  folic acid 1 milliGRAM(s) Oral daily  furosemide    Tablet 40 milliGRAM(s) Oral two times a day  insulin glargine Injectable (LANTUS) 10 Unit(s) SubCutaneous at bedtime  insulin lispro (HumaLOG) corrective regimen sliding scale   SubCutaneous three times a day before meals  insulin lispro (HumaLOG) corrective regimen sliding scale   SubCutaneous at bedtime  insulin lispro Injectable (HumaLOG) 7 Unit(s) SubCutaneous three times a day before meals  melatonin 5 milliGRAM(s) Oral at bedtime  pantoprazole    Tablet 40 milliGRAM(s) Oral before breakfast  polyethylene glycol 3350 17 Gram(s) Oral daily  predniSONE   Tablet 60 milliGRAM(s) Oral daily  senna 2 Tablet(s) Oral at bedtime  sodium chloride 0.9% lock flush 3 milliLiter(s) IV Push every 8 hours  sodium chloride 0.9%. 1000 milliLiter(s) (10 mL/Hr) IV Continuous <Continuous>  tiotropium 18 MICROgram(s) Capsule 1 Capsule(s) Inhalation daily      Physical Exam  General: Patient comfortable in bed  Vital Signs Last 12 Hrs  T(F): 97.3 (01-01-20 @ 05:34), Max: 97.3 (01-01-20 @ 05:34)  HR: 86 (01-01-20 @ 05:34) (86 - 86)  BP: 110/65 (01-01-20 @ 05:34) (110/65 - 110/65)  BP(mean): --  RR: 16 (01-01-20 @ 05:34) (16 - 16)  SpO2: 100% (01-01-20 @ 05:34) (100% - 100%)  Neck: No palpable thyroid nodules.  CVS: S1S2, No murmurs  Respiratory: No wheezing, no crepitations  GI: Abdomen soft, bowel sounds positive  Musculoskeletal:  edema lower extremities.   Skin: No skin rashes, no ecchymosis    Diagnostics Chief complaint  Patient is a 84y old  Male who presents with a chief complaint of CHF exacerbation (31 Dec 2019 13:45)   Review of systems  Patient in bed, looks comfortable,  no fever, no hypoglycemia.    Labs and Fingersticks  CAPILLARY BLOOD GLUCOSE      POCT Blood Glucose.: 165 mg/dL (01 Jan 2020 11:53)  POCT Blood Glucose.: 160 mg/dL (01 Jan 2020 08:03)  POCT Blood Glucose.: 153 mg/dL (31 Dec 2019 22:14)  POCT Blood Glucose.: 130 mg/dL (31 Dec 2019 16:58)      Anion Gap, Serum: 16 (01-01 @ 09:23)  Anion Gap, Serum: 17 (12-31 @ 06:54)      Calcium, Total Serum: 8.5 (01-01 @ 09:23)  Calcium, Total Serum: 8.6 (12-31 @ 06:54)          01-01    133<L>  |  97  |  115<H>  ----------------------------<  147<H>  3.9   |  20<L>  |  2.00<H>    Ca    8.5      01 Jan 2020 09:23                          7.1    13.81 )-----------( 146      ( 01 Jan 2020 09:23 )             21.6     Medications  MEDICATIONS  (STANDING):  aspirin enteric coated 81 milliGRAM(s) Oral daily  atorvastatin 40 milliGRAM(s) Oral at bedtime  dextrose 5%. 1000 milliLiter(s) (50 mL/Hr) IV Continuous <Continuous>  dextrose 50% Injectable 12.5 Gram(s) IV Push once  ferrous    sulfate 325 milliGRAM(s) Oral daily  fluticasone propionate 50 MICROgram(s)/spray Nasal Spray 1 Spray(s) Both Nostrils two times a day  folic acid 1 milliGRAM(s) Oral daily  furosemide    Tablet 40 milliGRAM(s) Oral two times a day  insulin glargine Injectable (LANTUS) 10 Unit(s) SubCutaneous at bedtime  insulin lispro (HumaLOG) corrective regimen sliding scale   SubCutaneous three times a day before meals  insulin lispro (HumaLOG) corrective regimen sliding scale   SubCutaneous at bedtime  insulin lispro Injectable (HumaLOG) 7 Unit(s) SubCutaneous three times a day before meals  melatonin 5 milliGRAM(s) Oral at bedtime  pantoprazole    Tablet 40 milliGRAM(s) Oral before breakfast  polyethylene glycol 3350 17 Gram(s) Oral daily  predniSONE   Tablet 60 milliGRAM(s) Oral daily  senna 2 Tablet(s) Oral at bedtime  sodium chloride 0.9% lock flush 3 milliLiter(s) IV Push every 8 hours  sodium chloride 0.9%. 1000 milliLiter(s) (10 mL/Hr) IV Continuous <Continuous>  tiotropium 18 MICROgram(s) Capsule 1 Capsule(s) Inhalation daily      Physical Exam  General: Patient comfortable in bed  Vital Signs Last 12 Hrs  T(F): 97.3 (01-01-20 @ 05:34), Max: 97.3 (01-01-20 @ 05:34)  HR: 86 (01-01-20 @ 05:34) (86 - 86)  BP: 110/65 (01-01-20 @ 05:34) (110/65 - 110/65)  BP(mean): --  RR: 16 (01-01-20 @ 05:34) (16 - 16)  SpO2: 100% (01-01-20 @ 05:34) (100% - 100%)  Neck: No palpable thyroid nodules.  CVS: S1S2, No murmurs  Respiratory: No wheezing, no crepitations  GI: Abdomen soft, bowel sounds positive  Musculoskeletal:  edema lower extremities.   Skin: No skin rashes, no ecchymosis    Diagnostics

## 2020-01-01 NOTE — PROGRESS NOTE ADULT - PROBLEM SELECTOR PLAN 1
- on intermittent diureses, monitor SCr  -torsemide 40mg po daily to start 1/2  -monitor volume status  -Daily standing weight and strict I's and O's  -Discharge planning-rehab once medically stable

## 2020-01-01 NOTE — PROGRESS NOTE ADULT - PROBLEM SELECTOR PLAN 1
- would change lasix to torsemidr 40 mg po daily tomorrow  - Continue metoprolol succinate 25 mg daily.  - Daily standing weight and strict I's and O's.

## 2020-01-01 NOTE — PROGRESS NOTE ADULT - ATTENDING COMMENTS
Patient with A1C 5.6%, blood sugars trending at target on low-dose insulin management. Suggest to DC home on Tradjenta 5mg daily and FU endo 4 weeks.  Patient counseled for compliance with consistent low carb diet and exercise as tolerated outpatient.

## 2020-01-01 NOTE — PROGRESS NOTE ADULT - NSHPATTENDINGPLANDISCUSS_GEN_ALL_CORE
house staff
patient
patient
CTS
Dr. Becker
CTS NP and CHF team
Patient and CTS NP
Patient, CTS NP, and his aide at bedside
Patient and CTS NP
Dr. Becker
Dr. Becker
house staff

## 2020-01-01 NOTE — PROGRESS NOTE ADULT - SUBJECTIVE AND OBJECTIVE BOX
VITAL SIGNS-Telemetry:  afib 70  Vital Signs Last 24 Hrs  T(C): 36.4 (20 @ 12:30), Max: 36.7 (19 @ 20:54)  T(F): 97.5 (20 @ 12:30), Max: 98.1 (19 @ 20:54)  HR: 85 (20 @ 12:30) (85 - 87)  BP: 110/68 (20 @ 12:30) (110/59 - 110/68)  RR: 18 (20 @ 12:30) (16 - 18)  SpO2: 96% (20 @ 12:30) (96% - 100%)          @ 07: @ 07:00  --------------------------------------------------------  IN: 800 mL / OUT: 1800 mL / NET: -1000 mL     @ 07:  -   @ 14:26  --------------------------------------------------------  IN: 600 mL / OUT: 400 mL / NET: 200 mL    Daily     Daily Weight in k.2 (2020 09:12)    CAPILLARY BLOOD GLUCOSE  POCT Blood Glucose.: 165 mg/dL (2020 11:53)  POCT Blood Glucose.: 160 mg/dL (2020 08:03)  POCT Blood Glucose.: 153 mg/dL (31 Dec 2019 22:14)  POCT Blood Glucose.: 130 mg/dL (31 Dec 2019 16:58)       Coumadin    [x ] YES          [  ]      NO         Reason:     af  PHYSICAL EXAM:  Neurology: alert and oriented x 3, nonfocal, no gross deficits  CV : irregular +sys murmur  Lungs: cta  Abdomen: soft, nontender, nondistended, positive bowel sounds, last bowel movement         Extremities:    no edema no calf tenderness     acetaminophen   Tablet .. 650 milliGRAM(s) Oral every 6 hours PRN  ALBUTerol    90 MICROgram(s) HFA Inhaler 2 Puff(s) Inhalation every 6 hours PRN  aspirin enteric coated 81 milliGRAM(s) Oral daily  atorvastatin 40 milliGRAM(s) Oral at bedtime  dextrose 40% Gel 15 Gram(s) Oral once PRN  dextrose 5%. 1000 milliLiter(s) IV Continuous <Continuous>  dextrose 50% Injectable 12.5 Gram(s) IV Push once  ferrous    sulfate 325 milliGRAM(s) Oral daily  fluticasone propionate 50 MICROgram(s)/spray Nasal Spray 1 Spray(s) Both Nostrils two times a day  folic acid 1 milliGRAM(s) Oral daily  glucagon  Injectable 1 milliGRAM(s) IntraMuscular once PRN  insulin glargine Injectable (LANTUS) 10 Unit(s) SubCutaneous at bedtime  insulin lispro (HumaLOG) corrective regimen sliding scale   SubCutaneous three times a day before meals  insulin lispro (HumaLOG) corrective regimen sliding scale   SubCutaneous at bedtime  insulin lispro Injectable (HumaLOG) 7 Unit(s) SubCutaneous three times a day before meals  melatonin 5 milliGRAM(s) Oral at bedtime  pantoprazole    Tablet 40 milliGRAM(s) Oral before breakfast  polyethylene glycol 3350 17 Gram(s) Oral daily  predniSONE   Tablet 60 milliGRAM(s) Oral daily  senna 2 Tablet(s) Oral at bedtime  sodium chloride 0.9% lock flush 3 milliLiter(s) IV Push every 8 hours  sodium chloride 0.9%. 1000 milliLiter(s) IV Continuous <Continuous>  tiotropium 18 MICROgram(s) Capsule 1 Capsule(s) Inhalation daily      Physical Therapy Rec:   Home  [  ]   Home w/ PT  [  ]  Rehab  [ x ]  Discussed with Cardiothoracic Team at AM rounds.

## 2020-01-01 NOTE — PROGRESS NOTE ADULT - SUBJECTIVE AND OBJECTIVE BOX
Interval History:  feels well  able to walk around hallway    Medications:  acetaminophen   Tablet .. 650 milliGRAM(s) Oral every 6 hours PRN  ALBUTerol    90 MICROgram(s) HFA Inhaler 2 Puff(s) Inhalation every 6 hours PRN  aspirin enteric coated 81 milliGRAM(s) Oral daily  atorvastatin 40 milliGRAM(s) Oral at bedtime  dextrose 40% Gel 15 Gram(s) Oral once PRN  dextrose 5%. 1000 milliLiter(s) IV Continuous <Continuous>  dextrose 50% Injectable 12.5 Gram(s) IV Push once  ferrous    sulfate 325 milliGRAM(s) Oral daily  fluticasone propionate 50 MICROgram(s)/spray Nasal Spray 1 Spray(s) Both Nostrils two times a day  folic acid 1 milliGRAM(s) Oral daily  glucagon  Injectable 1 milliGRAM(s) IntraMuscular once PRN  insulin glargine Injectable (LANTUS) 10 Unit(s) SubCutaneous at bedtime  insulin lispro (HumaLOG) corrective regimen sliding scale   SubCutaneous three times a day before meals  insulin lispro (HumaLOG) corrective regimen sliding scale   SubCutaneous at bedtime  insulin lispro Injectable (HumaLOG) 7 Unit(s) SubCutaneous three times a day before meals  melatonin 5 milliGRAM(s) Oral at bedtime  pantoprazole    Tablet 40 milliGRAM(s) Oral before breakfast  polyethylene glycol 3350 17 Gram(s) Oral daily  predniSONE   Tablet 60 milliGRAM(s) Oral daily  senna 2 Tablet(s) Oral at bedtime  sodium chloride 0.9% lock flush 3 milliLiter(s) IV Push every 8 hours  sodium chloride 0.9%. 1000 milliLiter(s) IV Continuous <Continuous>  tiotropium 18 MICROgram(s) Capsule 1 Capsule(s) Inhalation daily  warfarin 7.5 milliGRAM(s) Oral once      Vitals:  T(C): 36.4 (20 @ 19:55), Max: 36.7 (19 @ 20:54)  HR: 62 (20 @ 19:55) (62 - 87)  BP: 105/56 (20 @ 19:55) (105/56 - 110/68)  BP(mean): 76 (19 @ 20:54) (76 - 76)  RR: 18 (20 @ 19:55) (16 - 18)  SpO2: 100% (20 @ 19:55) (96% - 100%)    Daily     Daily Weight in k.2 (2020 09:12)        I&O's Summary    31 Dec 2019 07:01  -  2020 07:00  --------------------------------------------------------  IN: 800 mL / OUT: 1800 mL / NET: -1000 mL    2020 07:01  -  2020 20:49  --------------------------------------------------------  IN: 600 mL / OUT: 400 mL / NET: 200 mL        Physical Exam:  Appearance: No Acute Distress; frail appearing  HEENT: PERRL  Neck: JVD approx 8 cm w/ mild HJR  Cardiovascular: grade III/VI ssytolic murmur  Respiratory: Clear to auscultation bilaterally  Gastrointestinal: Soft, Non-tender	  Skin: No cyanosis	  Neurologic: Non-focal  Extremities: No LE edema  Psychiatry: A & O x 3, Mood & affect appropriate    Labs:                        7.1    13.81 )-----------( 146      ( 2020 09:23 )             21.6         133<L>  |  97  |  115<H>  ----------------------------<  147<H>  3.9   |  20<L>  |  2.00<H>    Ca    8.5      2020 09:23      PT/INR - ( 2020 09:23 )   PT: 14.2 sec;   INR: 1.24 ratio         PTT - ( 2020 09:23 )  PTT:25.8 sec

## 2020-01-01 NOTE — PROGRESS NOTE ADULT - ASSESSMENT
Mr. Latham is an 84 year old man with ACC/AHA Stage C ischemic cardiomyopathy (LVDD 4.5 cm, EF 30%), severe AS (58/30/0.6), chronic AF on coumadin, and CAD s/p PCI to RCA and LAD with prior tamponade during coronary intervention who is admitted with decompensated heart failure after R/LHC with plans for optimization prior to consideration of TAVR. He underwent CT coronaries on 12/17 as a part of TAVR evaluation and suffered from MAI. His renal function is improving since. He underwent balloon aortic valvuloplasty on 12/26 with good result and decrease in transaortic gradients. His right sided filling pressures are improved and he's near euvolemic

## 2020-01-01 NOTE — PROGRESS NOTE ADULT - ASSESSMENT
Assessment  DMT2: 84y Male with DM T2 with hyperglycemia, A1C 5.6%, was on Metformin in the past, patient now manages his DM with diet/exercise, postop on low-dose insulin, blood sugars trending within acceptable range, no hypoglycemic episodes. Patient is on prednisone, will be DC on prednisone taper per heme/onc recs. He is eating meals with good appetite, alert and comfortable, planning DC rehab.  Aortic Stenosis: s/p TAVR, on medications, no chest pain, stable, monitored.  HTN: Controlled,  on antihypertensive medications.  HLD: Controlled, on statin.  CKD: Monitor labs/BMP.          Apolinar Cobb MD  Cell: [phone]  Office: [phone] Assessment  DMT2: 84y Male with DM T2 with hyperglycemia, A1C 5.6%, was on Metformin in the past, patient now manages his DM with diet/exercise, postop on low-dose insulin,  blood sugars trending within acceptable range, no hypoglycemic episodes. Patient is on prednisone, will be DC on prednisone taper per heme/onc recs. He is eating meals with good appetite, alert and comfortable, planning DC rehab.  Aortic Stenosis: s/p TAVR, on medications, no chest pain, stable, monitored.  HTN: Controlled,  on antihypertensive medications.  HLD: Controlled, on statin.  CKD: Monitor labs/BMP.          Apolinar Cobb MD  Cell: [phone]  Office: [phone]

## 2020-01-02 LAB
ANION GAP SERPL CALC-SCNC: 14 MMOL/L — SIGNIFICANT CHANGE UP (ref 5–17)
APTT BLD: 25.7 SEC — LOW (ref 27.5–36.3)
BLD GP AB SCN SERPL QL: NEGATIVE — SIGNIFICANT CHANGE UP
BUN SERPL-MCNC: 105 MG/DL — HIGH (ref 7–23)
CALCIUM SERPL-MCNC: 7.9 MG/DL — LOW (ref 8.4–10.5)
CHLORIDE SERPL-SCNC: 99 MMOL/L — SIGNIFICANT CHANGE UP (ref 96–108)
CO2 SERPL-SCNC: 19 MMOL/L — LOW (ref 22–31)
CREAT SERPL-MCNC: 1.78 MG/DL — HIGH (ref 0.5–1.3)
GLUCOSE BLDC GLUCOMTR-MCNC: 116 MG/DL — HIGH (ref 70–99)
GLUCOSE BLDC GLUCOMTR-MCNC: 128 MG/DL — HIGH (ref 70–99)
GLUCOSE BLDC GLUCOMTR-MCNC: 140 MG/DL — HIGH (ref 70–99)
GLUCOSE BLDC GLUCOMTR-MCNC: 173 MG/DL — HIGH (ref 70–99)
GLUCOSE SERPL-MCNC: 135 MG/DL — HIGH (ref 70–99)
HCT VFR BLD CALC: 17.9 % — CRITICAL LOW (ref 39–50)
HCT VFR BLD CALC: 19 % — CRITICAL LOW (ref 39–50)
HGB BLD-MCNC: 6 G/DL — CRITICAL LOW (ref 13–17)
HGB BLD-MCNC: 6.3 G/DL — CRITICAL LOW (ref 13–17)
INR BLD: 1.46 RATIO — HIGH (ref 0.88–1.16)
MCHC RBC-ENTMCNC: 31 PG — SIGNIFICANT CHANGE UP (ref 27–34)
MCHC RBC-ENTMCNC: 31.1 PG — SIGNIFICANT CHANGE UP (ref 27–34)
MCHC RBC-ENTMCNC: 33.2 GM/DL — SIGNIFICANT CHANGE UP (ref 32–36)
MCHC RBC-ENTMCNC: 33.5 GM/DL — SIGNIFICANT CHANGE UP (ref 32–36)
MCV RBC AUTO: 92.7 FL — SIGNIFICANT CHANGE UP (ref 80–100)
MCV RBC AUTO: 93.6 FL — SIGNIFICANT CHANGE UP (ref 80–100)
NRBC # BLD: 0 /100 WBCS — SIGNIFICANT CHANGE UP (ref 0–0)
NRBC # BLD: 0 /100 WBCS — SIGNIFICANT CHANGE UP (ref 0–0)
PLATELET # BLD AUTO: 138 K/UL — LOW (ref 150–400)
PLATELET # BLD AUTO: 159 K/UL — SIGNIFICANT CHANGE UP (ref 150–400)
POTASSIUM SERPL-MCNC: 3.9 MMOL/L — SIGNIFICANT CHANGE UP (ref 3.5–5.3)
POTASSIUM SERPL-SCNC: 3.9 MMOL/L — SIGNIFICANT CHANGE UP (ref 3.5–5.3)
PROTHROM AB SERPL-ACNC: 16.8 SEC — HIGH (ref 10–12.9)
RBC # BLD: 1.93 M/UL — LOW (ref 4.2–5.8)
RBC # BLD: 2.03 M/UL — LOW (ref 4.2–5.8)
RBC # FLD: 18.4 % — HIGH (ref 10.3–14.5)
RBC # FLD: 18.6 % — HIGH (ref 10.3–14.5)
RH IG SCN BLD-IMP: POSITIVE — SIGNIFICANT CHANGE UP
SODIUM SERPL-SCNC: 132 MMOL/L — LOW (ref 135–145)
WBC # BLD: 14.74 K/UL — HIGH (ref 3.8–10.5)
WBC # BLD: 17.23 K/UL — HIGH (ref 3.8–10.5)
WBC # FLD AUTO: 14.74 K/UL — HIGH (ref 3.8–10.5)
WBC # FLD AUTO: 17.23 K/UL — HIGH (ref 3.8–10.5)

## 2020-01-02 PROCEDURE — 93010 ELECTROCARDIOGRAM REPORT: CPT

## 2020-01-02 RX ORDER — WARFARIN SODIUM 2.5 MG/1
5 TABLET ORAL ONCE
Refills: 0 | Status: COMPLETED | OUTPATIENT
Start: 2020-01-02 | End: 2020-01-02

## 2020-01-02 RX ADMIN — INSULIN GLARGINE 10 UNIT(S): 100 INJECTION, SOLUTION SUBCUTANEOUS at 22:06

## 2020-01-02 RX ADMIN — SODIUM CHLORIDE 3 MILLILITER(S): 9 INJECTION INTRAMUSCULAR; INTRAVENOUS; SUBCUTANEOUS at 05:34

## 2020-01-02 RX ADMIN — SODIUM CHLORIDE 3 MILLILITER(S): 9 INJECTION INTRAMUSCULAR; INTRAVENOUS; SUBCUTANEOUS at 12:21

## 2020-01-02 RX ADMIN — Medication 7 UNIT(S): at 12:45

## 2020-01-02 RX ADMIN — SODIUM CHLORIDE 3 MILLILITER(S): 9 INJECTION INTRAMUSCULAR; INTRAVENOUS; SUBCUTANEOUS at 22:06

## 2020-01-02 RX ADMIN — Medication 325 MILLIGRAM(S): at 12:46

## 2020-01-02 RX ADMIN — PANTOPRAZOLE SODIUM 40 MILLIGRAM(S): 20 TABLET, DELAYED RELEASE ORAL at 05:33

## 2020-01-02 RX ADMIN — Medication 5 MILLIGRAM(S): at 22:06

## 2020-01-02 RX ADMIN — Medication 40 MILLIGRAM(S): at 05:34

## 2020-01-02 RX ADMIN — Medication 7 UNIT(S): at 17:46

## 2020-01-02 RX ADMIN — Medication 1 MILLIGRAM(S): at 12:45

## 2020-01-02 RX ADMIN — Medication 81 MILLIGRAM(S): at 12:45

## 2020-01-02 RX ADMIN — Medication 60 MILLIGRAM(S): at 05:34

## 2020-01-02 RX ADMIN — Medication 7 UNIT(S): at 08:24

## 2020-01-02 RX ADMIN — Medication 1: at 17:46

## 2020-01-02 RX ADMIN — TIOTROPIUM BROMIDE 1 CAPSULE(S): 18 CAPSULE ORAL; RESPIRATORY (INHALATION) at 12:53

## 2020-01-02 RX ADMIN — WARFARIN SODIUM 5 MILLIGRAM(S): 2.5 TABLET ORAL at 22:07

## 2020-01-02 RX ADMIN — ATORVASTATIN CALCIUM 40 MILLIGRAM(S): 80 TABLET, FILM COATED ORAL at 22:06

## 2020-01-02 NOTE — PROGRESS NOTE ADULT - PROBLEM SELECTOR PLAN 4
- No evidence of iron deficiency anemia. Possibly due to hemolysis in the setting of severe AS.  - HCG (repeat) = 6.3 - as per heme - will transfuse 1 unit RBCs this am & hold off on rehab transfer  -Heme following--cont on steroids and folic acid

## 2020-01-02 NOTE — PROGRESS NOTE ADULT - ASSESSMENT
85 yo M with hx Afib (Coumadin), DM2, HLD, CAD (stent 2014 and 2015 in mLAD and RCA), HFrEF (40%) severe AS, LBBB, baseline SCr 1.4, was referred by cardiologist for symptomatic CHF exacerbation. Echo from 12/10 shows severe aortic stenosis and EF 30%. Patient s/p RHC/LHC prior to scheduled TAVR. Patient was found to have worsening LE swelling and anemia with Hb of 7.9 and was admitted to medicine for further management. Pt was being optimized pre TAVR with diuretics per Heart failure service.   Pt underwent balloon aortic valvuloplasty on 12/26.    12/26 AV balloon valvuloplasty. s/p 1 PRBC  12/27 s/p PRBC x1. H/H stable 8/24 Transferred to 2 [facility]. Continue w/ intermittent diureses.    12/28: creatinine improving.   12/29: Creatinine continues to improve. Pt recommended for rehab. Also lives alone. Discharge when medically stable. Diuretics still being adjusted. Insulin added to regimen as blood sugars are high likely 2/2 addition of prednisone.   12/30: VSS. Continued improvement of BUN and creatinine. Cont on Lasix 40mg IV BID. Lantus and humalog added for elevated sugars, Dr. Cobb to evaluate pt today for insulin regimen. INR of 1.0, will dose 7.5 of Coumadin today in accordance with his home Coumadin schedule (7.5mg on M/W/F, 5mg on T/Th/Sat/Sun). Cont patient on prednisone 60 and folic acid as per heme, improvement of H&H today at 8.1 & 24.   Discharge planning-rehab once medically stable.   12/31 - vss hct 22 - pt asymptomatic - heme to tape steroids. d/c to rehab thursday. [facility]   1/1/20 - lasix po d/c'd by HF- pt received lasix 40mg po this am - will start Torsemide 40mg po tomorrow - d/c to rehab thursday on pred 60 per heme & diuretics.  1/2/19 repeat hbg 6.3 - RBC 1 unit as per Heme - spoke to fellow.  will hold off on transfer to rehab today - rehab tomorrow if hct stable  pt is asymptomatic @ this time

## 2020-01-02 NOTE — PROGRESS NOTE ADULT - ASSESSMENT
Assessment  DMT2: 84y Male with DM T2 with hyperglycemia, A1C 5.6%, was on Metformin in the past, patient now manages his DM with diet/exercise, postop on low-dose insulin, blood sugars trending within acceptable range, no hypoglycemic episodes. Patient is on prednisone, will be DC on prednisone taper per heme/onc recs. He is eating meals, states he feels lousy today, DC rehab on hold for hgb level, planning transfusion today.   Aortic Stenosis: s/p TAVR, on medications, no chest pain, stable, monitored.  HTN: Controlled,  on antihypertensive medications.  HLD: Controlled, on statin.  CKD: Monitor labs/BMP.          Apolinar Cobb MD  Cell: [phone]  Office: [phone] Assessment  DMT2: 84y Male with DM T2 with hyperglycemia, A1C 5.6%, was on Metformin in the past, patient now manages his DM with diet/exercise, postop on low-dose insulin, blood sugars trending within acceptable range, no hypoglycemic episodes.  Patient is on prednisone, will be DC on prednisone taper per heme/onc recs. He is eating meals, states he feels lousy today, DC rehab on hold for hgb level, planning transfusion today.   Aortic Stenosis: s/p TAVR, on medications, no chest pain, stable, monitored.  HTN: Controlled,  on antihypertensive medications.  HLD: Controlled, on statin.  CKD: Monitor labs/BMP.          Apolinar Cobb MD  Cell: [phone]  Office: [phone]

## 2020-01-02 NOTE — PROGRESS NOTE ADULT - PROBLEM SELECTOR PLAN 1
Will continue current insulin regimen for now; Will continue monitoring FS and FU.  Patient with A1C 5.6%, blood sugars trending at target on low-dose insulin management. Suggest to DC home on Tradjenta 5mg daily and FU endo 4 weeks.  Patient counseled for compliance with consistent low carb diet and exercise as tolerated outpatient. Patient with A1C 5.6%, blood sugars trending at target on low-dose insulin management. Suggest to DC home on Tradjenta 5mg daily and FU endo 4 weeks.  Patient counseled for compliance with consistent low carb diet and exercise as tolerated outpatient.

## 2020-01-02 NOTE — PROGRESS NOTE ADULT - SUBJECTIVE AND OBJECTIVE BOX
Chief complaint  Patient is a 84y old  Male who presents with a chief complaint of CHF exacerbation (02 Jan 2020 09:19)   Review of systems  Patient sitting up in chair, looks comfortable, no fever, no hypoglycemia.    Labs and Fingersticks  CAPILLARY BLOOD GLUCOSE      POCT Blood Glucose.: 128 mg/dL (02 Jan 2020 11:55)  POCT Blood Glucose.: 140 mg/dL (02 Jan 2020 07:51)  POCT Blood Glucose.: 220 mg/dL (01 Jan 2020 20:47)  POCT Blood Glucose.: 257 mg/dL (01 Jan 2020 16:54)      Anion Gap, Serum: 14 (01-02 @ 05:56)  Anion Gap, Serum: 16 (01-01 @ 09:23)      Calcium, Total Serum: 7.9 <L> (01-02 @ 05:56)  Calcium, Total Serum: 8.5 (01-01 @ 09:23)          01-02    132<L>  |  99  |  105<H>  ----------------------------<  135<H>  3.9   |  19<L>  |  1.78<H>    Ca    7.9<L>      02 Jan 2020 05:56                          6.3    17.23 )-----------( 159      ( 02 Jan 2020 08:21 )             19.0     Medications  MEDICATIONS  (STANDING):  aspirin enteric coated 81 milliGRAM(s) Oral daily  atorvastatin 40 milliGRAM(s) Oral at bedtime  dextrose 5%. 1000 milliLiter(s) (50 mL/Hr) IV Continuous <Continuous>  dextrose 50% Injectable 12.5 Gram(s) IV Push once  ferrous    sulfate 325 milliGRAM(s) Oral daily  fluticasone propionate 50 MICROgram(s)/spray Nasal Spray 1 Spray(s) Both Nostrils two times a day  folic acid 1 milliGRAM(s) Oral daily  insulin glargine Injectable (LANTUS) 10 Unit(s) SubCutaneous at bedtime  insulin lispro (HumaLOG) corrective regimen sliding scale   SubCutaneous three times a day before meals  insulin lispro (HumaLOG) corrective regimen sliding scale   SubCutaneous at bedtime  insulin lispro Injectable (HumaLOG) 7 Unit(s) SubCutaneous three times a day before meals  melatonin 5 milliGRAM(s) Oral at bedtime  pantoprazole    Tablet 40 milliGRAM(s) Oral before breakfast  polyethylene glycol 3350 17 Gram(s) Oral daily  predniSONE   Tablet 60 milliGRAM(s) Oral daily  senna 2 Tablet(s) Oral at bedtime  sodium chloride 0.9% lock flush 3 milliLiter(s) IV Push every 8 hours  sodium chloride 0.9%. 1000 milliLiter(s) (10 mL/Hr) IV Continuous <Continuous>  tiotropium 18 MICROgram(s) Capsule 1 Capsule(s) Inhalation daily  torsemide 40 milliGRAM(s) Oral daily  warfarin 5 milliGRAM(s) Oral once      Physical Exam  General: Patient comfortable in bed  Vital Signs Last 12 Hrs  T(F): 97.7 (01-02-20 @ 05:35), Max: 97.7 (01-02-20 @ 05:35)  HR: 92 (01-02-20 @ 05:35) (92 - 92)  BP: 105/57 (01-02-20 @ 05:35) (105/57 - 105/57)  BP(mean): 73 (01-02-20 @ 05:35) (73 - 73)  RR: 18 (01-02-20 @ 05:35) (18 - 18)  SpO2: 100% (01-02-20 @ 05:35) (100% - 100%)  Neck: No palpable thyroid nodules.  CVS: S1S2, No murmurs  Respiratory: No wheezing, no crepitations  GI: Abdomen soft, bowel sounds positive  Musculoskeletal:  edema lower extremities.   Skin: No skin rashes, no ecchymosis    Diagnostics Chief complaint  Patient is a 84y old  Male who presents with a chief complaint  of CHF exacerbation (02 Jan 2020 09:19)   Review of systems  Patient sitting up in chair, looks comfortable, no fever, no hypoglycemia.    Labs and Fingersticks  CAPILLARY BLOOD GLUCOSE      POCT Blood Glucose.: 128 mg/dL (02 Jan 2020 11:55)  POCT Blood Glucose.: 140 mg/dL (02 Jan 2020 07:51)  POCT Blood Glucose.: 220 mg/dL (01 Jan 2020 20:47)  POCT Blood Glucose.: 257 mg/dL (01 Jan 2020 16:54)      Anion Gap, Serum: 14 (01-02 @ 05:56)  Anion Gap, Serum: 16 (01-01 @ 09:23)      Calcium, Total Serum: 7.9 <L> (01-02 @ 05:56)  Calcium, Total Serum: 8.5 (01-01 @ 09:23)          01-02    132<L>  |  99  |  105<H>  ----------------------------<  135<H>  3.9   |  19<L>  |  1.78<H>    Ca    7.9<L>      02 Jan 2020 05:56                          6.3    17.23 )-----------( 159      ( 02 Jan 2020 08:21 )             19.0     Medications  MEDICATIONS  (STANDING):  aspirin enteric coated 81 milliGRAM(s) Oral daily  atorvastatin 40 milliGRAM(s) Oral at bedtime  dextrose 5%. 1000 milliLiter(s) (50 mL/Hr) IV Continuous <Continuous>  dextrose 50% Injectable 12.5 Gram(s) IV Push once  ferrous    sulfate 325 milliGRAM(s) Oral daily  fluticasone propionate 50 MICROgram(s)/spray Nasal Spray 1 Spray(s) Both Nostrils two times a day  folic acid 1 milliGRAM(s) Oral daily  insulin glargine Injectable (LANTUS) 10 Unit(s) SubCutaneous at bedtime  insulin lispro (HumaLOG) corrective regimen sliding scale   SubCutaneous three times a day before meals  insulin lispro (HumaLOG) corrective regimen sliding scale   SubCutaneous at bedtime  insulin lispro Injectable (HumaLOG) 7 Unit(s) SubCutaneous three times a day before meals  melatonin 5 milliGRAM(s) Oral at bedtime  pantoprazole    Tablet 40 milliGRAM(s) Oral before breakfast  polyethylene glycol 3350 17 Gram(s) Oral daily  predniSONE   Tablet 60 milliGRAM(s) Oral daily  senna 2 Tablet(s) Oral at bedtime  sodium chloride 0.9% lock flush 3 milliLiter(s) IV Push every 8 hours  sodium chloride 0.9%. 1000 milliLiter(s) (10 mL/Hr) IV Continuous <Continuous>  tiotropium 18 MICROgram(s) Capsule 1 Capsule(s) Inhalation daily  torsemide 40 milliGRAM(s) Oral daily  warfarin 5 milliGRAM(s) Oral once      Physical Exam  General: Patient comfortable in bed  Vital Signs Last 12 Hrs  T(F): 97.7 (01-02-20 @ 05:35), Max: 97.7 (01-02-20 @ 05:35)  HR: 92 (01-02-20 @ 05:35) (92 - 92)  BP: 105/57 (01-02-20 @ 05:35) (105/57 - 105/57)  BP(mean): 73 (01-02-20 @ 05:35) (73 - 73)  RR: 18 (01-02-20 @ 05:35) (18 - 18)  SpO2: 100% (01-02-20 @ 05:35) (100% - 100%)  Neck: No palpable thyroid nodules.  CVS: S1S2, No murmurs  Respiratory: No wheezing, no crepitations  GI: Abdomen soft, bowel sounds positive  Musculoskeletal:  edema lower extremities.   Skin: No skin rashes, no ecchymosis    Diagnostics

## 2020-01-02 NOTE — PROGRESS NOTE ADULT - SUBJECTIVE AND OBJECTIVE BOX
VITAL SIGNS-Telemetry: afib  85 ()   Vital Signs Last 24 Hrs  T(C): 36.5 (20 @ 05:35), Max: 36.5 (20 @ 05:35)  T(F): 97.7 (20 @ 05:35), Max: 97.7 (20 @ 05:35)  HR: 92 (20 @ 05:35) (62 - 92)  BP: 105/57 (20 @ 05:35) (105/56 - 110/68)  RR: 18 (20 @ 05:35) (18 - 18)  SpO2: 100% (20 @ 05:35) (96% - 100%)         - @ 07:01  -   @ 07:00  --------------------------------------------------------  IN: 700 mL / OUT: 1700 mL / NET: -1000 mL    Daily     Daily Weight in k.5 (2020 07:33)    CAPILLARY BLOOD GLUCOSE  POCT Blood Glucose.: 140 mg/dL (2020 07:51)  POCT Blood Glucose.: 220 mg/dL (2020 20:47)  POCT Blood Glucose.: 257 mg/dL (2020 16:54)  POCT Blood Glucose.: 165 mg/dL (2020 11:53)        Coumadin    [x ] YES          [  ]      NO         Reason: af       PHYSICAL EXAM:  Neurology: alert and oriented x 3, nonfocal, no gross deficits  CV : irregular - +systolic murmur  Sternal Wound :  CDI , Stable  Lungs: cta  Abdomen: soft, nontender, nondistended, positive bowel sounds, last bowel movement         Extremities:    no edema no calf tenderness     acetaminophen   Tablet .. 650 milliGRAM(s) Oral every 6 hours PRN  ALBUTerol    90 MICROgram(s) HFA Inhaler 2 Puff(s) Inhalation every 6 hours PRN  aspirin enteric coated 81 milliGRAM(s) Oral daily  atorvastatin 40 milliGRAM(s) Oral at bedtime  dextrose 40% Gel 15 Gram(s) Oral once PRN  dextrose 5%. 1000 milliLiter(s) IV Continuous <Continuous>  dextrose 50% Injectable 12.5 Gram(s) IV Push once  ferrous    sulfate 325 milliGRAM(s) Oral daily  fluticasone propionate 50 MICROgram(s)/spray Nasal Spray 1 Spray(s) Both Nostrils two times a day  folic acid 1 milliGRAM(s) Oral daily  glucagon  Injectable 1 milliGRAM(s) IntraMuscular once PRN  insulin glargine Injectable (LANTUS) 10 Unit(s) SubCutaneous at bedtime  insulin lispro (HumaLOG) corrective regimen sliding scale   SubCutaneous three times a day before meals  insulin lispro (HumaLOG) corrective regimen sliding scale   SubCutaneous at bedtime  insulin lispro Injectable (HumaLOG) 7 Unit(s) SubCutaneous three times a day before meals  melatonin 5 milliGRAM(s) Oral at bedtime  pantoprazole    Tablet 40 milliGRAM(s) Oral before breakfast  polyethylene glycol 3350 17 Gram(s) Oral daily  predniSONE   Tablet 60 milliGRAM(s) Oral daily  senna 2 Tablet(s) Oral at bedtime  sodium chloride 0.9% lock flush 3 milliLiter(s) IV Push every 8 hours  sodium chloride 0.9%. 1000 milliLiter(s) IV Continuous <Continuous>  tiotropium 18 MICROgram(s) Capsule 1 Capsule(s) Inhalation daily  torsemide 40 milliGRAM(s) Oral daily    Physical Therapy Rec:   Home  [  ]   Home w/ PT  [  ]  Rehab  [  ]  Discussed with Cardiothoracic Team at AM rounds. VITAL SIGNS-Telemetry: afib  85 ()   Vital Signs Last 24 Hrs  T(C): 36.5 (20 @ 05:35), Max: 36.5 (20 @ 05:35)  T(F): 97.7 (20 @ 05:35), Max: 97.7 (20 @ 05:35)  HR: 92 (20 @ 05:35) (62 - 92)  BP: 105/57 (20 @ 05:35) (105/56 - 110/68)  RR: 18 (20 @ 05:35) (18 - 18)  SpO2: 100% (20 @ 05:35) (96% - 100%)         - @ 07:01  -   @ 07:00  --------------------------------------------------------  IN: 700 mL / OUT: 1700 mL / NET: -1000 mL    Daily     Daily Weight in k.5 (2020 07:33)    CAPILLARY BLOOD GLUCOSE  POCT Blood Glucose.: 140 mg/dL (2020 07:51)  POCT Blood Glucose.: 220 mg/dL (2020 20:47)  POCT Blood Glucose.: 257 mg/dL (2020 16:54)  POCT Blood Glucose.: 165 mg/dL (2020 11:53)        Coumadin    [x ] YES          [  ]      NO         Reason: af       PHYSICAL EXAM:  Neurology: alert and oriented x 3, nonfocal, no gross deficits  CV : irregular - +systolic murmur  Lungs: cta  Abdomen: soft, nontender, nondistended, positive bowel sounds, last bowel movement         Extremities:    no edema no calf tenderness     acetaminophen   Tablet .. 650 milliGRAM(s) Oral every 6 hours PRN  ALBUTerol    90 MICROgram(s) HFA Inhaler 2 Puff(s) Inhalation every 6 hours PRN  aspirin enteric coated 81 milliGRAM(s) Oral daily  atorvastatin 40 milliGRAM(s) Oral at bedtime  dextrose 40% Gel 15 Gram(s) Oral once PRN  dextrose 5%. 1000 milliLiter(s) IV Continuous <Continuous>  dextrose 50% Injectable 12.5 Gram(s) IV Push once  ferrous    sulfate 325 milliGRAM(s) Oral daily  fluticasone propionate 50 MICROgram(s)/spray Nasal Spray 1 Spray(s) Both Nostrils two times a day  folic acid 1 milliGRAM(s) Oral daily  glucagon  Injectable 1 milliGRAM(s) IntraMuscular once PRN  insulin glargine Injectable (LANTUS) 10 Unit(s) SubCutaneous at bedtime  insulin lispro (HumaLOG) corrective regimen sliding scale   SubCutaneous three times a day before meals  insulin lispro (HumaLOG) corrective regimen sliding scale   SubCutaneous at bedtime  insulin lispro Injectable (HumaLOG) 7 Unit(s) SubCutaneous three times a day before meals  melatonin 5 milliGRAM(s) Oral at bedtime  pantoprazole    Tablet 40 milliGRAM(s) Oral before breakfast  polyethylene glycol 3350 17 Gram(s) Oral daily  predniSONE   Tablet 60 milliGRAM(s) Oral daily  senna 2 Tablet(s) Oral at bedtime  sodium chloride 0.9% lock flush 3 milliLiter(s) IV Push every 8 hours  sodium chloride 0.9%. 1000 milliLiter(s) IV Continuous <Continuous>  tiotropium 18 MICROgram(s) Capsule 1 Capsule(s) Inhalation daily  torsemide 40 milliGRAM(s) Oral daily    Physical Therapy Rec:   Home  [  ]   Home w/ PT  [  ]  Rehab  [  ]  Discussed with Cardiothoracic Team at AM rounds.

## 2020-01-03 VITALS — SYSTOLIC BLOOD PRESSURE: 116 MMHG | OXYGEN SATURATION: 96 % | HEART RATE: 79 BPM | DIASTOLIC BLOOD PRESSURE: 68 MMHG

## 2020-01-03 LAB
ANION GAP SERPL CALC-SCNC: 15 MMOL/L — SIGNIFICANT CHANGE UP (ref 5–17)
APTT BLD: 25.3 SEC — LOW (ref 27.5–36.3)
BUN SERPL-MCNC: 110 MG/DL — HIGH (ref 7–23)
CALCIUM SERPL-MCNC: 8.2 MG/DL — LOW (ref 8.4–10.5)
CHLORIDE SERPL-SCNC: 101 MMOL/L — SIGNIFICANT CHANGE UP (ref 96–108)
CO2 SERPL-SCNC: 18 MMOL/L — LOW (ref 22–31)
CREAT SERPL-MCNC: 1.79 MG/DL — HIGH (ref 0.5–1.3)
GLUCOSE BLDC GLUCOMTR-MCNC: 123 MG/DL — HIGH (ref 70–99)
GLUCOSE BLDC GLUCOMTR-MCNC: 159 MG/DL — HIGH (ref 70–99)
GLUCOSE SERPL-MCNC: 111 MG/DL — HIGH (ref 70–99)
HCT VFR BLD CALC: 20.6 % — CRITICAL LOW (ref 39–50)
HGB BLD-MCNC: 6.9 G/DL — CRITICAL LOW (ref 13–17)
INR BLD: 1.69 RATIO — HIGH (ref 0.88–1.16)
MCHC RBC-ENTMCNC: 31.4 PG — SIGNIFICANT CHANGE UP (ref 27–34)
MCHC RBC-ENTMCNC: 33.5 GM/DL — SIGNIFICANT CHANGE UP (ref 32–36)
MCV RBC AUTO: 93.6 FL — SIGNIFICANT CHANGE UP (ref 80–100)
NRBC # BLD: 0 /100 WBCS — SIGNIFICANT CHANGE UP (ref 0–0)
PLATELET # BLD AUTO: 150 K/UL — SIGNIFICANT CHANGE UP (ref 150–400)
POTASSIUM SERPL-MCNC: 4.4 MMOL/L — SIGNIFICANT CHANGE UP (ref 3.5–5.3)
POTASSIUM SERPL-SCNC: 4.4 MMOL/L — SIGNIFICANT CHANGE UP (ref 3.5–5.3)
PROTHROM AB SERPL-ACNC: 19.7 SEC — HIGH (ref 10–12.9)
RBC # BLD: 2.2 M/UL — LOW (ref 4.2–5.8)
RBC # FLD: 18.7 % — HIGH (ref 10.3–14.5)
SODIUM SERPL-SCNC: 134 MMOL/L — LOW (ref 135–145)
WBC # BLD: 16.01 K/UL — HIGH (ref 3.8–10.5)
WBC # FLD AUTO: 16.01 K/UL — HIGH (ref 3.8–10.5)

## 2020-01-03 PROCEDURE — 82553 CREATINE MB FRACTION: CPT

## 2020-01-03 PROCEDURE — 86334 IMMUNOFIX E-PHORESIS SERUM: CPT

## 2020-01-03 PROCEDURE — 83880 ASSAY OF NATRIURETIC PEPTIDE: CPT

## 2020-01-03 PROCEDURE — 93306 TTE W/DOPPLER COMPLETE: CPT

## 2020-01-03 PROCEDURE — 85045 AUTOMATED RETICULOCYTE COUNT: CPT

## 2020-01-03 PROCEDURE — 83521 IG LIGHT CHAINS FREE EACH: CPT

## 2020-01-03 PROCEDURE — 84295 ASSAY OF SERUM SODIUM: CPT

## 2020-01-03 PROCEDURE — 84165 PROTEIN E-PHORESIS SERUM: CPT

## 2020-01-03 PROCEDURE — 92986 REVISION OF AORTIC VALVE: CPT

## 2020-01-03 PROCEDURE — C1725: CPT

## 2020-01-03 PROCEDURE — 86038 ANTINUCLEAR ANTIBODIES: CPT

## 2020-01-03 PROCEDURE — 83605 ASSAY OF LACTIC ACID: CPT

## 2020-01-03 PROCEDURE — 84132 ASSAY OF SERUM POTASSIUM: CPT

## 2020-01-03 PROCEDURE — 94640 AIRWAY INHALATION TREATMENT: CPT

## 2020-01-03 PROCEDURE — 75574 CT ANGIO HRT W/3D IMAGE: CPT

## 2020-01-03 PROCEDURE — 82330 ASSAY OF CALCIUM: CPT

## 2020-01-03 PROCEDURE — 93010 ELECTROCARDIOGRAM REPORT: CPT

## 2020-01-03 PROCEDURE — 97530 THERAPEUTIC ACTIVITIES: CPT

## 2020-01-03 PROCEDURE — 83036 HEMOGLOBIN GLYCOSYLATED A1C: CPT

## 2020-01-03 PROCEDURE — 83735 ASSAY OF MAGNESIUM: CPT

## 2020-01-03 PROCEDURE — 36430 TRANSFUSION BLD/BLD COMPNT: CPT

## 2020-01-03 PROCEDURE — 84540 ASSAY OF URINE/UREA-N: CPT

## 2020-01-03 PROCEDURE — 82962 GLUCOSE BLOOD TEST: CPT

## 2020-01-03 PROCEDURE — 99152 MOD SED SAME PHYS/QHP 5/>YRS: CPT

## 2020-01-03 PROCEDURE — 84300 ASSAY OF URINE SODIUM: CPT

## 2020-01-03 PROCEDURE — 93880 EXTRACRANIAL BILAT STUDY: CPT

## 2020-01-03 PROCEDURE — 86850 RBC ANTIBODY SCREEN: CPT

## 2020-01-03 PROCEDURE — 80048 BASIC METABOLIC PNL TOTAL CA: CPT

## 2020-01-03 PROCEDURE — 83935 ASSAY OF URINE OSMOLALITY: CPT

## 2020-01-03 PROCEDURE — 85027 COMPLETE CBC AUTOMATED: CPT

## 2020-01-03 PROCEDURE — C1887: CPT

## 2020-01-03 PROCEDURE — 84166 PROTEIN E-PHORESIS/URINE/CSF: CPT

## 2020-01-03 PROCEDURE — 71045 X-RAY EXAM CHEST 1 VIEW: CPT

## 2020-01-03 PROCEDURE — 81001 URINALYSIS AUTO W/SCOPE: CPT

## 2020-01-03 PROCEDURE — 83930 ASSAY OF BLOOD OSMOLALITY: CPT

## 2020-01-03 PROCEDURE — 82746 ASSAY OF FOLIC ACID SERUM: CPT

## 2020-01-03 PROCEDURE — 84155 ASSAY OF PROTEIN SERUM: CPT

## 2020-01-03 PROCEDURE — 84550 ASSAY OF BLOOD/URIC ACID: CPT

## 2020-01-03 PROCEDURE — 93005 ELECTROCARDIOGRAM TRACING: CPT

## 2020-01-03 PROCEDURE — 86160 COMPLEMENT ANTIGEN: CPT

## 2020-01-03 PROCEDURE — 83010 ASSAY OF HAPTOGLOBIN QUANT: CPT

## 2020-01-03 PROCEDURE — 99239 HOSP IP/OBS DSCHRG MGMT >30: CPT

## 2020-01-03 PROCEDURE — 82803 BLOOD GASES ANY COMBINATION: CPT

## 2020-01-03 PROCEDURE — 82435 ASSAY OF BLOOD CHLORIDE: CPT

## 2020-01-03 PROCEDURE — 80162 ASSAY OF DIGOXIN TOTAL: CPT

## 2020-01-03 PROCEDURE — 83615 LACTATE (LD) (LDH) ENZYME: CPT

## 2020-01-03 PROCEDURE — C1894: CPT

## 2020-01-03 PROCEDURE — 83550 IRON BINDING TEST: CPT

## 2020-01-03 PROCEDURE — 97162 PT EVAL MOD COMPLEX 30 MIN: CPT

## 2020-01-03 PROCEDURE — 86157 COLD AGGLUTININ TITER: CPT

## 2020-01-03 PROCEDURE — 82607 VITAMIN B-12: CPT

## 2020-01-03 PROCEDURE — 93456 R HRT CORONARY ARTERY ANGIO: CPT

## 2020-01-03 PROCEDURE — 85384 FIBRINOGEN ACTIVITY: CPT

## 2020-01-03 PROCEDURE — 97116 GAIT TRAINING THERAPY: CPT

## 2020-01-03 PROCEDURE — C1760: CPT

## 2020-01-03 PROCEDURE — 82728 ASSAY OF FERRITIN: CPT

## 2020-01-03 PROCEDURE — 82947 ASSAY GLUCOSE BLOOD QUANT: CPT

## 2020-01-03 PROCEDURE — 82570 ASSAY OF URINE CREATININE: CPT

## 2020-01-03 PROCEDURE — 82272 OCCULT BLD FECES 1-3 TESTS: CPT

## 2020-01-03 PROCEDURE — 83540 ASSAY OF IRON: CPT

## 2020-01-03 PROCEDURE — 86900 BLOOD TYPING SEROLOGIC ABO: CPT

## 2020-01-03 PROCEDURE — 94010 BREATHING CAPACITY TEST: CPT

## 2020-01-03 PROCEDURE — C1889: CPT

## 2020-01-03 PROCEDURE — 76000 FLUOROSCOPY <1 HR PHYS/QHP: CPT

## 2020-01-03 PROCEDURE — C1769: CPT

## 2020-01-03 PROCEDURE — 85730 THROMBOPLASTIN TIME PARTIAL: CPT

## 2020-01-03 PROCEDURE — 84484 ASSAY OF TROPONIN QUANT: CPT

## 2020-01-03 PROCEDURE — 86431 RHEUMATOID FACTOR QUANT: CPT

## 2020-01-03 PROCEDURE — 82550 ASSAY OF CK (CPK): CPT

## 2020-01-03 PROCEDURE — 80053 COMPREHEN METABOLIC PANEL: CPT

## 2020-01-03 PROCEDURE — 86901 BLOOD TYPING SEROLOGIC RH(D): CPT

## 2020-01-03 PROCEDURE — 97110 THERAPEUTIC EXERCISES: CPT

## 2020-01-03 PROCEDURE — 71250 CT THORAX DX C-: CPT

## 2020-01-03 PROCEDURE — 74176 CT ABD & PELVIS W/O CONTRAST: CPT

## 2020-01-03 PROCEDURE — 84100 ASSAY OF PHOSPHORUS: CPT

## 2020-01-03 PROCEDURE — 85014 HEMATOCRIT: CPT

## 2020-01-03 PROCEDURE — 85610 PROTHROMBIN TIME: CPT

## 2020-01-03 RX ORDER — POLYETHYLENE GLYCOL 3350 17 G/17G
17 POWDER, FOR SOLUTION ORAL
Qty: 0 | Refills: 0 | DISCHARGE
Start: 2020-01-03

## 2020-01-03 RX ORDER — ACETAMINOPHEN 500 MG
2 TABLET ORAL
Qty: 0 | Refills: 0 | DISCHARGE
Start: 2020-01-03

## 2020-01-03 RX ORDER — FLUTICASONE PROPIONATE 50 MCG
1 SPRAY, SUSPENSION NASAL
Qty: 0 | Refills: 0 | DISCHARGE

## 2020-01-03 RX ORDER — ROSUVASTATIN CALCIUM 5 MG/1
1 TABLET ORAL
Qty: 0 | Refills: 0 | DISCHARGE

## 2020-01-03 RX ORDER — FLUTICASONE PROPIONATE 50 MCG
1 SPRAY, SUSPENSION NASAL
Qty: 0 | Refills: 0 | DISCHARGE
Start: 2020-01-03

## 2020-01-03 RX ORDER — PANTOPRAZOLE SODIUM 20 MG/1
1 TABLET, DELAYED RELEASE ORAL
Qty: 0 | Refills: 0 | DISCHARGE
Start: 2020-01-03

## 2020-01-03 RX ORDER — ALBUTEROL 90 UG/1
2 AEROSOL, METERED ORAL
Qty: 0 | Refills: 0 | DISCHARGE
Start: 2020-01-03

## 2020-01-03 RX ORDER — INSULIN GLARGINE 100 [IU]/ML
10 INJECTION, SOLUTION SUBCUTANEOUS
Qty: 0 | Refills: 0 | DISCHARGE
Start: 2020-01-03

## 2020-01-03 RX ORDER — TIOTROPIUM BROMIDE 18 UG/1
1 CAPSULE ORAL; RESPIRATORY (INHALATION)
Qty: 0 | Refills: 0 | DISCHARGE
Start: 2020-01-03

## 2020-01-03 RX ORDER — ATORVASTATIN CALCIUM 80 MG/1
1 TABLET, FILM COATED ORAL
Qty: 0 | Refills: 0 | DISCHARGE
Start: 2020-01-03

## 2020-01-03 RX ORDER — LANOLIN ALCOHOL/MO/W.PET/CERES
1 CREAM (GRAM) TOPICAL
Qty: 0 | Refills: 0 | DISCHARGE
Start: 2020-01-03

## 2020-01-03 RX ORDER — SENNA PLUS 8.6 MG/1
2 TABLET ORAL
Qty: 0 | Refills: 0 | DISCHARGE
Start: 2020-01-03

## 2020-01-03 RX ORDER — ASPIRIN/CALCIUM CARB/MAGNESIUM 324 MG
1 TABLET ORAL
Qty: 0 | Refills: 0 | DISCHARGE
Start: 2020-01-03

## 2020-01-03 RX ADMIN — Medication 60 MILLIGRAM(S): at 06:04

## 2020-01-03 RX ADMIN — SODIUM CHLORIDE 3 MILLILITER(S): 9 INJECTION INTRAMUSCULAR; INTRAVENOUS; SUBCUTANEOUS at 13:17

## 2020-01-03 RX ADMIN — Medication 40 MILLIGRAM(S): at 06:04

## 2020-01-03 RX ADMIN — Medication 1 MILLIGRAM(S): at 11:41

## 2020-01-03 RX ADMIN — Medication 325 MILLIGRAM(S): at 11:41

## 2020-01-03 RX ADMIN — Medication 1: at 12:25

## 2020-01-03 RX ADMIN — Medication 81 MILLIGRAM(S): at 11:41

## 2020-01-03 RX ADMIN — Medication 7 UNIT(S): at 08:38

## 2020-01-03 RX ADMIN — TIOTROPIUM BROMIDE 1 CAPSULE(S): 18 CAPSULE ORAL; RESPIRATORY (INHALATION) at 11:42

## 2020-01-03 RX ADMIN — PANTOPRAZOLE SODIUM 40 MILLIGRAM(S): 20 TABLET, DELAYED RELEASE ORAL at 06:04

## 2020-01-03 RX ADMIN — Medication 7 UNIT(S): at 12:25

## 2020-01-03 RX ADMIN — SODIUM CHLORIDE 3 MILLILITER(S): 9 INJECTION INTRAMUSCULAR; INTRAVENOUS; SUBCUTANEOUS at 06:04

## 2020-01-03 NOTE — PROGRESS NOTE ADULT - PROVIDER SPECIALTY LIST ADULT
CT Surgery
Critical Care
Critical Care
Endocrinology
Heart Failure
Heme/Onc
Internal Medicine
Nephrology
Structural Heart
Vascular Cardiology
CT Surgery

## 2020-01-03 NOTE — PROGRESS NOTE ADULT - ASSESSMENT
Assessment  DMT2: 84y Male with DM T2 with hyperglycemia, A1C 5.6%, patient manages his DM with diet/exercise, postop on low-dose insulin, blood sugars trending within acceptable range, no hypoglycemic episodes. Patient is on prednisone, will likely be DC on prednisone taper per heme/onc recs. He is eating meals, alert and comfortable, DC planning in progress, family at the bedside.  Aortic Stenosis: s/p TAVR, on medications, no chest pain, stable, monitored.  HTN: Controlled,  on antihypertensive medications.  HLD: Controlled, on statin.  CKD: Monitor labs/BMP.          Apolinar Cobb MD  Cell: [phone]  Office: [phone] Assessment  DMT2: 84y Male with DM T2 with hyperglycemia, A1C 5.6%, patient manages his DM with diet/exercise, postop on low-dose insulin,  blood sugars trending within acceptable range, no hypoglycemic episodes. Patient is on prednisone, will likely be DC on prednisone taper per heme/onc recs. He is eating meals, alert and comfortable, DC planning in progress, family at the bedside.  Aortic Stenosis: s/p TAVR, on medications, no chest pain, stable, monitored.  HTN: Controlled,  on antihypertensive medications.  HLD: Controlled, on statin.  CKD: Monitor labs/BMP.          Apolinar Cobb MD  Cell: [phone]  Office: [phone]

## 2020-01-03 NOTE — PROGRESS NOTE ADULT - PROBLEM SELECTOR PROBLEM 3
Anemia
Chronic atrial fibrillation
HTN (hypertension)
Severe aortic stenosis
Anemia
HTN (hypertension)
Severe aortic stenosis

## 2020-01-03 NOTE — PROGRESS NOTE ADULT - PROBLEM SELECTOR PROBLEM 2
Acute on chronic combined systolic and diastolic CHF, NYHA class 3
Aortic stenosis, severe
Atrial fibrillation, unspecified type
Hyponatremia
Severe aortic stenosis
Aortic stenosis, severe
Severe aortic stenosis
Atrial fibrillation, unspecified type

## 2020-01-03 NOTE — PROGRESS NOTE ADULT - PROBLEM SELECTOR PROBLEM 1
Acute kidney injury
Acute on chronic systolic (congestive) heart failure
Acute on chronic systolic heart failure, NYHA class 3
DM (diabetes mellitus)
Severe aortic stenosis
Acute kidney injury
Acute on chronic systolic (congestive) heart failure
DM (diabetes mellitus)
Acute on chronic systolic heart failure, NYHA class 3

## 2020-01-03 NOTE — CHART NOTE - NSCHARTNOTEFT_GEN_A_CORE
Nutrition Follow Up Note  Patient seen for: Nutrition follow up    Per medical record pt is an 84 year old male with PMH hx Afib (Coumadin), T2DM, HLD, CAD, HFrEF (40%) severe AS, LBBB, baseline SCr 1.4, was referred by cardiologist for symptomatic CHF exacerbation. Pt S/P balloon aortic valvuloplasty on 12/26. Noted per CTS note pt to undergo TAVR weeks after discharge from rehab. Currently on lasix and AC on Coumadin.     Source: Pt, medical record, and previous RD notes    Diet: Consistent Carbohydrate no snacks     Limited interview as pt was emotional during visit. States "I just want to go home, not to rehab." Expressed "I do better at home". Pt reports he is eating better and his appetite is back. Pt reports he loves breakfast and if he does not want to eat entire meal he leaves food off of the tray and saves it for later. No further food preferences at this time. Reinforced importance of adequate PO intake. Pt made aware RD remains available.    PO intake: Observed 100% breakfast consumed today (01/03). Per flow sheets pt consuming >75% documented meals since last dietitian follow up (12/31).     GI: Pt denies N+V. Per flow sheets last BM x 1 on 12/31     Enteral /Parenteral Nutrition: N/A    Weights: (01/02) 117.9 pounds (12/29) 121.6 pounds (12/22) 130.2 pounds- ?accuracy bed weight (12/17) 116.8 pounds (12/15) 120.5 pounds   % Weight Change: Pt with fluid shifts and on diuretic throughout hospital course. - Will continue to monitor    Pertinent Medications: Lantus, Humalog sliding scale, Demadex, Lipitor, ferrous sulfate, folic acid, Protonix, Miralax, Deltasone, senna  Pertinent Labs: (01/03) Finger Sticks 123, Sodium 134 <L>,  <H>, Creatinine 1.79 <H>, Glucose 111 <H>, Calcium 8.2 <L>, eGFR 34 <L> (01/02) Finger Sticks [id number]    Skin per nursing documentation: No pressure injuries  Edema: none noted per flow sheets    Estimated Needs:   [x] no change since previous assessment:  (based on 56.7kg weight from 12/18): 30-35 kcal/kg = 5646-7263 kcal/day; 1.2-1.4 g/kg= 68.04-79.38g protein/day  [ ] recalculated:     Previous Nutrition Diagnosis: Mild malnutrition  Nutrition Diagnosis is: ongoing; Being addressed with PO intake encouragement    New Nutrition Diagnosis: N/A    Recommend  1) Recommend change diet to Low Sodium, Consistent Carbohydrate with snack; Defer fluids to team. Monitor and adjust as needed. Spoke to provider.  2) Continue to monitor PO intake, diet tolerance, weight, labs, skin integrity, food preferences, and further educational needs.     RD remains available upon request and will follow up per protocol  Clarice Arias, Dietetic Intern (Pager #[phone]) Nutrition Follow Up Note  Patient seen for: Malnutrition follow up    Per medical record pt is an 84 year old male with PMH hx Afib (Coumadin), T2DM, HLD, CAD, HFrEF (40%) severe AS, LBBB, baseline SCr 1.4, was referred by cardiologist for symptomatic CHF exacerbation. Pt S/P balloon aortic valvuloplasty on 12/26. Noted per CTS note pt to undergo TAVR weeks after discharge from rehab. Currently on AC on Coumadin.     Source: Pt, medical record, and previous RD notes    Diet: Consistent Carbohydrate no snacks     Limited interview as pt was emotional during visit. States "I just want to go home, not to rehab." Expressed "I do better at home". Pt reports he is eating better and his appetite is back. Pt reports he loves breakfast and if he does not want to eat entire meal he leaves food off of the tray and saves it for later. No further food preferences at this time. Reinforced importance of adequate PO intake. Pt made aware RD remains available.    PO intake: Observed 100% breakfast consumed today (01/03). Per flow sheets pt consuming >75% documented meals since last dietitian follow up (12/31).     GI: Pt denies N+V. Per flow sheets last BM x 1 on 12/31     Enteral /Parenteral Nutrition: N/A    Weights: (01/02) 117.9 pounds (12/29) 121.6 pounds (12/22) 130.2 pounds- ?accuracy bed weight (12/17) 116.8 pounds (12/15) 120.5 pounds   % Weight Change: Pt with fluid shifts and on diuretic throughout hospital course. - Will continue to monitor    Pertinent Medications: Lantus, Humalog sliding scale, Demadex, Lipitor, ferrous sulfate, folic acid, Protonix, Miralax, Deltasone, senna  Pertinent Labs: (01/03) Finger Sticks 123, Sodium 134 <L>,  <H>, Creatinine 1.79 <H>, Glucose 111 <H>, Calcium 8.2 <L>, eGFR 34 <L> (01/02) Finger Sticks [id number]    Skin per nursing documentation: No pressure injuries  Edema: none noted per flow sheets    Estimated Needs:   [x] no change since previous assessment:  (based on 56.7kg weight from 12/18): 30-35 kcal/kg = 9126-2076 kcal/day; 1.2-1.4 g/kg= 68.04-79.38g protein/day  [ ] recalculated:     Previous Nutrition Diagnosis: Mild malnutrition  Nutrition Diagnosis is: ongoing; Being addressed with PO intake encouragement  Nutrition care plan     New Nutrition Diagnosis: N/A    Recommend  1) Continue Consistent Carbohydrate diet; Consider adding low sodium if indicated. Defer fluids to team. Monitor and adjust as needed. Spoke to provider.  2) Continue to monitor PO intake, diet tolerance, weight, labs, skin integrity, food preferences, and further educational needs.     RD remains available upon request and will follow up per protocol  Clarice Arias, Dietetic Intern (Pager #[phone])

## 2020-01-03 NOTE — PROGRESS NOTE ADULT - SUBJECTIVE AND OBJECTIVE BOX
Chief complaint  Patient is a 84y old  Male who presents with a chief complaint of CHF exacerbation (02 Jan 2020 12:11)   Review of systems  Patient in bed, looks comfortable, no fever, no hypoglycemia.    Labs and Fingersticks  CAPILLARY BLOOD GLUCOSE      POCT Blood Glucose.: 123 mg/dL (03 Jan 2020 08:30)  POCT Blood Glucose.: 116 mg/dL (02 Jan 2020 21:25)  POCT Blood Glucose.: 173 mg/dL (02 Jan 2020 17:16)  POCT Blood Glucose.: 128 mg/dL (02 Jan 2020 11:55)      Anion Gap, Serum: 15 (01-03 @ 07:13)  Anion Gap, Serum: 14 (01-02 @ 05:56)      Calcium, Total Serum: 8.2 <L> (01-03 @ 07:13)  Calcium, Total Serum: 7.9 <L> (01-02 @ 05:56)          01-03    134<L>  |  101  |  110<H>  ----------------------------<  111<H>  4.4   |  18<L>  |  1.79<H>    Ca    8.2<L>      03 Jan 2020 07:13                          6.9    16.01 )-----------( 150      ( 03 Jan 2020 07:22 )             20.6     Medications  MEDICATIONS  (STANDING):  aspirin enteric coated 81 milliGRAM(s) Oral daily  atorvastatin 40 milliGRAM(s) Oral at bedtime  dextrose 5%. 1000 milliLiter(s) (50 mL/Hr) IV Continuous <Continuous>  dextrose 50% Injectable 12.5 Gram(s) IV Push once  ferrous    sulfate 325 milliGRAM(s) Oral daily  fluticasone propionate 50 MICROgram(s)/spray Nasal Spray 1 Spray(s) Both Nostrils two times a day  folic acid 1 milliGRAM(s) Oral daily  insulin glargine Injectable (LANTUS) 10 Unit(s) SubCutaneous at bedtime  insulin lispro (HumaLOG) corrective regimen sliding scale   SubCutaneous three times a day before meals  insulin lispro (HumaLOG) corrective regimen sliding scale   SubCutaneous at bedtime  insulin lispro Injectable (HumaLOG) 7 Unit(s) SubCutaneous three times a day before meals  melatonin 5 milliGRAM(s) Oral at bedtime  pantoprazole    Tablet 40 milliGRAM(s) Oral before breakfast  polyethylene glycol 3350 17 Gram(s) Oral daily  predniSONE   Tablet 60 milliGRAM(s) Oral daily  senna 2 Tablet(s) Oral at bedtime  sodium chloride 0.9% lock flush 3 milliLiter(s) IV Push every 8 hours  sodium chloride 0.9%. 1000 milliLiter(s) (10 mL/Hr) IV Continuous <Continuous>  tiotropium 18 MICROgram(s) Capsule 1 Capsule(s) Inhalation daily  torsemide 40 milliGRAM(s) Oral daily      Physical Exam  General: Patient comfortable in bed  Vital Signs Last 12 Hrs  T(F): 98 (01-03-20 @ 04:25), Max: 98 (01-03-20 @ 04:25)  HR: 87 (01-03-20 @ 04:25) (87 - 87)  BP: 119/74 (01-03-20 @ 04:25) (119/74 - 119/74)  BP(mean): --  RR: 18 (01-03-20 @ 04:25) (18 - 18)  SpO2: 99% (01-03-20 @ 04:25) (99% - 99%)  Neck: No palpable thyroid nodules.  CVS: S1S2, No murmurs  Respiratory: No wheezing, no crepitations  GI: Abdomen soft, bowel sounds positive  Musculoskeletal:  edema lower extremities.   Skin: No skin rashes, no ecchymosis    Diagnostics Chief complaint  Patient is a 84y old  Male who presents with a chief complaint of CHF exacerbation (02 Jan 2020 12:11)   Review of systems  Patient in bed, looks comfortable, no fever,  no hypoglycemia.    Labs and Fingersticks  CAPILLARY BLOOD GLUCOSE      POCT Blood Glucose.: 123 mg/dL (03 Jan 2020 08:30)  POCT Blood Glucose.: 116 mg/dL (02 Jan 2020 21:25)  POCT Blood Glucose.: 173 mg/dL (02 Jan 2020 17:16)  POCT Blood Glucose.: 128 mg/dL (02 Jan 2020 11:55)      Anion Gap, Serum: 15 (01-03 @ 07:13)  Anion Gap, Serum: 14 (01-02 @ 05:56)      Calcium, Total Serum: 8.2 <L> (01-03 @ 07:13)  Calcium, Total Serum: 7.9 <L> (01-02 @ 05:56)          01-03    134<L>  |  101  |  110<H>  ----------------------------<  111<H>  4.4   |  18<L>  |  1.79<H>    Ca    8.2<L>      03 Jan 2020 07:13                          6.9    16.01 )-----------( 150      ( 03 Jan 2020 07:22 )             20.6     Medications  MEDICATIONS  (STANDING):  aspirin enteric coated 81 milliGRAM(s) Oral daily  atorvastatin 40 milliGRAM(s) Oral at bedtime  dextrose 5%. 1000 milliLiter(s) (50 mL/Hr) IV Continuous <Continuous>  dextrose 50% Injectable 12.5 Gram(s) IV Push once  ferrous    sulfate 325 milliGRAM(s) Oral daily  fluticasone propionate 50 MICROgram(s)/spray Nasal Spray 1 Spray(s) Both Nostrils two times a day  folic acid 1 milliGRAM(s) Oral daily  insulin glargine Injectable (LANTUS) 10 Unit(s) SubCutaneous at bedtime  insulin lispro (HumaLOG) corrective regimen sliding scale   SubCutaneous three times a day before meals  insulin lispro (HumaLOG) corrective regimen sliding scale   SubCutaneous at bedtime  insulin lispro Injectable (HumaLOG) 7 Unit(s) SubCutaneous three times a day before meals  melatonin 5 milliGRAM(s) Oral at bedtime  pantoprazole    Tablet 40 milliGRAM(s) Oral before breakfast  polyethylene glycol 3350 17 Gram(s) Oral daily  predniSONE   Tablet 60 milliGRAM(s) Oral daily  senna 2 Tablet(s) Oral at bedtime  sodium chloride 0.9% lock flush 3 milliLiter(s) IV Push every 8 hours  sodium chloride 0.9%. 1000 milliLiter(s) (10 mL/Hr) IV Continuous <Continuous>  tiotropium 18 MICROgram(s) Capsule 1 Capsule(s) Inhalation daily  torsemide 40 milliGRAM(s) Oral daily      Physical Exam  General: Patient comfortable in bed  Vital Signs Last 12 Hrs  T(F): 98 (01-03-20 @ 04:25), Max: 98 (01-03-20 @ 04:25)  HR: 87 (01-03-20 @ 04:25) (87 - 87)  BP: 119/74 (01-03-20 @ 04:25) (119/74 - 119/74)  BP(mean): --  RR: 18 (01-03-20 @ 04:25) (18 - 18)  SpO2: 99% (01-03-20 @ 04:25) (99% - 99%)  Neck: No palpable thyroid nodules.  CVS: S1S2, No murmurs  Respiratory: No wheezing, no crepitations  GI: Abdomen soft, bowel sounds positive  Musculoskeletal:  edema lower extremities.   Skin: No skin rashes, no ecchymosis    Diagnostics

## 2020-01-03 NOTE — PROGRESS NOTE ADULT - REASON FOR ADMISSION
CHF exacerbation
Warm AIHA
CHF exacerbation
Anemia, CHF
CHF exacerbation

## 2020-01-05 ENCOUNTER — INPATIENT (INPATIENT)
Facility: HOSPITAL | Age: 85
LOS: 9 days | Discharge: HOME | DRG: 299 | End: 2020-01-15
Attending: INTERNAL MEDICINE | Admitting: INTERNAL MEDICINE
Payer: MEDICARE

## 2020-01-05 VITALS
RESPIRATION RATE: 18 BRPM | WEIGHT: 139.99 LBS | SYSTOLIC BLOOD PRESSURE: 100 MMHG | OXYGEN SATURATION: 100 % | HEART RATE: 108 BPM | TEMPERATURE: 98 F | DIASTOLIC BLOOD PRESSURE: 71 MMHG

## 2020-01-05 DIAGNOSIS — S30.1XXA CONTUSION OF ABDOMINAL WALL, INITIAL ENCOUNTER: ICD-10-CM

## 2020-01-05 DIAGNOSIS — D59.1 OTHER AUTOIMMUNE HEMOLYTIC ANEMIAS: ICD-10-CM

## 2020-01-05 DIAGNOSIS — Z79.01 LONG TERM (CURRENT) USE OF ANTICOAGULANTS: ICD-10-CM

## 2020-01-05 DIAGNOSIS — I50.22 CHRONIC SYSTOLIC (CONGESTIVE) HEART FAILURE: ICD-10-CM

## 2020-01-05 DIAGNOSIS — D64.9 ANEMIA, UNSPECIFIED: ICD-10-CM

## 2020-01-05 DIAGNOSIS — Z29.9 ENCOUNTER FOR PROPHYLACTIC MEASURES, UNSPECIFIED: ICD-10-CM

## 2020-01-05 LAB
ALBUMIN SERPL ELPH-MCNC: 3.3 G/DL — SIGNIFICANT CHANGE UP (ref 3.3–5)
ALP SERPL-CCNC: 76 U/L — SIGNIFICANT CHANGE UP (ref 40–120)
ALT FLD-CCNC: 18 U/L — SIGNIFICANT CHANGE UP (ref 10–45)
ANION GAP SERPL CALC-SCNC: 15 MMOL/L — SIGNIFICANT CHANGE UP (ref 5–17)
ANISOCYTOSIS BLD QL: SLIGHT — SIGNIFICANT CHANGE UP
APTT BLD: 25.1 SEC — LOW (ref 27.5–36.3)
AST SERPL-CCNC: 13 U/L — SIGNIFICANT CHANGE UP (ref 10–40)
BASOPHILS # BLD AUTO: 0 K/UL — SIGNIFICANT CHANGE UP (ref 0–0.2)
BASOPHILS NFR BLD AUTO: 0 % — SIGNIFICANT CHANGE UP (ref 0–2)
BILIRUB SERPL-MCNC: 3 MG/DL — HIGH (ref 0.2–1.2)
BUN SERPL-MCNC: 102 MG/DL — HIGH (ref 7–23)
CALCIUM SERPL-MCNC: 7.9 MG/DL — LOW (ref 8.4–10.5)
CHLORIDE SERPL-SCNC: 96 MMOL/L — SIGNIFICANT CHANGE UP (ref 96–108)
CO2 SERPL-SCNC: 17 MMOL/L — LOW (ref 22–31)
CREAT SERPL-MCNC: 1.71 MG/DL — HIGH (ref 0.5–1.3)
ELLIPTOCYTES BLD QL SMEAR: SLIGHT — SIGNIFICANT CHANGE UP
EOSINOPHIL # BLD AUTO: 0 K/UL — SIGNIFICANT CHANGE UP (ref 0–0.5)
EOSINOPHIL NFR BLD AUTO: 0 % — SIGNIFICANT CHANGE UP (ref 0–6)
GLUCOSE SERPL-MCNC: 268 MG/DL — HIGH (ref 70–99)
HCT VFR BLD CALC: 15.5 % — CRITICAL LOW (ref 39–50)
HGB BLD-MCNC: 5.3 G/DL — CRITICAL LOW (ref 13–17)
INR BLD: 2.01 RATIO — HIGH (ref 0.88–1.16)
LYMPHOCYTES # BLD AUTO: 12 % — LOW (ref 13–44)
LYMPHOCYTES # BLD AUTO: 2.55 K/UL — SIGNIFICANT CHANGE UP (ref 1–3.3)
MANUAL SMEAR VERIFICATION: SIGNIFICANT CHANGE UP
MCHC RBC-ENTMCNC: 32.3 PG — SIGNIFICANT CHANGE UP (ref 27–34)
MCHC RBC-ENTMCNC: 34.2 GM/DL — SIGNIFICANT CHANGE UP (ref 32–36)
MCV RBC AUTO: 94.5 FL — SIGNIFICANT CHANGE UP (ref 80–100)
MICROCYTES BLD QL: SLIGHT — SIGNIFICANT CHANGE UP
MONOCYTES # BLD AUTO: 0.64 K/UL — SIGNIFICANT CHANGE UP (ref 0–0.9)
MONOCYTES NFR BLD AUTO: 3 % — SIGNIFICANT CHANGE UP (ref 2–14)
MYELOCYTES NFR BLD: 1 % — HIGH (ref 0–0)
NEUTROPHILS # BLD AUTO: 17.85 K/UL — HIGH (ref 1.8–7.4)
NEUTROPHILS NFR BLD AUTO: 83 % — HIGH (ref 43–77)
NEUTS BAND # BLD: 1 % — SIGNIFICANT CHANGE UP (ref 0–8)
NRBC # BLD: 0 /100 — SIGNIFICANT CHANGE UP (ref 0–0)
PLAT MORPH BLD: NORMAL — SIGNIFICANT CHANGE UP
PLATELET # BLD AUTO: 191 K/UL — SIGNIFICANT CHANGE UP (ref 150–400)
POIKILOCYTOSIS BLD QL AUTO: SLIGHT — SIGNIFICANT CHANGE UP
POTASSIUM SERPL-MCNC: 4.7 MMOL/L — SIGNIFICANT CHANGE UP (ref 3.5–5.3)
POTASSIUM SERPL-SCNC: 4.7 MMOL/L — SIGNIFICANT CHANGE UP (ref 3.5–5.3)
PROT SERPL-MCNC: 6.3 G/DL — SIGNIFICANT CHANGE UP (ref 6–8.3)
PROTHROM AB SERPL-ACNC: 23.4 SEC — HIGH (ref 10–12.9)
RBC # BLD: 1.64 M/UL — LOW (ref 4.2–5.8)
RBC # BLD: 1.83 M/UL — LOW (ref 4.2–5.8)
RBC # FLD: 19.1 % — HIGH (ref 10.3–14.5)
RBC BLD AUTO: ABNORMAL
RETICS #: 143.1 K/UL — HIGH (ref 25–125)
RETICS/RBC NFR: 7.8 % — HIGH (ref 0.5–2.5)
ROULEAUX BLD QL SMEAR: PRESENT
SODIUM SERPL-SCNC: 128 MMOL/L — LOW (ref 135–145)
WBC # BLD: 21.25 K/UL — HIGH (ref 3.8–10.5)
WBC # FLD AUTO: 21.25 K/UL — HIGH (ref 3.8–10.5)

## 2020-01-05 PROCEDURE — 74176 CT ABD & PELVIS W/O CONTRAST: CPT | Mod: 26

## 2020-01-05 PROCEDURE — 93010 ELECTROCARDIOGRAM REPORT: CPT

## 2020-01-05 PROCEDURE — 99233 SBSQ HOSP IP/OBS HIGH 50: CPT

## 2020-01-05 PROCEDURE — 99223 1ST HOSP IP/OBS HIGH 75: CPT

## 2020-01-05 PROCEDURE — 99291 CRITICAL CARE FIRST HOUR: CPT | Mod: GC

## 2020-01-05 RX ORDER — SIMVASTATIN 20 MG/1
40 TABLET, FILM COATED ORAL AT BEDTIME
Refills: 0 | Status: DISCONTINUED | OUTPATIENT
Start: 2020-01-05 | End: 2020-01-15

## 2020-01-05 RX ORDER — FERROUS SULFATE 325(65) MG
325 TABLET ORAL DAILY
Refills: 0 | Status: DISCONTINUED | OUTPATIENT
Start: 2020-01-05 | End: 2020-01-15

## 2020-01-05 RX ORDER — FERROUS SULFATE 325(65) MG
1 TABLET ORAL
Qty: 0 | Refills: 0 | DISCHARGE

## 2020-01-05 RX ORDER — GLUCAGON INJECTION, SOLUTION 0.5 MG/.1ML
1 INJECTION, SOLUTION SUBCUTANEOUS ONCE
Refills: 0 | Status: DISCONTINUED | OUTPATIENT
Start: 2020-01-05 | End: 2020-01-15

## 2020-01-05 RX ORDER — INSULIN LISPRO 100/ML
VIAL (ML) SUBCUTANEOUS AT BEDTIME
Refills: 0 | Status: DISCONTINUED | OUTPATIENT
Start: 2020-01-05 | End: 2020-01-06

## 2020-01-05 RX ORDER — SODIUM CHLORIDE 9 MG/ML
1000 INJECTION, SOLUTION INTRAVENOUS
Refills: 0 | Status: DISCONTINUED | OUTPATIENT
Start: 2020-01-05 | End: 2020-01-15

## 2020-01-05 RX ORDER — DEXTROSE 50 % IN WATER 50 %
25 SYRINGE (ML) INTRAVENOUS ONCE
Refills: 0 | Status: DISCONTINUED | OUTPATIENT
Start: 2020-01-05 | End: 2020-01-15

## 2020-01-05 RX ORDER — FOLIC ACID 0.8 MG
1 TABLET ORAL DAILY
Refills: 0 | Status: DISCONTINUED | OUTPATIENT
Start: 2020-01-05 | End: 2020-01-15

## 2020-01-05 RX ORDER — DEXTROSE 50 % IN WATER 50 %
12.5 SYRINGE (ML) INTRAVENOUS ONCE
Refills: 0 | Status: DISCONTINUED | OUTPATIENT
Start: 2020-01-05 | End: 2020-01-15

## 2020-01-05 RX ORDER — SENNA PLUS 8.6 MG/1
1 TABLET ORAL AT BEDTIME
Refills: 0 | Status: DISCONTINUED | OUTPATIENT
Start: 2020-01-05 | End: 2020-01-15

## 2020-01-05 RX ORDER — LANOLIN ALCOHOL/MO/W.PET/CERES
3 CREAM (GRAM) TOPICAL AT BEDTIME
Refills: 0 | Status: DISCONTINUED | OUTPATIENT
Start: 2020-01-05 | End: 2020-01-15

## 2020-01-05 RX ORDER — INSULIN GLARGINE 100 [IU]/ML
10 INJECTION, SOLUTION SUBCUTANEOUS AT BEDTIME
Refills: 0 | Status: DISCONTINUED | OUTPATIENT
Start: 2020-01-06 | End: 2020-01-15

## 2020-01-05 RX ORDER — DEXTROSE 50 % IN WATER 50 %
15 SYRINGE (ML) INTRAVENOUS ONCE
Refills: 0 | Status: DISCONTINUED | OUTPATIENT
Start: 2020-01-05 | End: 2020-01-15

## 2020-01-05 RX ORDER — TIOTROPIUM BROMIDE 18 UG/1
1 CAPSULE ORAL; RESPIRATORY (INHALATION) DAILY
Refills: 0 | Status: DISCONTINUED | OUTPATIENT
Start: 2020-01-05 | End: 2020-01-15

## 2020-01-05 RX ORDER — MECLIZINE HCL 12.5 MG
12.5 TABLET ORAL THREE TIMES A DAY
Refills: 0 | Status: DISCONTINUED | OUTPATIENT
Start: 2020-01-05 | End: 2020-01-06

## 2020-01-05 RX ORDER — INSULIN LISPRO 100/ML
VIAL (ML) SUBCUTANEOUS
Refills: 0 | Status: DISCONTINUED | OUTPATIENT
Start: 2020-01-05 | End: 2020-01-06

## 2020-01-05 RX ORDER — FUROSEMIDE 40 MG
40 TABLET ORAL ONCE
Refills: 0 | Status: COMPLETED | OUTPATIENT
Start: 2020-01-05 | End: 2020-01-05

## 2020-01-05 RX ORDER — FLUTICASONE PROPIONATE 50 MCG
1 SPRAY, SUSPENSION NASAL
Refills: 0 | Status: DISCONTINUED | OUTPATIENT
Start: 2020-01-05 | End: 2020-01-15

## 2020-01-05 RX ORDER — ALBUTEROL 90 UG/1
2 AEROSOL, METERED ORAL EVERY 6 HOURS
Refills: 0 | Status: DISCONTINUED | OUTPATIENT
Start: 2020-01-05 | End: 2020-01-15

## 2020-01-05 RX ORDER — PANTOPRAZOLE SODIUM 20 MG/1
40 TABLET, DELAYED RELEASE ORAL
Refills: 0 | Status: DISCONTINUED | OUTPATIENT
Start: 2020-01-05 | End: 2020-01-06

## 2020-01-05 RX ORDER — ASPIRIN/CALCIUM CARB/MAGNESIUM 324 MG
81 TABLET ORAL DAILY
Refills: 0 | Status: DISCONTINUED | OUTPATIENT
Start: 2020-01-05 | End: 2020-01-15

## 2020-01-05 RX ORDER — POLYETHYLENE GLYCOL 3350 17 G/17G
17 POWDER, FOR SOLUTION ORAL DAILY
Refills: 0 | Status: DISCONTINUED | OUTPATIENT
Start: 2020-01-05 | End: 2020-01-15

## 2020-01-05 RX ADMIN — Medication 40 MILLIGRAM(S): at 23:37

## 2020-01-05 NOTE — CONSULT NOTE ADULT - ASSESSMENT
84M PMH A-fib (on coumadin), DM II, HLD, CAD (s/p stents 2014, 215), HF, Severe AS with associated hemolytic anemia, LBBB, presenting with anemia. Found on imaging to have LLE hematoma    - Imaging reviewed with radiology - pt with a [id number] hematoma over left groin   - Patient's anemia is likely secondary to chronic hemolytic anemia - hematoma is not large enough to explain hgb 5.3 otherwise  - Please obtain duplex of groin to rule out PSA  - On exam, LLE limb does NOT appear threatened - is warm and has palpable pedal pulses - no indication for urgent surgical intervention at this time  - Will f/u CT final read and duplex read (ordered)  - Discussed with Vascular surgery fellow    ELIECER Mata PGY3  Vascular Surgery  [id number]

## 2020-01-05 NOTE — ED PROVIDER NOTE - CRITICAL CARE INDICATION, MLM
patient was critically ill... Patient was critically ill with a high probability of imminent or life threatening deterioration due to sig anemia.

## 2020-01-05 NOTE — ED PROVIDER NOTE - NSTIMEPROVIDERCAREINITIATE_GEN_ER
[facility] Emergency Department.
05-Jan-2020 19:07

## 2020-01-05 NOTE — ED PROVIDER NOTE - OBJECTIVE STATEMENT
Attending Gerda: 83 yo M with hx Afib (Coumadin), DM2, HLD, CAD (stent 2014 and 2015 in mLAD and RCA), HFrEF (40%) severe AS, LBBB, biba from nursing facility for low hgb. Pt states found to be 5. +Mild lightheadedness/weakness. Denies cp, sob, abd pain, n/v/d, cough, congestion. Dark stools but chronic 2/2 iron supplementation.

## 2020-01-05 NOTE — ED ADULT NURSE NOTE - NSIMPLEMENTINTERV_GEN_ALL_ED
Implemented All Fall with Harm Risk Interventions:  [place] to call system. Call bell, personal items and telephone within reach. Instruct patient to call for assistance. Room bathroom lighting operational. Non-slip footwear when patient is off stretcher. Physically safe environment: no spills, clutter or unnecessary equipment. Stretcher in lowest position, wheels locked, appropriate side rails in place. Provide visual cue, wrist band, yellow gown, etc. Monitor gait and stability. Monitor for mental status changes and reorient to person, place, and time. Review medications for side effects contributing to fall risk. Reinforce activity limits and safety measures with patient and family. Provide visual clues: red socks.

## 2020-01-05 NOTE — PROGRESS NOTE ADULT - ASSESSMENT
84 yr old male with H/O DM2, chronic obstructive pulmonary disease, HTN, multivessel CAD, ischemic cardiomyopathy, Severe Systolic LV failure, chronic A Fib, severe AS, stented coronary RCA, mid LAD, Hemolytic anemia, on chronic steroids, chronic kidney disease stage 3.    S/P BAV 12/26, access via L groin, R groin for TVP,   Admitted with weakness, lightheadedness, difficulty finding word while speaking,  Labs Hct 15%  leukocytosis.  on PE + grape size inguinal hematoma.    Plan supplemental Oxygen, bronchodilators,     Symptomatic anemia, neurological symptoms & hypotension , with critical AS & CAD  IV access Transfuse 2 units PRBC   Diuresis   Patient @ risk for volume overload with Severe systolic LV dysfunction & AS  Discharge Hgb on 1/3 6.9 Hct 20%   >5% drop,   on AC INR 2  Stat CT + 6 x6 cm inguinal hematoma  Doubt cause of anemia, no tenderness or thrill,   will get L groin US   Hold AC  serial groin exams  monitor serial Hct & PT/INR  cont ASA  stented LAD& RCA    Obtain TTE ,   Vascular Surgery input,    Prednisone 60 mg/day,   LDH  Retic count    insulin SSI, Lantus  PPI for gastrointestinal prophylxis     chronic kidney disease Stage 3, monitor UO, BUN/Cr/K  Avoid hypotension, nephro toxic drugs.    Leukocytosis, no fever will obtain BC X2, UA  Procalcitonin  start ABx if patient destabilizing       NPO overnight,     I have spent 30 minutes providing critical care management to this patient.    I have spent 30 minutes providing critical care management to this patient.

## 2020-01-05 NOTE — ED PROVIDER NOTE - NS ED ROS FT
Patient
Constitutional: No fever or chills  Eyes: No visual changes  HEENT: No throat pain  CV: No chest pain  Resp: No SOB no cough  GI: No abd pain, nause or vomiting  : No dysuria  MSK: No musculoskeletal pain  Skin: No rash  Neuro: No headache

## 2020-01-05 NOTE — CONSULT NOTE ADULT - SUBJECTIVE AND OBJECTIVE BOX
Vascular Surgery Consult Note    Consulting surgical team: Vascular Surgery  Consulting attending: Dr. Saravia    HPI:  84M PMH A-fib (on coumadin), DM II, HLD, CAD (s/p stents 2014, 215), HF, Severe AS with associated hemolytic anemia, LBBB, presenting with anemia. Patient had undergone Balloon aortic valvuloplasty 12/26 with access through left groin. Patient lives at nursing facility and had outpatient labs drawn, via which he was found to be anemic to Hgb 5, prompting presentation to [facility] for further evaluation.    Upon arrival to [facility] ED, AVSS. WBC 21.25. H/H 5.3/15.5. INR 2.01. In the ED patient was noted to have some fullness over L groin. CT abd/pelvis obtained, which demonstrated ill-defined hematoma measuring approximately 6x6x2.5cm.     PAST MEDICAL HISTORY:  Anemia  LBBB (left bundle branch block)  DYER (dyspnea on exertion)  Coronary disease  CHF (congestive heart failure)  Former smoker  Meniere disease  HLD (hyperlipidemia)  DM (diabetes mellitus)  Atrial fibrillation  HTN (hypertension)      PAST SURGICAL HISTORY:  Lipoma  History of skin graft  Status post appendectomy      MEDICATIONS:  ALBUTerol    90 MICROgram(s) HFA Inhaler 2 Puff(s) Inhalation every 6 hours PRN  aspirin enteric coated 81 milliGRAM(s) Oral daily  dextrose 40% Gel 15 Gram(s) Oral once PRN  dextrose 5%. 1000 milliLiter(s) IV Continuous <Continuous>  dextrose 50% Injectable 12.5 Gram(s) IV Push once  dextrose 50% Injectable 25 Gram(s) IV Push once  dextrose 50% Injectable 25 Gram(s) IV Push once  ferrous    sulfate 325 milliGRAM(s) Oral daily  furosemide   Injectable 40 milliGRAM(s) IV Push once  glucagon  Injectable 1 milliGRAM(s) IntraMuscular once PRN  insulin lispro (HumaLOG) corrective regimen sliding scale   SubCutaneous three times a day before meals  insulin lispro (HumaLOG) corrective regimen sliding scale   SubCutaneous at bedtime  melatonin 3 milliGRAM(s) Oral at bedtime  pantoprazole    Tablet 40 milliGRAM(s) Oral before breakfast  predniSONE   Tablet 60 milliGRAM(s) Oral daily  senna 1 Tablet(s) Oral at bedtime  simvastatin 40 milliGRAM(s) Oral at bedtime  tiotropium 18 MICROgram(s) Capsule 1 Capsule(s) Inhalation daily      ALLERGIES:  No Known Allergies      VITALS & I/Os:  Vital Signs Last 24 Hrs  T(C): 36.3 (05 Jan 2020 22:45), Max: 36.8 (05 Jan 2020 19:30)  T(F): 97.4 (05 Jan 2020 22:45), Max: 98.3 (05 Jan 2020 19:30)  HR: 96 (05 Jan 2020 23:00) (92 - 110)  BP: 126/64 (05 Jan 2020 23:00) (100/71 - 126/64)  BP(mean): 87 (05 Jan 2020 23:00) (87 - 99)  RR: 18 (05 Jan 2020 23:00) (18 - 22)  SpO2: 99% (05 Jan 2020 23:00) (94% - 100%)    I&O's Summary    05 Jan 2020 07:01  -  05 Jan 2020 23:31  --------------------------------------------------------  IN: 300 mL / OUT: 0 mL / NET: 300 mL        PHYSICAL EXAM:  General: Laying in bed, in no acute distress.   Respiratory: Nonlabored  Abdominal: Soft, nondistended, nontender  Extremities   LLE: Fullness over : groin access site. No active drainage or bleeding noted. Palpable pedal pulses, foot warm  RLE: + Palpable femoral, palpable pedal pulses    LABS:                        5.3    21.25 )-----------( 191      ( 05 Jan 2020 19:49 )             15.5     01-05    128<L>  |  96  |  102<H>  ----------------------------<  268<H>  4.7   |  17<L>  |  1.71<H>    Ca    7.9<L>      05 Jan 2020 19:49    TPro  6.3  /  Alb  3.3  /  TBili  3.0<H>  /  DBili  x   /  AST  13  /  ALT  18  /  AlkPhos  76  01-05    Lactate:    PT/INR - ( 05 Jan 2020 19:49 )   PT: 23.4 sec;   INR: 2.01 ratio         PTT - ( 05 Jan 2020 19:49 )  PTT:25.1 sec              IMAGING:  CT Abdomen and Pelvis No Cont (01.05.20 @ 22:30)  The hematoma is ill-defined but measures approximately 6 x 6 x 2.5 cm.

## 2020-01-05 NOTE — H&P ADULT - NSHPREVIEWOFSYSTEMS_GEN_ALL_CORE
Review of Systems  Constitutional: [ ] fever, [ ]weight loss,  [ ]fatigue  Eyes: [ ] visual changes  Respiratory: [x ]shortness of breath;  [ ] cough, [ ]wheezing, [ ]chills, [ ]hemoptysis  Cardiovascular: [x ] chest pain, [ ]palpitations, [ ]dizziness,  [ ]leg swelling[ ]orthopnea[ ]PND  Gastrointestinal: [ ] abdominal pain, [ ]nausea, [ ]vomiting,  [ ]diarrhea   Genitourinary: [ ] dysuria, [ ] hematuria  Neurologic: [ ] headaches [ ] tremors[ ]weakness  Skin: [ ] itching, [ ]burning, [ ] rashes  Endocrine: [ ] heat or cold intolerance  Musculoskeletal: [ ] joint pain or swelling; [ ] muscle, back, or extremity pain  Psychiatric: [ ] depression, [ ]anxiety, [ ]mood swings, or [ ]difficulty sleeping  Hematologic: [ ] easy bruising, [ ] bleeding gums  All other systems negative except as noted Review of Systems  Constitutional: [ ] fever, [ ]weight loss,  [x]fatigue  Eyes: [ ] visual changes  Respiratory: [ ]shortness of breath;  [ ] cough, [ ]wheezing, [ ]chills, [ ]hemoptysis  Cardiovascular: [  ] chest pain, [ ]palpitations, [x ]dizziness,  [ ]leg swelling[ x]orthopnea[ ]PND  Gastrointestinal: [ ] abdominal pain, [ ]nausea, [ ]vomiting,  [ ]diarrhea   Genitourinary: [ ] dysuria, [ ] hematuria  Neurologic: [ ] headaches [ ] tremors[ ]weakness  Skin: [ ] itching, [ ]burning, [ ] rashes  Endocrine: [ ] heat or cold intolerance  Musculoskeletal: [ ] joint pain or swelling; [ ] muscle, back, or extremity pain  Psychiatric: [ ] depression, [ ]anxiety, [ ]mood swings, or [ ]difficulty sleeping  Hematologic: [ ] easy bruising, [ ] bleeding gums  All other systems negative except as noted

## 2020-01-05 NOTE — H&P ADULT - ASSESSMENT
85 yo M with hx Afib (Coumadin), DM2, HLD, CAD (stent 2014 and 2015 in mLAD and RCA), HFrEF (40%) severe AS, HFrEF  30%, LBBB, baseline SCr 1.4, and Autoimmune hemolytic who was admitted from 12/-1/3 and optimized with diuretics per Heart failure service, planned for TAVR however underwent  balloon aortic valvuloplasty on 12/26, restarted on AC for atrial fibrillation, followed by heme for anemia and ultimately discharged to [facility] facility on 1/3/20.   Returned to ER this evening after an episode of bleeding from left groin site and complaints of dizziness/weakness.  Found to be anemic and with L groin hematoma.   Admitted to CTS for further management.

## 2020-01-05 NOTE — H&P ADULT - EJECTION FRACTION >40 %
Brief Physical Exam and Communication Note - Resident Documentation    Name: Nabila Browning    Physical Exam  Temp: 98.2  F (36.8  C) Temp src: Axillary BP: 93/54   Heart Rate: 144 Resp: 58 SpO2: 90 %   Oxygen Delivery: 1/2 LPM    General: Resting comfortably, bed elevated. Opening eyes, looking around. Calm  HEENT: Anterior fontanelle soft, flat, open. NC in place. No drainage from eyes.  Cardiovascular: RRR, no murmur appreciated, brisk cap refill  Pulmonary: CTAB, on low flow, nasal cannula in place  Abdominal: Soft and nontender  Skin: Warm and dry. Paler complexion today  Neuro: Responds to touch appropriately, good tone    Family Update: Mother updated on telephone    Ross Garcia MD  Pediatric PGY1  Pager: [phone]   no

## 2020-01-05 NOTE — H&P ADULT - PROBLEM SELECTOR PLAN 1
Symptomatic anemia due to bleeding/hematoma  Transfuse 2 U PRBC  continue prednisone  Heme consult in AM

## 2020-01-05 NOTE — H&P ADULT - NSHPPHYSICALEXAM_GEN_ALL_CORE
General: Examined on stretcher, in no acute distress, prefers upright position , frail appearing  Neuro: non-focal  Psych: mood and affect appropriate  HEENT: PERRL  Neck: no jvd  Cardiovascular: grade III/VI ssytolic murmur  Respiratory: Nonlabored, clear  Abdominal: Soft, nondistended, nontender  Extremities : LLE: Fullness over : groin access site. No active drainage or bleeding noted. Palpable pedal pulses, foot warm  RLE: + Palpable femoral, palpable pedal pulses  Skin: pale, ecchymoses, ichteric General: Examined on stretcher, in no acute distress, prefers upright position , frail appearing  Neuro: non-focal  Psych: mood and affect appropriate  HEENT: PERRL  Neck: no jvd  Cardiovascular: grade III/VI ssytolic murmur  Respiratory: Nonlabored, clear  Abdominal: Soft, nondistended, nontender  Extremities : LLE: Fullness over R groin access site. No bleeding/pain/thrill noted. Palpable pedal pulses, foot warm  RLE: + Palpable femoral, palpable pedal pulses, Pea sized hematoma at site   Skin: pale, ecchymoses, ichteric

## 2020-01-05 NOTE — PROGRESS NOTE ADULT - SUBJECTIVE AND OBJECTIVE BOX
10:45-11:15pm    Patient seen and examined at the bedside.      OBJECTIVE:  ICU Vital Signs Last 24 Hrs  T(C): 36.8 (05 Jan 2020 19:30), Max: 36.8 (05 Jan 2020 19:30)  T(F): 98.3 (05 Jan 2020 19:30), Max: 98.3 (05 Jan 2020 19:30)  HR: 105 (05 Jan 2020 22:45) (92 - 110)  BP: 121/87 (05 Jan 2020 22:45) (100/71 - 124/84)  BP(mean): 99 (05 Jan 2020 22:45) (89 - 99)  ABP: --  ABP(mean): --  RR: 22 (05 Jan 2020 19:30) (18 - 22)  SpO2: 100% (05 Jan 2020 19:30) (100% - 100%)        CAPILLARY BLOOD GLUCOSE            Review of Systems  GENERAL:  + weakness,+atigue, fevers or chills  NEURO: + lightheadedness, no dizziness, numbness, tingling or weakness  SKIN: no itching, burning, rashes, or lesions   HEENT: no visual changes;  no headache, no vertigo, no recent colds  RESPIRATORY: + shortness of breath, no cough, sputum, wheezing  CARDIOVASCULAR:  no chest pain,  or palpitations  GI: no abd pain. no N/V/D.  PERIPHERAL VASCULAR: + swelling, no tenderness, no erythema      PHYSICAL EXAM:       General: chronically debilitated   Neurology: A&Ox3, nonfocal, CASTILLO x 4  Eyes: PERRLA/ EOMI, Gross vision intact  ENT/Neck: Neck supple, trachea midline, No JVD, Gross hearing intact  Respiratory:  B/L fine  rales, occasional rhonchi  CV: A Fib , + holosystolic murmurs   Abdominal: Soft, NT, ND +BS,   Extremities: No edema, + peripheral pulses  Skin: + grape size L inguinal Hematoma,     Tubes:  LINES:   [ ] Arterial Line   [ ] Central Line  [ ] PA catheter  [ ] IABP  [ ] ECMO  [ ] LVAD  [ ] Ventilator  [ ] pacemaker [ ] Impella    Beside evaluation, monitoring, treatment of hemodynamics, fluids, IVP/IVCD meds,         Ventilator (  )              Hemodynamic lability,instability. Requires IVCD [ ] vasopressors [ ] inotropes  [ ] vasodilator  [x ]IVSS fluid  to maintain MAP, perfusion, C.I.     Ventilator Management:  [ ]AC-rest    [ ]CPAP-PS Wean    [ ]Trach Collar     [ ]Extubate    [ ] T-Piece  [ ]peep>5     ALL MEDICATIONS:  MEDICATIONS  (STANDING):  dextrose 5%. 1000 milliLiter(s) (50 mL/Hr) IV Continuous <Continuous>  dextrose 50% Injectable 12.5 Gram(s) IV Push once  dextrose 50% Injectable 25 Gram(s) IV Push once  dextrose 50% Injectable 25 Gram(s) IV Push once  furosemide   Injectable 40 milliGRAM(s) IV Push once  insulin lispro (HumaLOG) corrective regimen sliding scale   SubCutaneous three times a day before meals  insulin lispro (HumaLOG) corrective regimen sliding scale   SubCutaneous at bedtime  pantoprazole    Tablet 40 milliGRAM(s) Oral before breakfast  predniSONE   Tablet 60 milliGRAM(s) Oral daily    MEDICATIONS  (PRN):  dextrose 40% Gel 15 Gram(s) Oral once PRN Blood Glucose LESS THAN 70 milliGRAM(s)/deciliter  glucagon  Injectable 1 milliGRAM(s) IntraMuscular once PRN Glucose LESS THAN 70 milligrams/deciliter      LABS:                        5.3    21.25 )-----------( 191      ( 05 Jan 2020 19:49 )             15.5     01-05    128<L>  |  96  |  102<H>  ----------------------------<  268<H>  4.7   |  17<L>  |  1.71<H>    Ca    7.9<L>      05 Jan 2020 19:49    TPro  6.3  /  Alb  3.3  /  TBili  3.0<H>  /  DBili  x   /  AST  13  /  ALT  18  /  AlkPhos  76  01-05    PT/INR - ( 05 Jan 2020 19:49 )   PT: 23.4 sec;   INR: 2.01 ratio         PTT - ( 05 Jan 2020 19:49 )  PTT:25.1 sec          LIVER FUNCTIONS - ( 05 Jan 2020 19:49 )  Alb: 3.3 g/dL / Pro: 6.3 g/dL / ALK PHOS: 76 U/L / ALT: 18 U/L / AST: 13 U/L / GGT: x           MICROBIOLOGY:     RADIOLOGY:  cardiomegaly + b/l intersitial marking     Assessment and Plan:

## 2020-01-05 NOTE — ED PROVIDER NOTE - PROGRESS NOTE DETAILS
on further exam, pt noted to have hematoma to L groin. on chart review, pt noted to have balloon angioplasty during last hospitalization, done by dr rojas, ct surgery. hematoma ? contributing to anemia. bilat LE warm, well perfused, very low suspicion of occlusion, though could represent pseudoaneurysm vs hematoma vs other vascular pathology. ct surgery consulted. ct surgery evaluated pt, asking for vascular surgery consult, which has been called and who will see pt. va duplex arterial US ordered. pt admitted to ct surgery, ctu. - Manas Lorenz MD

## 2020-01-05 NOTE — ED PROVIDER NOTE - CLINICAL SUMMARY MEDICAL DECISION MAKING FREE TEXT BOX
84M, extensive pmh as noted, chronic anemia, presents with low hgb. Pt endorses mild lightheadedness/weakness but otherwise without acute complaints. To obtain labs. Likely admit for transfusion. - Manas Lorenz MD

## 2020-01-05 NOTE — H&P ADULT - HISTORY OF PRESENT ILLNESS
85 yo M with hx Afib (Coumadin), DM2, HLD, CAD (stent 2014 and 2015 in mLAD and RCA), HFrEF (40%) severe AS, HFrEF  30%, LBBB, baseline SCr 1.4, and Autoimmune hemolytic who was admitted from 12/-1/3 and optimized with diuretics per Heart failure service, planned for TAVR however underwent  balloon aortic valvuloplasty on 12/26 and transfused 1 U PRBC.  Post-procedure course complicated by MAI, hyperglycemia, and anemia 2/2 AIHA on prednisone and followed by hematology.  He was restarted on Coumadin, received 1 U PRBC per heme on 1/2/20 and was discharged to [facility] on 1/3/20.   He continued on  Torsemide 40mg po and prednisone 60mg daily  per heme & diuretics.  He returns to the ER this evening due to symptomatic anemia related to reported bleeding from L groin access site on 1/4/20.  Patient states that he had bleeding on 1/4/20 and the staff applied pressure and a band-aid and the bleeding stopped.  Today he felt weak, short of breath, and dizzy and the rehab initiated transfer for further work-up.   Patient denies fever, chills, syncope, chest pain, hemoptysis, melena, hematochezia, dysuria.   In ER, patient was noted to have H+H of 5/15 downtrending from discharge values of 6.9/20.6 and noted to have a egg sized hematoma in the left groin.  Right groin stable with pea size firmness at access site.   When stretcher position was lifted from supine to upright patient complained of feeling dizzy and SBP was 100, decreased from 117.     Vascular consult was initiated, PRBC requested, CT scan non-contrast ordered. 83 yo M with hx Afib (Coumadin), DM2, HLD, CAD (stent 2014 and 2015 in mLAD and RCA), HFrEF (40%) severe AS, HFrEF  30%, LBBB, baseline SCr 1.4, and Autoimmune hemolytic who was admitted from 12/13 -1/3 and optimized with diuretics per Heart failure service, planned for TAVR however underwent  balloon aortic valvuloplasty on 12/26 and transfused 1 U PRBC. Post-procedure course complicated by MAI, hyperglycemia, and anemia 2/2 AIHA on prednisone and followed by hematology.  He was restarted on Coumadin, received 1 U PRBC per heme on 1/2/20 and was discharged to [facility] facility on 1/3/20.   He continued on  Torsemide 40mg po and prednisone 60mg daily  per heme & diuretics.  He returns to the ER this evening due to symptomatic anemia related to reported bleeding from L groin access site on 1/4/20.  Patient states that he had bleeding on 1/4/20 and the staff applied pressure and a band-aid and the bleeding stopped.  Today he felt weak, short of breath, and dizzy and the rehab initiated transfer for further work-up.   Patient denies fever, chills, syncope, chest pain, hemoptysis, melena, hematochezia, dysuria.   In ER, patient was noted to have H+H of 5/15 downtrending from discharge values of 6.9/20.6 and noted to have a egg sized hematoma in the left groin.  Right groin stable with pea size firmness at access site.   When stretcher position was lifted from supine to upright patient complained of feeling dizzy and SBP was 100, decreased from 117.     Vascular consult was initiated, PRBC requested, CT scan non-contrast ordered.

## 2020-01-05 NOTE — ED ADULT NURSE REASSESSMENT NOTE - NS ED NURSE REASSESS COMMENT FT1
hi Received report from Fifi BLOOM. Patient resting comfortably in stretcher. A&Ox3. Vital signs are stable. Patient denies chest pain, SOB, fever/chills, N/V. Patient in no acute distress. Patient reports bleeding from incision site near left groin. Edema noted in the left groin. No ecchymosis noted around the incision. Patient pending results of blood tests. Plan of care discussed. Safety and comfort measures maintained. Will continue to monitor.

## 2020-01-05 NOTE — ED ADULT NURSE REASSESSMENT NOTE - NS ED NURSE REASSESS COMMENT FT1
(Product) given. Consent in chart. Risks and benefits explained to patient. Patient verbalized understanding of risks and benefits. Patient aware of possible side effects. Vital signs stable. Second RN at bedside for confirmation. 1 unit of Packed red blood cells given. Consent in chart. Risks and benefits explained to patient. Patient verbalized understanding of risks and benefits. Patient aware of possible side effects. Vital signs stable. Second RN at bedside for confirmation.

## 2020-01-05 NOTE — H&P ADULT - NSHPLABSRESULTS_GEN_ALL_CORE
5.3    21.25 )-----------( 191      ( 05 Jan 2020 19:49 )             15.5   01-05    128<L>  |  96  |  102<H>  ----------------------------<  268<H>  4.7   |  17<L>  |  1.71<H>    Ca    7.9<L>      05 Jan 2020 19:49    TPro  6.3  /  Alb  3.3  /  TBili  3.0<H>  /  DBili  x   /  AST  13  /  ALT  18  /  AlkPhos  76  01-05

## 2020-01-05 NOTE — ED ADULT NURSE NOTE - OBJECTIVE STATEMENT
Pt is a 83 yo M who was brought to the ED from [facility] c/o anemia. Pt has Hx anemia, labs today shows H/H 5.7/16.3. Pt A/O x3, denies ha/dizziness, no cp/sob/palpitations, no abd pain n/v/d. Pt is a 85 yo M who was brought to the ED from [facility] c/o anemia. Pt has Hx anemia, labs today shows H/H 5.7/16.3. Pt A/O x3, c/o sob, denies ha/dizziness, no cp/palpitations, no abd pain n/v/d.

## 2020-01-05 NOTE — H&P ADULT - PROBLEM SELECTOR PLAN 2
Vascular consult  F/u CT A/P wo  results  Transfuse Vascular consult  F/u CT A/P wo  results  Transfuse  Arterial duplex

## 2020-01-05 NOTE — ED ADULT NURSE NOTE - PMH
Anemia    Atrial fibrillation    CHF (congestive heart failure)    Coronary disease  PCI  DM (diabetes mellitus)    DYER (dyspnea on exertion)    Former smoker    HLD (hyperlipidemia)    HTN (hypertension)    LBBB (left bundle branch block)    Meniere disease

## 2020-01-05 NOTE — ED ADULT NURSE NOTE - CHPI ED NUR SYMPTOMS NEG
no weakness/no fever/no pain/no dizziness/no chills/no nausea/no decreased eating/drinking/no tingling/no vomiting

## 2020-01-05 NOTE — ED PROVIDER NOTE - PHYSICAL EXAMINATION
R groin has healing incision (from balloon angioplasty?) with small amount of oozing blood. Fullness in area from possible hematoma.   Stool in vault, non-melanotic, no blood.   Porter Fernández M.D. PGY-2

## 2020-01-06 LAB
ANION GAP SERPL CALC-SCNC: 13 MMOL/L — SIGNIFICANT CHANGE UP (ref 5–17)
APPEARANCE UR: CLEAR — SIGNIFICANT CHANGE UP
APTT BLD: 23.2 SEC — LOW (ref 27.5–36.3)
BACTERIA # UR AUTO: NEGATIVE — SIGNIFICANT CHANGE UP
BILIRUB UR-MCNC: NEGATIVE — SIGNIFICANT CHANGE UP
BLD GP AB SCN SERPL QL: NEGATIVE — SIGNIFICANT CHANGE UP
BUN SERPL-MCNC: 113 MG/DL — HIGH (ref 7–23)
CALCIUM SERPL-MCNC: 7.8 MG/DL — LOW (ref 8.4–10.5)
CHLORIDE SERPL-SCNC: 100 MMOL/L — SIGNIFICANT CHANGE UP (ref 96–108)
CO2 SERPL-SCNC: 16 MMOL/L — LOW (ref 22–31)
COLOR SPEC: YELLOW — SIGNIFICANT CHANGE UP
CREAT SERPL-MCNC: 1.62 MG/DL — HIGH (ref 0.5–1.3)
DIFF PNL FLD: NEGATIVE — SIGNIFICANT CHANGE UP
E CLOAC COMP DNA BLD POS QL NAA+PROBE: SIGNIFICANT CHANGE UP
EPI CELLS # UR: 1 /HPF — SIGNIFICANT CHANGE UP
GLUCOSE BLDC GLUCOMTR-MCNC: 122 MG/DL — HIGH (ref 70–99)
GLUCOSE BLDC GLUCOMTR-MCNC: 130 MG/DL — HIGH (ref 70–99)
GLUCOSE BLDC GLUCOMTR-MCNC: 133 MG/DL — HIGH (ref 70–99)
GLUCOSE BLDC GLUCOMTR-MCNC: 143 MG/DL — HIGH (ref 70–99)
GLUCOSE BLDC GLUCOMTR-MCNC: 151 MG/DL — HIGH (ref 70–99)
GLUCOSE BLDC GLUCOMTR-MCNC: 174 MG/DL — HIGH (ref 70–99)
GLUCOSE BLDC GLUCOMTR-MCNC: 214 MG/DL — HIGH (ref 70–99)
GLUCOSE BLDC GLUCOMTR-MCNC: 214 MG/DL — HIGH (ref 70–99)
GLUCOSE BLDC GLUCOMTR-MCNC: 260 MG/DL — HIGH (ref 70–99)
GLUCOSE BLDC GLUCOMTR-MCNC: 329 MG/DL — HIGH (ref 70–99)
GLUCOSE BLDC GLUCOMTR-MCNC: 397 MG/DL — HIGH (ref 70–99)
GLUCOSE BLDC GLUCOMTR-MCNC: 59 MG/DL — LOW (ref 70–99)
GLUCOSE BLDC GLUCOMTR-MCNC: 63 MG/DL — LOW (ref 70–99)
GLUCOSE SERPL-MCNC: 205 MG/DL — HIGH (ref 70–99)
GLUCOSE UR QL: NEGATIVE — SIGNIFICANT CHANGE UP
GRAM STN FLD: SIGNIFICANT CHANGE UP
HAPTOGLOB SERPL-MCNC: <20 MG/DL — LOW (ref 34–200)
HCT VFR BLD CALC: 19.4 % — CRITICAL LOW (ref 39–50)
HCT VFR BLD CALC: 19.5 % — CRITICAL LOW (ref 39–50)
HGB BLD-MCNC: 6.5 G/DL — CRITICAL LOW (ref 13–17)
HGB BLD-MCNC: 7.1 G/DL — LOW (ref 13–17)
HYALINE CASTS # UR AUTO: 0 /LPF — SIGNIFICANT CHANGE UP (ref 0–7)
INR BLD: 2.01 RATIO — HIGH (ref 0.88–1.16)
KETONES UR-MCNC: NEGATIVE — SIGNIFICANT CHANGE UP
LDH SERPL L TO P-CCNC: 651 U/L — HIGH (ref 50–242)
LEUKOCYTE ESTERASE UR-ACNC: NEGATIVE — SIGNIFICANT CHANGE UP
MCHC RBC-ENTMCNC: 31.6 PG — SIGNIFICANT CHANGE UP (ref 27–34)
MCHC RBC-ENTMCNC: 33.5 GM/DL — SIGNIFICANT CHANGE UP (ref 32–36)
MCHC RBC-ENTMCNC: 34.3 PG — HIGH (ref 27–34)
MCHC RBC-ENTMCNC: 36.4 GM/DL — HIGH (ref 32–36)
MCV RBC AUTO: 94.2 FL — SIGNIFICANT CHANGE UP (ref 80–100)
MCV RBC AUTO: 94.2 FL — SIGNIFICANT CHANGE UP (ref 80–100)
METHOD TYPE: SIGNIFICANT CHANGE UP
NITRITE UR-MCNC: NEGATIVE — SIGNIFICANT CHANGE UP
NRBC # BLD: 3 /100 WBCS — HIGH (ref 0–0)
NRBC # BLD: 6 /100 WBCS — HIGH (ref 0–0)
PH UR: 5.5 — SIGNIFICANT CHANGE UP (ref 5–8)
PLATELET # BLD AUTO: 128 K/UL — LOW (ref 150–400)
PLATELET # BLD AUTO: 151 K/UL — SIGNIFICANT CHANGE UP (ref 150–400)
POTASSIUM SERPL-MCNC: 4.5 MMOL/L — SIGNIFICANT CHANGE UP (ref 3.5–5.3)
POTASSIUM SERPL-SCNC: 4.5 MMOL/L — SIGNIFICANT CHANGE UP (ref 3.5–5.3)
PROCALCITONIN SERPL-MCNC: 2.06 NG/ML — HIGH (ref 0.02–0.1)
PROT UR-MCNC: NEGATIVE — SIGNIFICANT CHANGE UP
PROTHROM AB SERPL-ACNC: 23.4 SEC — HIGH (ref 10–12.9)
RBC # BLD: 2.06 M/UL — LOW (ref 4.2–5.8)
RBC # BLD: 2.07 M/UL — LOW (ref 4.2–5.8)
RBC # FLD: 19.4 % — HIGH (ref 10.3–14.5)
RBC # FLD: 19.9 % — HIGH (ref 10.3–14.5)
RBC CASTS # UR COMP ASSIST: 9 /HPF — HIGH (ref 0–4)
RH IG SCN BLD-IMP: POSITIVE — SIGNIFICANT CHANGE UP
SODIUM SERPL-SCNC: 129 MMOL/L — LOW (ref 135–145)
SP GR SPEC: 1.01 — SIGNIFICANT CHANGE UP (ref 1.01–1.02)
SPECIMEN SOURCE: SIGNIFICANT CHANGE UP
SPECIMEN SOURCE: SIGNIFICANT CHANGE UP
TSH SERPL-MCNC: 4.37 UIU/ML — HIGH (ref 0.27–4.2)
UROBILINOGEN FLD QL: ABNORMAL
WBC # BLD: 22.95 K/UL — HIGH (ref 3.8–10.5)
WBC # BLD: 24.12 K/UL — HIGH (ref 3.8–10.5)
WBC # FLD AUTO: 22.95 K/UL — HIGH (ref 3.8–10.5)
WBC # FLD AUTO: 24.12 K/UL — HIGH (ref 3.8–10.5)
WBC UR QL: 1 /HPF — SIGNIFICANT CHANGE UP (ref 0–5)

## 2020-01-06 PROCEDURE — 93926 LOWER EXTREMITY STUDY: CPT | Mod: 26,LT

## 2020-01-06 PROCEDURE — 93306 TTE W/DOPPLER COMPLETE: CPT | Mod: 26

## 2020-01-06 PROCEDURE — 99222 1ST HOSP IP/OBS MODERATE 55: CPT

## 2020-01-06 PROCEDURE — 71045 X-RAY EXAM CHEST 1 VIEW: CPT | Mod: 26

## 2020-01-06 PROCEDURE — 99291 CRITICAL CARE FIRST HOUR: CPT

## 2020-01-06 RX ORDER — CEFEPIME 1 G/1
INJECTION, POWDER, FOR SOLUTION INTRAMUSCULAR; INTRAVENOUS
Refills: 0 | Status: DISCONTINUED | OUTPATIENT
Start: 2020-01-06 | End: 2020-01-06

## 2020-01-06 RX ORDER — INSULIN LISPRO 100/ML
7 VIAL (ML) SUBCUTANEOUS
Refills: 0 | Status: DISCONTINUED | OUTPATIENT
Start: 2020-01-06 | End: 2020-01-15

## 2020-01-06 RX ORDER — LEVOTHYROXINE SODIUM 125 MCG
50 TABLET ORAL DAILY
Refills: 0 | Status: DISCONTINUED | OUTPATIENT
Start: 2020-01-06 | End: 2020-01-15

## 2020-01-06 RX ORDER — DEXTROSE 50 % IN WATER 50 %
25 SYRINGE (ML) INTRAVENOUS ONCE
Refills: 0 | Status: COMPLETED | OUTPATIENT
Start: 2020-01-06 | End: 2020-01-06

## 2020-01-06 RX ORDER — DIPHENHYDRAMINE HCL 50 MG
25 CAPSULE ORAL ONCE
Refills: 0 | Status: COMPLETED | OUTPATIENT
Start: 2020-01-06 | End: 2020-01-06

## 2020-01-06 RX ORDER — INSULIN LISPRO 100/ML
VIAL (ML) SUBCUTANEOUS
Refills: 0 | Status: DISCONTINUED | OUTPATIENT
Start: 2020-01-06 | End: 2020-01-15

## 2020-01-06 RX ORDER — LANOLIN ALCOHOL/MO/W.PET/CERES
3 CREAM (GRAM) TOPICAL ONCE
Refills: 0 | Status: COMPLETED | OUTPATIENT
Start: 2020-01-06 | End: 2020-01-06

## 2020-01-06 RX ORDER — SIMVASTATIN 20 MG/1
40 TABLET, FILM COATED ORAL ONCE
Refills: 0 | Status: COMPLETED | OUTPATIENT
Start: 2020-01-06 | End: 2020-01-06

## 2020-01-06 RX ORDER — MEROPENEM 1 G/30ML
1000 INJECTION INTRAVENOUS EVERY 12 HOURS
Refills: 0 | Status: DISCONTINUED | OUTPATIENT
Start: 2020-01-06 | End: 2020-01-10

## 2020-01-06 RX ORDER — PANTOPRAZOLE SODIUM 20 MG/1
40 TABLET, DELAYED RELEASE ORAL
Refills: 0 | Status: DISCONTINUED | OUTPATIENT
Start: 2020-01-06 | End: 2020-01-15

## 2020-01-06 RX ORDER — ACETAMINOPHEN 500 MG
650 TABLET ORAL ONCE
Refills: 0 | Status: COMPLETED | OUTPATIENT
Start: 2020-01-06 | End: 2020-01-06

## 2020-01-06 RX ORDER — INSULIN HUMAN 100 [IU]/ML
1 INJECTION, SOLUTION SUBCUTANEOUS
Qty: 100 | Refills: 0 | Status: DISCONTINUED | OUTPATIENT
Start: 2020-01-06 | End: 2020-01-08

## 2020-01-06 RX ORDER — CEFEPIME 1 G/1
1000 INJECTION, POWDER, FOR SOLUTION INTRAMUSCULAR; INTRAVENOUS ONCE
Refills: 0 | Status: COMPLETED | OUTPATIENT
Start: 2020-01-06 | End: 2020-01-06

## 2020-01-06 RX ADMIN — Medication 325 MILLIGRAM(S): at 12:08

## 2020-01-06 RX ADMIN — Medication 1 SPRAY(S): at 06:08

## 2020-01-06 RX ADMIN — Medication 3 MILLIGRAM(S): at 21:37

## 2020-01-06 RX ADMIN — Medication 7 UNIT(S): at 17:03

## 2020-01-06 RX ADMIN — MEROPENEM 100 MILLIGRAM(S): 1 INJECTION INTRAVENOUS at 17:04

## 2020-01-06 RX ADMIN — Medication 1 MILLIGRAM(S): at 12:08

## 2020-01-06 RX ADMIN — TIOTROPIUM BROMIDE 1 CAPSULE(S): 18 CAPSULE ORAL; RESPIRATORY (INHALATION) at 12:32

## 2020-01-06 RX ADMIN — SIMVASTATIN 40 MILLIGRAM(S): 20 TABLET, FILM COATED ORAL at 21:37

## 2020-01-06 RX ADMIN — Medication 3: at 07:26

## 2020-01-06 RX ADMIN — Medication 40 MILLIGRAM(S): at 06:08

## 2020-01-06 RX ADMIN — Medication 25 MILLIGRAM(S): at 02:43

## 2020-01-06 RX ADMIN — INSULIN GLARGINE 10 UNIT(S): 100 INJECTION, SOLUTION SUBCUTANEOUS at 22:11

## 2020-01-06 RX ADMIN — Medication 25 GRAM(S): at 22:11

## 2020-01-06 RX ADMIN — PANTOPRAZOLE SODIUM 40 MILLIGRAM(S): 20 TABLET, DELAYED RELEASE ORAL at 06:07

## 2020-01-06 RX ADMIN — Medication 3 MILLIGRAM(S): at 00:50

## 2020-01-06 RX ADMIN — Medication 650 MILLIGRAM(S): at 02:42

## 2020-01-06 RX ADMIN — INSULIN HUMAN 1 UNIT(S)/HR: 100 INJECTION, SOLUTION SUBCUTANEOUS at 12:29

## 2020-01-06 RX ADMIN — PANTOPRAZOLE SODIUM 40 MILLIGRAM(S): 20 TABLET, DELAYED RELEASE ORAL at 17:02

## 2020-01-06 RX ADMIN — Medication 60 MILLIGRAM(S): at 06:08

## 2020-01-06 RX ADMIN — Medication 81 MILLIGRAM(S): at 12:08

## 2020-01-06 RX ADMIN — SIMVASTATIN 40 MILLIGRAM(S): 20 TABLET, FILM COATED ORAL at 00:50

## 2020-01-06 RX ADMIN — Medication 1 SPRAY(S): at 17:03

## 2020-01-06 NOTE — PROGRESS NOTE ADULT - ASSESSMENT
84 yr old male with H/O DM2, chronic obstructive pulmonary disease, HTN, multivessel CAD, ischemic cardiomyopathy, Severe Systolic LV failure, chronic A Fib, severe AS, stented coronary RCA, mid LAD, Hemolytic anemia, on chronic steroids, chronic kidney disease stage 3.    S/P BAV 12/26, access via L groin, R groin for TVP,   Admitted with weakness, lightheadedness, difficulty finding word while speaking,  Labs Hct 15%  leukocytosis.  on PE + grape size inguinal hematoma.    Plan supplemental Oxygen, bronchodilators,     Symptomatic anemia, neurological symptoms & hypotension , with critical AS & CAD  IV access Transfuse 2 units PRBC   Diuresis   Patient @ risk for volume overload with Severe systolic LV dysfunction & AS  Discharge Hgb on 1/3 6.9 Hct 20%   >5% drop,   on AC INR 2  Stat CT + 6 x6 cm inguinal hematoma  Doubt cause of anemia, no tenderness or thrill,   will get L groin US   Hold AC  serial groin exams  monitor serial Hct & PT/INR  cont ASA  stented LAD& RCA    Obtain TTE ,   Vascular Surgery input,    Prednisone 60 mg/day,   LDH  Retic count    insulin SSI, Lantus  PPI for gastrointestinal prophylxis     chronic kidney disease Stage 3, monitor UO, BUN/Cr/K  Avoid hypotension, nephro toxic drugs.    Leukocytosis, no fever will obtain BC X2, UA  Procalcitonin  start ABx if patient destabilizing     I have spent 30 minutes providing critical care management to this patient.

## 2020-01-06 NOTE — CONSULT NOTE ADULT - SUBJECTIVE AND OBJECTIVE BOX
Patient is a 84y old  Male who presents with a chief complaint of Anemia (06 Jan 2020 12:32)    HPI:  83 yo M with hx Afib (Coumadin), DM2, HLD, CAD (stent 2014 and 2015 in mLAD and RCA), HFrEF (40%) severe AS, HFrEF  30%, LBBB, baseline SCr 1.4, and Autoimmune hemolytic who was admitted from 12/13 -1/3 and optimized with diuretics per Heart failure service, planned for TAVR however underwent  balloon aortic valvuloplasty on 12/26 and transfused 1 U PRBC. Post-procedure course complicated by MAI, hyperglycemia, and anemia 2/2 AIHA on prednisone and followed by hematology.  He was restarted on Coumadin, received 1 U PRBC per heme on 1/2/20 and was discharged to [facility] facility on 1/3/20.   He continued on  Torsemide 40mg po and prednisone 60mg daily  per heme & diuretics.  He returns to the ER this evening due to symptomatic anemia related to reported bleeding from L groin access site on 1/4/20.  Patient states that he had bleeding on 1/4/20 and the staff applied pressure and a band-aid and the bleeding stopped.  Today he felt weak, short of breath, and dizzy and the rehab initiated transfer for further work-up.   Patient denies fever, chills, syncope, chest pain, hemoptysis, melena, hematochezia, dysuria.   In ER, patient was noted to have H+H of 5/15 downtrending from discharge values of 6.9/20.6 and noted to have a egg sized hematoma in the left groin.  Right groin stable with pea size firmness at access site.   When stretcher position was lifted from supine to upright patient complained of feeling dizzy and SBP was 100, decreased from 117.     Vascular consult was initiated, PRBC requested, CT scan non-contrast ordered. (05 Jan 2020 22:02)      PAST MEDICAL & SURGICAL HISTORY:  Anemia  LBBB (left bundle branch block)  DYER (dyspnea on exertion)  Coronary disease: PCI  CHF (congestive heart failure)  Former smoker  Meniere disease  HLD (hyperlipidemia)  DM (diabetes mellitus)  Atrial fibrillation  HTN (hypertension)  Lipoma  History of skin graft  Status post appendectomy      Social history:    FAMILY HISTORY:  Family history of brain tumor (Sibling)  Family history of heart attack (Child)  Family history of CHF (congestive heart failure)  Family history of lung cancer       Cardiovascular:	No chest pain,palpitaions or dizziness    Gastrointestinal:	NO nausea,abdominal pain or diarrhea.    Genitourinary:	No dysuria,frequency. No flank pain    Musculoskeletal:	No joint swelling or pain.No weakness          Endocrine:	No recent weight gain or loss.No abnormal heat/cold intolerance    Allergic/Immunologic:	No hives or rash   Allergies    No Known Allergies    Intolerances        Antimicrobials:    meropenem  IVPB 1000 milliGRAM(s) IV Intermittent every 12 hours        Vital Signs Last 24 Hrs  T(C): 37 (06 Jan 2020 12:00), Max: 37.3 (06 Jan 2020 04:00)  T(F): 98.6 (06 Jan 2020 12:00), Max: 99.1 (06 Jan 2020 04:00)  HR: 96 (06 Jan 2020 15:00) (87 - 110)  BP: 122/58 (06 Jan 2020 15:00) (99/66 - 141/58)  BP(mean): 83 (06 Jan 2020 15:00) (72 - 109)  RR: 21 (06 Jan 2020 15:00) (18 - 36)  SpO2: 100% (06 Jan 2020 15:00) (94% - 100%)    PHYSICAL EXAM:Pleasant patient in no acute distress.      Constitutional:Comfortable.Awake and alert  No cachexia     Eyes:PERRL EOMI.NO discharge or conjunctival injection    ENMT:No sinus tenderness.No thrush.No pharyngeal exudate or erythema.Fair dental hygiene    Neck:Supple,No LN,no JVD      Respiratory:Good air entry bilaterally,CTA    Cardiovascular:S1 S2 wnl, No murmurs,rub or gallops    Gastrointestinal:Soft BS(+) no tenderness no masses ,No rebound or guarding        left groin  fullness no drainge or redenss No cyanosis,clubbing or edema.    Vascular:peripheral pulses felt                                    7.1    24.12 )-----------( 151      ( 06 Jan 2020 05:08 )             19.5         01-06    129<L>  |  100  |  113<H>  ----------------------------<  205<H>  4.5   |  16<L>  |  1.62<H>    Ca    7.8<L>      06 Jan 2020 05:08    TPro  6.3  /  Alb  3.3  /  TBili  3.0<H>  /  DBili  x   /  AST  13  /  ALT  18  /  AlkPhos  76  01-05      RECENT CULTURES:  01-06 @ 02:46  .Blood Blood  Blood Culture PCR  Blood Culture PCR  PCR    Growth in anaerobic bottle: Gram Negative Rods  "Due to technical problems, Proteus sp. will Not be reported as part of  the BCID panel until further notice"  ***Blood Panel PCR results on this specimen are available  approximately 3 hours after the Gram stain result.***  Gram stain, PCR, and/or culture results may not always  correspond due to difference in methodologies.  ************************************************************  This PCR assay was performed using [employer].  The following targets are tested for: Enterococcus,  vancomycin resistant enterococci, Listeria monocytogenes,  coagulase negative staphylococci, S. aureus,  methicillin resistant S. aureus, Streptococcus agalactiae  (Group B), S. pneumoniae, S. pyogenes (Group A),  Acinetobacter baumannii, Enterobacter cloacae, E. coli,  Klebsiella oxytoca, K. pneumoniae, Proteus sp.,  Serratia marcescens, Haemophilus influenzae,  Neisseria meningitidis, Pseudomonas aeruginosa, Candida  albicans, C. glabrata, C krusei, C parapsilosis,  C. tropicalis and the KPC resistance gene.  --      MICROBIOLOGY:  Culture Results:   Growth in anaerobic bottle: Gram Negative Rods  "Due to technical problems, Proteus sp. will Not be reported as part of  the BCID panel until further notice"  ***Blood Panel PCR results on this specimen are available  approximately 3 hours after the Gram stain result.***  Gram stain, PCR, and/or culture results may not always  correspond due to difference in methodologies.  ************************************************************  This PCR assay was performed using [employer].  The following targets are tested for: Enterococcus,  vancomycin resistant enterococci, Listeria monocytogenes,  coagulase negative staphylococci, S. aureus,  methicillin resistant S. aureus, Streptococcus agalactiae  (Group B), S. pneumoniae, S. pyogenes (Group A),  Acinetobacter baumannii, Enterobacter cloacae, E. coli,  Klebsiella oxytoca, K. pneumoniae, Proteus sp.,  Serratia marcescens, Haemophilus influenzae,  Neisseria meningitidis, Pseudomonas aeruginosa, Candida  albicans, C. glabrata, C krusei, C parapsilosis,  C. tropicalis and the KPC resistance gene. (01-06 @ 02:46)          Radiology:      Assessment:        Recommendations and Plan:    Pager [phone]  After 5 pm/weekends or if no response :[phone]

## 2020-01-06 NOTE — CONSULT NOTE ADULT - ASSESSMENT
84M PMH A-fib (on coumadin), DM II, HLD, CAD (s/p stents 2014, 215), HF, Severe AS with associated hemolytic anemia, LBBB, presenting with anemia. Found on imaging to have LLE hematoma      pt with a [id number] hematoma over left groin   - bc positive for enterobacter  no sings of UTI, pneumonia or endocarditis      suspect enterobacter related to groin pseudoaneurysm and hematoma.    start meropenem  1 q 12 hrs.   [facility]ed with [facility]. If groin hematoma is infected, it will need to be explored.  follow up bc in 24 hrs.

## 2020-01-07 LAB
ALBUMIN SERPL ELPH-MCNC: 2.8 G/DL — LOW (ref 3.3–5)
ALP SERPL-CCNC: 64 U/L — SIGNIFICANT CHANGE UP (ref 40–120)
ALT FLD-CCNC: 19 U/L — SIGNIFICANT CHANGE UP (ref 10–45)
ANION GAP SERPL CALC-SCNC: 13 MMOL/L — SIGNIFICANT CHANGE UP (ref 5–17)
AST SERPL-CCNC: 23 U/L — SIGNIFICANT CHANGE UP (ref 10–40)
BILIRUB SERPL-MCNC: 3.7 MG/DL — HIGH (ref 0.2–1.2)
BUN SERPL-MCNC: 104 MG/DL — HIGH (ref 7–23)
CALCIUM SERPL-MCNC: 7.7 MG/DL — LOW (ref 8.4–10.5)
CHLORIDE SERPL-SCNC: 98 MMOL/L — SIGNIFICANT CHANGE UP (ref 96–108)
CO2 SERPL-SCNC: 18 MMOL/L — LOW (ref 22–31)
CREAT SERPL-MCNC: 1.85 MG/DL — HIGH (ref 0.5–1.3)
GLUCOSE BLDC GLUCOMTR-MCNC: 119 MG/DL — HIGH (ref 70–99)
GLUCOSE BLDC GLUCOMTR-MCNC: 119 MG/DL — HIGH (ref 70–99)
GLUCOSE BLDC GLUCOMTR-MCNC: 136 MG/DL — HIGH (ref 70–99)
GLUCOSE BLDC GLUCOMTR-MCNC: 269 MG/DL — HIGH (ref 70–99)
GLUCOSE SERPL-MCNC: 146 MG/DL — HIGH (ref 70–99)
GRAM STN FLD: SIGNIFICANT CHANGE UP
HCT VFR BLD CALC: 16.3 % — CRITICAL LOW (ref 39–50)
HCT VFR BLD CALC: 20.9 % — CRITICAL LOW (ref 39–50)
HGB BLD-MCNC: 6 G/DL — CRITICAL LOW (ref 13–17)
HGB BLD-MCNC: 7.4 G/DL — LOW (ref 13–17)
MAGNESIUM SERPL-MCNC: 1.7 MG/DL — SIGNIFICANT CHANGE UP (ref 1.6–2.6)
MCHC RBC-ENTMCNC: 34.3 PG — HIGH (ref 27–34)
MCHC RBC-ENTMCNC: 35.3 PG — HIGH (ref 27–34)
MCHC RBC-ENTMCNC: 35.4 GM/DL — SIGNIFICANT CHANGE UP (ref 32–36)
MCHC RBC-ENTMCNC: 36.8 GM/DL — HIGH (ref 32–36)
MCV RBC AUTO: 95.9 FL — SIGNIFICANT CHANGE UP (ref 80–100)
MCV RBC AUTO: 96.8 FL — SIGNIFICANT CHANGE UP (ref 80–100)
NRBC # BLD: 2 /100 WBCS — HIGH (ref 0–0)
NRBC # BLD: 4 /100 WBCS — HIGH (ref 0–0)
PHOSPHATE SERPL-MCNC: 3.5 MG/DL — SIGNIFICANT CHANGE UP (ref 2.5–4.5)
PLATELET # BLD AUTO: 124 K/UL — LOW (ref 150–400)
PLATELET # BLD AUTO: 128 K/UL — LOW (ref 150–400)
POTASSIUM SERPL-MCNC: 4.4 MMOL/L — SIGNIFICANT CHANGE UP (ref 3.5–5.3)
POTASSIUM SERPL-SCNC: 4.4 MMOL/L — SIGNIFICANT CHANGE UP (ref 3.5–5.3)
PROT SERPL-MCNC: 5.4 G/DL — LOW (ref 6–8.3)
RBC # BLD: 1.7 M/UL — LOW (ref 4.2–5.8)
RBC # BLD: 2.16 M/UL — LOW (ref 4.2–5.8)
RBC # FLD: 20.4 % — HIGH (ref 10.3–14.5)
RBC # FLD: 21.4 % — HIGH (ref 10.3–14.5)
SODIUM SERPL-SCNC: 129 MMOL/L — LOW (ref 135–145)
SPECIMEN SOURCE: SIGNIFICANT CHANGE UP
SPECIMEN SOURCE: SIGNIFICANT CHANGE UP
WBC # BLD: 22.81 K/UL — HIGH (ref 3.8–10.5)
WBC # BLD: 25.48 K/UL — HIGH (ref 3.8–10.5)
WBC # FLD AUTO: 22.81 K/UL — HIGH (ref 3.8–10.5)
WBC # FLD AUTO: 25.48 K/UL — HIGH (ref 3.8–10.5)

## 2020-01-07 PROCEDURE — 99233 SBSQ HOSP IP/OBS HIGH 50: CPT

## 2020-01-07 PROCEDURE — 99222 1ST HOSP IP/OBS MODERATE 55: CPT

## 2020-01-07 PROCEDURE — 71045 X-RAY EXAM CHEST 1 VIEW: CPT | Mod: 26

## 2020-01-07 PROCEDURE — 99024 POSTOP FOLLOW-UP VISIT: CPT

## 2020-01-07 RX ORDER — INSULIN GLARGINE 100 [IU]/ML
5 INJECTION, SOLUTION SUBCUTANEOUS ONCE
Refills: 0 | Status: COMPLETED | OUTPATIENT
Start: 2020-01-07 | End: 2020-01-07

## 2020-01-07 RX ORDER — FUROSEMIDE 40 MG
20 TABLET ORAL ONCE
Refills: 0 | Status: COMPLETED | OUTPATIENT
Start: 2020-01-07 | End: 2020-01-07

## 2020-01-07 RX ORDER — MECLIZINE HCL 12.5 MG
12.5 TABLET ORAL EVERY 8 HOURS
Refills: 0 | Status: DISCONTINUED | OUTPATIENT
Start: 2020-01-07 | End: 2020-01-08

## 2020-01-07 RX ADMIN — Medication 325 MILLIGRAM(S): at 12:07

## 2020-01-07 RX ADMIN — Medication 1 MILLIGRAM(S): at 12:07

## 2020-01-07 RX ADMIN — MEROPENEM 100 MILLIGRAM(S): 1 INJECTION INTRAVENOUS at 05:23

## 2020-01-07 RX ADMIN — MEROPENEM 100 MILLIGRAM(S): 1 INJECTION INTRAVENOUS at 17:06

## 2020-01-07 RX ADMIN — PANTOPRAZOLE SODIUM 40 MILLIGRAM(S): 20 TABLET, DELAYED RELEASE ORAL at 05:23

## 2020-01-07 RX ADMIN — Medication 2: at 08:07

## 2020-01-07 RX ADMIN — Medication 1 SPRAY(S): at 05:24

## 2020-01-07 RX ADMIN — TIOTROPIUM BROMIDE 1 CAPSULE(S): 18 CAPSULE ORAL; RESPIRATORY (INHALATION) at 12:14

## 2020-01-07 RX ADMIN — Medication 81 MILLIGRAM(S): at 12:07

## 2020-01-07 RX ADMIN — Medication 10: at 12:08

## 2020-01-07 RX ADMIN — Medication 7 UNIT(S): at 08:07

## 2020-01-07 RX ADMIN — Medication 20 MILLIGRAM(S): at 05:23

## 2020-01-07 RX ADMIN — Medication 7 UNIT(S): at 12:08

## 2020-01-07 RX ADMIN — SIMVASTATIN 40 MILLIGRAM(S): 20 TABLET, FILM COATED ORAL at 21:20

## 2020-01-07 RX ADMIN — Medication 7 UNIT(S): at 17:07

## 2020-01-07 RX ADMIN — SENNA PLUS 1 TABLET(S): 8.6 TABLET ORAL at 21:20

## 2020-01-07 RX ADMIN — Medication 50 MICROGRAM(S): at 05:23

## 2020-01-07 RX ADMIN — INSULIN GLARGINE 5 UNIT(S): 100 INJECTION, SOLUTION SUBCUTANEOUS at 22:11

## 2020-01-07 RX ADMIN — Medication 40 MILLIGRAM(S): at 09:12

## 2020-01-07 RX ADMIN — PANTOPRAZOLE SODIUM 40 MILLIGRAM(S): 20 TABLET, DELAYED RELEASE ORAL at 17:07

## 2020-01-07 RX ADMIN — Medication 3 MILLIGRAM(S): at 21:20

## 2020-01-07 RX ADMIN — Medication 60 MILLIGRAM(S): at 05:23

## 2020-01-07 RX ADMIN — Medication 1 SPRAY(S): at 17:07

## 2020-01-07 NOTE — PROGRESS NOTE ADULT - SUBJECTIVE AND OBJECTIVE BOX
I have reviewed all labs and imaging and met with the patient and CTU team and ID (Dr Powell).  Ravi was readmitted with critical anemia and is also found to have gram negative bacteremia and a partially thrombosed left groin pseudoaneurysm.  He was transfused and is on IV antibiotics.  On exam, the left groin site is clean, dry, no drainage, no bruit, and with a small area of hematoma.  I manually compressed the site and it is soft.  He expressed unequivocally that he wants to go home (i.e. not to a nursing home or rehab facility) and does not wish to undergo any further invasive procedures.  We had a goals of care discussion in this context, and I introduced the idea of eventual discharge to home with hospice care, which he is amenable to considering.  As per my discussion with Dr Powell, the goal will be to transition to oral antibiotics when able.  Will follow up with Vascular and Hematology as well.

## 2020-01-07 NOTE — CHART NOTE - NSCHARTNOTEFT_GEN_A_CORE
Per conversations with Dr. Saravia, plan for patient to undergo thrombin injection of pseudoaneurysm. Patient initially planned for thrombin injection earlier today in US lab, however unable to complete due to attending scheduling conflicts. Will plan for thrombin injection tomorrow at bedside.  Vascular will continue to follow closely.    [id number]

## 2020-01-07 NOTE — PROGRESS NOTE ADULT - SUBJECTIVE AND OBJECTIVE BOX
infectious diseases progress note:    Patient is a 84y old  Male who presents with a chief complaint of Anemia (2020 08:48)        Anemia        R     Allergies    No Known Allergies    Intolerances        ANTIBIOTICS/RELEVANT:  antimicrobials  meropenem  IVPB 1000 milliGRAM(s) IV Intermittent every 12 hours    immunologic:    OTHER:  ALBUTerol    90 MICROgram(s) HFA Inhaler 2 Puff(s) Inhalation every 6 hours PRN  aspirin enteric coated 81 milliGRAM(s) Oral daily  dextrose 40% Gel 15 Gram(s) Oral once PRN  dextrose 5%. 1000 milliLiter(s) IV Continuous <Continuous>  dextrose 50% Injectable 12.5 Gram(s) IV Push once  dextrose 50% Injectable 25 Gram(s) IV Push once  dextrose 50% Injectable 25 Gram(s) IV Push once  ferrous    sulfate 325 milliGRAM(s) Oral daily  fluticasone propionate 50 MICROgram(s)/spray Nasal Spray 1 Spray(s) Both Nostrils two times a day  folic acid 1 milliGRAM(s) Oral daily  glucagon  Injectable 1 milliGRAM(s) IntraMuscular once PRN  insulin glargine Injectable (LANTUS) 10 Unit(s) SubCutaneous at bedtime  insulin lispro (HumaLOG) corrective regimen sliding scale   SubCutaneous Before meals and at bedtime  insulin lispro Injectable (HumaLOG) 7 Unit(s) SubCutaneous three times a day with meals  insulin regular Infusion 1 Unit(s)/Hr IV Continuous <Continuous>  levothyroxine 50 MICROGram(s) Oral daily  meclizine 12.5 milliGRAM(s) Oral every 8 hours  melatonin 3 milliGRAM(s) Oral at bedtime  pantoprazole    Tablet 40 milliGRAM(s) Oral two times a day  polyethylene glycol 3350 17 Gram(s) Oral daily PRN  predniSONE   Tablet 60 milliGRAM(s) Oral daily  senna 1 Tablet(s) Oral at bedtime  simvastatin 40 milliGRAM(s) Oral at bedtime  tiotropium 18 MICROgram(s) Capsule 1 Capsule(s) Inhalation daily  torsemide 40 milliGRAM(s) Oral daily      Objective:  Vital Signs Last 24 Hrs  T(C): 36 (2020 08:00), Max: 37 (2020 12:00)  T(F): 96.8 (2020 08:00), Max: 98.6 (2020 12:00)  HR: 94 (2020 08:00) (82 - 106)  BP: 131/86 (2020 08:00) (99/54 - 142/63)  BP(mean): 101 (2020 08:00) (72 - 101)  RR: 20 (2020 08:00) (15 - 33)  SpO2: 100% (2020 08:00) (99% - 100%)    PHYSICAL EXAM:  Constitutional:Well-developed, well nourished--no acute distress  Eyes:RIYA, EOMI  Ear/Nose/Throat: no oral lesion, no sinus tenderness on percussion	  Neck:no JVD, no lymphadenopathy, supple  Respiratory: CTA azalia  Cardiovascular: S1S2 RRR, no murmurs  Gastrointestinal:soft, (+) BS, no HSM  Extremities:no minimal swelling in groin         LABS:                        6.0    22.81 )-----------( 124      ( 2020 02:09 )             16.3     01-07    129<L>  |  98  |  104<H>  ----------------------------<  146<H>  4.4   |  18<L>  |  1.85<H>    Ca    7.7<L>      2020 02:09  Phos  3.5     01-07  Mg     1.7     01-07    TPro  5.4<L>  /  Alb  2.8<L>  /  TBili  3.7<H>  /  DBili  x   /  AST  23  /  ALT  19  /  AlkPhos  64  01-07    PT/INR - ( 2020 05:08 )   PT: 23.4 sec;   INR: 2.01 ratio         PTT - ( 2020 05:08 )  PTT:23.2 sec  Urinalysis Basic - ( 2020 00:54 )    Color: Yellow / Appearance: Clear / S.013 / pH: x  Gluc: x / Ketone: Negative  / Bili: Negative / Urobili: 2 mg/dL   Blood: x / Protein: Negative / Nitrite: Negative   Leuk Esterase: Negative / RBC: 9 /hpf / WBC 1 /HPF   Sq Epi: x / Non Sq Epi: 1 /hpf / Bacteria: Negative          MICROBIOLOGY:    RECENT CULTURES:   @ 22:03 .Blood Blood-Peripheral       Growth in aerobic bottle: Gram Negative Rods  Growth in anaerobic bottle: Gram Negative Rods           Growth in aerobic bottle: Gram Negative Rods  Growth in anaerobic bottle: Gram Negative Rods    - @ 04:14 .Blood Blood       Growth in aerobic and anaerobic bottles: Gram Negative Rods           Growth in aerobic and anaerobic bottles: Gram Negative Rods    - @ 02:46 .Blood Blood   PCR    Growth in anaerobic bottle: Gram Negative Rods  Growth in aerobic bottle: Gram Negative Rods    Blood Culture PCR  Blood Culture PCR     Growth in anaerobic bottle: Gram Negative Rods  Growth in aerobic bottle: Gram Negative Rods  "Due to technical problems, Proteus sp. will Not be reported as part of  the BCID panel until further notice"  ***Blood Panel PCR results on this specimenare available  approximately 3 hours after the Gram stain result.***  Gram stain, PCR, and/or culture results may not always  correspond due to difference in methodologies.  ************************************************************  This PCR assaywas performed using [employer].  The following targets are tested for: Enterococcus,  vancomycin resistant enterococci, Listeria monocytogenes,  coagulase negative staphylococci, S. aureus,  methicillin resistant S. aureus, Streptococcus agalactiae  (Group B), S. pneumoniae, S. pyogenes (Group A),  Acinetobacter baumannii, Enterobacter cloacae, E. coli,  Klebsiella oxytoca, K. pneumoniae, Proteus sp.,  Serratia marcescens, Haemophilus influenzae,  Neisseria meningitidis, Pseudomonas aeruginosa, Candida  albicans, C. glabrata, C krusei, C parapsilosis,  C. tropicalis and the KPC resistance gene.          RESPIRATORY CULTURES:              RADIOLOGY & ADDITIONAL STUDIES:        Pager [phone]  After 5 pm/weekends or if no response :[phone]

## 2020-01-07 NOTE — PROVIDER CONTACT NOTE (CRITICAL VALUE NOTIFICATION) - SITUATION
Pt 85 yo male, s/p Balloon valve angioplasty. now readmitted for anemia. Blood cultures from 1/6 positive for gram negative rods in aerobic bottles.

## 2020-01-07 NOTE — PROGRESS NOTE ADULT - ASSESSMENT
84M PMH A-fib (on coumadin), DM II, HLD, CAD (s/p stents 2014, 215), HF, Severe AS with associated hemolytic anemia, LBBB, presenting with anemia. Found on imaging to have LLE hematoma      pt with a [id number] hematoma over left groin   - bc positive for enterobacter  no sings of UTI, pneumonia or endocarditis      suspect enterobacter related to groin pseudoaneurysm and hematoma.  pt has 8/8 bc positive for enterobacter  most likely intravascular source  discussed Dr. Becker  wants to avoid invasive procedures  not candidate fo aggressive surgery  prognosis guarded       meropenem  1 q 12 hrs.   discussed with [facility].    follow up bc in 24 hrs.

## 2020-01-07 NOTE — PROGRESS NOTE ADULT - SUBJECTIVE AND OBJECTIVE BOX
JESUS MORALES  MRN#: [id number]  Subjective:  Patient was seen and evaluate on AM rounds offering no specific complaints at this time.    OBJECTIVE:  ICU Vital Signs Last 24 Hrs  T(C): 36.6 (2020 16:00), Max: 36.6 (2020 16:00)  T(F): 97.8 (2020 16:00), Max: 97.8 (2020 16:00)  HR: 97 (2020 18:00) (82 - 116)  BP: 113/56 (2020 17:00) (99/54 - 142/63)  BP(mean): 80 (2020 17:00) (70 - 101)  ABP: --  ABP(mean): --  RR: 23 (2020 18:00) (15 - 33)  SpO2: 100% (2020 18:00) (98% - 100%)       @ 07:07 @ 07:00  --------------------------------------------------------  IN: 1764 mL / OUT: 1750 mL / NET: 14 mL     @ 07:  -   @ 18:32  --------------------------------------------------------  IN: 1130 mL / OUT: 0 mL / NET: 1130 mL        PHYSICAL EXAM:Daily     Daily Weight in k.3 (2020 00:00)  General: WN/WD NAD    HEENT:     + NCAT  + EOMI  - Conjuctival edema   - Icterus   - Thrush   - ETT  - NGT/OGT  Neck:         + FROM    - JVD     - Nodes     - Masses    + Mid-line trachea   - Tracheostomy  Chest:         - Sternal click  - Sternal drainage  - Pacing wires  - Chest tubes  - SubQ emphysema  Lungs:          + CTA   - Rhonchi    - Rales    - Wheezing     - Decreased BS   - Dullness R L  Cardiac:       + S1 + S2    + Irregularly irregular rhythm   - S3  - S4    - Murmurs   - Rub   - Hamman’s sign   Abdomen:    + BS     + Soft    + Non-tender     - Distended    - Organomegaly  - PEG  Extremities:   - Cyanosis U/L   - Clubbing  U/L  - LE/UE Edema   + Capillary refill    + Pulses   Neuro:        + Awake   +  Alert   - Confused   - Lethargic   - Sedated   - Generalized Weakness  Skin:        - Rashes    - Erythema   + Normal incisions   + IV sites intact  - Sacral decubitus    HOSPITAL MEDICATIONS: All mediciations reviewed and analyzed  MEDICATIONS  (STANDING):  aspirin enteric coated 81 milliGRAM(s) Oral daily  dextrose 5%. 1000 milliLiter(s) (50 mL/Hr) IV Continuous <Continuous>  dextrose 50% Injectable 12.5 Gram(s) IV Push once  dextrose 50% Injectable 25 Gram(s) IV Push once  dextrose 50% Injectable 25 Gram(s) IV Push once  ferrous    sulfate 325 milliGRAM(s) Oral daily  fluticasone propionate 50 MICROgram(s)/spray Nasal Spray 1 Spray(s) Both Nostrils two times a day  folic acid 1 milliGRAM(s) Oral daily  insulin glargine Injectable (LANTUS) 10 Unit(s) SubCutaneous at bedtime  insulin lispro (HumaLOG) corrective regimen sliding scale   SubCutaneous Before meals and at bedtime  insulin lispro Injectable (HumaLOG) 7 Unit(s) SubCutaneous three times a day with meals  insulin regular Infusion 1 Unit(s)/Hr (1 mL/Hr) IV Continuous <Continuous>  levothyroxine 50 MICROGram(s) Oral daily  meclizine 12.5 milliGRAM(s) Oral every 8 hours  melatonin 3 milliGRAM(s) Oral at bedtime  meropenem  IVPB 1000 milliGRAM(s) IV Intermittent every 12 hours  pantoprazole    Tablet 40 milliGRAM(s) Oral two times a day  predniSONE   Tablet 60 milliGRAM(s) Oral daily  senna 1 Tablet(s) Oral at bedtime  simvastatin 40 milliGRAM(s) Oral at bedtime  tiotropium 18 MICROgram(s) Capsule 1 Capsule(s) Inhalation daily  torsemide 40 milliGRAM(s) Oral daily    MEDICATIONS  (PRN):  ALBUTerol    90 MICROgram(s) HFA Inhaler 2 Puff(s) Inhalation every 6 hours PRN Shortness of Breath and/or Wheezing  dextrose 40% Gel 15 Gram(s) Oral once PRN Blood Glucose LESS THAN 70 milliGRAM(s)/deciliter  glucagon  Injectable 1 milliGRAM(s) IntraMuscular once PRN Glucose LESS THAN 70 milligrams/deciliter  polyethylene glycol 3350 17 Gram(s) Oral daily PRN Constipation    LABS: All Lab data reviewed and analyzed                        7.4    25.48 )-----------( 128      ( 2020 12:45 )             20.9    01-07    129<L>  |  98  |  104<H>  ----------------------------<  146<H>  4.4   |  18<L>  |  1.85<H>    Ca    7.7<L>      2020 02:09  Phos  3.5     01-07  Mg     1.7     01-07    TPro  5.4<L>  /  Alb  2.8<L>  /  TBili  3.7<H>  /  DBili  x   /  AST  23  /  ALT  19  /  AlkPhos  64  01-07    PT/INR - ( 2020 05:08 )   PT: 23.4 sec;   INR: 2.01 ratio         PTT - ( 2020 05:08 )  PTT:23.2 sec LIVER FUNCTIONS - ( 2020 02:09 )  Alb: 2.8 g/dL / Pro: 5.4 g/dL / ALK PHOS: 64 U/L / ALT: 19 U/L / AST: 23 U/L / GGT: x           RADIOLOGY: - Reviewed and analyzed     Respiratory status required supplemental oxygen, close monitoring of breathing pattern and respiratory rate & the following of continuous pulse oximetry for support & to prevent decompensation  Continued mobilization as tolerated  Addressed analgesic regimen to optimize function  ASA continued for graft occlusion-thromboembolism prophylaxis  Lipitor was also continued for long term graft patency  Lovenox/Heparin initiated/continued for VTE prophylaxis in addition to sequential compression devices  Protonix/Pepcid maintained for GI bleeding prophylaxis  Lopressor initiated/continued for atrial fibrillation prophylaxis  Metabolic stability & infection prophylaxis required review and adjustment of regular Insulin sliding scale and glycemic regimen while following serial glucose levels to help achieve & maintain euglycemia  Reviewed & addressed surgical site infection prophylaxis regimen JESUS MORALES  MRN#: [id number]  Subjective:     Patient is a 85 y/o Male with a PMHx of Atrial fibrillation, DM Type 2, HLD, CAD (stent  and  in mLAD and RCA), HFrEF (40%) severe AS s/p BAV , HFrEF (30%), LBBB, baseline SCr 1.4 and Autoimmune hemolytic.     OBJECTIVE:  ICU Vital Signs Last 24 Hrs  T(C): 36.6 (2020 16:00), Max: 36.6 (2020 16:00)  T(F): 97.8 (2020 16:00), Max: 97.8 (2020 16:00)  HR: 97 (2020 18:00) (82 - 116)  BP: 113/56 (2020 17:00) (99/54 - 142/63)  BP(mean): 80 (2020 17:00) (70 - 101)  ABP: --  ABP(mean): --  RR: 23 (2020 18:00) (15 - 33)  SpO2: 100% (2020 18:00) (98% - 100%)       @ 07:07 @ 07:00  --------------------------------------------------------  IN: 1764 mL / OUT: 1750 mL / NET: 14 mL     @ 07: @ 18:32  --------------------------------------------------------  IN: 1130 mL / OUT: 0 mL / NET: 1130 mL        PHYSICAL EXAM:Daily     Daily Weight in k.3 (2020 00:00)    General: WN/WD NAD       + Cachectic   HEENT:     + NCAT  + EOMI  - Conjuctival edema   - Icterus   - Thrush   - ETT  - NGT/OGT  Neck:         + FROM    - JVD     - Nodes     - Masses    + Mid-line trachea   - Tracheostomy  Chest:         - Sternal click  - Sternal drainage  - Pacing wires  - Chest tubes  - SubQ emphysema  Lungs:          + CTA   - Rhonchi    - Rales    - Wheezing     - Decreased BS   - Dullness R L  Cardiac:       + S1 + S2    + Irregularly irregular rhythm   - S3  - S4    - Murmurs   - Rub   - Hamman’s sign   Abdomen:    + BS     + Soft    + Non-tender     - Distended    - Organomegaly  - PEG  Extremities:   - Cyanosis U/L   - Clubbing  U/L  - LE/UE Edema   + Capillary refill    + Pulses   Neuro:        + Awake   +  Alert   - Confused   - Lethargic   - Sedated   - Generalized Weakness  Skin:        - Rashes    - Erythema   + Normal incisions   + IV sites intact  - Sacral decubitus  +Left groin incision     HOSPITAL MEDICATIONS: All mediciations reviewed and analyzed  MEDICATIONS  (STANDING):  aspirin enteric coated 81 milliGRAM(s) Oral daily  dextrose 5%. 1000 milliLiter(s) (50 mL/Hr) IV Continuous <Continuous>  dextrose 50% Injectable 12.5 Gram(s) IV Push once  dextrose 50% Injectable 25 Gram(s) IV Push once  dextrose 50% Injectable 25 Gram(s) IV Push once  ferrous    sulfate 325 milliGRAM(s) Oral daily  fluticasone propionate 50 MICROgram(s)/spray Nasal Spray 1 Spray(s) Both Nostrils two times a day  folic acid 1 milliGRAM(s) Oral daily  insulin glargine Injectable (LANTUS) 10 Unit(s) SubCutaneous at bedtime  insulin lispro (HumaLOG) corrective regimen sliding scale   SubCutaneous Before meals and at bedtime  insulin lispro Injectable (HumaLOG) 7 Unit(s) SubCutaneous three times a day with meals  insulin regular Infusion 1 Unit(s)/Hr (1 mL/Hr) IV Continuous <Continuous>  levothyroxine 50 MICROGram(s) Oral daily  meclizine 12.5 milliGRAM(s) Oral every 8 hours  melatonin 3 milliGRAM(s) Oral at bedtime  meropenem  IVPB 1000 milliGRAM(s) IV Intermittent every 12 hours  pantoprazole    Tablet 40 milliGRAM(s) Oral two times a day  predniSONE   Tablet 60 milliGRAM(s) Oral daily  senna 1 Tablet(s) Oral at bedtime  simvastatin 40 milliGRAM(s) Oral at bedtime  tiotropium 18 MICROgram(s) Capsule 1 Capsule(s) Inhalation daily  torsemide 40 milliGRAM(s) Oral daily    MEDICATIONS  (PRN):  ALBUTerol    90 MICROgram(s) HFA Inhaler 2 Puff(s) Inhalation every 6 hours PRN Shortness of Breath and/or Wheezing  dextrose 40% Gel 15 Gram(s) Oral once PRN Blood Glucose LESS THAN 70 milliGRAM(s)/deciliter  glucagon  Injectable 1 milliGRAM(s) IntraMuscular once PRN Glucose LESS THAN 70 milligrams/deciliter  polyethylene glycol 3350 17 Gram(s) Oral daily PRN Constipation    LABS: All Lab data reviewed and analyzed                        7.4    25.48 )-----------( 128      ( 2020 12:45 )             20.9    01-07    129<L>  |  98  |  104<H>  ----------------------------<  146<H>  4.4   |  18<L>  |  1.85<H>    Ca    7.7<L>      2020 02:09  Phos  3.5     01-07  Mg     1.7     -07    TPro  5.4<L>  /  Alb  2.8<L>  /  TBili  3.7<H>  /  DBili  x   /  AST  23  /  ALT  19  /  AlkPhos  64  01-07    PT/INR - ( 2020 05:08 )   PT: 23.4 sec;   INR: 2.01 ratio         PTT - ( 2020 05:08 )  PTT:23.2 sec LIVER FUNCTIONS - ( 2020 02:09 )  Alb: 2.8 g/dL / Pro: 5.4 g/dL / ALK PHOS: 64 U/L / ALT: 19 U/L / AST: 23 U/L / GGT: x           RADIOLOGY: - Reviewed and analyzed       Respiratory status required supplemental oxygen, close monitoring of breathing pattern and respiratory rate & the following of continuous pulse oximetry for support & to prevent decompensation  Continued mobilization as tolerated  Addressed analgesic regimen to optimize function  ASA continued for CAD and graft occlusion-thromboembolism prophylaxis.   Zocor started on  for CAD.   Lipitor was also continued for long term graft patency.  Lovenox/Heparin initiated/continued for VTE prophylaxis.  Protonix maintained for GI bleeding prophylaxis  Lopressor initiated/continued for atrial fibrillation prophylaxis.  Metabolic stability & infection prophylaxis required review and adjustment of regular Insulin sliding scale and glycemic regimen while following serial glucose levels to help achieve & maintain euglycemia  Reviewed & addressed surgical site infection prophylaxis regimen  Received Meropenem on  for Enterobacter Bacteremia.   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    Received pRBCs since yesterday for severe hemolytic and acute blood loss anemia. JESUS MORALES  MRN#: [id number]  83 yo M with hx Afib (Coumadin), DM2, HLD, CAD (stent  and  in mLAD and RCA), HFrEF (40%) severe AS, HFrEF  30%, LBBB, baseline SCr 1.4, and Autoimmune hemolytic who was admitted from  -1/3 and optimized with diuretics per Heart failure service, planned for TAVR however underwent  balloon aortic valvuloplasty on  and transfused 1 U PRBC. Post-procedure course complicated by MAI, hyperglycemia, and anemia.  He was restarted on Coumadin, received 1 U PRBC per heme on 20 and was discharged to rehab facility on 1/3/20.   He continued on  Torsemide 40mg po and prednisone 60mg daily  per heme & diuretics.  On 20, he returned to ER due to symptomatic anemia related to reported bleeding from L groin access site. Patient states that he had bleeding on 20 and the staff applied pressure and a band-aid and the bleeding stopped.  Patient felt weak, short of breath, and dizzy and the rehab initiated transfer for further work-up.  H+H in ER was 5 and 15 and downtrending from discharge values of 6.9 and 20. ER also noted an egg sized hematoma in the left groin.  Right groin stable with pea size firmness at access site. Patient was readmitted to [facility] on 20 for anemia and left groin hematoma. Currently hemodynamics are stable.    OBJECTIVE:  ICU Vital Signs Last 24 Hrs  T(C): 36.6 (2020 16:00), Max: 36.6 (2020 16:00)  T(F): 97.8 (2020 16:00), Max: 97.8 (2020 16:00)  HR: 97 (2020 18:00) (82 - 116)  BP: 113/56 (2020 17:00) (99/54 - 142/63)  BP(mean): 80 (2020 17:00) (70 - 101)  ABP: --  ABP(mean): --  RR: 23 (2020 18:00) (15 - 33)  SpO2: 100% (2020 18:00) (98% - 100%)      01-06 @ 07:01  -  -07 @ 07:00  --------------------------------------------------------  IN: 1764 mL / OUT: 1750 mL / NET: 14 mL     @ 07: @ 18:32  --------------------------------------------------------  IN: 1130 mL / OUT: 0 mL / NET: 1130 mL        PHYSICAL EXAM:Daily     Daily Weight in k.3 (2020 00:00)    General: WN/WD NAD       + Cachectic   HEENT:     + NCAT  + EOMI  - Conjuctival edema   - Icterus   - Thrush   - ETT  - NGT/OGT  Neck:         + FROM    - JVD     - Nodes     - Masses    + Mid-line trachea   - Tracheostomy  Chest:         - Sternal click  - Sternal drainage  - Pacing wires  - Chest tubes  - SubQ emphysema  Lungs:          + CTA   - Rhonchi    - Rales    - Wheezing     - Decreased BS   - Dullness R L  Cardiac:       + S1 + S2    + Irregularly irregular rhythm, currently in AFib  - S3  - S4    - Murmurs   - Rub   - Hamman’s sign   Abdomen:    + BS     + Soft    + Non-tender     - Distended    - Organomegaly  - PEG  Extremities:   - Cyanosis U/L   - Clubbing  U/L  - LE/UE Edema   + Capillary refill    + Pulses   Neuro:        + Awake   +  Alert   - Confused   - Lethargic   - Sedated   - Generalized Weakness  Skin:        - Rashes    - Erythema   + Normal incisions   + IV sites intact  - Sacral decubitus  + Left groin incision, not bleeding    HOSPITAL MEDICATIONS: All mediciations reviewed and analyzed  MEDICATIONS  (STANDING):  aspirin enteric coated 81 milliGRAM(s) Oral daily  dextrose 5%. 1000 milliLiter(s) (50 mL/Hr) IV Continuous <Continuous>  dextrose 50% Injectable 12.5 Gram(s) IV Push once  dextrose 50% Injectable 25 Gram(s) IV Push once  dextrose 50% Injectable 25 Gram(s) IV Push once  ferrous    sulfate 325 milliGRAM(s) Oral daily  fluticasone propionate 50 MICROgram(s)/spray Nasal Spray 1 Spray(s) Both Nostrils two times a day  folic acid 1 milliGRAM(s) Oral daily  insulin glargine Injectable (LANTUS) 10 Unit(s) SubCutaneous at bedtime  insulin lispro (HumaLOG) corrective regimen sliding scale   SubCutaneous Before meals and at bedtime  insulin lispro Injectable (HumaLOG) 7 Unit(s) SubCutaneous three times a day with meals  insulin regular Infusion 1 Unit(s)/Hr (1 mL/Hr) IV Continuous <Continuous>  levothyroxine 50 MICROGram(s) Oral daily  meclizine 12.5 milliGRAM(s) Oral every 8 hours  melatonin 3 milliGRAM(s) Oral at bedtime  meropenem  IVPB 1000 milliGRAM(s) IV Intermittent every 12 hours  pantoprazole    Tablet 40 milliGRAM(s) Oral two times a day  predniSONE   Tablet 60 milliGRAM(s) Oral daily  senna 1 Tablet(s) Oral at bedtime  simvastatin 40 milliGRAM(s) Oral at bedtime  tiotropium 18 MICROgram(s) Capsule 1 Capsule(s) Inhalation daily  torsemide 40 milliGRAM(s) Oral daily    MEDICATIONS  (PRN):  ALBUTerol    90 MICROgram(s) HFA Inhaler 2 Puff(s) Inhalation every 6 hours PRN Shortness of Breath and/or Wheezing  dextrose 40% Gel 15 Gram(s) Oral once PRN Blood Glucose LESS THAN 70 milliGRAM(s)/deciliter  glucagon  Injectable 1 milliGRAM(s) IntraMuscular once PRN Glucose LESS THAN 70 milligrams/deciliter  polyethylene glycol 3350 17 Gram(s) Oral daily PRN Constipation    LABS: All Lab data reviewed and analyzed                        7.4    25.48 )-----------( 128      ( 2020 12:45 )             20.9    01-07    129<L>  |  98  |  104<H>  ----------------------------<  146<H>  4.4   |  18<L>  |  1.85<H>    Ca    7.7<L>      2020 02:09  Phos  3.5     01-07  Mg     1.7     01-07    TPro  5.4<L>  /  Alb  2.8<L>  /  TBili  3.7<H>  /  DBili  x   /  AST  23  /  ALT  19  /  AlkPhos  64  01-07    PT/INR - ( 2020 05:08 )   PT: 23.4 sec;   INR: 2.01 ratio         PTT - ( 2020 05:08 )  PTT:23.2 sec LIVER FUNCTIONS - ( 2020 02:09 )  Alb: 2.8 g/dL / Pro: 5.4 g/dL / ALK PHOS: 64 U/L / ALT: 19 U/L / AST: 23 U/L / GGT: x           RADIOLOGY: - Reviewed and analyzed       Respiratory status required supplemental oxygen, close monitoring of breathing pattern and respiratory rate & the following of continuous pulse oximetry for support & to prevent decompensation  Continued mobilization as tolerated  Addressed analgesic regimen to optimize function  ASA continued for CAD and graft occlusion-thromboembolism prophylaxis.   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  Protonix maintained for GI bleeding prophylaxis  Metabolic stability & infection prophylaxis required review and adjustment of regular Insulin sliding scale and glycemic regimen while following serial glucose levels to help achieve & maintain euglycemia  Reviewed & addressed surgical site infection prophylaxis regimen  Received Meropenem on  for bacteremia.   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  Required 1 unit of pRBCs since yesterday for severe hemolytic and acute blood loss anemia.  By signing my name below, I, Emre Harper, attest that this documentation has been prepared under the direction and in the presence of Dr. Carpenter.  Electronically signed: Cheo Mckinley, 19:53, 2020 JESUS MORALES  MRN#: [id number]    85 yo M with hx Afib (Coumadin), DM2, HLD, CAD (stent  and  in mLAD and RCA), HFrEF (40%) severe AS, HFrEF  30%, LBBB, baseline SCr 1.4, and autoimmune hemolytic anemia, s/p aortic valvuloplasty  and now readmitted with an hematocrit of 16 due to acute blood loss at left groin access site.     Patient had been treated for heart failure in mid December but was deemed to be too high risk for TAVR and underwent  balloon aortic valvuloplasty on . Post-procedure course complicated by contrast induced nephropathy, hyperglycemia, and anemia.  He required blood transfusion and was restarted on Coumadin on 20 and was discharged to rehab facility on 1/3/20.   He continued on torsemide 40mg po and prednisone 60mg daily.  On 20, he returned to ER due to symptomatic anemia related to reported bleeding from L groin access site. Patient states that he had bleeding on 20 and the staff applied pressure and a band-aid and the bleeding stopped.  Patient felt weak, short of breath, and dizzy and the rehab initiated transfer for further work-up.  H+H in ER was 5 and 15 and downtrending from discharge values of 6.9 and 20. ER also noted an egg sized hematoma in the left groin.  Right groin stable with pea size firmness at access site.     OBJECTIVE:  ICU Vital Signs Last 24 Hrs  T(C): 36.6 (2020 16:00), Max: 36.6 (2020 16:00)  T(F): 97.8 (2020 16:00), Max: 97.8 (2020 16:00)  HR: 97 (2020 18:00) (82 - 116)  BP: 113/56 (2020 17:00) (99/54 - 142/63)  BP(mean): 80 (2020 17:00) (70 - 101)  ABP: --  ABP(mean): --  RR: 23 (2020 18:00) (15 - 33)  SpO2: 100% (2020 18:00) (98% - 100%)      - @ 07: @ 07:00  --------------------------------------------------------  IN: 1764 mL / OUT: 1750 mL / NET: 14 mL     @ 07: @ 18:32  --------------------------------------------------------  IN: 1130 mL / OUT: 0 mL / NET: 1130 mL        PHYSICAL EXAM:  Daily Weight in k.3 (2020 00:00)    General:      + Cachectic, elderly male   HEENT:     + NCAT  + EOMI  - Conjuctival edema   - Icterus   - Thrush   - ETT  - NGT/OGT  Neck:         + FROM    - JVD     - Nodes     - Masses    + Mid-line trachea   - Tracheostomy  Chest:         - Sternal click  - Sternal drainage  - Pacing wires  - Chest tubes  - SubQ emphysema  Lungs:          + CTA   - Rhonchi    - Rales    - Wheezing     - Decreased BS   - Dullness R L  Cardiac:       + S1 + S2    + Irregularly irregular rhythm  - S3  - S4    - Murmurs   - Rub   - Hamman’s sign   Abdomen:    + BS     + Soft    + Non-tender     - Distended    - Organomegaly  - PEG  Extremities:   - Cyanosis U/L   - Clubbing  U/L  - LE/UE Edema   + Capillary refill    + Pulses   Neuro:        + Awake   +  Alert   - Confused   - Lethargic   - Sedated   + Generalized Weakness  Skin:        - Rashes    - Erythema   + Normal incisions   + IV sites intact  - Sacral decubitus  + Left groin incision, not bleeding    HOSPITAL MEDICATIONS: All mediciations reviewed and analyzed  MEDICATIONS  (STANDING):  aspirin enteric coated 81 milliGRAM(s) Oral daily  dextrose 5%. 1000 milliLiter(s) (50 mL/Hr) IV Continuous <Continuous>  dextrose 50% Injectable 12.5 Gram(s) IV Push once  dextrose 50% Injectable 25 Gram(s) IV Push once  dextrose 50% Injectable 25 Gram(s) IV Push once  ferrous    sulfate 325 milliGRAM(s) Oral daily  fluticasone propionate 50 MICROgram(s)/spray Nasal Spray 1 Spray(s) Both Nostrils two times a day  folic acid 1 milliGRAM(s) Oral daily  insulin glargine Injectable (LANTUS) 10 Unit(s) SubCutaneous at bedtime  insulin lispro (HumaLOG) corrective regimen sliding scale   SubCutaneous Before meals and at bedtime  insulin lispro Injectable (HumaLOG) 7 Unit(s) SubCutaneous three times a day with meals  insulin regular Infusion 1 Unit(s)/Hr (1 mL/Hr) IV Continuous <Continuous>  levothyroxine 50 MICROGram(s) Oral daily  meclizine 12.5 milliGRAM(s) Oral every 8 hours  melatonin 3 milliGRAM(s) Oral at bedtime  meropenem  IVPB 1000 milliGRAM(s) IV Intermittent every 12 hours  pantoprazole    Tablet 40 milliGRAM(s) Oral two times a day  predniSONE   Tablet 60 milliGRAM(s) Oral daily  senna 1 Tablet(s) Oral at bedtime  simvastatin 40 milliGRAM(s) Oral at bedtime  tiotropium 18 MICROgram(s) Capsule 1 Capsule(s) Inhalation daily  torsemide 40 milliGRAM(s) Oral daily    MEDICATIONS  (PRN):  ALBUTerol    90 MICROgram(s) HFA Inhaler 2 Puff(s) Inhalation every 6 hours PRN Shortness of Breath and/or Wheezing  dextrose 40% Gel 15 Gram(s) Oral once PRN Blood Glucose LESS THAN 70 milliGRAM(s)/deciliter  glucagon  Injectable 1 milliGRAM(s) IntraMuscular once PRN Glucose LESS THAN 70 milligrams/deciliter  polyethylene glycol 3350 17 Gram(s) Oral daily PRN Constipation    LABS: All Lab data reviewed and analyzed                        7.4    25.48 )-----------( 128      ( 2020 12:45 )             20.9    01-07    129<L>  |  98  |  104<H>  ----------------------------<  146<H>  4.4   |  18<L>  |  1.85<H>    Ca    7.7<L>      2020 02:09  Phos  3.5     01-07  Mg     1.7     01-07    TPro  5.4<L>  /  Alb  2.8<L>  /  TBili  3.7<H>  /  DBili  x   /  AST  23  /  ALT  19  /  AlkPhos  64  01-07    PT/INR - ( 2020 05:08 )   PT: 23.4 sec;   INR: 2.01 ratio         PTT - ( 2020 05:08 )  PTT:23.2 sec LIVER FUNCTIONS - ( 2020 02:09 )  Alb: 2.8 g/dL / Pro: 5.4 g/dL / ALK PHOS: 64 U/L / ALT: 19 U/L / AST: 23 U/L / GGT: x           RADIOLOGY: - Reviewed and analyzed     Acute blood loss anemia in addition to hemolytic anemia, he has required 3 units of pRBCs since admission. Continue to monitor. Patient requires injection at the bedside  Respiratory status required supplemental oxygen, close monitoring of breathing pattern and respiratory rate & the following of continuous pulse oximetry for support & to prevent decompensation  Continued mobilization as tolerated  ASA continued for CAD and graft occlusion-thromboembolism prophylaxis.   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  Protonix maintained for GI bleeding prophylaxis  Metabolic stability & infection prophylaxis required review and adjustment of regular Insulin sliding scale and glycemic regimen while following serial glucose levels to help achieve & maintain euglycemia  Reviewed & addressed surgical site infection prophylaxis regimen  Received Meropenem on  for bacteremia.   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    By signing my name below, I, Emre Harper, attest that this documentation has been prepared under the direction and in the presence of Dr. Carpenter.  Electronically signed: Cheo Mckinley, 19:53, 2020 JESUS MORALES  MRN#: [id number]    83 yo M with hx Afib (Coumadin), DM2, HLD, CAD (stent  and  in mLAD and RCA), HFrEF (40%) severe AS, HFrEF  30%, LBBB, baseline SCr 1.4, and autoimmune hemolytic anemia, s/p aortic valvuloplasty  and now readmitted with an hematocrit of 16 due to acute blood loss at left groin access site.     Patient had been treated for heart failure in mid December but was deemed to be too high risk for TAVR and underwent  balloon aortic valvuloplasty on . Post-procedure course complicated by contrast induced nephropathy, hyperglycemia, and anemia.  He required blood transfusion and was restarted on Coumadin on 20 and was discharged to rehab facility on 1/3/20.   He continued on torsemide 40mg po and prednisone 60mg daily.  On 20, he returned to ER due to symptomatic anemia related to reported bleeding from L groin access site. Patient states that he had bleeding on 20 and the staff applied pressure and a band-aid and the bleeding stopped.  Patient felt weak, short of breath, and dizzy and the rehab initiated transfer for further work-up.  H+H in ER was 5 and 15 and downtrending from discharge values of 6.9 and 20. ER also noted an egg sized hematoma in the left groin.  Right groin stable with pea size firmness at access site.     OBJECTIVE:  ICU Vital Signs Last 24 Hrs  T(C): 36.6 (2020 16:00), Max: 36.6 (2020 16:00)  T(F): 97.8 (2020 16:00), Max: 97.8 (2020 16:00)  HR: 97 (2020 18:00) (82 - 116)  BP: 113/56 (2020 17:00) (99/54 - 142/63)  BP(mean): 80 (2020 17:00) (70 - 101)  ABP: --  ABP(mean): --  RR: 23 (2020 18:00) (15 - 33)  SpO2: 100% (2020 18:00) (98% - 100%)      - @ 07: @ 07:00  --------------------------------------------------------  IN: 1764 mL / OUT: 1750 mL / NET: 14 mL     @ 07: @ 18:32  --------------------------------------------------------  IN: 1130 mL / OUT: 0 mL / NET: 1130 mL        PHYSICAL EXAM:  Daily Weight in k.3 (2020 00:00)    General:      + Cachectic, elderly male   HEENT:     + NCAT + temporal wasting + EOMI  - Conjuctival edema   + Icterus   - ETT  - NGT/OGT  Neck:         + FROM    - JVD     - Nodes     - Masses    + Mid-line trachea   - Tracheostomy  Chest:         - Sternal click  - Sternal drainage  - Pacing wires  - Chest tubes  - SubQ emphysema  Lungs:          + CTA   - Rhonchi    - Rales    - Wheezing     - Decreased BS   - Dullness R L  Cardiac:       + S1 + S2    + Irregularly irregular rhythm  - S3  - S4    - Murmurs   - Rub   - Hamman’s sign   Abdomen:    + BS     + Soft    + Non-tender     - Distended    - Organomegaly  - PEG  Extremities:   - Cyanosis U/L   - Clubbing  U/L  - LE/UE Edema   + Capillary refill    + Pulses   Neuro:        + Awake   +  Alert   - Confused   - Lethargic   - Sedated   + Generalized Weakness  Skin:        - Rashes    - Erythema   + mildly jaundiced   + IV sites intact  - Sacral decubitus  + Left groin incision, not bleeding    HOSPITAL MEDICATIONS: All medications reviewed and analyzed  MEDICATIONS  (STANDING):  aspirin enteric coated 81 milliGRAM(s) Oral daily  dextrose 5%. 1000 milliLiter(s) (50 mL/Hr) IV Continuous <Continuous>  dextrose 50% Injectable 12.5 Gram(s) IV Push once  dextrose 50% Injectable 25 Gram(s) IV Push once  dextrose 50% Injectable 25 Gram(s) IV Push once  ferrous    sulfate 325 milliGRAM(s) Oral daily  fluticasone propionate 50 MICROgram(s)/spray Nasal Spray 1 Spray(s) Both Nostrils two times a day  folic acid 1 milliGRAM(s) Oral daily  insulin glargine Injectable (LANTUS) 10 Unit(s) SubCutaneous at bedtime  insulin lispro (HumaLOG) corrective regimen sliding scale   SubCutaneous Before meals and at bedtime  insulin lispro Injectable (HumaLOG) 7 Unit(s) SubCutaneous three times a day with meals  insulin regular Infusion 1 Unit(s)/Hr (1 mL/Hr) IV Continuous <Continuous>  levothyroxine 50 MICROGram(s) Oral daily  meclizine 12.5 milliGRAM(s) Oral every 8 hours  melatonin 3 milliGRAM(s) Oral at bedtime  meropenem  IVPB 1000 milliGRAM(s) IV Intermittent every 12 hours  pantoprazole    Tablet 40 milliGRAM(s) Oral two times a day  predniSONE   Tablet 60 milliGRAM(s) Oral daily  senna 1 Tablet(s) Oral at bedtime  simvastatin 40 milliGRAM(s) Oral at bedtime  tiotropium 18 MICROgram(s) Capsule 1 Capsule(s) Inhalation daily  torsemide 40 milliGRAM(s) Oral daily    MEDICATIONS  (PRN):  ALBUTerol    90 MICROgram(s) HFA Inhaler 2 Puff(s) Inhalation every 6 hours PRN Shortness of Breath and/or Wheezing  dextrose 40% Gel 15 Gram(s) Oral once PRN Blood Glucose LESS THAN 70 milliGRAM(s)/deciliter  glucagon  Injectable 1 milliGRAM(s) IntraMuscular once PRN Glucose LESS THAN 70 milligrams/deciliter  polyethylene glycol 3350 17 Gram(s) Oral daily PRN Constipation    LABS: All Lab data reviewed and analyzed                        7.4    25.48 )-----------( 128      ( 2020 12:45 )             20.9    01-07    129<L>  |  98  |  104<H>  ----------------------------<  146<H>  4.4   |  18<L>  |  1.85<H>    Ca    7.7<L>      2020 02:09  Phos  3.5     01-07  Mg     1.7     01-07    TPro  5.4<L>  /  Alb  2.8<L>  /  TBili  3.7<H>  /  DBili  x   /  AST  23  /  ALT  19  /  AlkPhos  64  01-07    PT/INR - ( 2020 05:08 )   PT: 23.4 sec;   INR: 2.01 ratio         PTT - ( 2020 05:08 )  PTT:23.2 sec LIVER FUNCTIONS - ( 2020 02:09 )  Alb: 2.8 g/dL / Pro: 5.4 g/dL / ALK PHOS: 64 U/L / ALT: 19 U/L / AST: 23 U/L / GGT: x           RADIOLOGY: - Reviewed and analyzed     Acute blood loss anemia in addition to hemolytic anemia, he has required 3 units of pRBCs since admission. Continue to monitor. Patient requires injection of a partially thrombosed femoral pseudoaneurysm by vascular surgery. No current need for transfusion due to chronic component of anemai  Respiratory status required supplemental oxygen, close monitoring of breathing pattern and respiratory rate & the following of continuous pulse oximetry for support & to prevent decompensation. Continues on treatment with Piriva  Continued mobilization as tolerated  ASA continued for CAD and graft occlusion-thromboembolism prophylaxis.   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  Protonix maintained for GI bleeding prophylaxis  Type 2 diabetes, metabolic stability & infection prophylaxis required review and adjustment of regular Insulin sliding scale and glycemic regimen with Lantus and Humalog while following serial glucose levels to help achieve & maintain euglycemia  Reviewed & addressed surgical site infection prophylaxis regimen  Continues on treatment with Meropenem on gram negative bacteremia.     I personally performed the service described in the documentation recorded by the fang in my presence, and it accurately and completely records my words and actions.    I confirm that the note above accurately reflects all work, treatment, procedures and medical decision making performed by me.    By signing my name below, I, Emre Harper, attest that this documentation has been prepared under the direction and in the presence of Dr. Carpenter.  Electronically signed: Fang Mckinley, 19:53, 2020 JESUS MORALES  MRN#: [id number]    83 yo M with hx Afib (Coumadin), DM2, HLD, CAD (stent  and  in mLAD and RCA), HFrEF (40%) severe AS, HFrEF  30%, LBBB, baseline SCr 1.4, and autoimmune hemolytic anemia, s/p aortic valvuloplasty  and now readmitted with an hematocrit of 16 due to acute blood loss at left groin access site.     Patient had been treated for heart failure in mid December but was deemed to be too high risk for TAVR and underwent  balloon aortic valvuloplasty on . Post-procedure course complicated by contrast induced nephropathy, hyperglycemia, and anemia.  He required blood transfusion and was restarted on Coumadin on 20 and was discharged to rehab facility on 1/3/20.   He continued on torsemide 40mg po and prednisone 60mg daily.  On 20, he returned to ER due to symptomatic anemia related to reported bleeding from L groin access site. Patient states that he had bleeding on 20 and the staff applied pressure and a band-aid and the bleeding stopped.  Patient felt weak, short of breath, and dizzy and the rehab initiated transfer for further work-up.  H+H in ER was 5 and 15 and downtrending from discharge values of 6.9 and 20. ER also noted an egg sized hematoma in the left groin.  Right groin stable with pea size firmness at access site.     OBJECTIVE:  ICU Vital Signs Last 24 Hrs  T(C): 36.6 (2020 16:00), Max: 36.6 (2020 16:00)  T(F): 97.8 (2020 16:00), Max: 97.8 (2020 16:00)  HR: 97 (2020 18:00) (82 - 116)  BP: 113/56 (2020 17:00) (99/54 - 142/63)  BP(mean): 80 (2020 17:00) (70 - 101)  ABP: --  ABP(mean): --  RR: 23 (2020 18:00) (15 - 33)  SpO2: 100% (2020 18:00) (98% - 100%)      - @ 07: @ 07:00  --------------------------------------------------------  IN: 1764 mL / OUT: 1750 mL / NET: 14 mL     @ 07: @ 18:32  --------------------------------------------------------  IN: 1130 mL / OUT: 0 mL / NET: 1130 mL        PHYSICAL EXAM:  Daily Weight in k.3 (2020 00:00)    General:      + Cachectic, elderly male   HEENT:     + NCAT + temporal wasting + EOMI  - Conjuctival edema   + Icterus   - ETT  - NGT/OGT  Neck:         + FROM    - JVD     - Nodes     - Masses    + Mid-line trachea   - Tracheostomy  Chest:         - Sternal click  - Sternal drainage  - Pacing wires  - Chest tubes  - SubQ emphysema  Lungs:          + CTA   - Rhonchi    - Rales    - Wheezing     - Decreased BS   - Dullness R L  Cardiac:       + S1 + S2    + Irregularly irregular rhythm  - S3  - S4    - Murmurs   - Rub   - Hamman’s sign   Abdomen:    + BS     + Soft    + Non-tender     - Distended    - Organomegaly  - PEG  Extremities:   - Cyanosis U/L   - Clubbing  U/L  - LE/UE Edema   + Capillary refill    + Pulses   Neuro:        + Awake   +  Alert   - Confused   - Lethargic   - Sedated   + Generalized Weakness  Skin:        - Rashes    - Erythema   + mildly jaundiced   + IV sites intact  - Sacral decubitus  + Left groin incision, not bleeding    HOSPITAL MEDICATIONS: All medications reviewed and analyzed  MEDICATIONS  (STANDING):  aspirin enteric coated 81 milliGRAM(s) Oral daily  dextrose 5%. 1000 milliLiter(s) (50 mL/Hr) IV Continuous <Continuous>  dextrose 50% Injectable 12.5 Gram(s) IV Push once  dextrose 50% Injectable 25 Gram(s) IV Push once  dextrose 50% Injectable 25 Gram(s) IV Push once  ferrous    sulfate 325 milliGRAM(s) Oral daily  fluticasone propionate 50 MICROgram(s)/spray Nasal Spray 1 Spray(s) Both Nostrils two times a day  folic acid 1 milliGRAM(s) Oral daily  insulin glargine Injectable (LANTUS) 10 Unit(s) SubCutaneous at bedtime  insulin lispro (HumaLOG) corrective regimen sliding scale   SubCutaneous Before meals and at bedtime  insulin lispro Injectable (HumaLOG) 7 Unit(s) SubCutaneous three times a day with meals  insulin regular Infusion 1 Unit(s)/Hr (1 mL/Hr) IV Continuous <Continuous>  levothyroxine 50 MICROGram(s) Oral daily  meclizine 12.5 milliGRAM(s) Oral every 8 hours  melatonin 3 milliGRAM(s) Oral at bedtime  meropenem  IVPB 1000 milliGRAM(s) IV Intermittent every 12 hours  pantoprazole    Tablet 40 milliGRAM(s) Oral two times a day  predniSONE   Tablet 60 milliGRAM(s) Oral daily  senna 1 Tablet(s) Oral at bedtime  simvastatin 40 milliGRAM(s) Oral at bedtime  tiotropium 18 MICROgram(s) Capsule 1 Capsule(s) Inhalation daily  torsemide 40 milliGRAM(s) Oral daily    MEDICATIONS  (PRN):  ALBUTerol    90 MICROgram(s) HFA Inhaler 2 Puff(s) Inhalation every 6 hours PRN Shortness of Breath and/or Wheezing  dextrose 40% Gel 15 Gram(s) Oral once PRN Blood Glucose LESS THAN 70 milliGRAM(s)/deciliter  glucagon  Injectable 1 milliGRAM(s) IntraMuscular once PRN Glucose LESS THAN 70 milligrams/deciliter  polyethylene glycol 3350 17 Gram(s) Oral daily PRN Constipation    LABS: All Lab data reviewed and analyzed                        7.4    25.48 )-----------( 128      ( 2020 12:45 )             20.9    01-07    129<L>  |  98  |  104<H>  ----------------------------<  146<H>  4.4   |  18<L>  |  1.85<H>    Ca    7.7<L>      2020 02:09  Phos  3.5     01-07  Mg     1.7     01-07    TPro  5.4<L>  /  Alb  2.8<L>  /  TBili  3.7<H>  /  DBili  x   /  AST  23  /  ALT  19  /  AlkPhos  64  01-07    PT/INR - ( 2020 05:08 )   PT: 23.4 sec;   INR: 2.01 ratio         PTT - ( 2020 05:08 )  PTT:23.2 sec LIVER FUNCTIONS - ( 2020 02:09 )  Alb: 2.8 g/dL / Pro: 5.4 g/dL / ALK PHOS: 64 U/L / ALT: 19 U/L / AST: 23 U/L / GGT: x           RADIOLOGY: - Reviewed and analyzed     Acute blood loss anemia in addition to hemolytic anemia, he has required 3 units of pRBCs since admission. Continue to monitor. Patient requires injection of a partially thrombosed femoral pseudoaneurysm by vascular surgery. No current need for transfusion due to chronic component of anemia. Continue on prednisone for autoimmune hemolytic anemia  Acute on chronic systolic heart failure, continue on daily Torsemide  Respiratory status required supplemental oxygen, close monitoring of breathing pattern and respiratory rate & the following of continuous pulse oximetry for support & to prevent decompensation. Continues on treatment with spiriva and proventyl  Continued mobilization as tolerated  ASA continued for CAD and graft occlusion-thromboembolism prophylaxis.   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  Protonix maintained for GI bleeding prophylaxis  Type 2 diabetes, metabolic stability & infection prophylaxis required review and adjustment of regular Insulin sliding scale and glycemic regimen with Lantus and Humalog while following serial glucose levels to help achieve & maintain euglycemia  Reviewed & addressed surgical site infection prophylaxis regimen  Continues on treatment with Meropenem on gram negative bacteremia.     I personally performed the service described in the documentation recorded by the fang in my presence, and it accurately and completely records my words and actions.    I confirm that the note above accurately reflects all work, treatment, procedures and medical decision making performed by me.    By signing my name below, I, Emre Harper, attest that this documentation has been prepared under the direction and in the presence of Dr. Carpenter.  Electronically signed: Fang Mckinley, 19:53, 2020

## 2020-01-08 LAB
-  AMIKACIN: SIGNIFICANT CHANGE UP
-  AMPICILLIN/SULBACTAM: SIGNIFICANT CHANGE UP
-  AMPICILLIN: SIGNIFICANT CHANGE UP
-  AZTREONAM: SIGNIFICANT CHANGE UP
-  CEFAZOLIN: SIGNIFICANT CHANGE UP
-  CEFEPIME: SIGNIFICANT CHANGE UP
-  CEFOXITIN: SIGNIFICANT CHANGE UP
-  CEFTRIAXONE: SIGNIFICANT CHANGE UP
-  CIPROFLOXACIN: SIGNIFICANT CHANGE UP
-  ERTAPENEM: SIGNIFICANT CHANGE UP
-  GENTAMICIN: SIGNIFICANT CHANGE UP
-  IMIPENEM: SIGNIFICANT CHANGE UP
-  LEVOFLOXACIN: SIGNIFICANT CHANGE UP
-  MEROPENEM: SIGNIFICANT CHANGE UP
-  PIPERACILLIN/TAZOBACTAM: SIGNIFICANT CHANGE UP
-  TOBRAMYCIN: SIGNIFICANT CHANGE UP
-  TRIMETHOPRIM/SULFAMETHOXAZOLE: SIGNIFICANT CHANGE UP
ALBUMIN SERPL ELPH-MCNC: 2.8 G/DL — LOW (ref 3.3–5)
ALP SERPL-CCNC: 74 U/L — SIGNIFICANT CHANGE UP (ref 40–120)
ALT FLD-CCNC: 29 U/L — SIGNIFICANT CHANGE UP (ref 10–45)
ANION GAP SERPL CALC-SCNC: 17 MMOL/L — SIGNIFICANT CHANGE UP (ref 5–17)
AST SERPL-CCNC: 29 U/L — SIGNIFICANT CHANGE UP (ref 10–40)
BILIRUB DIRECT SERPL-MCNC: 2 MG/DL — HIGH (ref 0–0.2)
BILIRUB INDIRECT FLD-MCNC: 4.3 MG/DL — HIGH (ref 0.2–1)
BILIRUB SERPL-MCNC: 4.6 MG/DL — HIGH (ref 0.2–1.2)
BILIRUB SERPL-MCNC: 6.3 MG/DL — HIGH (ref 0.2–1.2)
BLD GP AB SCN SERPL QL: NEGATIVE — SIGNIFICANT CHANGE UP
BUN SERPL-MCNC: 112 MG/DL — HIGH (ref 7–23)
CALCIUM SERPL-MCNC: 7.9 MG/DL — LOW (ref 8.4–10.5)
CHLORIDE SERPL-SCNC: 100 MMOL/L — SIGNIFICANT CHANGE UP (ref 96–108)
CO2 SERPL-SCNC: 16 MMOL/L — LOW (ref 22–31)
CREAT SERPL-MCNC: 1.88 MG/DL — HIGH (ref 0.5–1.3)
CULTURE RESULTS: SIGNIFICANT CHANGE UP
GLUCOSE BLDC GLUCOMTR-MCNC: 139 MG/DL — HIGH (ref 70–99)
GLUCOSE BLDC GLUCOMTR-MCNC: 178 MG/DL — HIGH (ref 70–99)
GLUCOSE BLDC GLUCOMTR-MCNC: 226 MG/DL — HIGH (ref 70–99)
GLUCOSE BLDC GLUCOMTR-MCNC: 260 MG/DL — HIGH (ref 70–99)
GLUCOSE BLDC GLUCOMTR-MCNC: 289 MG/DL — HIGH (ref 70–99)
GLUCOSE SERPL-MCNC: 165 MG/DL — HIGH (ref 70–99)
HAPTOGLOB SERPL-MCNC: <20 MG/DL — LOW (ref 34–200)
HCT VFR BLD CALC: 17.7 % — CRITICAL LOW (ref 39–50)
HCT VFR BLD CALC: 19 % — CRITICAL LOW (ref 39–50)
HGB BLD-MCNC: 6 G/DL — CRITICAL LOW (ref 13–17)
HGB BLD-MCNC: 6.7 G/DL — CRITICAL LOW (ref 13–17)
LDH SERPL L TO P-CCNC: 842 U/L — HIGH (ref 50–242)
MAGNESIUM SERPL-MCNC: 1.8 MG/DL — SIGNIFICANT CHANGE UP (ref 1.6–2.6)
MCHC RBC-ENTMCNC: 33 PG — SIGNIFICANT CHANGE UP (ref 27–34)
MCHC RBC-ENTMCNC: 33.9 GM/DL — SIGNIFICANT CHANGE UP (ref 32–36)
MCHC RBC-ENTMCNC: 34.4 PG — HIGH (ref 27–34)
MCHC RBC-ENTMCNC: 35.3 GM/DL — SIGNIFICANT CHANGE UP (ref 32–36)
MCV RBC AUTO: 97.3 FL — SIGNIFICANT CHANGE UP (ref 80–100)
MCV RBC AUTO: 97.4 FL — SIGNIFICANT CHANGE UP (ref 80–100)
METHOD TYPE: SIGNIFICANT CHANGE UP
NRBC # BLD: 1 /100 WBCS — HIGH (ref 0–0)
NRBC # BLD: 2 /100 WBCS — HIGH (ref 0–0)
ORGANISM # SPEC MICROSCOPIC CNT: SIGNIFICANT CHANGE UP
PHOSPHATE SERPL-MCNC: 4.2 MG/DL — SIGNIFICANT CHANGE UP (ref 2.5–4.5)
PLATELET # BLD AUTO: 101 K/UL — LOW (ref 150–400)
PLATELET # BLD AUTO: 113 K/UL — LOW (ref 150–400)
POTASSIUM SERPL-MCNC: 4.9 MMOL/L — SIGNIFICANT CHANGE UP (ref 3.5–5.3)
POTASSIUM SERPL-SCNC: 4.9 MMOL/L — SIGNIFICANT CHANGE UP (ref 3.5–5.3)
PROT SERPL-MCNC: 5.5 G/DL — LOW (ref 6–8.3)
RBC # BLD: 1.82 M/UL — LOW (ref 4.2–5.8)
RBC # BLD: 1.95 M/UL — LOW (ref 4.2–5.8)
RBC # FLD: 20.1 % — HIGH (ref 10.3–14.5)
RBC # FLD: 21 % — HIGH (ref 10.3–14.5)
RH IG SCN BLD-IMP: POSITIVE — SIGNIFICANT CHANGE UP
SODIUM SERPL-SCNC: 133 MMOL/L — LOW (ref 135–145)
SPECIMEN SOURCE: SIGNIFICANT CHANGE UP
WBC # BLD: 16.3 K/UL — HIGH (ref 3.8–10.5)
WBC # BLD: 21.32 K/UL — HIGH (ref 3.8–10.5)
WBC # FLD AUTO: 16.3 K/UL — HIGH (ref 3.8–10.5)
WBC # FLD AUTO: 21.32 K/UL — HIGH (ref 3.8–10.5)

## 2020-01-08 PROCEDURE — 99232 SBSQ HOSP IP/OBS MODERATE 35: CPT

## 2020-01-08 PROCEDURE — 71045 X-RAY EXAM CHEST 1 VIEW: CPT | Mod: 26

## 2020-01-08 PROCEDURE — 99233 SBSQ HOSP IP/OBS HIGH 50: CPT

## 2020-01-08 PROCEDURE — 99223 1ST HOSP IP/OBS HIGH 75: CPT | Mod: GC

## 2020-01-08 RX ORDER — LIDOCAINE HCL 20 MG/ML
10 VIAL (ML) INJECTION ONCE
Refills: 0 | Status: DISCONTINUED | OUTPATIENT
Start: 2020-01-08 | End: 2020-01-08

## 2020-01-08 RX ORDER — LIDOCAINE HCL 20 MG/ML
10 VIAL (ML) INJECTION ONCE
Refills: 0 | Status: COMPLETED | OUTPATIENT
Start: 2020-01-08 | End: 2020-01-08

## 2020-01-08 RX ADMIN — PANTOPRAZOLE SODIUM 40 MILLIGRAM(S): 20 TABLET, DELAYED RELEASE ORAL at 05:03

## 2020-01-08 RX ADMIN — TIOTROPIUM BROMIDE 1 CAPSULE(S): 18 CAPSULE ORAL; RESPIRATORY (INHALATION) at 12:53

## 2020-01-08 RX ADMIN — Medication 10 MILLILITER(S): at 11:00

## 2020-01-08 RX ADMIN — SIMVASTATIN 40 MILLIGRAM(S): 20 TABLET, FILM COATED ORAL at 21:45

## 2020-01-08 RX ADMIN — Medication 1 MILLIGRAM(S): at 11:34

## 2020-01-08 RX ADMIN — INSULIN GLARGINE 10 UNIT(S): 100 INJECTION, SOLUTION SUBCUTANEOUS at 21:44

## 2020-01-08 RX ADMIN — Medication 2: at 11:54

## 2020-01-08 RX ADMIN — Medication 5: at 09:10

## 2020-01-08 RX ADMIN — Medication 1 SPRAY(S): at 05:03

## 2020-01-08 RX ADMIN — MEROPENEM 100 MILLIGRAM(S): 1 INJECTION INTRAVENOUS at 17:19

## 2020-01-08 RX ADMIN — Medication 7 UNIT(S): at 17:18

## 2020-01-08 RX ADMIN — Medication 10: at 21:46

## 2020-01-08 RX ADMIN — Medication 40 MILLIGRAM(S): at 05:03

## 2020-01-08 RX ADMIN — PANTOPRAZOLE SODIUM 40 MILLIGRAM(S): 20 TABLET, DELAYED RELEASE ORAL at 17:18

## 2020-01-08 RX ADMIN — Medication 1 SPRAY(S): at 17:17

## 2020-01-08 RX ADMIN — Medication 60 MILLIGRAM(S): at 05:03

## 2020-01-08 RX ADMIN — Medication 50 MICROGRAM(S): at 05:03

## 2020-01-08 RX ADMIN — Medication 10: at 17:42

## 2020-01-08 RX ADMIN — SENNA PLUS 1 TABLET(S): 8.6 TABLET ORAL at 21:45

## 2020-01-08 RX ADMIN — Medication 3 MILLIGRAM(S): at 21:45

## 2020-01-08 RX ADMIN — Medication 81 MILLIGRAM(S): at 11:34

## 2020-01-08 RX ADMIN — MEROPENEM 100 MILLIGRAM(S): 1 INJECTION INTRAVENOUS at 05:04

## 2020-01-08 RX ADMIN — Medication 325 MILLIGRAM(S): at 11:34

## 2020-01-08 NOTE — DIETITIAN INITIAL EVALUATION ADULT. - ADD RECOMMEND
Recommend change diet to Consistent Carbohydrate, Low Sodium in setting of HF. Reinforce nutrition education PRN; RD availability made known. Monitor tolerance to diet prescription, nutritional intake, weight trends, labs and skin integrity.

## 2020-01-08 NOTE — DIETITIAN INITIAL EVALUATION ADULT. - FACTORS AFF FOOD INTAKE
Pt denies chewing/swallowing issues, nausea/vomiting, diarrhea/constipation or other signs of acute GI distress at this time. Reports last BM 1/6,

## 2020-01-08 NOTE — DIETITIAN INITIAL EVALUATION ADULT. - PERTINENT MEDS FT
lantus, humalog sliding scale, humalog standing, meclizine, synthroid, protonix, demadex, iron, folic acid, miralax, deltasone

## 2020-01-08 NOTE — PROGRESS NOTE ADULT - SUBJECTIVE AND OBJECTIVE BOX
CRITICAL CARE ATTENDING - CTICU    MEDICATIONS  (STANDING):  aspirin enteric coated 81 milliGRAM(s) Oral daily  dextrose 5%. 1000 milliLiter(s) (50 mL/Hr) IV Continuous <Continuous>  dextrose 50% Injectable 12.5 Gram(s) IV Push once  dextrose 50% Injectable 25 Gram(s) IV Push once  dextrose 50% Injectable 25 Gram(s) IV Push once  ferrous    sulfate 325 milliGRAM(s) Oral daily  fluticasone propionate 50 MICROgram(s)/spray Nasal Spray 1 Spray(s) Both Nostrils two times a day  folic acid 1 milliGRAM(s) Oral daily  insulin glargine Injectable (LANTUS) 10 Unit(s) SubCutaneous at bedtime  insulin lispro (HumaLOG) corrective regimen sliding scale   SubCutaneous Before meals and at bedtime  insulin lispro Injectable (HumaLOG) 7 Unit(s) SubCutaneous three times a day with meals  insulin regular Infusion 1 Unit(s)/Hr (1 mL/Hr) IV Continuous <Continuous>  levothyroxine 50 MICROGram(s) Oral daily  meclizine 12.5 milliGRAM(s) Oral every 8 hours  melatonin 3 milliGRAM(s) Oral at bedtime  meropenem  IVPB 1000 milliGRAM(s) IV Intermittent every 12 hours  pantoprazole    Tablet 40 milliGRAM(s) Oral two times a day  predniSONE   Tablet 60 milliGRAM(s) Oral daily  senna 1 Tablet(s) Oral at bedtime  simvastatin 40 milliGRAM(s) Oral at bedtime  tiotropium 18 MICROgram(s) Capsule 1 Capsule(s) Inhalation daily  torsemide 40 milliGRAM(s) Oral daily                                    6.7    21.32 )-----------( 113      ( 2020 00:58 )             19.0       01-08    133<L>  |  100  |  112<H>  ----------------------------<  165<H>  4.9   |  16<L>  |  1.88<H>    Ca    7.9<L>      2020 00:58  Phos  4.2     -08  Mg     1.8     -    TPro  5.5<L>  /  Alb  2.8<L>  /  TBili  4.6<H>  /  DBili  x   /  AST  29  /  ALT  29  /  AlkPhos  74  01-08              Daily     Daily Weight in k (2020 03:00)       @ 07: @ 07:00  --------------------------------------------------------  IN: 1764 mL / OUT: 1750 mL / NET: 14 mL     @ 07:  -   @ 06:19  --------------------------------------------------------  IN: 1430 mL / OUT: 1775 mL / NET: -345 mL        Critically Ill patient  : [ ] preoperative ,   [ x] post operative    Requires :  [ ] Arterial Line   [ ] Central Line  [ ] PA catheter  [ ] IABP  [ ] ECMO  [ ] LVAD  [ ] Ventilator  [ ] pacemaker [ ] Impella.                      [x ] ABG's     [x ] Pulse Oxymetry Monitoring  Bedside evaluation , monitoring , treatment of hemodynamics , fluids , IVP/ IVCD meds.        Diagnosis:     Severe AS s/p BAV - 19     Readmit - Anemia, L groin hematoma     hx of afib     hx of CAD     DM2 - IVCD insulin     CHF- acute [x ]   chronic [x ]    systolic [ x]   diatolic [ ]          - Echo- EF -      40s    [x ] LV dysfunction          - Cxr-cardiomegally, edema          - Clinical-  [ ]inotropes   [ ]pressors   [ ]diuresis   [ ]IABP   [ ]ECMO   [ ]LVAD   [ ]Respiratory Failure    Thrombocytopenia     Renal Failure - Acute Kidney Injury     Hyponatremia           Discussed with CT surgeon, Physician's Assistant - Nurse Practitioner- Critical care medicine team.   Dicussed at  AM / PM rounds.   Chart, labs , films reviewed.    Total Time: CRITICAL CARE ATTENDING - CTICU    MEDICATIONS  (STANDING):  aspirin enteric coated 81 milliGRAM(s) Oral daily  dextrose 5%. 1000 milliLiter(s) (50 mL/Hr) IV Continuous <Continuous>  dextrose 50% Injectable 12.5 Gram(s) IV Push once  dextrose 50% Injectable 25 Gram(s) IV Push once  dextrose 50% Injectable 25 Gram(s) IV Push once  ferrous    sulfate 325 milliGRAM(s) Oral daily  fluticasone propionate 50 MICROgram(s)/spray Nasal Spray 1 Spray(s) Both Nostrils two times a day  folic acid 1 milliGRAM(s) Oral daily  insulin glargine Injectable (LANTUS) 10 Unit(s) SubCutaneous at bedtime  insulin lispro (HumaLOG) corrective regimen sliding scale   SubCutaneous Before meals and at bedtime  insulin lispro Injectable (HumaLOG) 7 Unit(s) SubCutaneous three times a day with meals  insulin regular Infusion 1 Unit(s)/Hr (1 mL/Hr) IV Continuous <Continuous>  levothyroxine 50 MICROGram(s) Oral daily  meclizine 12.5 milliGRAM(s) Oral every 8 hours  melatonin 3 milliGRAM(s) Oral at bedtime  meropenem  IVPB 1000 milliGRAM(s) IV Intermittent every 12 hours  pantoprazole    Tablet 40 milliGRAM(s) Oral two times a day  predniSONE   Tablet 60 milliGRAM(s) Oral daily  senna 1 Tablet(s) Oral at bedtime  simvastatin 40 milliGRAM(s) Oral at bedtime  tiotropium 18 MICROgram(s) Capsule 1 Capsule(s) Inhalation daily  torsemide 40 milliGRAM(s) Oral daily                                    6.7    21.32 )-----------( 113      ( 2020 00:58 )             19.0       01-08    133<L>  |  100  |  112<H>  ----------------------------<  165<H>  4.9   |  16<L>  |  1.88<H>    Ca    7.9<L>      2020 00:58  Phos  4.2     -08  Mg     1.8     -    TPro  5.5<L>  /  Alb  2.8<L>  /  TBili  4.6<H>  /  DBili  x   /  AST  29  /  ALT  29  /  AlkPhos  74  01-08              Daily     Daily Weight in k (2020 03:00)       @ 07: @ 07:00  --------------------------------------------------------  IN: 1764 mL / OUT: 1750 mL / NET: 14 mL     @ 07:  -   @ 06:19  --------------------------------------------------------  IN: 1430 mL / OUT: 1775 mL / NET: -345 mL        Critically Ill patient  : [ ] preoperative ,   [ x] post operative    Requires :  [ ] Arterial Line   [ ] Central Line  [ ] PA catheter  [ ] IABP  [ ] ECMO  [ ] LVAD  [ ] Ventilator  [ ] pacemaker [ ] Impella.                      [x ] ABG's     [x ] Pulse Oxymetry Monitoring  Bedside evaluation , monitoring , treatment of hemodynamics , fluids , IVP/ IVCD meds.        Diagnosis:     Severe AS s/p BAV - 19     Readmit - Anemia, L groin hematoma     hx of afib     hx of CAD     DM2 - IVCD insulin     CHF- acute [x ]   chronic [x ]    systolic [ x]   diatolic [ ]          - Echo- EF -      40s    [x ] LV dysfunction          - Cxr-cardiomegally, edema          - Clinical-  [ ]inotropes   [ ]pressors   [ ]diuresis   [ ]IABP   [ ]ECMO   [ ]LVAD   [ ]Respiratory Failure    Thrombocytopenia     Renal Failure - Acute Kidney Injury     Hyponatremia       By signing my name below, I, Noy George, attest that this documentation has been prepared under the direction and in the presence of Alejandro Alexander MD  Electronically Signed: Cheo Elias. 20 @ 07:19    Discussed with CT surgeon, Physician's Assistant - Nurse Practitioner- Critical care medicine team.   Dicussed at  AM / PM rounds.   Chart, labs , films reviewed.    Total Time: CRITICAL CARE ATTENDING - CTICU    MEDICATIONS  (STANDING):  aspirin enteric coated 81 milliGRAM(s) Oral daily  dextrose 5%. 1000 milliLiter(s) (50 mL/Hr) IV Continuous <Continuous>  dextrose 50% Injectable 12.5 Gram(s) IV Push once  dextrose 50% Injectable 25 Gram(s) IV Push once  dextrose 50% Injectable 25 Gram(s) IV Push once  ferrous    sulfate 325 milliGRAM(s) Oral daily  fluticasone propionate 50 MICROgram(s)/spray Nasal Spray 1 Spray(s) Both Nostrils two times a day  folic acid 1 milliGRAM(s) Oral daily  insulin glargine Injectable (LANTUS) 10 Unit(s) SubCutaneous at bedtime  insulin lispro (HumaLOG) corrective regimen sliding scale   SubCutaneous Before meals and at bedtime  insulin lispro Injectable (HumaLOG) 7 Unit(s) SubCutaneous three times a day with meals  insulin regular Infusion 1 Unit(s)/Hr (1 mL/Hr) IV Continuous <Continuous>  levothyroxine 50 MICROGram(s) Oral daily  meclizine 12.5 milliGRAM(s) Oral every 8 hours  melatonin 3 milliGRAM(s) Oral at bedtime  meropenem  IVPB 1000 milliGRAM(s) IV Intermittent every 12 hours  pantoprazole    Tablet 40 milliGRAM(s) Oral two times a day  predniSONE   Tablet 60 milliGRAM(s) Oral daily  senna 1 Tablet(s) Oral at bedtime  simvastatin 40 milliGRAM(s) Oral at bedtime  tiotropium 18 MICROgram(s) Capsule 1 Capsule(s) Inhalation daily  torsemide 40 milliGRAM(s) Oral daily                                    6.7    21.32 )-----------( 113      ( 2020 00:58 )             19.0       01-08    133<L>  |  100  |  112<H>  ----------------------------<  165<H>  4.9   |  16<L>  |  1.88<H>    Ca    7.9<L>      2020 00:58  Phos  4.2     -08  Mg     1.8     -    TPro  5.5<L>  /  Alb  2.8<L>  /  TBili  4.6<H>  /  DBili  x   /  AST  29  /  ALT  29  /  AlkPhos  74  01-08              Daily     Daily Weight in k (2020 03:00)       @ 07: @ 07:00  --------------------------------------------------------  IN: 1764 mL / OUT: 1750 mL / NET: 14 mL     @ 07:  -   @ 06:19  --------------------------------------------------------  IN: 1430 mL / OUT: 1775 mL / NET: -345 mL        Critically Ill patient  : [ ] preoperative ,   [ x] post operative    Requires :  [x ] Arterial Line   [ ] Central Line  [ ] PA catheter  [ ] IABP  [ ] ECMO  [ ] LVAD  [ ] Ventilator  [ ] pacemaker [ ] Impella.                      [x ] ABG's     [x ] Pulse Oxymetry Monitoring  Bedside evaluation , monitoring , treatment of hemodynamics , fluids , IVP/ IVCD meds.        Diagnosis:     Severe AS s/p BAV - 19     Readmit - Anemia, L groin hematoma     hx of afib     hx of CAD     DM2 - IVCD insulin     CHF- acute [x ]   chronic [x ]    systolic [ x]   diatolic [ ]          - Echo- EF -      40s    [x ] LV dysfunction          - Cxr-cardiomegally, edema          - Clinical-  [ ]inotropes   [ ]pressors   [ ]diuresis   [ ]IABP   [ ]ECMO   [ ]LVAD   [ ]Respiratory Failure    Thrombocytopenia     Renal Failure - Acute Kidney Injury     Hyponatremia       By signing my name below, I, Noy George, attest that this documentation has been prepared under the direction and in the presence of Alejandro Alexander MD  Electronically Signed: Cheo Elias. 20 @ 10:35    Discussed with CT surgeon, Physician's Assistant - Nurse Practitioner- Critical care medicine team.   Dicussed at  AM / PM rounds.   Chart, labs , films reviewed.    Total Time:

## 2020-01-08 NOTE — PROGRESS NOTE ADULT - ASSESSMENT
84M PMH A-fib (on coumadin), DM II, HLD, CAD (s/p stents 2014, 215), HF, Severe AS with associated hemolytic anemia, LBBB, presenting with anemia. Found on imaging to have LLE hematoma      pt with a [id number] hematoma over left groin   - bc positive for enterobacter  no signs s of UTI, pneumonia or endocarditis      suspect enterobacter related to groin pseudoaneurysm and hematoma.  pt has 8/8 bc positive for enterobacter  most likely intravascular source  discussed Dr. Becker  wants to avoid invasive procedures  not candidate fo aggressive surgery palliative approach planned   prognosis guarded       meropenem  1 q 12 hrs.   discussed with [facility]    check follow up bc   follow up bc in 24 hrs.

## 2020-01-08 NOTE — CHART NOTE - NSCHARTNOTEFT_GEN_A_CORE
Vascular surgery will inject the L groin pseudoaneurysm today w/ Dr Saravia at bedside    discussed w/ Dr. Rani Dietz PGY-2  Vascular  [id number]

## 2020-01-08 NOTE — DIETITIAN INITIAL EVALUATION ADULT. - REASON INDICATOR FOR ASSESSMENT
Seen for: length of stay on CTU  Source: patient, patient's aide, EMR    Per chart, pt is a 84 year old male with PMH of AFib (on coumadin), T2DM, HLD, CAD, HFrEF, LBBB, s/p balloon aortic valvuloplasty (12/26), admitted with symptomatic anemia related to reported bleeding from L groin access site on 1/4/20. Today, plan for vascular surgery to inject the L groin pseudoaneurysm.

## 2020-01-08 NOTE — CONSULT NOTE ADULT - SUBJECTIVE AND OBJECTIVE BOX
HPI:  83 yo M with hx Afib (Coumadin), DM2, HLD, CAD (stent 2014 and 2015 in mLAD and RCA), HFrEF (40%) severe AS, HFrEF  30%, LBBB, baseline SCr 1.4, and Autoimmune hemolytic who was admitted from 12/13 -1/3 and optimized with diuretics per Heart failure service, planned for TAVR however underwent  balloon aortic valvuloplasty on 12/26 and transfused 1 U PRBC. Post-procedure course complicated by MAI, hyperglycemia, and anemia 2/2 AIHA on prednisone and followed by hematology.  He was restarted on Coumadin, received 1 U PRBC per heme on 1/2/20 and was discharged to [facility] facility on 1/3/20.   He continued on  Torsemide 40mg po and prednisone 60mg daily  per heme & diuretics.  He returns to the ER this evening due to symptomatic anemia related to reported bleeding from L groin access site on 1/4/20.  Patient states that he had bleeding on 1/4/20 and the staff applied pressure and a band-aid and the bleeding stopped.  Today he felt weak, short of breath, and dizzy and the rehab initiated transfer for further work-up.   Patient denies fever, chills, syncope, chest pain, hemoptysis, melena, hematochezia, dysuria.   In ER, patient was noted to have H+H of 5/15 downtrending from discharge values of 6.9/20.6 and noted to have a egg sized hematoma in the left groin.  Right groin stable with pea size firmness at access site.   When stretcher position was lifted from supine to upright patient complained of feeling dizzy and SBP was 100, decreased from 117.     Vascular consult was initiated, PRBC requested, CT scan non-contrast ordered. (05 Jan 2020 22:02)      PAST MEDICAL & SURGICAL HISTORY:  Anemia  LBBB (left bundle branch block)  DYER (dyspnea on exertion)  Coronary disease: PCI  CHF (congestive heart failure)  Former smoker  Meniere disease  HLD (hyperlipidemia)  DM (diabetes mellitus)  Atrial fibrillation  HTN (hypertension)  Lipoma  History of skin graft  Status post appendectomy      Allergies    No Known Allergies    Intolerances        MEDICATIONS  (STANDING):  aspirin enteric coated 81 milliGRAM(s) Oral daily  dextrose 5%. 1000 milliLiter(s) (50 mL/Hr) IV Continuous <Continuous>  dextrose 50% Injectable 12.5 Gram(s) IV Push once  dextrose 50% Injectable 25 Gram(s) IV Push once  dextrose 50% Injectable 25 Gram(s) IV Push once  ferrous    sulfate 325 milliGRAM(s) Oral daily  fluticasone propionate 50 MICROgram(s)/spray Nasal Spray 1 Spray(s) Both Nostrils two times a day  folic acid 1 milliGRAM(s) Oral daily  insulin glargine Injectable (LANTUS) 10 Unit(s) SubCutaneous at bedtime  insulin lispro (HumaLOG) corrective regimen sliding scale   SubCutaneous Before meals and at bedtime  insulin lispro Injectable (HumaLOG) 7 Unit(s) SubCutaneous three times a day with meals  levothyroxine 50 MICROGram(s) Oral daily  lidocaine 2% Injectable 10 milliLiter(s) Local Injection once  meclizine 12.5 milliGRAM(s) Oral every 8 hours  melatonin 3 milliGRAM(s) Oral at bedtime  meropenem  IVPB 1000 milliGRAM(s) IV Intermittent every 12 hours  pantoprazole    Tablet 40 milliGRAM(s) Oral two times a day  predniSONE   Tablet 60 milliGRAM(s) Oral daily  senna 1 Tablet(s) Oral at bedtime  simvastatin 40 milliGRAM(s) Oral at bedtime  tiotropium 18 MICROgram(s) Capsule 1 Capsule(s) Inhalation daily  torsemide 40 milliGRAM(s) Oral daily    MEDICATIONS  (PRN):  ALBUTerol    90 MICROgram(s) HFA Inhaler 2 Puff(s) Inhalation every 6 hours PRN Shortness of Breath and/or Wheezing  dextrose 40% Gel 15 Gram(s) Oral once PRN Blood Glucose LESS THAN 70 milliGRAM(s)/deciliter  glucagon  Injectable 1 milliGRAM(s) IntraMuscular once PRN Glucose LESS THAN 70 milligrams/deciliter  polyethylene glycol 3350 17 Gram(s) Oral daily PRN Constipation      FAMILY HISTORY:  Family history of brain tumor (Sibling)  Family history of heart attack (Child)  Family history of CHF (congestive heart failure)  Family history of lung cancer      SOCIAL HISTORY: No EtOH, no tobacco    REVIEW OF SYSTEMS:    CONSTITUTIONAL: No fevers, no chills, no weakness, no weight loss  EYES/ENT: No visual changes;  No dizziness/vertigo or throat pain   NECK: No pain or stiffness  RESPIRATORY: No shortness of breath, no cough or wheezing   CARDIOVASCULAR: No chest pain or palpitations, no dyspnea on exertion, no peripheral edema  GASTROINTESTINAL: No nausea/vomiting/diarrhea, no abdominal pain, no melena or BRBPR, no constipation  GENITOURINARY: No dysuria, increased frequency or hematuria  NEUROLOGICAL: No numbness or weakness  SKIN: No rashes, no bruises, no petechiae  LYMPH: No enlarged lymph nodes  All other review of systems is negative unless indicated above        VITALS:   T(F): 96.8 (01-08-20 @ 08:00), Max: 98.1 (01-07-20 @ 19:00)  HR: 96 (01-08-20 @ 10:00)  BP: 97/54 (01-08-20 @ 10:00)  RR: 22 (01-08-20 @ 10:00)  SpO2: 100% (01-08-20 @ 10:00)  Wt(kg): --      PHYSICAL EXAM:  GENERAL: resting in bed comfortably  EYES: EOMI, conjunctiva and sclera clear  NECK: supple  RESP: CTAB  HEART: RRR, S1/S2  ABDOMEN: +BS, soft, NT, ND  EXTREMITIES: no LE edema  NEUROLOGIC: no focal deficits, AAO x 3  SKIN: warm and dry, no rashes, no bruises or petechiae  LYMPH: No peripheral lymphadenopathy appreciated      LABS:                         6.0    16.30 )-----------( 101      ( 08 Jan 2020 10:16 )             17.7       01-08    133<L>  |  100  |  112<H>  ----------------------------<  165<H>  4.9   |  16<L>  |  1.88<H>    Ca    7.9<L>      08 Jan 2020 00:58  Phos  4.2     01-08  Mg     1.8     01-08    TPro  5.5<L>  /  Alb  2.8<L>  /  TBili  4.6<H>  /  DBili  x   /  AST  29  /  ALT  29  /  AlkPhos  74  01-08      Magnesium, Serum: 1.8 mg/dL (01-08 @ 00:58)  Phosphorus Level, Serum: 4.2 mg/dL (01-08 @ 00:58)        IMAGING: HPI:  85 yo M with hx Afib (Coumadin), DM2, HLD, CAD (stent 2014 and 2015 in mLAD and RCA), HFrEF (40%) severe AS, HFrEF  30%, LBBB, baseline SCr 1.4, and Autoimmune hemolytic who was admitted from 12/13 -1/3 and optimized with diuretics per Heart failure service, planned for TAVR however underwent  balloon aortic valvuloplasty on 12/26 and transfused 1 U PRBC. Post-procedure course complicated by MAI, hyperglycemia, and anemia 2/2 warm AIHA on prednisone and followed by hematology.  He was restarted on Coumadin, received 1 U PRBC per heme on 1/2/20 and was discharged to [place] re[facility] facility on 1/3/20.   He continued on  Torsemide 40mg po and prednisone 60mg daily  per heme & diuretics.  He returned to the ER on 1/5 due to symptomatic anemia related to reported bleeding from L groin access site on 1/4/20.  Patient states that he had bleeding on 1/4/20 and the staff applied pressure and a band-aid and the bleeding stopped.    Today he felt weak, short of breath, and dizzy and the rehab initiated transfer for further work-up. Daughter states that prior to previous admission pt was independent with all ADLs. Patient denies fever, chills, syncope, chest pain, hemoptysis, melena, hematochezia, dysuria.   In ER, patient was noted to have H+H of 5/15 downtrending from discharge values of 6.9/20.6 and noted to have a egg sized hematoma in the left groin.  Right groin stable with pea size firmness at access site.   When stretcher position was lifted from supine to upright patient complained of feeling dizzy and SBP was 100, decreased from 117.     Vascular consult was initiated, PRBC requested, CT scan non-contrast ordered. (05 Jan 2020 22:02)    Patient received 2u pRBCs in ED, 1u on 1/7, 1u on 1/8. Patient also found to be septic w/ blood cx growing enterobacter, currently being treated with meropenem. No acute complaints today.    PAST MEDICAL & SURGICAL HISTORY:  Anemia  LBBB (left bundle branch block)  DYER (dyspnea on exertion)  Coronary disease: PCI  CHF (congestive heart failure)  Former smoker  Meniere disease  HLD (hyperlipidemia)  DM (diabetes mellitus)  Atrial fibrillation  HTN (hypertension)  Lipoma  History of skin graft  Status post appendectomy      Allergies    No Known Allergies    Intolerances        MEDICATIONS  (STANDING):  aspirin enteric coated 81 milliGRAM(s) Oral daily  dextrose 5%. 1000 milliLiter(s) (50 mL/Hr) IV Continuous <Continuous>  dextrose 50% Injectable 12.5 Gram(s) IV Push once  dextrose 50% Injectable 25 Gram(s) IV Push once  dextrose 50% Injectable 25 Gram(s) IV Push once  ferrous    sulfate 325 milliGRAM(s) Oral daily  fluticasone propionate 50 MICROgram(s)/spray Nasal Spray 1 Spray(s) Both Nostrils two times a day  folic acid 1 milliGRAM(s) Oral daily  insulin glargine Injectable (LANTUS) 10 Unit(s) SubCutaneous at bedtime  insulin lispro (HumaLOG) corrective regimen sliding scale   SubCutaneous Before meals and at bedtime  insulin lispro Injectable (HumaLOG) 7 Unit(s) SubCutaneous three times a day with meals  levothyroxine 50 MICROGram(s) Oral daily  lidocaine 2% Injectable 10 milliLiter(s) Local Injection once  meclizine 12.5 milliGRAM(s) Oral every 8 hours  melatonin 3 milliGRAM(s) Oral at bedtime  meropenem  IVPB 1000 milliGRAM(s) IV Intermittent every 12 hours  pantoprazole    Tablet 40 milliGRAM(s) Oral two times a day  predniSONE   Tablet 60 milliGRAM(s) Oral daily  senna 1 Tablet(s) Oral at bedtime  simvastatin 40 milliGRAM(s) Oral at bedtime  tiotropium 18 MICROgram(s) Capsule 1 Capsule(s) Inhalation daily  torsemide 40 milliGRAM(s) Oral daily    MEDICATIONS  (PRN):  ALBUTerol    90 MICROgram(s) HFA Inhaler 2 Puff(s) Inhalation every 6 hours PRN Shortness of Breath and/or Wheezing  dextrose 40% Gel 15 Gram(s) Oral once PRN Blood Glucose LESS THAN 70 milliGRAM(s)/deciliter  glucagon  Injectable 1 milliGRAM(s) IntraMuscular once PRN Glucose LESS THAN 70 milligrams/deciliter  polyethylene glycol 3350 17 Gram(s) Oral daily PRN Constipation      FAMILY HISTORY:  Family history of brain tumor (Sibling)  Family history of heart attack (Child)  Family history of CHF (congestive heart failure)  Family history of lung cancer      SOCIAL HISTORY: No EtOH, no tobacco    REVIEW OF SYSTEMS:  CONSTITUTIONAL: +weakness, No fevers, no chills, no weight loss  EYES/ENT: No visual changes;  No dizziness/vertigo or throat pain   NECK: No pain or stiffness  RESPIRATORY: No shortness of breath, no cough or wheezing   CARDIOVASCULAR: No chest pain or palpitations, no dyspnea on exertion, no peripheral edema  GASTROINTESTINAL: No nausea/vomiting/diarrhea, no abdominal pain, no melena or BRBPR, no constipation  GENITOURINARY: No dysuria, increased frequency or hematuria  NEUROLOGICAL: +LE weakness  SKIN: +bruises on b/l upper extremity  LYMPH: No enlarged lymph nodes  All other review of systems is negative unless indicated above      VITALS:   T(F): 96.8 (01-08-20 @ 08:00), Max: 98.1 (01-07-20 @ 19:00)  HR: 96 (01-08-20 @ 10:00)  BP: 97/54 (01-08-20 @ 10:00)  RR: 22 (01-08-20 @ 10:00)  SpO2: 100% (01-08-20 @ 10:00)  Wt(kg): --      PHYSICAL EXAM:  GENERAL: resting in bed, no acute distress  EYES: EOMI, sclera mildly jaundiced  NECK: supple  RESP: CTAB  HEART: irregularly irregular, tachycardic, 2/6 systolic ejection murmur  ABDOMEN: +BS, soft, NT, ND  EXTREMITIES: no LE edema  NEUROLOGIC: no focal deficits, AAO x 3  SKIN: warm and dry, many purpuric rashes on b/l UE, mildly jaundiced  LYMPH: No peripheral lymphadenopathy appreciated      LABS:                         6.0    16.30 )-----------( 101      ( 08 Jan 2020 10:16 )             17.7       01-08    133<L>  |  100  |  112<H>  ----------------------------<  165<H>  4.9   |  16<L>  |  1.88<H>    Ca    7.9<L>      08 Jan 2020 00:58  Phos  4.2     01-08  Mg     1.8     01-08    TPro  5.5<L>  /  Alb  2.8<L>  /  TBili  4.6<H>  /  DBili  x   /  AST  29  /  ALT  29  /  AlkPhos  74  01-08      Magnesium, Serum: 1.8 mg/dL (01-08 @ 00:58)  Phosphorus Level, Serum: 4.2 mg/dL (01-08 @ 00:58)      IMAGING:  < from: US Duplex Arterial Lower Ext Ltd, Left (01.06.20 @ 14:01) >  EXAM:  US DPLX LWR EXT ARTS LTD LT                        PROCEDURE DATE:  01/06/2020    INTERPRETATION:  Clinical information: Status post balloon angioplasty via the left groin. Evaluate for pseudoaneurysm.    Findings: There is a partially patent left groin pseudoaneurysm which contains thrombus. The patent portion of this pseudoaneurysm measures 1.4 cm x 7 mm x 1.6 cm. A prominent neck measuring approximately 2 cm in length is appreciated with a to and fro waveform obtained. The entire partially thrombosed pseudoaneurysm measures 4.3 cm x 2.3 cm x 2.8 cm. The underlying left common femoral artery and vein are patent.    Additionally, there is an avascular left groin hematoma measuring 5.7 cm x 1.1 cm x 3.3 cm.    Impression: Partially thrombosed left groin pseudoaneurysm with patent portion measuring 1.4 cm x 7 mm x 1.6 cm. A prominent patent pseudoaneurysm neck is appreciated.    Additionally, there is a left groin hematoma without vascular flow.    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  He was restarted on Coumadin, received 1 U PRBC per heme on 1/2/20 and was discharged to [facility] facility on 1/3/20.   He continued on  Torsemide 40mg po and prednisone 60mg daily  per heme & diuretics.  He returned to the ER on 1/5 due to symptomatic anemia related to reported bleeding from L groin access site on 1/4/20.  Patient states that he had bleeding on 1/4/20 and the staff applied pressure and a band-aid and the bleeding stopped.    On arrival to ED he felt weak, short of breath, and dizzy and the rehab initiated transfer for further work-up. Daughter states that prior to previous admission pt was independent with all ADLs. In ER, patient was noted to have H+H of 5/15 downtrending from discharge values of 6.9/20.6 and noted to have a egg sized hematoma in the left groin.  Right groin stable with pea size firmness at access site.   When stretcher position was lifted from supine to upright patient complained of feeling dizzy and SBP was 100, decreased from 117. Vascular consult was initiated, PRBC requested, CT scan non-contrast ordered. (05 Jan 2020 22:02)    Patient received 2u pRBCs in ED, 1u on 1/7, 1u on 1/8. Patient also found to be septic w/ blood cultures growing enterobacter, currently being treated with meropenem. Patient is being followed for his anemia by outside hematologist, has never received any treatment for it. Pt has no acute complaints today.    PAST MEDICAL & SURGICAL HISTORY:  Anemia  LBBB (left bundle branch block)  DYER (dyspnea on exertion)  Coronary disease: PCI  CHF (congestive heart failure)  Former smoker  Meniere disease  HLD (hyperlipidemia)  DM (diabetes mellitus)  Atrial fibrillation  HTN (hypertension)  Lipoma  History of skin graft  Status post appendectomy      Allergies    No Known Allergies    Intolerances        MEDICATIONS  (STANDING):  aspirin enteric coated 81 milliGRAM(s) Oral daily  dextrose 5%. 1000 milliLiter(s) (50 mL/Hr) IV Continuous <Continuous>  dextrose 50% Injectable 12.5 Gram(s) IV Push once  dextrose 50% Injectable 25 Gram(s) IV Push once  dextrose 50% Injectable 25 Gram(s) IV Push once  ferrous    sulfate 325 milliGRAM(s) Oral daily  fluticasone propionate 50 MICROgram(s)/spray Nasal Spray 1 Spray(s) Both Nostrils two times a day  folic acid 1 milliGRAM(s) Oral daily  insulin glargine Injectable (LANTUS) 10 Unit(s) SubCutaneous at bedtime  insulin lispro (HumaLOG) corrective regimen sliding scale   SubCutaneous Before meals and at bedtime  insulin lispro Injectable (HumaLOG) 7 Unit(s) SubCutaneous three times a day with meals  levothyroxine 50 MICROGram(s) Oral daily  lidocaine 2% Injectable 10 milliLiter(s) Local Injection once  meclizine 12.5 milliGRAM(s) Oral every 8 hours  melatonin 3 milliGRAM(s) Oral at bedtime  meropenem  IVPB 1000 milliGRAM(s) IV Intermittent every 12 hours  pantoprazole    Tablet 40 milliGRAM(s) Oral two times a day  predniSONE   Tablet 60 milliGRAM(s) Oral daily  senna 1 Tablet(s) Oral at bedtime  simvastatin 40 milliGRAM(s) Oral at bedtime  tiotropium 18 MICROgram(s) Capsule 1 Capsule(s) Inhalation daily  torsemide 40 milliGRAM(s) Oral daily    MEDICATIONS  (PRN):  ALBUTerol    90 MICROgram(s) HFA Inhaler 2 Puff(s) Inhalation every 6 hours PRN Shortness of Breath and/or Wheezing  dextrose 40% Gel 15 Gram(s) Oral once PRN Blood Glucose LESS THAN 70 milliGRAM(s)/deciliter  glucagon  Injectable 1 milliGRAM(s) IntraMuscular once PRN Glucose LESS THAN 70 milligrams/deciliter  polyethylene glycol 3350 17 Gram(s) Oral daily PRN Constipation      FAMILY HISTORY:  Family history of brain tumor (Sibling)  Family history of heart attack (Child)  Family history of CHF (congestive heart failure)  Family history of lung cancer      SOCIAL HISTORY: No EtOH, no tobacco    REVIEW OF SYSTEMS:  CONSTITUTIONAL: +weakness, No fevers, no chills, no weight loss  EYES/ENT: No visual changes;  No dizziness/vertigo or throat pain   NECK: No pain or stiffness  RESPIRATORY: No shortness of breath, no cough or wheezing   CARDIOVASCULAR: No chest pain or palpitations, no dyspnea on exertion, no peripheral edema  GASTROINTESTINAL: No nausea/vomiting/diarrhea, no abdominal pain, no melena or BRBPR, no constipation  GENITOURINARY: No dysuria, increased frequency or hematuria  NEUROLOGICAL: +LE weakness  SKIN: +bruises on b/l upper extremity  LYMPH: No enlarged lymph nodes  All other review of systems is negative unless indicated above      VITALS:   T(F): 96.8 (01-08-20 @ 08:00), Max: 98.1 (01-07-20 @ 19:00)  HR: 96 (01-08-20 @ 10:00)  BP: 97/54 (01-08-20 @ 10:00)  RR: 22 (01-08-20 @ 10:00)  SpO2: 100% (01-08-20 @ 10:00)  Wt(kg): --      PHYSICAL EXAM:  GENERAL: resting in bed, no acute distress  EYES: EOMI, sclera mildly jaundiced  NECK: supple  RESP: CTAB  HEART: irregularly irregular, tachycardic, 2/6 systolic ejection murmur  ABDOMEN: +BS, soft, NT, ND  EXTREMITIES: no LE edema  NEUROLOGIC: no focal deficits, AAO x 3  SKIN: warm and dry, many purpuric rashes on b/l UE, mildly jaundiced  LYMPH: No peripheral lymphadenopathy appreciated      LABS:                         6.0    16.30 )-----------( 101      ( 08 Jan 2020 10:16 )             17.7       01-08    133<L>  |  100  |  112<H>  ----------------------------<  165<H>  4.9   |  16<L>  |  1.88<H>    Ca    7.9<L>      08 Jan 2020 00:58  Phos  4.2     01-08  Mg     1.8     01-08    TPro  5.5<L>  /  Alb  2.8<L>  /  TBili  4.6<H>  /  DBili  x   /  AST  29  /  ALT  29  /  AlkPhos  74  01-08      Magnesium, Serum: 1.8 mg/dL (01-08 @ 00:58)  Phosphorus Level, Serum: 4.2 mg/dL (01-08 @ 00:58)      IMAGING:  < from: US Duplex Arterial Lower Ext Ltd, Left (01.06.20 @ 14:01) >  EXAM:  US DPLX LWR EXT ARTS LTD LT                        PROCEDURE DATE:  01/06/2020    INTERPRETATION:  Clinical information: Status post balloon angioplasty via the left groin. Evaluate for pseudoaneurysm.    Findings: There is a partially patent left groin pseudoaneurysm which contains thrombus. The patent portion of this pseudoaneurysm measures 1.4 cm x 7 mm x 1.6 cm. A prominent neck measuring approximately 2 cm in length is appreciated with a to and fro waveform obtained. The entire partially thrombosed pseudoaneurysm measures 4.3 cm x 2.3 cm x 2.8 cm. The underlying left common femoral artery and vein are patent.    Additionally, there is an avascular left groin hematoma measuring 5.7 cm x 1.1 cm x 3.3 cm.    Impression: Partially thrombosed left groin pseudoaneurysm with patent portion measuring 1.4 cm x 7 mm x 1.6 cm. A prominent patent pseudoaneurysm neck is appreciated.    Additionally, there is a left groin hematoma without vascular flow.    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 Post-procedure course complicated by MAI, hyperglycemia, and anemia 2/2 AIHA and followed by hematology.  He was restarted on Coumadin, received 1 U PRBC per heme on 1/2/20 and was discharged to [place] [facility] facility on 1/3/20.   He continued on  Torsemide 40mg po and prednisone 60mg daily  per heme & diuretics.  He returned to the ER on 1/5 due to symptomatic anemia related to reported bleeding from L groin access site on 1/4/20.  Patient states that he had bleeding on 1/4/20 and the staff applied pressure and a band-aid and the bleeding stopped.    On arrival to ED he felt weak, short of breath, and dizzy and the rehab initiated transfer for further work-up. Daughter states that prior to previous admission pt was independent with all ADLs. In ER, patient was noted to have H+H of 5/15 downtrending from discharge values of 6.9/20.6 and noted to have a egg sized hematoma in the left groin.  Right groin stable with pea size firmness at access site.   When stretcher position was lifted from supine to upright patient complained of feeling dizzy and SBP was 100, decreased from 117. Vascular consult was initiated, PRBC requested, CT scan non-contrast ordered. (05 Jan 2020 22:02)    Patient is being followed for his anemia by outside hematologist, has never received any treatment for it. From workup during his last admission, pt had elevated LDH, low haptoglobin, direct alisson positive for IgG and C3, CT chest/abdomen/pelvis negative for malignancy, negative cold agglutinin titer, consistent with warm AIHA. Patient received 2u pRBCs in ED, 1u on 1/7, 1u on 1/8. Patient also found to be septic w/ blood cultures growing enterobacter, currently being treated with meropenem. Pt has no acute complaints today.    PAST MEDICAL & SURGICAL HISTORY:  Anemia  LBBB (left bundle branch block)  DYER (dyspnea on exertion)  Coronary disease: PCI  CHF (congestive heart failure)  Former smoker  Meniere disease  HLD (hyperlipidemia)  DM (diabetes mellitus)  Atrial fibrillation  HTN (hypertension)  Lipoma  History of skin graft  Status post appendectomy      Allergies    No Known Allergies    Intolerances        MEDICATIONS  (STANDING):  aspirin enteric coated 81 milliGRAM(s) Oral daily  dextrose 5%. 1000 milliLiter(s) (50 mL/Hr) IV Continuous <Continuous>  dextrose 50% Injectable 12.5 Gram(s) IV Push once  dextrose 50% Injectable 25 Gram(s) IV Push once  dextrose 50% Injectable 25 Gram(s) IV Push once  ferrous    sulfate 325 milliGRAM(s) Oral daily  fluticasone propionate 50 MICROgram(s)/spray Nasal Spray 1 Spray(s) Both Nostrils two times a day  folic acid 1 milliGRAM(s) Oral daily  insulin glargine Injectable (LANTUS) 10 Unit(s) SubCutaneous at bedtime  insulin lispro (HumaLOG) corrective regimen sliding scale   SubCutaneous Before meals and at bedtime  insulin lispro Injectable (HumaLOG) 7 Unit(s) SubCutaneous three times a day with meals  levothyroxine 50 MICROGram(s) Oral daily  lidocaine 2% Injectable 10 milliLiter(s) Local Injection once  meclizine 12.5 milliGRAM(s) Oral every 8 hours  melatonin 3 milliGRAM(s) Oral at bedtime  meropenem  IVPB 1000 milliGRAM(s) IV Intermittent every 12 hours  pantoprazole    Tablet 40 milliGRAM(s) Oral two times a day  predniSONE   Tablet 60 milliGRAM(s) Oral daily  senna 1 Tablet(s) Oral at bedtime  simvastatin 40 milliGRAM(s) Oral at bedtime  tiotropium 18 MICROgram(s) Capsule 1 Capsule(s) Inhalation daily  torsemide 40 milliGRAM(s) Oral daily    MEDICATIONS  (PRN):  ALBUTerol    90 MICROgram(s) HFA Inhaler 2 Puff(s) Inhalation every 6 hours PRN Shortness of Breath and/or Wheezing  dextrose 40% Gel 15 Gram(s) Oral once PRN Blood Glucose LESS THAN 70 milliGRAM(s)/deciliter  glucagon  Injectable 1 milliGRAM(s) IntraMuscular once PRN Glucose LESS THAN 70 milligrams/deciliter  polyethylene glycol 3350 17 Gram(s) Oral daily PRN Constipation      FAMILY HISTORY:  Family history of brain tumor (Sibling)  Family history of heart attack (Child)  Family history of CHF (congestive heart failure)  Family history of lung cancer      SOCIAL HISTORY: No EtOH, no tobacco    REVIEW OF SYSTEMS:  CONSTITUTIONAL: +weakness, No fevers, no chills, no weight loss  EYES/ENT: No visual changes;  No dizziness/vertigo or throat pain   NECK: No pain or stiffness  RESPIRATORY: No shortness of breath, no cough or wheezing   CARDIOVASCULAR: No chest pain or palpitations, no dyspnea on exertion, no peripheral edema  GASTROINTESTINAL: No nausea/vomiting/diarrhea, no abdominal pain, no melena or BRBPR, no constipation  GENITOURINARY: No dysuria, increased frequency or hematuria  NEUROLOGICAL: +LE weakness  SKIN: +bruises on b/l upper extremity  LYMPH: No enlarged lymph nodes  All other review of systems is negative unless indicated above      VITALS:   T(F): 96.8 (01-08-20 @ 08:00), Max: 98.1 (01-07-20 @ 19:00)  HR: 96 (01-08-20 @ 10:00)  BP: 97/54 (01-08-20 @ 10:00)  RR: 22 (01-08-20 @ 10:00)  SpO2: 100% (01-08-20 @ 10:00)  Wt(kg): --      PHYSICAL EXAM:  GENERAL: resting in bed, no acute distress  EYES: EOMI, sclera mildly jaundiced  NECK: supple  RESP: CTAB  HEART: irregularly irregular, tachycardic, 2/6 systolic ejection murmur  ABDOMEN: +BS, soft, NT, ND  EXTREMITIES: no LE edema  NEUROLOGIC: no focal deficits, AAO x 3  SKIN: warm and dry, many purpuric rashes on b/l UE, mildly jaundiced  LYMPH: No peripheral lymphadenopathy appreciated      LABS:                         6.0    16.30 )-----------( 101      ( 08 Jan 2020 10:16 )             17.7       01-08    133<L>  |  100  |  112<H>  ----------------------------<  165<H>  4.9   |  16<L>  |  1.88<H>    Ca    7.9<L>      08 Jan 2020 00:58  Phos  4.2     01-08  Mg     1.8     01-08    TPro  5.5<L>  /  Alb  2.8<L>  /  TBili  4.6<H>  /  DBili  x   /  AST  29  /  ALT  29  /  AlkPhos  74  01-08      Magnesium, Serum: 1.8 mg/dL (01-08 @ 00:58)  Phosphorus Level, Serum: 4.2 mg/dL (01-08 @ 00:58)      IMAGING:  < from: US Duplex Arterial Lower Ext Ltd, Left (01.06.20 @ 14:01) >  EXAM:  US DPLX LWR EXT ARTS LTD LT                        PROCEDURE DATE:  01/06/2020    INTERPRETATION:  Clinical information: Status post balloon angioplasty via the left groin. Evaluate for pseudoaneurysm.    Findings: There is a partially patent left groin pseudoaneurysm which contains thrombus. The patent portion of this pseudoaneurysm measures 1.4 cm x 7 mm x 1.6 cm. A prominent neck measuring approximately 2 cm in length is appreciated with a to and fro waveform obtained. The entire partially thrombosed pseudoaneurysm measures 4.3 cm x 2.3 cm x 2.8 cm. The underlying left common femoral artery and vein are patent.    Additionally, there is an avascular left groin hematoma measuring 5.7 cm x 1.1 cm x 3.3 cm.    Impression: Partially thrombosed left groin pseudoaneurysm with patent portion measuring 1.4 cm x 7 mm x 1.6 cm. A prominent patent pseudoaneurysm neck is appreciated.    Additionally, there is a left groin hematoma without vascular flow.    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 Post-procedure course complicated by MAI, hyperglycemia, and anemia 2/2 AIHA and followed by hematology.  He was restarted on Coumadin, received 1 U PRBC per heme on 1/2/20 and was discharged to [facility] on 1/3/20.   He continued on  Torsemide 40mg po and prednisone 60mg daily  per heme & diuretics.  He returned to the ER on 1/5 due to symptomatic anemia related to reported bleeding from L groin access site on 1/4/20.  Patient states that he had bleeding on 1/4/20 and the staff applied pressure and a band-aid and the bleeding stopped.    On arrival to ED he felt weak, short of breath, and dizzy and the rehab initiated transfer for further work-up. Daughter states that prior to previous admission pt was independent with all ADLs. In ER, patient was noted to have H+H of 5/15 downtrending from discharge values of 6.9/20.6 and noted to have a egg sized hematoma in the left groin.  Right groin stable with pea size firmness at access site.   When stretcher position was lifted from supine to upright patient complained of feeling dizzy and SBP was 100, decreased from 117. Vascular consult was initiated, PRBC requested, CT scan non-contrast ordered. (05 Jan 2020 22:02)    Patient is being followed for his anemia by outside hematologist, has never received any treatment for it. From workup during his last admission, pt had elevated LDH, low haptoglobin, direct alisson positive for IgG and C3, CT chest/abdomen/pelvis negative for malignancy, negative cold agglutinin titer, consistent with warm AIHA. Patient received 2u pRBCs in ED, 1u on 1/7, 1u on 1/8. Patient also found to be septic w/ blood cultures growing enterobacter, currently being treated with meropenem. Pt has no acute complaints today. Patient's daughter is unsure if hematoma has changed in size.    PAST MEDICAL & SURGICAL HISTORY:  Anemia  LBBB (left bundle branch block)  DYER (dyspnea on exertion)  Coronary disease: PCI  CHF (congestive heart failure)  Former smoker  Meniere disease  HLD (hyperlipidemia)  DM (diabetes mellitus)  Atrial fibrillation  HTN (hypertension)  Lipoma  History of skin graft  Status post appendectomy      Allergies    No Known Allergies    Intolerances        MEDICATIONS  (STANDING):  aspirin enteric coated 81 milliGRAM(s) Oral daily  dextrose 5%. 1000 milliLiter(s) (50 mL/Hr) IV Continuous <Continuous>  dextrose 50% Injectable 12.5 Gram(s) IV Push once  dextrose 50% Injectable 25 Gram(s) IV Push once  dextrose 50% Injectable 25 Gram(s) IV Push once  ferrous    sulfate 325 milliGRAM(s) Oral daily  fluticasone propionate 50 MICROgram(s)/spray Nasal Spray 1 Spray(s) Both Nostrils two times a day  folic acid 1 milliGRAM(s) Oral daily  insulin glargine Injectable (LANTUS) 10 Unit(s) SubCutaneous at bedtime  insulin lispro (HumaLOG) corrective regimen sliding scale   SubCutaneous Before meals and at bedtime  insulin lispro Injectable (HumaLOG) 7 Unit(s) SubCutaneous three times a day with meals  levothyroxine 50 MICROGram(s) Oral daily  lidocaine 2% Injectable 10 milliLiter(s) Local Injection once  meclizine 12.5 milliGRAM(s) Oral every 8 hours  melatonin 3 milliGRAM(s) Oral at bedtime  meropenem  IVPB 1000 milliGRAM(s) IV Intermittent every 12 hours  pantoprazole    Tablet 40 milliGRAM(s) Oral two times a day  predniSONE   Tablet 60 milliGRAM(s) Oral daily  senna 1 Tablet(s) Oral at bedtime  simvastatin 40 milliGRAM(s) Oral at bedtime  tiotropium 18 MICROgram(s) Capsule 1 Capsule(s) Inhalation daily  torsemide 40 milliGRAM(s) Oral daily    MEDICATIONS  (PRN):  ALBUTerol    90 MICROgram(s) HFA Inhaler 2 Puff(s) Inhalation every 6 hours PRN Shortness of Breath and/or Wheezing  dextrose 40% Gel 15 Gram(s) Oral once PRN Blood Glucose LESS THAN 70 milliGRAM(s)/deciliter  glucagon  Injectable 1 milliGRAM(s) IntraMuscular once PRN Glucose LESS THAN 70 milligrams/deciliter  polyethylene glycol 3350 17 Gram(s) Oral daily PRN Constipation      FAMILY HISTORY:  Family history of brain tumor (Sibling)  Family history of heart attack (Child)  Family history of CHF (congestive heart failure)  Family history of lung cancer      SOCIAL HISTORY: No EtOH, no tobacco    REVIEW OF SYSTEMS:  CONSTITUTIONAL: +weakness, No fevers, no chills, no weight loss  EYES/ENT: No visual changes;  No dizziness/vertigo or throat pain   NECK: No pain or stiffness  RESPIRATORY: No shortness of breath, no cough or wheezing   CARDIOVASCULAR: No chest pain or palpitations, no dyspnea on exertion, no peripheral edema  GASTROINTESTINAL: No nausea/vomiting/diarrhea, no abdominal pain, no melena or BRBPR, no constipation  GENITOURINARY: No dysuria, increased frequency or hematuria  NEUROLOGICAL: +LE weakness  SKIN: +bruises on b/l upper extremity  LYMPH: No enlarged lymph nodes  All other review of systems is negative unless indicated above      VITALS:   T(F): 96.8 (01-08-20 @ 08:00), Max: 98.1 (01-07-20 @ 19:00)  HR: 96 (01-08-20 @ 10:00)  BP: 97/54 (01-08-20 @ 10:00)  RR: 22 (01-08-20 @ 10:00)  SpO2: 100% (01-08-20 @ 10:00)  Wt(kg): --      PHYSICAL EXAM:  GENERAL: resting in bed, no acute distress  EYES: EOMI, sclera mildly jaundiced  NECK: supple  RESP: CTAB  HEART: irregularly irregular, tachycardic, 2/6 systolic ejection murmur  ABDOMEN: +BS, soft, NT, ND  EXTREMITIES: no LE edema  NEUROLOGIC: no focal deficits, AAO x 3  SKIN: warm and dry, many purpuric rashes on b/l UE, mildly jaundiced  LYMPH: No peripheral lymphadenopathy appreciated      LABS:                         6.0    16.30 )-----------( 101      ( 08 Jan 2020 10:16 )             17.7       01-08    133<L>  |  100  |  112<H>  ----------------------------<  165<H>  4.9   |  16<L>  |  1.88<H>    Ca    7.9<L>      08 Jan 2020 00:58  Phos  4.2     01-08  Mg     1.8     01-08    TPro  5.5<L>  /  Alb  2.8<L>  /  TBili  4.6<H>  /  DBili  x   /  AST  29  /  ALT  29  /  AlkPhos  74  01-08      Magnesium, Serum: 1.8 mg/dL (01-08 @ 00:58)  Phosphorus Level, Serum: 4.2 mg/dL (01-08 @ 00:58)      IMAGING:  < from: US Duplex Arterial Lower Ext Ltd, Left (01.06.20 @ 14:01) >  EXAM:  US DPLX LWR EXT ARTS LTD LT                        PROCEDURE DATE:  01/06/2020    INTERPRETATION:  Clinical information: Status post balloon angioplasty via the left groin. Evaluate for pseudoaneurysm.    Findings: There is a partially patent left groin pseudoaneurysm which contains thrombus. The patent portion of this pseudoaneurysm measures 1.4 cm x 7 mm x 1.6 cm. A prominent neck measuring approximately 2 cm in length is appreciated with a to and fro waveform obtained. The entire partially thrombosed pseudoaneurysm measures 4.3 cm x 2.3 cm x 2.8 cm. The underlying left common femoral artery and vein are patent.    Additionally, there is an avascular left groin hematoma measuring 5.7 cm x 1.1 cm x 3.3 cm.    Impression: Partially thrombosed left groin pseudoaneurysm with patent portion measuring 1.4 cm x 7 mm x 1.6 cm. A prominent patent pseudoaneurysm neck is appreciated.    Additionally, there is a left groin hematoma without vascular flow.    < end of copied text > HPI:  83 yo M with hx Afib (Coumadin), DM2, HLD, CAD (stent 2014 and 2015 in mLAD and RCA), HFrEF (40%) severe AS, HFrEF  30%, LBBB, baseline SCr 1.4, and Autoimmune hemolytic who was admitted from 12/13 -1/3 and optimized with diuretics per Heart failure service, planned for TAVR however underwent  balloon aortic valvuloplasty on 12/26 and transfused 1 U PRBC. Post-procedure course complicated by MAI, hyperglycemia, and anemia 2/2 AIHA and followed by hematology.  He was restarted on Coumadin, received 1 U PRBC per heme on 1/2/20 and was discharged to [facility] on 1/3/20.   He continued on  Torsemide 40mg po and prednisone 60mg daily  per heme & diuretics.  He returned to the ER on 1/5 due to symptomatic anemia related to reported bleeding from L groin access site on 1/4/20.  Patient states that he had bleeding on 1/4/20 and the staff applied pressure and a band-aid and the bleeding stopped.    On arrival to ED he felt weak, short of breath, and dizzy and the rehab initiated transfer for further work-up. Daughter states that prior to previous admission pt was independent with all ADLs. In ER, patient was noted to have H+H of 5/15 downtrending from discharge values of 6.9/20.6 and noted to have a egg sized hematoma in the left groin.  Right groin stable with pea size firmness at access site.   When stretcher position was lifted from supine to upright patient complained of feeling dizzy and SBP was 100, decreased from 117. Vascular consult was initiated, PRBC requested, CT scan non-contrast ordered. (05 Jan 2020 22:02)    Patient is being followed for his anemia by outside hematologist, has never received any treatment for it. From workup during his last admission, pt had elevated LDH, low haptoglobin, direct alissno positive for IgG and C3, CT chest/abdomen/pelvis negative for malignancy, negative cold agglutinin titer, consistent with warm AIHA. Patient received 2u pRBCs in ED, 1u on 1/7, 1u on 1/8. Patient also found to be septic w/ blood cultures growing enterobacter, currently being treated with meropenem. Pt has no acute complaints today. Patient's daughter is unsure if hematoma has changed in size.    PAST MEDICAL & SURGICAL HISTORY:  Anemia  LBBB (left bundle branch block)  DYRE (dyspnea on exertion)  Coronary disease: PCI  CHF (congestive heart failure)  Former smoker  Meniere disease  HLD (hyperlipidemia)  DM (diabetes mellitus)  Atrial fibrillation  HTN (hypertension)  Lipoma  History of skin graft  Status post appendectomy      Allergies    No Known Allergies    Intolerances    MEDICATIONS  (STANDING):  aspirin enteric coated 81 milliGRAM(s) Oral daily  dextrose 5%. 1000 milliLiter(s) (50 mL/Hr) IV Continuous <Continuous>  dextrose 50% Injectable 12.5 Gram(s) IV Push once  dextrose 50% Injectable 25 Gram(s) IV Push once  dextrose 50% Injectable 25 Gram(s) IV Push once  ferrous    sulfate 325 milliGRAM(s) Oral daily  fluticasone propionate 50 MICROgram(s)/spray Nasal Spray 1 Spray(s) Both Nostrils two times a day  folic acid 1 milliGRAM(s) Oral daily  insulin glargine Injectable (LANTUS) 10 Unit(s) SubCutaneous at bedtime  insulin lispro (HumaLOG) corrective regimen sliding scale   SubCutaneous Before meals and at bedtime  insulin lispro Injectable (HumaLOG) 7 Unit(s) SubCutaneous three times a day with meals  levothyroxine 50 MICROGram(s) Oral daily  lidocaine 2% Injectable 10 milliLiter(s) Local Injection once  meclizine 12.5 milliGRAM(s) Oral every 8 hours  melatonin 3 milliGRAM(s) Oral at bedtime  meropenem  IVPB 1000 milliGRAM(s) IV Intermittent every 12 hours  pantoprazole    Tablet 40 milliGRAM(s) Oral two times a day  predniSONE   Tablet 60 milliGRAM(s) Oral daily  senna 1 Tablet(s) Oral at bedtime  simvastatin 40 milliGRAM(s) Oral at bedtime  tiotropium 18 MICROgram(s) Capsule 1 Capsule(s) Inhalation daily  torsemide 40 milliGRAM(s) Oral daily    MEDICATIONS  (PRN):  ALBUTerol    90 MICROgram(s) HFA Inhaler 2 Puff(s) Inhalation every 6 hours PRN Shortness of Breath and/or Wheezing  dextrose 40% Gel 15 Gram(s) Oral once PRN Blood Glucose LESS THAN 70 milliGRAM(s)/deciliter  glucagon  Injectable 1 milliGRAM(s) IntraMuscular once PRN Glucose LESS THAN 70 milligrams/deciliter  polyethylene glycol 3350 17 Gram(s) Oral daily PRN Constipation      FAMILY HISTORY:  Family history of brain tumor (Sibling)  Family history of heart attack (Child)  Family history of CHF (congestive heart failure)  Family history of lung cancer    SOCIAL HISTORY: No EtOH, no tobacco    REVIEW OF SYSTEMS:  CONSTITUTIONAL: +weakness, No fevers, no chills, no weight loss  EYES/ENT: No visual changes;  No dizziness/vertigo or throat pain   NECK: No pain or stiffness  RESPIRATORY: No shortness of breath, no cough or wheezing   CARDIOVASCULAR: No chest pain or palpitations, no dyspnea on exertion, no peripheral edema  GASTROINTESTINAL: No nausea/vomiting/diarrhea, no abdominal pain, no melena or BRBPR, no constipation  GENITOURINARY: No dysuria, increased frequency or hematuria  NEUROLOGICAL: +LE weakness  SKIN: +bruises on b/l upper extremity  LYMPH: No enlarged lymph nodes  All other review of systems is negative unless indicated above    VITALS:   T(F): 96.8 (01-08-20 @ 08:00), Max: 98.1 (01-07-20 @ 19:00)  HR: 96 (01-08-20 @ 10:00)  BP: 97/54 (01-08-20 @ 10:00)  RR: 22 (01-08-20 @ 10:00)  SpO2: 100% (01-08-20 @ 10:00)  Wt(kg): --    PHYSICAL EXAM:  GENERAL: resting in bed, no acute distress  EYES: EOMI, sclera mildly jaundiced  NECK: supple  RESP: CTAB  HEART: irregularly irregular, tachycardic, 2/6 systolic ejection murmur  ABDOMEN: +BS, soft, NT, ND  EXTREMITIES: no LE edema  NEUROLOGIC: no focal deficits, AAO x 3  SKIN: warm and dry, many purpuric rashes on b/l UE, mildly jaundiced  LYMPH: No peripheral lymphadenopathy appreciated    LABS:                         6.0    16.30 )-----------( 101      ( 08 Jan 2020 10:16 )             17.7     01-08    133<L>  |  100  |  112<H>  ----------------------------<  165<H>  4.9   |  16<L>  |  1.88<H>    Ca    7.9<L>      08 Jan 2020 00:58  Phos  4.2     01-08  Mg     1.8     01-08    TPro  5.5<L>  /  Alb  2.8<L>  /  TBili  4.6<H>  /  DBili  x   /  AST  29  /  ALT  29  /  AlkPhos  74  01-08    Magnesium, Serum: 1.8 mg/dL (01-08 @ 00:58)  Phosphorus Level, Serum: 4.2 mg/dL (01-08 @ 00:58)    IMAGING:  < from: US Duplex Arterial Lower Ext Ltd, Left (01.06.20 @ 14:01) >  EXAM:  US DPLX LWR EXT ARTS LTD LT                        PROCEDURE DATE:  01/06/2020    INTERPRETATION:  Clinical information: Status post balloon angioplasty via the left groin. Evaluate for pseudoaneurysm.    Findings: There is a partially patent left groin pseudoaneurysm which contains thrombus. The patent portion of this pseudoaneurysm measures 1.4 cm x 7 mm x 1.6 cm. A prominent neck measuring approximately 2 cm in length is appreciated with a to and fro waveform obtained. The entire partially thrombosed pseudoaneurysm measures 4.3 cm x 2.3 cm x 2.8 cm. The underlying left common femoral artery and vein are patent.    Additionally, there is an avascular left groin hematoma measuring 5.7 cm x 1.1 cm x 3.3 cm.  Impression: Partially thrombosed left groin pseudoaneurysm with patent portion measuring 1.4 cm x 7 mm x 1.6 cm. A prominent patent pseudoaneurysm neck is appreciated.  Additionally, there is a left groin hematoma without vascular flow.    < end of copied text >

## 2020-01-08 NOTE — DIETITIAN INITIAL EVALUATION ADULT. - PERTINENT LABORATORY DATA
(1/8) Hb 8.0, Hct 17.7, POCT blood glucose 139-178, Sodium 133, , Cr 1.88, Calcium 7.9, Protein 5.5, Albumin 2.8, Bilirubin 4.6, eGFR if Non African American 32 (12/14) HbA1c 5.6%

## 2020-01-08 NOTE — CONSULT NOTE ADULT - ASSESSMENT
83 yo M hx Afib on Coumadin, DM2, CAD, HFrEF, severe AS, recent admit for TAVR c/b AIHA underwent balloon aortic valvuloplasty (12/26) and started on prednisone 1mg/kg, discharged to rehab (1/3) returned to ER on 1/5 due to due to symptomatic anemia related to reported bleeding from L groin access site currently, Hgb of 5.3 on admission, currently in the CT ICU, Hematology consulted for further management of AIHA and has required 4 units of pRBCs this admission so far.    1. AIHA: IgG and C3 positive with elevated LDH and low haptoglobin with elevated reticulocyte count during last admission, cold agglutinin titer negative, consistent with warm AIHA during last admission  - started prednisone 1 mg/kg daily on 12/21  - currently anemia could be combination of AIHA and blood loss  - peripheral blood smear shows lots of agglutination, reticulocytes, some nucleated RBCs, no schistocytes  - would repeat LDH, haptoglobin, Direct Maribell test, and cold agglutinin titer  - continue with prednisone 1 mg/kg daily. Pt has now been on steroids for over 2 weeks, if H/H still continues to downtrend with no further evidence of bleeding, will have to consider Rituxan  - Continue folic acid  - monitor daily CBC, reticulocyte count, LDH, fractionate total bilirubin  - CT C/A/P during last admission unremarkable for malignancy  - for now can transfuse to keep Hgb > 7 if bleeding. Otherwise, for hemolytic anemia transfusion is only given if pt has symptoms    d/w primary team, pt, and family at bedside     Pati Sanchez, PGY-6  Hematology-Oncology Fellow  Pager: [phone] ([facility]), [phone] ([facility])  (After 5 pm or on weekends please page the on-call fellow) 85 yo M hx Afib on Coumadin, DM2, CAD, HFrEF, severe AS, recent admit for TAVR c/b AIHA underwent balloon aortic valvuloplasty (12/26) and started on prednisone 1mg/kg, discharged to rehab (1/3) returned to ER on 1/5 due to due to symptomatic anemia related to reported bleeding from L groin access site currently, Hgb of 5.3 on admission, currently in the CT ICU, Hematology consulted for further management of AIHA and has required 4 units of pRBCs this admission so far.    1. AIHA: IgG and C3 positive with elevated LDH and low haptoglobin with elevated reticulocyte count during last admission, cold agglutinin titer negative, consistent with warm AIHA during last admission  - started prednisone 1 mg/kg daily on 12/21  - currently anemia could be combination of AIHA and blood loss  - peripheral blood smear shows lots of agglutination, reticulocytes, some nucleated RBCs, no schistocytes  - would repeat LDH, haptoglobin, Direct Maribell test, and cold agglutinin titer  - continue with prednisone 1 mg/kg daily. Pt has now been on steroids for over 2 weeks, if H/H still continues to downtrend with no further evidence of bleeding, will have to consider Rituxan  - Continue folic acid  - monitor daily CBC, reticulocyte count, LDH, fractionate total bilirubin  - CT C/A/P during last admission unremarkable for malignancy  - for now can transfuse to keep Hgb > 7 if bleeding. Otherwise, for hemolytic anemia transfusion is only given if pt has symptoms    d/w primary team, pt, and family at bedside     Pati Sanchez, PGY-6  Hematology-Oncology Fellow  Pager: [phone] ([facility]), [phone] ([facility])  (After 5 pm or on weekends please page the on-call fellow) 85 yo M hx Afib on Coumadin, DM2, CAD, HFrEF, severe AS, recent admit for TAVR c/b AIHA underwent balloon aortic valvuloplasty (12/26) and started on prednisone 1mg/kg, discharged to rehab (1/3) returned to ER on 1/5 due to due to symptomatic anemia related to reported bleeding from L groin access site currently, Hgb of 5.3 on admission, currently in the CT ICU, Hematology consulted for further management of AIHA and has required 4 units of pRBCs this admission so far.    1. AIHA: IgG and C3 positive with elevated LDH and low haptoglobin with elevated reticulocyte count during last admission, cold agglutinin titer negative, consistent with warm AIHA during last admission  - started prednisone 1 mg/kg daily on 12/21  - currently anemia could be combination of AIHA and blood loss  - peripheral blood smear shows lots of agglutination, reticulocytes, some nucleated RBCs, no schistocytes  - Trend LDH, haptoglobin, awaiting results of Direct Maribell test, and cold agglutinin titer  - continue with prednisone 1 mg/kg daily. Will consider Rituxan, however given pt's recent bacteremia would need to discuss with ID of using Rituximab in setting of infection   - Continue folic acid  - monitor daily CBC, reticulocyte count, LDH, fractionate total bilirubin  - CT C/A/P during last admission unremarkable for malignancy  - for now can transfuse to keep Hgb > 7 if bleeding. Otherwise, for hemolytic anemia transfusion is only given if pt has symptoms    d/w primary team, pt, and family at bedside     Pati Sanchez, PGY-6  Hematology-Oncology Fellow  Pager: [phone] ([facility]), [phone] ([facility])  (After 5 pm or on weekends please page the on-call fellow)

## 2020-01-08 NOTE — DIETITIAN INITIAL EVALUATION ADULT. - OTHER INFO
DIET Hx: Aide reports pt with good PO intake PTA. Reports pt follows low sodium diet, does not add salt to meals, and avoids canned foods. Also on coumadin PTA; aide reports pt does not follow any certain restrictions related to this, nor has ever received nutrition education in the past. Endorses occasional consumption of Ensure PTA; RD offered for pt to receive in-house, pt declined. NKFA. Denies PTA micronutrient supplementation.    DM Hx: Reports T2DM is solely diet controlled and checks finger sticks 1x/month; reports they are usually within range, but not as of late in setting of prednisone. Denies taking medication for DM PTA; per chart, pt takes insulin glargine, aide denies this to be true. Most recent HbA1c of 5.6% (12/14) implies adequate glycemic maintenance.     WEIGHT Hx: Aide reports pt UBW to be 112 pounds and denies recent weight changes. Weight from previous RD note noted as 125.2 pounds (12/18/19). In-house weights range from 121.2 to 129.4 pounds, thus must question accuracy of weights. Nutrition Focused Physical Exam conducted with consent of patient; pt noted with moderate muscle wasting to temples, severe muscle wasting to clavicles, shoulders; mild fat loss to triceps. Pt and aide report pt has been thin for a large majority of his life and deny and any recent changes in appearance. Suspect age-related depletion as pt notes good PO intake.     Skin per chart: ecchymosis, right hand skin tear  Edema per chart: none noted    Ht: 65 inches (per previous RD note), Wt: 55 kg (1/8, current), BMI: 20.1 kg/m2, IBW: 136 pounds (+/-10%), %IBW: 89%     RD reviewed heart failure nutrition therapy, stressing sources of high sodium foods and alternative options, how to assess nutrition labels for sodium content, and how to assess daily weights and weight gain parameters on when to contact MD. Also reviewed coumadin-vitamin K drug-nutrient interaction and idea of consistent intake of vitamin K and sources of such. Pt/aide declined written materials at this time. RD availability made known.

## 2020-01-08 NOTE — CHART NOTE - NSCHARTNOTEFT_GEN_A_CORE
Patient seen and examined at bedside.   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 Procedure has had to be delayed.   Will plan to perform procedure at bedside tomorrow 1/8/2020. Before procedure, we will perform repeat US in am, there is likelihood that the PSA is already thrombosed thus there is utility in the exam.     - US Duplex L Groin 1/8/2020    Discussed issues and future planning with Family.    d/w Dinesh RESENDIZ; Vascular Resident contact info given to Dr. Becker.    d/w Clayton Cavanaugh Vascular Surgery Fellow      Vascular Surgery [phone]    Levar Abel M.D.   PGY1 - General Surgery   [facility]  Pager: [phone]

## 2020-01-08 NOTE — PROGRESS NOTE ADULT - SUBJECTIVE AND OBJECTIVE BOX
infectious diseases progress note:    Patient is a 84y old  Male who presents with a chief complaint of Anemia (08 Jan 2020 06:19)        Anemia        ROS:  CONSTITUTIONAL:  Negative fever or chills, feels well, good appetite  EYES:  Negative  blurry vision or double vision  CARDIOVASCULAR:  Negative for chest pain or palpitations  RESPIRATORY:  Negative for cough, wheezing, or SOB   GASTROINTESTINAL:  Negative for nausea, vomiting, diarrhea, constipation, or abdominal pain  GENITOURINARY:  Negative frequency, urgency or dysuria  NEUROLOGIC:  No headache, confusion, dizziness, lightheadedness    Allergies    No Known Allergies    Intolerances        ANTIBIOTICS/RELEVANT:  antimicrobials  meropenem  IVPB 1000 milliGRAM(s) IV Intermittent every 12 hours    immunologic:    OTHER:  ALBUTerol    90 MICROgram(s) HFA Inhaler 2 Puff(s) Inhalation every 6 hours PRN  aspirin enteric coated 81 milliGRAM(s) Oral daily  dextrose 40% Gel 15 Gram(s) Oral once PRN  dextrose 5%. 1000 milliLiter(s) IV Continuous <Continuous>  dextrose 50% Injectable 12.5 Gram(s) IV Push once  dextrose 50% Injectable 25 Gram(s) IV Push once  dextrose 50% Injectable 25 Gram(s) IV Push once  ferrous    sulfate 325 milliGRAM(s) Oral daily  fluticasone propionate 50 MICROgram(s)/spray Nasal Spray 1 Spray(s) Both Nostrils two times a day  folic acid 1 milliGRAM(s) Oral daily  glucagon  Injectable 1 milliGRAM(s) IntraMuscular once PRN  insulin glargine Injectable (LANTUS) 10 Unit(s) SubCutaneous at bedtime  insulin lispro (HumaLOG) corrective regimen sliding scale   SubCutaneous Before meals and at bedtime  insulin lispro Injectable (HumaLOG) 7 Unit(s) SubCutaneous three times a day with meals  insulin regular Infusion 1 Unit(s)/Hr IV Continuous <Continuous>  levothyroxine 50 MICROGram(s) Oral daily  meclizine 12.5 milliGRAM(s) Oral every 8 hours  melatonin 3 milliGRAM(s) Oral at bedtime  pantoprazole    Tablet 40 milliGRAM(s) Oral two times a day  polyethylene glycol 3350 17 Gram(s) Oral daily PRN  predniSONE   Tablet 60 milliGRAM(s) Oral daily  senna 1 Tablet(s) Oral at bedtime  simvastatin 40 milliGRAM(s) Oral at bedtime  tiotropium 18 MICROgram(s) Capsule 1 Capsule(s) Inhalation daily  torsemide 40 milliGRAM(s) Oral daily      Objective:  Vital Signs Last 24 Hrs  T(C): 36 (08 Jan 2020 08:00), Max: 36.7 (07 Jan 2020 19:00)  T(F): 96.8 (08 Jan 2020 08:00), Max: 98.1 (07 Jan 2020 19:00)  HR: 98 (08 Jan 2020 08:00) (81 - 116)  BP: 96/55 (08 Jan 2020 08:00) (96/55 - 135/52)  BP(mean): 71 (08 Jan 2020 08:00) (70 - 84)  RR: 22 (08 Jan 2020 08:00) (19 - 29)  SpO2: 100% (08 Ja   Eyes:RIYA, EOMI  Ear/Nose/Throat: no oral lesion, no sinus tenderness on percussion	  Neck:no JVD, no lymphadenopathy, supple  Respiratory: CTA azalia  Cardiovascular: S1S2 RRR, no murmurs  Gastrointestinal:soft, (+) BS, no HSM  Extremities:n groin non tender         LABS:                        6.7    21.32 )-----------( 113      ( 08 Jan 2020 00:58 )             19.0     01-08    133<L>  |  100  |  112<H>  ----------------------------<  165<H>  4.9   |  16<L>  |  1.88<H>    Ca    7.9<L>      08 Jan 2020 00:58  Phos  4.2     01-08  Mg     1.8     01-08    TPro  5.5<L>  /  Alb  2.8<L>  /  TBili  4.6<H>  /  DBili  x   /  AST  29  /  ALT  29  /  AlkPhos  74  01-08            MICROBIOLOGY:    RECENT CULTURES:  01-06 @ 22:03 .Blood Blood-Peripheral       Growth in aerobic bottle: Gram Negative Rods  Growth in anaerobic bottle: Gram Negative Rods           Growth in aerobic bottle: Gram Negative Rods  Growth in anaerobic bottle: Gram Negative Rods    01-06 @ 04:14 .Blood Blood       Growth in aerobic and anaerobic bottles: Gram Negative Rods           Growth in aerobic and anaerobic bottles: Enterobacter cloacae complex  See previous culture [id number]    01-06 @ 02:46 .Blood Blood   PCR    Growth in anaerobic bottle: Gram Negative Rods  Growth in aerobic bottle: Gram Negative Rods    Blood Culture PCR  Blood Culture PCR     Growth in aerobic and anaerobic bottles: Enterobacter cloacae complex  "Due to technical problems, Proteus sp. will Not be reported as part of  the BCID panel until further notice"  ***Blood Panel PCR results on this specimen are available  approximately 3 hours after the Gram stain result.***  Gram stain, PCR, and/or culture results may not always  correspond due to difference in methodologies.  ************************************************************  This PCR assay was performed using [employer].  The following targets are tested for: Enterococcus,  vancomycin resistant enterococci, Listeria monocytogenes,  coagulase negative staphylococci, S. aureus,  methicillin resistant S. aureus, Streptococcus agalactiae  (Group B), S. pneumoniae, S. pyogenes (Group A),  Acinetobacter baumannii, Enterobacter cloacae, E. coli,  Klebsiella oxytoca, K. pneumoniae, Proteus sp.,  Serratia marcescens, Haemophilus influenzae,  Neisseria meningitidis, Pseudomonas aeruginosa, Candida  albicans, C. glabrata, C krusei, C parapsilosis,  C. tropicalis and the KPC resistance gene.          RESPIRATORY CULTURES:              RADIOLOGY & ADDITIONAL STUDIES:        Pager [phone]  After 5 pm/weekends or if no response :[phone]

## 2020-01-09 LAB
ALBUMIN SERPL ELPH-MCNC: 3 G/DL — LOW (ref 3.3–5)
ALP SERPL-CCNC: 75 U/L — SIGNIFICANT CHANGE UP (ref 40–120)
ALT FLD-CCNC: 29 U/L — SIGNIFICANT CHANGE UP (ref 10–45)
ANION GAP SERPL CALC-SCNC: 17 MMOL/L — SIGNIFICANT CHANGE UP (ref 5–17)
APTT BLD: 19.8 SEC — LOW (ref 27.5–36.3)
AST SERPL-CCNC: 21 U/L — SIGNIFICANT CHANGE UP (ref 10–40)
BASOPHILS # BLD AUTO: 0 K/UL — SIGNIFICANT CHANGE UP (ref 0–0.2)
BASOPHILS NFR BLD AUTO: 0 % — SIGNIFICANT CHANGE UP (ref 0–2)
BILIRUB SERPL-MCNC: 3.8 MG/DL — HIGH (ref 0.2–1.2)
BUN SERPL-MCNC: 113 MG/DL — HIGH (ref 7–23)
CALCIUM SERPL-MCNC: 8 MG/DL — LOW (ref 8.4–10.5)
CHLORIDE SERPL-SCNC: 102 MMOL/L — SIGNIFICANT CHANGE UP (ref 96–108)
CO2 SERPL-SCNC: 17 MMOL/L — LOW (ref 22–31)
CREAT SERPL-MCNC: 1.89 MG/DL — HIGH (ref 0.5–1.3)
CULTURE RESULTS: SIGNIFICANT CHANGE UP
EOSINOPHIL # BLD AUTO: 0 K/UL — SIGNIFICANT CHANGE UP (ref 0–0.5)
EOSINOPHIL NFR BLD AUTO: 0 % — SIGNIFICANT CHANGE UP (ref 0–6)
GLUCOSE BLDC GLUCOMTR-MCNC: 210 MG/DL — HIGH (ref 70–99)
GLUCOSE BLDC GLUCOMTR-MCNC: 216 MG/DL — HIGH (ref 70–99)
GLUCOSE BLDC GLUCOMTR-MCNC: 233 MG/DL — HIGH (ref 70–99)
GLUCOSE BLDC GLUCOMTR-MCNC: 353 MG/DL — HIGH (ref 70–99)
GLUCOSE BLDC GLUCOMTR-MCNC: 67 MG/DL — LOW (ref 70–99)
GLUCOSE SERPL-MCNC: 99 MG/DL — SIGNIFICANT CHANGE UP (ref 70–99)
HCT VFR BLD CALC: 19.5 % — CRITICAL LOW (ref 39–50)
HCT VFR BLD CALC: 20.5 % — CRITICAL LOW (ref 39–50)
HGB BLD-MCNC: 6.8 G/DL — CRITICAL LOW (ref 13–17)
HGB BLD-MCNC: 7 G/DL — CRITICAL LOW (ref 13–17)
INR BLD: 1.03 RATIO — SIGNIFICANT CHANGE UP (ref 0.88–1.16)
LYMPHOCYTES # BLD AUTO: 16 % — SIGNIFICANT CHANGE UP (ref 13–44)
LYMPHOCYTES # BLD AUTO: 2.26 K/UL — SIGNIFICANT CHANGE UP (ref 1–3.3)
MAGNESIUM SERPL-MCNC: 2 MG/DL — SIGNIFICANT CHANGE UP (ref 1.6–2.6)
MCHC RBC-ENTMCNC: 32.6 PG — SIGNIFICANT CHANGE UP (ref 27–34)
MCHC RBC-ENTMCNC: 34.1 GM/DL — SIGNIFICANT CHANGE UP (ref 32–36)
MCHC RBC-ENTMCNC: 34.2 PG — HIGH (ref 27–34)
MCHC RBC-ENTMCNC: 34.9 GM/DL — SIGNIFICANT CHANGE UP (ref 32–36)
MCV RBC AUTO: 95.3 FL — SIGNIFICANT CHANGE UP (ref 80–100)
MCV RBC AUTO: 98 FL — SIGNIFICANT CHANGE UP (ref 80–100)
MONOCYTES # BLD AUTO: 0.28 K/UL — SIGNIFICANT CHANGE UP (ref 0–0.9)
MONOCYTES NFR BLD AUTO: 2 % — SIGNIFICANT CHANGE UP (ref 2–14)
NEUTROPHILS # BLD AUTO: 11.18 K/UL — HIGH (ref 1.8–7.4)
NEUTROPHILS NFR BLD AUTO: 78 % — HIGH (ref 43–77)
NRBC # BLD: 6 /100 WBCS — HIGH (ref 0–0)
PHOSPHATE SERPL-MCNC: 4 MG/DL — SIGNIFICANT CHANGE UP (ref 2.5–4.5)
PLATELET # BLD AUTO: 91 K/UL — LOW (ref 150–400)
PLATELET # BLD AUTO: 98 K/UL — LOW (ref 150–400)
POTASSIUM SERPL-MCNC: 4.6 MMOL/L — SIGNIFICANT CHANGE UP (ref 3.5–5.3)
POTASSIUM SERPL-SCNC: 4.6 MMOL/L — SIGNIFICANT CHANGE UP (ref 3.5–5.3)
PROT SERPL-MCNC: 5.6 G/DL — LOW (ref 6–8.3)
PROTHROM AB SERPL-ACNC: 11.9 SEC — SIGNIFICANT CHANGE UP (ref 10–12.9)
RBC # BLD: 1.99 M/UL — LOW (ref 4.2–5.8)
RBC # BLD: 1.99 M/UL — LOW (ref 4.2–5.8)
RBC # BLD: 2.15 M/UL — LOW (ref 4.2–5.8)
RBC # FLD: 21.2 % — HIGH (ref 10.3–14.5)
RBC # FLD: 21.6 % — HIGH (ref 10.3–14.5)
RETICS #: 226.9 K/UL — HIGH (ref 25–125)
RETICS/RBC NFR: 11.4 % — HIGH (ref 0.5–2.5)
SODIUM SERPL-SCNC: 136 MMOL/L — SIGNIFICANT CHANGE UP (ref 135–145)
SPECIMEN SOURCE: SIGNIFICANT CHANGE UP
WBC # BLD: 14.15 K/UL — HIGH (ref 3.8–10.5)
WBC # BLD: 14.78 K/UL — HIGH (ref 3.8–10.5)
WBC # FLD AUTO: 14.15 K/UL — HIGH (ref 3.8–10.5)
WBC # FLD AUTO: 14.78 K/UL — HIGH (ref 3.8–10.5)

## 2020-01-09 PROCEDURE — 71045 X-RAY EXAM CHEST 1 VIEW: CPT | Mod: 26

## 2020-01-09 PROCEDURE — 99233 SBSQ HOSP IP/OBS HIGH 50: CPT

## 2020-01-09 PROCEDURE — 99232 SBSQ HOSP IP/OBS MODERATE 35: CPT

## 2020-01-09 RX ORDER — DEXTROSE 50 % IN WATER 50 %
50 SYRINGE (ML) INTRAVENOUS ONCE
Refills: 0 | Status: COMPLETED | OUTPATIENT
Start: 2020-01-09 | End: 2020-01-09

## 2020-01-09 RX ADMIN — PANTOPRAZOLE SODIUM 40 MILLIGRAM(S): 20 TABLET, DELAYED RELEASE ORAL at 05:09

## 2020-01-09 RX ADMIN — Medication 7 UNIT(S): at 15:00

## 2020-01-09 RX ADMIN — Medication 1 MILLIGRAM(S): at 14:13

## 2020-01-09 RX ADMIN — Medication 7 UNIT(S): at 17:59

## 2020-01-09 RX ADMIN — Medication 50 MILLILITER(S): at 04:45

## 2020-01-09 RX ADMIN — Medication 40 MILLIGRAM(S): at 05:12

## 2020-01-09 RX ADMIN — Medication 8: at 21:39

## 2020-01-09 RX ADMIN — SENNA PLUS 1 TABLET(S): 8.6 TABLET ORAL at 21:39

## 2020-01-09 RX ADMIN — MEROPENEM 100 MILLIGRAM(S): 1 INJECTION INTRAVENOUS at 17:59

## 2020-01-09 RX ADMIN — Medication 81 MILLIGRAM(S): at 14:13

## 2020-01-09 RX ADMIN — Medication 60 MILLIGRAM(S): at 05:09

## 2020-01-09 RX ADMIN — Medication 12: at 17:58

## 2020-01-09 RX ADMIN — INSULIN GLARGINE 10 UNIT(S): 100 INJECTION, SOLUTION SUBCUTANEOUS at 21:38

## 2020-01-09 RX ADMIN — PANTOPRAZOLE SODIUM 40 MILLIGRAM(S): 20 TABLET, DELAYED RELEASE ORAL at 17:58

## 2020-01-09 RX ADMIN — Medication 1 SPRAY(S): at 17:56

## 2020-01-09 RX ADMIN — TIOTROPIUM BROMIDE 1 CAPSULE(S): 18 CAPSULE ORAL; RESPIRATORY (INHALATION) at 11:44

## 2020-01-09 RX ADMIN — Medication 325 MILLIGRAM(S): at 14:13

## 2020-01-09 RX ADMIN — Medication 1 SPRAY(S): at 05:10

## 2020-01-09 RX ADMIN — Medication 50 MICROGRAM(S): at 05:07

## 2020-01-09 RX ADMIN — Medication 3 MILLIGRAM(S): at 21:39

## 2020-01-09 RX ADMIN — MEROPENEM 100 MILLIGRAM(S): 1 INJECTION INTRAVENOUS at 05:07

## 2020-01-09 RX ADMIN — SIMVASTATIN 40 MILLIGRAM(S): 20 TABLET, FILM COATED ORAL at 21:39

## 2020-01-09 NOTE — PROGRESS NOTE ADULT - SUBJECTIVE AND OBJECTIVE BOX
JESUS MORALES  MRN#: [id number]    83 yo M with hx Afib (Coumadin), DM2, HLD, CAD (stent  and  in mLAD and RCA), HFrEF (40%) severe AS, HFrEF  30%, LBBB, baseline SCr 1.4, and autoimmune hemolytic anemia, s/p aortic valvuloplasty  and now readmitted with an hematocrit of 16 due to acute blood loss at left groin access site.     Patient had been treated for heart failure in mid December but was deemed to be too high risk for TAVR and underwent  balloon aortic valvuloplasty on . Post-procedure course complicated by contrast induced nephropathy, hyperglycemia, and anemia.  He required blood transfusion and was restarted on Coumadin on 20 and was discharged to rehab facility on 1/3/20.   He continued on torsemide 40mg po and prednisone 60mg daily.  On 20, he returned to ER due to symptomatic anemia related to reported bleeding from L groin access site. Patient states that he had bleeding on 20 and the staff applied pressure and a band-aid and the bleeding stopped.  Patient felt weak, short of breath, and dizzy and the rehab initiated transfer for further work-up.  H+H in ER was 5 and 15 and downtrending from discharge values of 6.9 and 20. ER also noted an egg sized hematoma in the left groin.  Right groin stable with pea size firmness at access site.     OBJECTIVE:  ICU Vital Signs Last 24 Hrs  T(C): 37 (2020 16:00), Max: 37.1 (2020 12:00)  T(F): 98.6 (2020 16:00), Max: 98.8 (2020 12:00)  HR: 117 (2020 19:00) (84 - 122)  BP: 103/63 (2020 19:00) (86/58 - 136/62)  BP(mean): 75 (2020 19:00) (64 - 89)  ABP: --  ABP(mean): --  RR: 20 (2020 19:00) (15 - 51)  SpO2: 100% (2020 19:00) (91% - 100%)      - @ 07:01  -   @ 07:00  --------------------------------------------------------  IN: 1120 mL / OUT: 1900 mL / NET: -780 mL     @ 07:01  -   @ 19:42  --------------------------------------------------------  IN: 0 mL / OUT: 700 mL / NET: -700 mL        PHYSICAL EXAM:Daily     Daily Weight in k.6 (2020 00:00)  General:      + Cachectic, elderly male   HEENT:     + NCAT + temporal wasting + EOMI  - Conjuctival edema   + Icterus   - ETT  - NGT/OGT  Neck:         + FROM    - JVD     - Nodes     - Masses    + Mid-line trachea   - Tracheostomy  Chest:         - Sternal click  - Sternal drainage  - Pacing wires  - Chest tubes  - SubQ emphysema  Lungs:          + CTA   - Rhonchi    - Rales    - Wheezing     - Decreased BS   - Dullness R L  Cardiac:       + S1 + S2    + Irregularly irregular rhythm  - S3  - S4    - Murmurs   - Rub   - Hamman’s sign   Abdomen:    + BS     + Soft    + Non-tender     - Distended    - Organomegaly  - PEG  Extremities:   - Cyanosis U/L   - Clubbing  U/L  - LE/UE Edema   + Capillary refill    + Pulses   Neuro:        + Awake   +  Alert   - Confused   - Lethargic   - Sedated   + Generalized Weakness  Skin:        - Rashes    - Erythema   + mildly jaundiced   + IV sites intact  - Sacral decubitus  + Left groin has a dry dressing without swelling   + Arthritic changes in bilateral hands and feet.     HOSPITAL MEDICATIONS: All mediciations reviewed and analyzed  MEDICATIONS  (STANDING):  aspirin enteric coated 81 milliGRAM(s) Oral daily  dextrose 5%. 1000 milliLiter(s) (50 mL/Hr) IV Continuous <Continuous>  dextrose 50% Injectable 12.5 Gram(s) IV Push once  dextrose 50% Injectable 25 Gram(s) IV Push once  dextrose 50% Injectable 25 Gram(s) IV Push once  ferrous    sulfate 325 milliGRAM(s) Oral daily  fluticasone propionate 50 MICROgram(s)/spray Nasal Spray 1 Spray(s) Both Nostrils two times a day  folic acid 1 milliGRAM(s) Oral daily  insulin glargine Injectable (LANTUS) 10 Unit(s) SubCutaneous at bedtime  insulin lispro (HumaLOG) corrective regimen sliding scale   SubCutaneous Before meals and at bedtime  insulin lispro Injectable (HumaLOG) 7 Unit(s) SubCutaneous three times a day with meals  levothyroxine 50 MICROGram(s) Oral daily  melatonin 3 milliGRAM(s) Oral at bedtime  meropenem  IVPB 1000 milliGRAM(s) IV Intermittent every 12 hours  pantoprazole    Tablet 40 milliGRAM(s) Oral two times a day  predniSONE   Tablet 60 milliGRAM(s) Oral daily  senna 1 Tablet(s) Oral at bedtime  simvastatin 40 milliGRAM(s) Oral at bedtime  tiotropium 18 MICROgram(s) Capsule 1 Capsule(s) Inhalation daily  torsemide 40 milliGRAM(s) Oral daily    MEDICATIONS  (PRN):  ALBUTerol    90 MICROgram(s) HFA Inhaler 2 Puff(s) Inhalation every 6 hours PRN Shortness of Breath and/or Wheezing  dextrose 40% Gel 15 Gram(s) Oral once PRN Blood Glucose LESS THAN 70 milliGRAM(s)/deciliter  glucagon  Injectable 1 milliGRAM(s) IntraMuscular once PRN Glucose LESS THAN 70 milligrams/deciliter  polyethylene glycol 3350 17 Gram(s) Oral daily PRN Constipation    LABS: All Lab data reviewed and analyzed                        7.0    14.78 )-----------( 98       ( 2020 00:54 )             20.5        136  |  102  |  113<H>  ----------------------------<  99  4.6   |  17<L>  |  1.89<H>    Ca    8.0<L>      2020 00:54  Phos  4.0       Mg     2.0         TPro  5.6<L>  /  Alb  3.0<L>  /  TBili  3.8<H>  /  DBili  x   /  AST  21  /  ALT  29  /  AlkPhos  75      PT/INR - ( 2020 00:54 )   PT: 11.9 sec;   INR: 1.03 ratio         PTT - ( 2020 00:54 )  PTT:19.8 sec LIVER FUNCTIONS - ( 2020 00:54 )  Alb: 3.0 g/dL / Pro: 5.6 g/dL / ALK PHOS: 75 U/L / ALT: 29 U/L / AST: 21 U/L / GGT: x           RADIOLOGY: - Reviewed and analyzed     Acute blood loss anemia in addition to autoimmune hemolytic anemia, he has required 3 units of pRBCs since admission. Continue to monitor. Patient requires injection of a partially thrombosed femoral pseudoaneurysm by vascular surgery. No current need for transfusion due to chronic component of anemia. Continue on prednisone for autoimmune hemolytic anemia  Acute on chronic systolic heart failure, continue on daily Torsemide  Respiratory status required supplemental oxygen, close monitoring of breathing pattern and respiratory rate & the following of continuous pulse oximetry for support & to prevent decompensation. Continues on treatment with spiriva and proventyl  Continued mobilization as tolerated  ASA continued for CAD and graft occlusion-thromboembolism prophylaxis.   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  Protonix maintained for GI bleeding prophylaxis  Type 2 diabetes, metabolic stability & infection prophylaxis required review and adjustment of regular Insulin sliding scale and glycemic regimen with Lantus and Humalog while following serial glucose levels to help achieve & maintain euglycemia  Reviewed & addressed surgical site infection prophylaxis regimen  Continues on treatment with Meropenem on gram negative bacteremia.     I personally performed the service described in the documentation recorded by the paoibcris in my presence, and it accurately and completely records my words and actions.    I confirm that the note above accurately reflects all work, treatment, procedures and medical decision making performed by me.    By signing my name below, I, Emre Harper, attest that this documentation has been prepared under the direction and in the presence of Dr. Carpenter.  Electronically signed: Cheo Mckinley, 20 @ 19:55 JESUS MORALES  MRN#: [id number]    83 yo M with hx Afib (Coumadin), DM2, HLD, CAD (stent  and  in mLAD and RCA), HFrEF (40%) severe AS, HFrEF  30%, LBBB, baseline SCr 1.4, and autoimmune hemolytic anemia, s/p aortic valvuloplasty  and now readmitted with an hematocrit of 16 due to acute blood loss at left groin access site.     Patient had been treated for heart failure in mid December but was deemed to be too high risk for TAVR and underwent  balloon aortic valvuloplasty on . Post-procedure course complicated by contrast induced nephropathy, hyperglycemia, and anemia.  He required blood transfusion and was restarted on Coumadin on 20 and was discharged to rehab facility on 1/3/20.   He continued on torsemide 40mg po and prednisone 60mg daily.  On 20, he returned to ER due to symptomatic anemia related to reported bleeding from L groin access site. Patient states that he had bleeding on 20 and the staff applied pressure and a band-aid and the bleeding stopped.  Patient felt weak, short of breath, and dizzy and the rehab initiated transfer for further work-up.  H+H in ER was 5 and 15 and downtrending from discharge values of 6.9 and 20. ER also noted an egg sized hematoma in the left groin.  Right groin stable with pea size firmness at access site. Now recovering s/p     OBJECTIVE:  ICU Vital Signs Last 24 Hrs  T(C): 37 (2020 16:00), Max: 37.1 (2020 12:00)  T(F): 98.6 (2020 16:00), Max: 98.8 (2020 12:00)  HR: 117 (2020 19:00) (84 - 122)  BP: 103/63 (2020 19:00) (86/58 - 136/62)  BP(mean): 75 (2020 19:00) (64 - 89)  ABP: --  ABP(mean): --  RR: 20 (2020 19:00) (15 - 51)  SpO2: 100% (2020 19:00) (91% - 100%)      - @ 07: @ 07:00  --------------------------------------------------------  IN: 1120 mL / OUT: 1900 mL / NET: -780 mL     @ 07: @ 19:42  --------------------------------------------------------  IN: 0 mL / OUT: 700 mL / NET: -700 mL        PHYSICAL EXAM:Daily     Daily Weight in k.6 (2020 00:00)  General:      + Cachectic, elderly male   HEENT:     + NCAT + temporal wasting + EOMI  - Conjuctival edema   + Icterus   - ETT  - NGT/OGT  Neck:         + FROM    - JVD     - Nodes     - Masses    + Mid-line trachea   - Tracheostomy  Chest:         - Sternal click  - Sternal drainage  - Pacing wires  - Chest tubes  - SubQ emphysema  Lungs:          + CTA   - Rhonchi    - Rales    - Wheezing     - Decreased BS   - Dullness R L  Cardiac:       + S1 + S2    + Irregularly irregular rhythm  - S3  - S4    - Murmurs   - Rub   - Hamman’s sign   Abdomen:    + BS     + Soft    + Non-tender     - Distended    - Organomegaly  - PEG  Extremities:   - Cyanosis U/L   - Clubbing  U/L  - LE/UE Edema   + Capillary refill    + Pulses   Neuro:        + Awake   +  Alert   - Confused   - Lethargic   - Sedated   + Generalized Weakness  Skin:        - Rashes    - Erythema   + mildly jaundiced   + IV sites intact  - Sacral decubitus  + Left groin has a dry dressing without swelling   + Arthritic changes in bilateral hands and feet.     HOSPITAL MEDICATIONS: All mediciations reviewed and analyzed  MEDICATIONS  (STANDING):  aspirin enteric coated 81 milliGRAM(s) Oral daily  dextrose 5%. 1000 milliLiter(s) (50 mL/Hr) IV Continuous <Continuous>  dextrose 50% Injectable 12.5 Gram(s) IV Push once  dextrose 50% Injectable 25 Gram(s) IV Push once  dextrose 50% Injectable 25 Gram(s) IV Push once  ferrous    sulfate 325 milliGRAM(s) Oral daily  fluticasone propionate 50 MICROgram(s)/spray Nasal Spray 1 Spray(s) Both Nostrils two times a day  folic acid 1 milliGRAM(s) Oral daily  insulin glargine Injectable (LANTUS) 10 Unit(s) SubCutaneous at bedtime  insulin lispro (HumaLOG) corrective regimen sliding scale   SubCutaneous Before meals and at bedtime  insulin lispro Injectable (HumaLOG) 7 Unit(s) SubCutaneous three times a day with meals  levothyroxine 50 MICROGram(s) Oral daily  melatonin 3 milliGRAM(s) Oral at bedtime  meropenem  IVPB 1000 milliGRAM(s) IV Intermittent every 12 hours  pantoprazole    Tablet 40 milliGRAM(s) Oral two times a day  predniSONE   Tablet 60 milliGRAM(s) Oral daily  senna 1 Tablet(s) Oral at bedtime  simvastatin 40 milliGRAM(s) Oral at bedtime  tiotropium 18 MICROgram(s) Capsule 1 Capsule(s) Inhalation daily  torsemide 40 milliGRAM(s) Oral daily    MEDICATIONS  (PRN):  ALBUTerol    90 MICROgram(s) HFA Inhaler 2 Puff(s) Inhalation every 6 hours PRN Shortness of Breath and/or Wheezing  dextrose 40% Gel 15 Gram(s) Oral once PRN Blood Glucose LESS THAN 70 milliGRAM(s)/deciliter  glucagon  Injectable 1 milliGRAM(s) IntraMuscular once PRN Glucose LESS THAN 70 milligrams/deciliter  polyethylene glycol 3350 17 Gram(s) Oral daily PRN Constipation    LABS: All Lab data reviewed and analyzed                        7.0    14.78 )-----------( 98       ( 2020 00:54 )             20.5    -    136  |  102  |  113<H>  ----------------------------<  99  4.6   |  17<L>  |  1.89<H>    Ca    8.0<L>      2020 00:54  Phos  4.0       Mg     2.0         TPro  5.6<L>  /  Alb  3.0<L>  /  TBili  3.8<H>  /  DBili  x   /  AST  21  /  ALT  29  /  AlkPhos  75      PT/INR - ( 2020 00:54 )   PT: 11.9 sec;   INR: 1.03 ratio         PTT - ( 2020 00:54 )  PTT:19.8 sec LIVER FUNCTIONS - ( 2020 00:54 )  Alb: 3.0 g/dL / Pro: 5.6 g/dL / ALK PHOS: 75 U/L / ALT: 29 U/L / AST: 21 U/L / GGT: x           RADIOLOGY: - Reviewed and analyzed     Acute blood loss anemia in addition to autoimmune hemolytic anemia, he has required 3 units of pRBCs since admission. Continue to monitor. Patient requires injection of a partially thrombosed femoral pseudoaneurysm by vascular surgery. No current need for transfusion due to chronic component of anemia. Continue on prednisone for autoimmune hemolytic anemia  Acute on chronic systolic heart failure, continue on daily Torsemide  Respiratory status required supplemental oxygen, close monitoring of breathing pattern and respiratory rate & the following of continuous pulse oximetry for support & to prevent decompensation. Continues on treatment with spiriva and proventyl  Continued mobilization as tolerated  ASA continued for CAD and graft occlusion-thromboembolism prophylaxis.   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  Protonix maintained for GI bleeding prophylaxis  Type 2 diabetes, metabolic stability & infection prophylaxis required review and adjustment of regular Insulin sliding scale and glycemic regimen with Lantus and Humalog while following serial glucose levels to help achieve & maintain euglycemia  Reviewed & addressed surgical site infection prophylaxis regimen  Continues on treatment with Meropenem on gram negative bacteremia.     I personally performed the service described in the documentation recorded by the fang in my presence, and it accurately and completely records my words and actions.    I confirm that the note above accurately reflects all work, treatment, procedures and medical decision making performed by me.    By signing my name below, I, Emre Harper, attest that this documentation has been prepared under the direction and in the presence of Dr. Carpenter.  Electronically signed: Fang Mckinley, 20 @ 19:55 JESUS MORALES  MRN#: [id number]    83 yo M with hx Afib (Coumadin), DM2, HLD, CAD (stent  and  in mLAD and RCA), HFrEF (40%) severe AS, HFrEF  30%, LBBB, baseline SCr 1.4, and autoimmune hemolytic anemia, s/p aortic valvuloplasty  and now readmitted with an hematocrit of 16 due to acute blood loss at left groin access site.     Patient had been treated for heart failure in mid December but was deemed to be too high risk for TAVR and underwent  balloon aortic valvuloplasty on . Post-procedure course complicated by contrast induced nephropathy, hyperglycemia, and anemia.  He required blood transfusion and was restarted on Coumadin on 20 and was discharged to rehab facility on 1/3/20.   He continued on torsemide 40mg po and prednisone 60mg daily.  On 20, he returned to ER due to symptomatic anemia related to reported bleeding from L groin access site. Patient states that he had bleeding on 20 and the staff applied pressure and a band-aid and the bleeding stopped.  Patient felt weak, short of breath, and dizzy and the rehab initiated transfer for further work-up.  H+H in ER was 5 and 15 and downtrending from discharge values of 6.9 and 20. ER also noted an egg sized hematoma in the left groin.  Right groin stable with pea size firmness at access site. Now recovering s/p thrombin injection of left groin.    OBJECTIVE:  ICU Vital Signs Last 24 Hrs  T(C): 37 (2020 16:00), Max: 37.1 (2020 12:00)  T(F): 98.6 (2020 16:00), Max: 98.8 (2020 12:00)  HR: 117 (2020 19:00) (84 - 122)  BP: 103/63 (2020 19:00) (86/58 - 136/62)  BP(mean): 75 (2020 19:00) (64 - 89)  ABP: --  ABP(mean): --  RR: 20 (2020 19:00) (15 - 51)  SpO2: 100% (:00) (91% - 100%)      01-08 @ 07: @ 07:00  --------------------------------------------------------  IN: 1120 mL / OUT: 1900 mL / NET: -780 mL     @ 07: @ 19:42  --------------------------------------------------------  IN: 0 mL / OUT: 700 mL / NET: -700 mL    PHYSICAL EXAM:Daily     Daily Weight in k.6 (2020 00:00)  General:      + Cachectic, elderly male, breathing comfortably at rest  HEENT:     + NCAT + temporal wasting + EOMI  - Conjuctival edema   + Icterus   - ETT  - NGT/OGT  Neck:         + FROM    - JVD     - Nodes     - Masses    + Mid-line trachea   - Tracheostomy  Chest:         - Sternal click  - Sternal drainage  - Pacing wires  - Chest tubes  - SubQ emphysema  Lungs:          + CTA   - Rhonchi    - Rales    - Wheezing     - Decreased BS   - Dullness R L  Cardiac:       + S1 + S2    + Irregularly irregular rhythm  - S3  - S4    - Murmurs   - Rub   - Hamman’s sign   Abdomen:    + BS     + Soft    + Non-tender     - Distended    - Organomegaly  - PEG  Extremities:   - Cyanosis U/L   - Clubbing  U/L  - LE/UE Edema   + Capillary refill    + Pulses  + Arthritic changes in bilateral hands and feet  Neuro:        + Awake   +  Alert   - Confused   - Lethargic   - Sedated   + Generalized Weakness  Skin:        - Rashes    - Erythema   + mildly jaundiced   + IV sites intact  - Sacral decubitus  + Left groin has a dry dressing without swelling      HOSPITAL MEDICATIONS: All mediciations reviewed and analyzed  MEDICATIONS  (STANDING):  aspirin enteric coated 81 milliGRAM(s) Oral daily  dextrose 5%. 1000 milliLiter(s) (50 mL/Hr) IV Continuous <Continuous>  dextrose 50% Injectable 12.5 Gram(s) IV Push once  dextrose 50% Injectable 25 Gram(s) IV Push once  dextrose 50% Injectable 25 Gram(s) IV Push once  ferrous    sulfate 325 milliGRAM(s) Oral daily  fluticasone propionate 50 MICROgram(s)/spray Nasal Spray 1 Spray(s) Both Nostrils two times a day  folic acid 1 milliGRAM(s) Oral daily  insulin glargine Injectable (LANTUS) 10 Unit(s) SubCutaneous at bedtime  insulin lispro (HumaLOG) corrective regimen sliding scale   SubCutaneous Before meals and at bedtime  insulin lispro Injectable (HumaLOG) 7 Unit(s) SubCutaneous three times a day with meals  levothyroxine 50 MICROGram(s) Oral daily  melatonin 3 milliGRAM(s) Oral at bedtime  meropenem  IVPB 1000 milliGRAM(s) IV Intermittent every 12 hours  pantoprazole    Tablet 40 milliGRAM(s) Oral two times a day  predniSONE   Tablet 60 milliGRAM(s) Oral daily  senna 1 Tablet(s) Oral at bedtime  simvastatin 40 milliGRAM(s) Oral at bedtime  tiotropium 18 MICROgram(s) Capsule 1 Capsule(s) Inhalation daily  torsemide 40 milliGRAM(s) Oral daily    MEDICATIONS  (PRN):  ALBUTerol    90 MICROgram(s) HFA Inhaler 2 Puff(s) Inhalation every 6 hours PRN Shortness of Breath and/or Wheezing  dextrose 40% Gel 15 Gram(s) Oral once PRN Blood Glucose LESS THAN 70 milliGRAM(s)/deciliter  glucagon  Injectable 1 milliGRAM(s) IntraMuscular once PRN Glucose LESS THAN 70 milligrams/deciliter  polyethylene glycol 3350 17 Gram(s) Oral daily PRN Constipation    LABS: All Lab data reviewed and analyzed                        7.0    14.78 )-----------( 98       ( 2020 00:54 )             20.5    01-    136  |  102  |  113<H>  ----------------------------<  99  4.6   |  17<L>  |  1.89<H>    Ca    8.0<L>      2020 00:54  Phos  4.0     -  Mg     2.0         TPro  5.6<L>  /  Alb  3.0<L>  /  TBili  3.8<H>  /  DBili  x   /  AST  21  /  ALT  29  /  AlkPhos  75      PT/INR - ( 2020 00:54 )   PT: 11.9 sec;   INR: 1.03 ratio         PTT - ( 2020 00:54 )  PTT:19.8 sec LIVER FUNCTIONS - ( 2020 00:54 )  Alb: 3.0 g/dL / Pro: 5.6 g/dL / ALK PHOS: 75 U/L / ALT: 29 U/L / AST: 21 U/L / GGT: x           RADIOLOGY: - Reviewed and analyzed     Acute blood loss anemia in addition to autoimmune hemolytic anemia, he has required 3 units of pRBCs since admission. Continue to monitor. Plan for transfusion for Hg less than 7 as per hematology consult. Continue on prednisone for autoimmune hemolytic anemia. Follow up LDH, Haptoglobin, Direct alisson, and cold agglutinin titers ordered.  Multiple positive blood cultures for enterobacter bacteremia. Continues on treatment with Meropenem, ID follow up appreciated.  Acute on chronic systolic heart failure, continue on daily Torsemide  Respiratory status required supplemental oxygen, close monitoring of breathing pattern and respiratory rate & the following of continuous pulse oximetry for support & to prevent decompensation. Continues on treatment with spiriva and proventyl  Continued mobilization as tolerated  ASA continued for CAD and graft occlusion-thromboembolism prophylaxis.   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  Protonix maintained for GI bleeding prophylaxis  Type 2 diabetes, metabolic stability & infection prophylaxis required review and adjustment of regular Insulin sliding scale and glycemic regimen with Lantus and Humalog while following serial glucose levels to help achieve & maintain euglycemia  Reviewed & addressed surgical site infection prophylaxis regimen    I personally performed the service described in the documentation recorded by the fang in my presence, and it accurately and completely records my words and actions.    I confirm that the note above accurately reflects all work, treatment, procedures and medical decision making performed by me.    By signing my name below, I, Emre Harper, attest that this documentation has been prepared under the direction and in the presence of Dr. Carpenter.  Electronically signed: Fang Mckinley, 20 @ 19:55

## 2020-01-09 NOTE — PROGRESS NOTE ADULT - ASSESSMENT
84M PMH A-fib (on coumadin), DM II, HLD, CAD (s/p stents 2014, 215), HF, Severe AS with associated hemolytic anemia, LBBB, presenting with anemia. Found on imaging to have LLE hematoma      pt with a [id number] hematoma over left groin   - bc positive for enterobacter  no signs s of UTI, pneumonia or endocarditis      suspect enterobacter related to groin pseudoaneurysm and hematoma.  pt has 8/8 bc positive for enterobacter  most likely intravascular source endo also possible   discussed Dr. Becker  wants to avoid invasive procedures  not candidate fo aggressive surgery palliative approach planned   prognosis guarded       meropenem  1 q 12 hrs.   discussed with [facility]    await follow up bc  no jase planned

## 2020-01-09 NOTE — PROGRESS NOTE ADULT - SUBJECTIVE AND OBJECTIVE BOX
infectious diseases progress note:    Patient is a 84y old  Male who presents with a chief complaint of Anemia (08 Jan 2020 11:40)        Anemia             Allergies    No Known Allergies    Intolerances        ANTIBIOTICS/RELEVANT:  antimicrobials  meropenem  IVPB 1000 milliGRAM(s) IV Intermittent every 12 hours    immunologic:    OTHER:  ALBUTerol    90 MICROgram(s) HFA Inhaler 2 Puff(s) Inhalation every 6 hours PRN  aspirin enteric coated 81 milliGRAM(s) Oral daily  dextrose 40% Gel 15 Gram(s) Oral once PRN  dextrose 5%. 1000 milliLiter(s) IV Continuous <Continuous>  dextrose 50% Injectable 12.5 Gram(s) IV Push once  dextrose 50% Injectable 25 Gram(s) IV Push once  dextrose 50% Injectable 25 Gram(s) IV Push once  ferrous    sulfate 325 milliGRAM(s) Oral daily  fluticasone propionate 50 MICROgram(s)/spray Nasal Spray 1 Spray(s) Both Nostrils two times a day  folic acid 1 milliGRAM(s) Oral daily  glucagon  Injectable 1 milliGRAM(s) IntraMuscular once PRN  insulin glargine Injectable (LANTUS) 10 Unit(s) SubCutaneous at bedtime  insulin lispro (HumaLOG) corrective regimen sliding scale   SubCutaneous Before meals and at bedtime  insulin lispro Injectable (HumaLOG) 7 Unit(s) SubCutaneous three times a day with meals  levothyroxine 50 MICROGram(s) Oral daily  melatonin 3 milliGRAM(s) Oral at bedtime  pantoprazole    Tablet 40 milliGRAM(s) Oral two times a day  polyethylene glycol 3350 17 Gram(s) Oral daily PRN  predniSONE   Tablet 60 milliGRAM(s) Oral daily  senna 1 Tablet(s) Oral at bedtime  simvastatin 40 milliGRAM(s) Oral at bedtime  tiotropium 18 MICROgram(s) Capsule 1 Capsule(s) Inhalation daily  torsemide 40 milliGRAM(s) Oral daily      Objective:  Vital Signs Last 24 Hrs  T(C): 35.7 (09 Jan 2020 04:00), Max: 36.1 (08 Jan 2020 12:00)  T(F): 96.2 (09 Jan 2020 04:00), Max: 96.9 (08 Jan 2020 12:00)  HR: 96 (09 Jan 2020 07:00) (84 - 112)  BP: 108/56 (09 Jan 2020 07:00) (82/50 - 133/64)  BP(mean): 71 (09 Jan 2020 07:00) (61 - 89)  RR: 19 (09 Jan 2020 07:00) (15 - 51)  SpO2: 100% (09 Jan 2020 07:00) (91% - 100%)    PHYSICAL EXAM:  Constitutional:Well-developed, well nourished--no acute distress  Eyes:RIYA, EOMI  Ear/Nose/Throat: no oral lesion, no sinus tenderness on percussion	  Neck:no JVD, no lymphadenopathy, supple  Respiratory: CTA azalia  Cardiovascular: S1S2 RRR, no murmurs  Gastrointestinal:soft, (+) BS, no HSM  Extremities:no e/e/c        LABS:                        7.0    14.78 )-----------( 98       ( 09 Jan 2020 00:54 )             20.5     01-09    136  |  102  |  113<H>  ----------------------------<  99  4.6   |  17<L>  |  1.89<H>    Ca    8.0<L>      09 Jan 2020 00:54  Phos  4.0     01-09  Mg     2.0     01-09    TPro  5.6<L>  /  Alb  3.0<L>  /  TBili  3.8<H>  /  DBili  x   /  AST  21  /  ALT  29  /  AlkPhos  75  01-09    PT/INR - ( 09 Jan 2020 00:54 )   PT: 11.9 sec;   INR: 1.03 ratio         PTT - ( 09 Jan 2020 00:54 )  PTT:19.8 sec        MICROBIOLOGY:    RECENT CULTURES:  01-06 @ 22:03 .Blood Blood-Peripheral       Growth in aerobic bottle: Gram Negative Rods  Growth in anaerobic bottle: Gram Negative Rods           Growth in aerobic and anaerobic bottles: Gram Negative Rods    01-06 @ 04:14 .Blood Blood       Growth in aerobic and anaerobic bottles: Gram Negative Rods           Growth in aerobic and anaerobic bottles: Enterobacter cloacae complex  See previous culture [id number]    01-06 @ 02:46 .Blood Blood   PCR    Growth in anaerobic bottle: Gram Negative Rods  Growth in aerobic bottle: Gram Negative Rods    Blood Culture PCR  Enterobacter cloacae complex  Blood Culture PCR     Growth in aerobic and anaerobic bottles: Enterobacter cloacae complex  ***Blood Panel PCR results on this specimen are available  approximately 3 hours after the Gram stain result.***  Gram stain, PCR, and/or culture results may not always  correspond due to difference in methodologies.  ************************************************************  This PCR assay was performed using [employer].  The following targets are tested for: Enterococcus,  vancomycin resistant enterococci, Listeria monocytogenes,  coagulase negative staphylococci, S. aureus,  methicillin resistant S. aureus, Streptococcus agalactiae  (Group B), S. pneumoniae, S. pyogenes (Group A),  Acinetobacter baumannii, Enterobacter cloacae, E. coli,  Klebsiella oxytoca, K. pneumoniae, Proteus sp.,  Serratia marcescens, Haemophilus influenzae,  Neisseria meningitidis, Pseudomonas aeruginosa, Candida  albicans, C. glabrata, C krusei, C parapsilosis,  C. tropicalis and the KPC resistance gene.          RESPIRATORY CULTURES:              RADIOLOGY & ADDITIONAL STUDIES:        Pager [phone]  After 5 pm/weekends or if no response :[phone]

## 2020-01-09 NOTE — PROVIDER CONTACT NOTE (CRITICAL VALUE NOTIFICATION) - ASSESSMENT
Patient alert and oriented to baseline. No s/s of bleeding noted. Groin sites WDL, no s/s of additonal bleeding

## 2020-01-10 DIAGNOSIS — Z51.5 ENCOUNTER FOR PALLIATIVE CARE: ICD-10-CM

## 2020-01-10 DIAGNOSIS — Z71.89 OTHER SPECIFIED COUNSELING: ICD-10-CM

## 2020-01-10 DIAGNOSIS — S30.1XXD CONTUSION OF ABDOMINAL WALL, SUBSEQUENT ENCOUNTER: ICD-10-CM

## 2020-01-10 DIAGNOSIS — R53.2 FUNCTIONAL QUADRIPLEGIA: ICD-10-CM

## 2020-01-10 DIAGNOSIS — Z98.890 OTHER SPECIFIED POSTPROCEDURAL STATES: ICD-10-CM

## 2020-01-10 LAB
ALBUMIN SERPL ELPH-MCNC: 3.1 G/DL — LOW (ref 3.3–5)
ALP SERPL-CCNC: 84 U/L — SIGNIFICANT CHANGE UP (ref 40–120)
ALT FLD-CCNC: 24 U/L — SIGNIFICANT CHANGE UP (ref 10–45)
ANION GAP SERPL CALC-SCNC: 15 MMOL/L — SIGNIFICANT CHANGE UP (ref 5–17)
ANISOCYTOSIS BLD QL: SIGNIFICANT CHANGE UP
AST SERPL-CCNC: 18 U/L — SIGNIFICANT CHANGE UP (ref 10–40)
BASO STIPL BLD QL SMEAR: PRESENT — SIGNIFICANT CHANGE UP
BASOPHILS # BLD AUTO: 0 K/UL — SIGNIFICANT CHANGE UP (ref 0–0.2)
BASOPHILS NFR BLD AUTO: 0 % — SIGNIFICANT CHANGE UP (ref 0–2)
BILIRUB SERPL-MCNC: 4 MG/DL — HIGH (ref 0.2–1.2)
BUN SERPL-MCNC: 129 MG/DL — HIGH (ref 7–23)
BURR CELLS BLD QL SMEAR: PRESENT — SIGNIFICANT CHANGE UP
BURR CELLS BLD QL SMEAR: SLIGHT — SIGNIFICANT CHANGE UP
CALCIUM SERPL-MCNC: 8.3 MG/DL — LOW (ref 8.4–10.5)
CHLORIDE SERPL-SCNC: 103 MMOL/L — SIGNIFICANT CHANGE UP (ref 96–108)
CO2 SERPL-SCNC: 19 MMOL/L — LOW (ref 22–31)
CREAT SERPL-MCNC: 2.17 MG/DL — HIGH (ref 0.5–1.3)
ELLIPTOCYTES BLD QL SMEAR: SLIGHT — SIGNIFICANT CHANGE UP
EOSINOPHIL # BLD AUTO: 0 K/UL — SIGNIFICANT CHANGE UP (ref 0–0.5)
EOSINOPHIL NFR BLD AUTO: 0 % — SIGNIFICANT CHANGE UP (ref 0–6)
GLUCOSE BLDC GLUCOMTR-MCNC: 126 MG/DL — HIGH (ref 70–99)
GLUCOSE BLDC GLUCOMTR-MCNC: 133 MG/DL — HIGH (ref 70–99)
GLUCOSE BLDC GLUCOMTR-MCNC: 141 MG/DL — HIGH (ref 70–99)
GLUCOSE BLDC GLUCOMTR-MCNC: 145 MG/DL — HIGH (ref 70–99)
GLUCOSE BLDC GLUCOMTR-MCNC: 171 MG/DL — HIGH (ref 70–99)
GLUCOSE SERPL-MCNC: 51 MG/DL — LOW (ref 70–99)
HCT VFR BLD CALC: 20.5 % — CRITICAL LOW (ref 39–50)
HCT VFR BLD CALC: 21.8 % — LOW (ref 39–50)
HGB BLD-MCNC: 6.7 G/DL — CRITICAL LOW (ref 13–17)
HGB BLD-MCNC: 7.9 G/DL — LOW (ref 13–17)
LYMPHOCYTES # BLD AUTO: 17.2 % — SIGNIFICANT CHANGE UP (ref 13–44)
LYMPHOCYTES # BLD AUTO: 3 K/UL — SIGNIFICANT CHANGE UP (ref 1–3.3)
MACROCYTES BLD QL: SLIGHT — SIGNIFICANT CHANGE UP
MAGNESIUM SERPL-MCNC: 2 MG/DL — SIGNIFICANT CHANGE UP (ref 1.6–2.6)
MANUAL SMEAR VERIFICATION: SIGNIFICANT CHANGE UP
MCHC RBC-ENTMCNC: 31.6 PG — SIGNIFICANT CHANGE UP (ref 27–34)
MCHC RBC-ENTMCNC: 32.7 GM/DL — SIGNIFICANT CHANGE UP (ref 32–36)
MCHC RBC-ENTMCNC: 35.1 PG — HIGH (ref 27–34)
MCHC RBC-ENTMCNC: 36.2 GM/DL — HIGH (ref 32–36)
MCV RBC AUTO: 96.7 FL — SIGNIFICANT CHANGE UP (ref 80–100)
MCV RBC AUTO: 96.9 FL — SIGNIFICANT CHANGE UP (ref 80–100)
MICROCYTES BLD QL: SLIGHT — SIGNIFICANT CHANGE UP
MONOCYTES # BLD AUTO: 0.16 K/UL — SIGNIFICANT CHANGE UP (ref 0–0.9)
MONOCYTES NFR BLD AUTO: 0.9 % — LOW (ref 2–14)
MYELOCYTES NFR BLD: 1.7 % — HIGH (ref 0–0)
NEUTROPHILS # BLD AUTO: 13.82 K/UL — HIGH (ref 1.8–7.4)
NEUTROPHILS NFR BLD AUTO: 78.4 % — HIGH (ref 43–77)
NEUTS BAND # BLD: 0.9 % — SIGNIFICANT CHANGE UP (ref 0–8)
NRBC # BLD: 18 /100 — HIGH (ref 0–0)
NRBC # BLD: 4 /100 WBCS — HIGH (ref 0–0)
OVALOCYTES BLD QL SMEAR: SLIGHT — SIGNIFICANT CHANGE UP
PHOSPHATE SERPL-MCNC: 3.7 MG/DL — SIGNIFICANT CHANGE UP (ref 2.5–4.5)
PLAT MORPH BLD: NORMAL — SIGNIFICANT CHANGE UP
PLATELET # BLD AUTO: 108 K/UL — LOW (ref 150–400)
PLATELET # BLD AUTO: 71 K/UL — LOW (ref 150–400)
POIKILOCYTOSIS BLD QL AUTO: SIGNIFICANT CHANGE UP
POLYCHROMASIA BLD QL SMEAR: SIGNIFICANT CHANGE UP
POTASSIUM SERPL-MCNC: 5.1 MMOL/L — SIGNIFICANT CHANGE UP (ref 3.5–5.3)
POTASSIUM SERPL-SCNC: 5.1 MMOL/L — SIGNIFICANT CHANGE UP (ref 3.5–5.3)
PROT SERPL-MCNC: 6.1 G/DL — SIGNIFICANT CHANGE UP (ref 6–8.3)
RBC # BLD: 2.12 M/UL — LOW (ref 4.2–5.8)
RBC # BLD: 2.25 M/UL — LOW (ref 4.2–5.8)
RBC # FLD: 21.5 % — HIGH (ref 10.3–14.5)
RBC # FLD: 22.7 % — HIGH (ref 10.3–14.5)
RBC BLD AUTO: ABNORMAL
SCHISTOCYTES BLD QL AUTO: SIGNIFICANT CHANGE UP
SODIUM SERPL-SCNC: 137 MMOL/L — SIGNIFICANT CHANGE UP (ref 135–145)
SPHEROCYTES BLD QL SMEAR: SLIGHT — SIGNIFICANT CHANGE UP
VARIANT LYMPHS # BLD: 0.9 % — SIGNIFICANT CHANGE UP (ref 0–6)
WBC # BLD: 17.43 K/UL — HIGH (ref 3.8–10.5)
WBC # BLD: 19.4 K/UL — HIGH (ref 3.8–10.5)
WBC # FLD AUTO: 17.43 K/UL — HIGH (ref 3.8–10.5)
WBC # FLD AUTO: 19.4 K/UL — HIGH (ref 3.8–10.5)

## 2020-01-10 PROCEDURE — 93926 LOWER EXTREMITY STUDY: CPT | Mod: 26,LT

## 2020-01-10 PROCEDURE — 99233 SBSQ HOSP IP/OBS HIGH 50: CPT

## 2020-01-10 PROCEDURE — 99232 SBSQ HOSP IP/OBS MODERATE 35: CPT

## 2020-01-10 PROCEDURE — 99232 SBSQ HOSP IP/OBS MODERATE 35: CPT | Mod: GC

## 2020-01-10 PROCEDURE — 99497 ADVNCD CARE PLAN 30 MIN: CPT | Mod: 25

## 2020-01-10 PROCEDURE — 71045 X-RAY EXAM CHEST 1 VIEW: CPT | Mod: 26

## 2020-01-10 PROCEDURE — 99223 1ST HOSP IP/OBS HIGH 75: CPT

## 2020-01-10 RX ORDER — MEROPENEM 1 G/30ML
500 INJECTION INTRAVENOUS EVERY 8 HOURS
Refills: 0 | Status: DISCONTINUED | OUTPATIENT
Start: 2020-01-10 | End: 2020-01-14

## 2020-01-10 RX ADMIN — Medication 5: at 17:43

## 2020-01-10 RX ADMIN — MEROPENEM 100 MILLIGRAM(S): 1 INJECTION INTRAVENOUS at 13:24

## 2020-01-10 RX ADMIN — Medication 1 MILLIGRAM(S): at 11:41

## 2020-01-10 RX ADMIN — SENNA PLUS 1 TABLET(S): 8.6 TABLET ORAL at 22:38

## 2020-01-10 RX ADMIN — MEROPENEM 100 MILLIGRAM(S): 1 INJECTION INTRAVENOUS at 05:50

## 2020-01-10 RX ADMIN — SIMVASTATIN 40 MILLIGRAM(S): 20 TABLET, FILM COATED ORAL at 22:38

## 2020-01-10 RX ADMIN — Medication 50 MICROGRAM(S): at 05:50

## 2020-01-10 RX ADMIN — Medication 2: at 12:09

## 2020-01-10 RX ADMIN — Medication 60 MILLIGRAM(S): at 05:50

## 2020-01-10 RX ADMIN — Medication 3 MILLIGRAM(S): at 22:38

## 2020-01-10 RX ADMIN — Medication 40 MILLIGRAM(S): at 05:50

## 2020-01-10 RX ADMIN — PANTOPRAZOLE SODIUM 40 MILLIGRAM(S): 20 TABLET, DELAYED RELEASE ORAL at 05:50

## 2020-01-10 RX ADMIN — Medication 7 UNIT(S): at 17:43

## 2020-01-10 RX ADMIN — MEROPENEM 100 MILLIGRAM(S): 1 INJECTION INTRAVENOUS at 22:38

## 2020-01-10 RX ADMIN — Medication 7 UNIT(S): at 12:11

## 2020-01-10 RX ADMIN — Medication 2: at 08:28

## 2020-01-10 RX ADMIN — TIOTROPIUM BROMIDE 1 CAPSULE(S): 18 CAPSULE ORAL; RESPIRATORY (INHALATION) at 17:31

## 2020-01-10 RX ADMIN — Medication 325 MILLIGRAM(S): at 11:41

## 2020-01-10 RX ADMIN — Medication 2: at 22:20

## 2020-01-10 RX ADMIN — Medication 7 UNIT(S): at 08:27

## 2020-01-10 RX ADMIN — PANTOPRAZOLE SODIUM 40 MILLIGRAM(S): 20 TABLET, DELAYED RELEASE ORAL at 17:28

## 2020-01-10 RX ADMIN — Medication 1 SPRAY(S): at 22:38

## 2020-01-10 RX ADMIN — INSULIN GLARGINE 10 UNIT(S): 100 INJECTION, SOLUTION SUBCUTANEOUS at 22:20

## 2020-01-10 RX ADMIN — Medication 81 MILLIGRAM(S): at 11:41

## 2020-01-10 NOTE — PROVIDER CONTACT NOTE (CRITICAL VALUE NOTIFICATION) - ASSESSMENT
Patient alert and oriented to baseline. No s/s of distress or bleeding noted. Bilateral groin sites checked, no bleeding noted

## 2020-01-10 NOTE — PROGRESS NOTE ADULT - SUBJECTIVE AND OBJECTIVE BOX
Interval Hx; Events Overnight:  SUBJECTIVE: " I feel okay, I am tired today "    LABS:                7.9                  x    | x    | x            17.43 >-----------< 71      ------------------------< x                     21.8                 x    | x    | x                                            Ca x     Mg x     Ph x              VITAL SIGNS    Telemetry: afib 90s     Daily Height in cm: 165.1 (2020 20:00)    Daily Weight in k.5 (10 Ousmane 2020 00:00)      Vital Signs Last 24 Hrs  T(C): 35.8 (01-10-20 @ 08:00), Max: 37 (20 @ 16:00)  T(F): 96.5 (01-10-20 @ 08:00), Max: 98.6 (20 @ 16:00)  HR: 110 (01-10-20 @ 10:00) (91 - 122)  BP: 101/61 (01-10-20 @ 10:00) (86/58 - 120/53)  RR: 18 (01-10-20 @ 10:00) (14 - 35)  SpO2: 100% (01-10-20 @ 10:00) (96% - 100%)             I&O's Detail    2020 07:  -  10 Ousmane 2020 07:00  --------------------------------------------------------  IN:    IV PiggyBack: 50 mL    Packed Red Blood Cells: 350 mL  Total IN: 400 mL    OUT:    Incontinent per Condom Catheter: 1000 mL  Total OUT: 1000 mL    Total NET: -600 mL      10 Ousmane 2020 07:01  -  10 Ousmane 2020 12:07  --------------------------------------------------------  IN:    Oral Fluid: 150 mL  Total IN: 150 mL    OUT:    Incontinent per Condom Catheter: 100 mL  Total OUT: 100 mL    Total NET: 50 mL                    GLUCOSE  CAPILLARY BLOOD GLUCOSE  353 (2020 19:00)  233 (2020 14:00)      POCT Blood Glucose.: 126 mg/dL (10 Ousmane 2020 11:51)  POCT Blood Glucose.: 141 mg/dL (10 Ousmane 2020 08:24)  POCT Blood Glucose.: 145 mg/dL (10 Ousmane 2020 06:38)  POCT Blood Glucose.: 210 mg/dL (2020 21:21)  POCT Blood Glucose.: 353 mg/dL (2020 17:41)  POCT Blood Glucose.: 233 mg/dL (2020 14:05)                      PHYSICAL EXAM      General: NAD, well appearing, in no distress  Neurology: A&O x3, non focal, no neuro deficits. Moves all extremities to command.  CV : s1 s2 RRR, no murmurs, gallops, clicks.   Lungs: clear to auscultation  Abdomen: soft, nontender, nondistended, positive bowel sounds  :    voiding , incontinent, external catheter          Extremities:    no  edema. + pedal pulses      Incision: b/l groin sites stable, L groin site, no bleeding noted  Skin: intact, no lesions        MEDICATIONS  ALBUTerol    90 MICROgram(s) HFA Inhaler 2 Puff(s) Inhalation every 6 hours PRN  aspirin enteric coated 81 milliGRAM(s) Oral daily  dextrose 40% Gel 15 Gram(s) Oral once PRN  dextrose 5%. 1000 milliLiter(s) IV Continuous <Continuous>  dextrose 50% Injectable 12.5 Gram(s) IV Push once  dextrose 50% Injectable 25 Gram(s) IV Push once  dextrose 50% Injectable 25 Gram(s) IV Push once  ferrous    sulfate 325 milliGRAM(s) Oral daily  fluticasone propionate 50 MICROgram(s)/spray Nasal Spray 1 Spray(s) Both Nostrils two times a day  folic acid 1 milliGRAM(s) Oral daily  glucagon  Injectable 1 milliGRAM(s) IntraMuscular once PRN  insulin glargine Injectable (LANTUS) 10 Unit(s) SubCutaneous at bedtime  insulin lispro (HumaLOG) corrective regimen sliding scale   SubCutaneous Before meals and at bedtime  insulin lispro Injectable (HumaLOG) 7 Unit(s) SubCutaneous three times a day with meals  levothyroxine 50 MICROGram(s) Oral daily  melatonin 3 milliGRAM(s) Oral at bedtime  meropenem  IVPB 500 milliGRAM(s) IV Intermittent every 8 hours  pantoprazole    Tablet 40 milliGRAM(s) Oral two times a day  polyethylene glycol 3350 17 Gram(s) Oral daily PRN  predniSONE   Tablet 60 milliGRAM(s) Oral daily  senna 1 Tablet(s) Oral at bedtime  simvastatin 40 milliGRAM(s) Oral at bedtime  tiotropium 18 MICROgram(s) Capsule 1 Capsule(s) Inhalation daily  torsemide 40 milliGRAM(s) Oral daily

## 2020-01-10 NOTE — CONSULT NOTE ADULT - ASSESSMENT
83 yo male with CAD,afib, severe AS with hemolytic anemia, CHF, multiple recent admissions.  Now admitted with groin pain, anemia.  found to have hematoma of the R groin. not a surgical candidate s/p bedside thrombin injection. Palliative called for GOC.

## 2020-01-10 NOTE — PROGRESS NOTE ADULT - SUBJECTIVE AND OBJECTIVE BOX
CRITICAL CARE ATTENDING - CTICU    MEDICATIONS  (STANDING):  aspirin enteric coated 81 milliGRAM(s) Oral daily  dextrose 5%. 1000 milliLiter(s) (50 mL/Hr) IV Continuous <Continuous>  dextrose 50% Injectable 12.5 Gram(s) IV Push once  dextrose 50% Injectable 25 Gram(s) IV Push once  dextrose 50% Injectable 25 Gram(s) IV Push once  ferrous    sulfate 325 milliGRAM(s) Oral daily  fluticasone propionate 50 MICROgram(s)/spray Nasal Spray 1 Spray(s) Both Nostrils two times a day  folic acid 1 milliGRAM(s) Oral daily  insulin glargine Injectable (LANTUS) 10 Unit(s) SubCutaneous at bedtime  insulin lispro (HumaLOG) corrective regimen sliding scale   SubCutaneous Before meals and at bedtime  insulin lispro Injectable (HumaLOG) 7 Unit(s) SubCutaneous three times a day with meals  levothyroxine 50 MICROGram(s) Oral daily  melatonin 3 milliGRAM(s) Oral at bedtime  meropenem  IVPB 1000 milliGRAM(s) IV Intermittent every 12 hours  pantoprazole    Tablet 40 milliGRAM(s) Oral two times a day  predniSONE   Tablet 60 milliGRAM(s) Oral daily  senna 1 Tablet(s) Oral at bedtime  simvastatin 40 milliGRAM(s) Oral at bedtime  tiotropium 18 MICROgram(s) Capsule 1 Capsule(s) Inhalation daily  torsemide 40 milliGRAM(s) Oral daily                                    6.7    19.40 )-----------( 108      ( 10 Ousmane 2020 00:57 )             20.5       01-10    137  |  103  |  129<H>  ----------------------------<  51<L>  5.1   |  19<L>  |  2.17<H>    Ca    8.3<L>      10 Ousmane 2020 00:57  Phos  3.7     01-10  Mg     2.0     01-10    TPro  6.1  /  Alb  3.1<L>  /  TBili  4.0<H>  /  DBili  x   /  AST  18  /  ALT  24  /  AlkPhos  84  01-10      PT/INR - ( 2020 00:54 )   PT: 11.9 sec;   INR: 1.03 ratio         PTT - ( 2020 00:54 )  PTT:19.8 sec        Daily Height in cm: 165.1 (2020 20:00)    Daily Weight in k.5 (10 Ousmane 2020 00:00)      : @ 07:00  --------------------------------------------------------  IN: 1120 mL / OUT: 1900 mL / NET: -780 mL     @ :01  -  01-10 @ 06:14  --------------------------------------------------------  IN: 400 mL / OUT: 700 mL / NET: -300 mL        Critically Ill patient  : [ ] preoperative ,   [x ] post operative    Requires :  [ ] Arterial Line   [ ] Central Line  [ ] PA catheter  [ ] IABP  [ ] ECMO  [ ] LVAD  [ ] Ventilator  [ x] pacemaker - PPM [ ] Impella.                      [ x] ABG's     [ x] Pulse Oxymetry Monitoring  Bedside evaluation , monitoring , treatment of hemodynamics , fluids , IVP/ IVCD meds.        Diagnosis:     Severe AS s/p BAV - 19     Readmit - Anemia, L groin hematoma     hx of afib     hx of CAD     CHF- acute [x ]   chronic [x ]    systolic [ x]   diatolic [ ]          - Echo- EF -      40s    [x ] LV dysfunction          - Cxr-cardiomegally, edema          - Clinical-  [ ]inotropes   [ ]pressors   [ ]diuresis   [ ]IABP   [ ]ECMO   [ ]LVAD   [ ]Respiratory Failure    Thrombocytopenia     Renal Failure - Acute Kidney Injury    PPM    Hypovolemia             By signing my name below, I, Noy George, attest that this documentation has been prepared under the direction and in the presence of Alejandro Alexander MD  Electronically Signed: Cheo Elias. 01-10-20 @ 06:16    Discussed with CT surgeon, Physician's Assistant - Nurse Practitioner- Critical care medicine team.   Dicussed at  AM / PM rounds.   Chart, labs , films reviewed.    Total Time: CRITICAL CARE ATTENDING - CTICU    MEDICATIONS  (STANDING):  aspirin enteric coated 81 milliGRAM(s) Oral daily  dextrose 5%. 1000 milliLiter(s) (50 mL/Hr) IV Continuous <Continuous>  dextrose 50% Injectable 12.5 Gram(s) IV Push once  dextrose 50% Injectable 25 Gram(s) IV Push once  dextrose 50% Injectable 25 Gram(s) IV Push once  ferrous    sulfate 325 milliGRAM(s) Oral daily  fluticasone propionate 50 MICROgram(s)/spray Nasal Spray 1 Spray(s) Both Nostrils two times a day  folic acid 1 milliGRAM(s) Oral daily  insulin glargine Injectable (LANTUS) 10 Unit(s) SubCutaneous at bedtime  insulin lispro (HumaLOG) corrective regimen sliding scale   SubCutaneous Before meals and at bedtime  insulin lispro Injectable (HumaLOG) 7 Unit(s) SubCutaneous three times a day with meals  levothyroxine 50 MICROGram(s) Oral daily  melatonin 3 milliGRAM(s) Oral at bedtime  meropenem  IVPB 1000 milliGRAM(s) IV Intermittent every 12 hours  pantoprazole    Tablet 40 milliGRAM(s) Oral two times a day  predniSONE   Tablet 60 milliGRAM(s) Oral daily  senna 1 Tablet(s) Oral at bedtime  simvastatin 40 milliGRAM(s) Oral at bedtime  tiotropium 18 MICROgram(s) Capsule 1 Capsule(s) Inhalation daily  torsemide 40 milliGRAM(s) Oral daily                                    6.7    19.40 )-----------( 108      ( 10 Ousmane 2020 00:57 )             20.5       01-10    137  |  103  |  129<H>  ----------------------------<  51<L>  5.1   |  19<L>  |  2.17<H>    Ca    8.3<L>      10 Ousmane 2020 00:57  Phos  3.7     01-10  Mg     2.0     01-10    TPro  6.1  /  Alb  3.1<L>  /  TBili  4.0<H>  /  DBili  x   /  AST  18  /  ALT  24  /  AlkPhos  84  01-10      PT/INR - ( 2020 00:54 )   PT: 11.9 sec;   INR: 1.03 ratio         PTT - ( 2020 00:54 )  PTT:19.8 sec        Daily Height in cm: 165.1 (2020 20:00)    Daily Weight in k.5 (10 Ousmane 2020 00:00)       @ :  -   @ 07:00  --------------------------------------------------------  IN: 1120 mL / OUT: 1900 mL / NET: -780 mL     @ :  -  01-10 @ 06:14  --------------------------------------------------------  IN: 400 mL / OUT: 700 mL / NET: -300 mL        Critically Ill patient  : [ ] preoperative ,   [x ] post operative    Requires :  [ ] Arterial Line   [ ] Central Line  [ ] PA catheter  [ ] IABP  [ ] ECMO  [ ] LVAD  [ ] Ventilator  [ x] pacemaker - PPM [ ] Impella.                      [ x] ABG's     [ x] Pulse Oxymetry Monitoring  Bedside evaluation , monitoring , treatment of hemodynamics , fluids , IVP/ IVCD meds.        Diagnosis:     Severe AS s/p BAV - 19     Readmit - Anemia, L groin hematoma - thrombin injection    Hemolysis - s/p transfusions    hx of afib     hx of CAD     Autoimmune Hemolytic Anemia    CHF- acute [x ]   chronic [x ]    systolic [ x]   diatolic [ ]          - Echo- EF -      40s    [x ] LV dysfunction          - Cxr-cardiomegally, edema          - Clinical-  [ ]inotropes   [ ]pressors   [ ]diuresis   [ ]IABP   [ ]ECMO   [ ]LVAD   [ ]Respiratory Failure    Thrombocytopenia     Renal Failure - Acute Kidney Injury    PPM    Hypovolemia     I, Alejandro Alexander, personally performed the services described in this documentation. All medical record entries made by the scribe were at my direction and in my presence. I have reviewed the chart and agree that the record reflects my personal performance and is accurate and complete.   Alejandro Alexander MD.             By signing my name below, I, Noy George, attest that this documentation has been prepared under the direction and in the presence of Alejandro Alexander MD  Electronically Signed: Cheo Elias. 01-10-20 @ 06:16    Discussed with CT surgeon, Physician's Assistant - Nurse Practitioner- Critical care medicine team.   Dicussed at  AM / PM rounds.   Chart, labs , films reviewed.    Total Time: 20 min

## 2020-01-10 NOTE — PROGRESS NOTE ADULT - SUBJECTIVE AND OBJECTIVE BOX
INTERVAL HPI/OVERNIGHT EVENTS:  Patient S&E at bedside. No o/n events,     MEDICATIONS  (STANDING):  aspirin enteric coated 81 milliGRAM(s) Oral daily  dextrose 5%. 1000 milliLiter(s) (50 mL/Hr) IV Continuous <Continuous>  dextrose 50% Injectable 12.5 Gram(s) IV Push once  dextrose 50% Injectable 25 Gram(s) IV Push once  dextrose 50% Injectable 25 Gram(s) IV Push once  ferrous    sulfate 325 milliGRAM(s) Oral daily  fluticasone propionate 50 MICROgram(s)/spray Nasal Spray 1 Spray(s) Both Nostrils two times a day  folic acid 1 milliGRAM(s) Oral daily  insulin glargine Injectable (LANTUS) 10 Unit(s) SubCutaneous at bedtime  insulin lispro (HumaLOG) corrective regimen sliding scale   SubCutaneous Before meals and at bedtime  insulin lispro Injectable (HumaLOG) 7 Unit(s) SubCutaneous three times a day with meals  levothyroxine 50 MICROGram(s) Oral daily  melatonin 3 milliGRAM(s) Oral at bedtime  meropenem  IVPB 1000 milliGRAM(s) IV Intermittent every 12 hours  pantoprazole    Tablet 40 milliGRAM(s) Oral two times a day  predniSONE   Tablet 60 milliGRAM(s) Oral daily  senna 1 Tablet(s) Oral at bedtime  simvastatin 40 milliGRAM(s) Oral at bedtime  tiotropium 18 MICROgram(s) Capsule 1 Capsule(s) Inhalation daily  torsemide 40 milliGRAM(s) Oral daily    MEDICATIONS  (PRN):  ALBUTerol    90 MICROgram(s) HFA Inhaler 2 Puff(s) Inhalation every 6 hours PRN Shortness of Breath and/or Wheezing  dextrose 40% Gel 15 Gram(s) Oral once PRN Blood Glucose LESS THAN 70 milliGRAM(s)/deciliter  glucagon  Injectable 1 milliGRAM(s) IntraMuscular once PRN Glucose LESS THAN 70 milligrams/deciliter  polyethylene glycol 3350 17 Gram(s) Oral daily PRN Constipation    Allergies    No Known Allergies    Intolerances        VITAL SIGNS:  T(F): 96.5 (01-10-20 @ 08:00)  HR: 110 (01-10-20 @ 10:00)  BP: 101/61 (01-10-20 @ 10:00)  RR: 18 (01-10-20 @ 10:00)  SpO2: 100% (01-10-20 @ 10:00)  Wt(kg): --    PHYSICAL EXAM:    Constitutional: NAD  Eyes: EOMI, sclera non-icteric  Neck: supple  Respiratory: CTAB  Cardiovascular: RRR, S1/S2  Gastrointestinal: +BS, soft, NTND  Extremities: no LE edema  Neurological: AAOx3    LABS:                        7.9    17.43 )-----------( 71       ( 10 Ousmane 2020 06:21 )             21.8     01-10    137  |  103  |  129<H>  ----------------------------<  51<L>  5.1   |  19<L>  |  2.17<H>    Ca    8.3<L>      10 Ousmane 2020 00:57  Phos  3.7     01-10  Mg     2.0     01-10    TPro  6.1  /  Alb  3.1<L>  /  TBili  4.0<H>  /  DBili  x   /  AST  18  /  ALT  24  /  AlkPhos  84  01-10    PT/INR - ( 09 Jan 2020 00:54 )   PT: 11.9 sec;   INR: 1.03 ratio         PTT - ( 09 Jan 2020 00:54 )  PTT:19.8 sec      RADIOLOGY & ADDITIONAL TESTS:  Studies reviewed. INTERVAL HPI/OVERNIGHT EVENTS:  Patient S&E at bedside w/ daughter. A/O x3. Spirits down about prolonged hospital stay and wishes to be d/c. S/p thrombin injection of pseudoaneurysm of L groin yesterday by vascular surgery. Reports no pain at injection site. No abd pain or chest pain. No LE pain. Pt reports he is eating and drinking well. No melena/hematochezia or hematuria.    Hgb dropped to 6.7 o/n and pt was transferred 1u PRBC. Repeat Hgb 7.9 Transferred from the CTU to the floor this AM. Plt now 71 (from 108 on last CBC) from 191 on admission. LDH <20 and haptoglobin 842 on 1/8.    Pt remains on meropenem per ID. Blood cx + for enterobacter. Afebrile for 24hr+. Remains tachycardic to 110s.    MEDICATIONS  (STANDING):  aspirin enteric coated 81 milliGRAM(s) Oral daily  dextrose 5%. 1000 milliLiter(s) (50 mL/Hr) IV Continuous <Continuous>  dextrose 50% Injectable 12.5 Gram(s) IV Push once  dextrose 50% Injectable 25 Gram(s) IV Push once  dextrose 50% Injectable 25 Gram(s) IV Push once  ferrous    sulfate 325 milliGRAM(s) Oral daily  fluticasone propionate 50 MICROgram(s)/spray Nasal Spray 1 Spray(s) Both Nostrils two times a day  folic acid 1 milliGRAM(s) Oral daily  insulin glargine Injectable (LANTUS) 10 Unit(s) SubCutaneous at bedtime  insulin lispro (HumaLOG) corrective regimen sliding scale   SubCutaneous Before meals and at bedtime  insulin lispro Injectable (HumaLOG) 7 Unit(s) SubCutaneous three times a day with meals  levothyroxine 50 MICROGram(s) Oral daily  melatonin 3 milliGRAM(s) Oral at bedtime  meropenem  IVPB 1000 milliGRAM(s) IV Intermittent every 12 hours  pantoprazole    Tablet 40 milliGRAM(s) Oral two times a day  predniSONE   Tablet 60 milliGRAM(s) Oral daily  senna 1 Tablet(s) Oral at bedtime  simvastatin 40 milliGRAM(s) Oral at bedtime  tiotropium 18 MICROgram(s) Capsule 1 Capsule(s) Inhalation daily  torsemide 40 milliGRAM(s) Oral daily    MEDICATIONS  (PRN):  ALBUTerol    90 MICROgram(s) HFA Inhaler 2 Puff(s) Inhalation every 6 hours PRN Shortness of Breath and/or Wheezing  dextrose 40% Gel 15 Gram(s) Oral once PRN Blood Glucose LESS THAN 70 milliGRAM(s)/deciliter  glucagon  Injectable 1 milliGRAM(s) IntraMuscular once PRN Glucose LESS THAN 70 milligrams/deciliter  polyethylene glycol 3350 17 Gram(s) Oral daily PRN Constipation    Allergies    No Known Allergies    Intolerances        VITAL SIGNS:  T(F): 96.5 (01-10-20 @ 08:00)  HR: 110 (01-10-20 @ 10:00)  BP: 101/61 (01-10-20 @ 10:00)  RR: 18 (01-10-20 @ 10:00)  SpO2: 100% (01-10-20 @ 10:00)  Wt(kg): --    PHYSICAL EXAM:    Constitutional: NAD, mild jaundice  Eyes: EOMI  Neck: supple  Respiratory: CTAB  Cardiovascular: tachycardic, 2/6 systolic murmur, regular rhythm  Gastrointestinal: +BS, soft, NTND  Extremities: no LE edema or pain; L groin site C/D/I without bleeding or exudate  Neurological: AAOx3    LABS:                        7.9    17.43 )-----------( 71       ( 10 Ousmane 2020 06:21 )             21.8     01-10    137  |  103  |  129<H>  ----------------------------<  51<L>  5.1   |  19<L>  |  2.17<H>    Ca    8.3<L>      10 Ousmane 2020 00:57  Phos  3.7     01-10  Mg     2.0     01-10    TPro  6.1  /  Alb  3.1<L>  /  TBili  4.0<H>  /  DBili  x   /  AST  18  /  ALT  24  /  AlkPhos  84  01-10    PT/INR - ( 09 Jan 2020 00:54 )   PT: 11.9 sec;   INR: 1.03 ratio         PTT - ( 09 Jan 2020 00:54 )  PTT:19.8 sec      RADIOLOGY & ADDITIONAL TESTS:  Studies reviewed.

## 2020-01-10 NOTE — CONSULT NOTE ADULT - SUBJECTIVE AND OBJECTIVE BOX
HPI:  85 yo M with hx Afib (Coumadin), DM2, HLD, CAD (stent 2014 and 2015 in mLAD and RCA), HFrEF (40%) severe AS, HFrEF  30%, LBBB, baseline SCr 1.4, and Autoimmune hemolytic who was admitted from 12/13 -1/3 and optimized with diuretics per Heart failure service, planned for TAVR however underwent  balloon aortic valvuloplasty on 12/26 and transfused 1 U PRBC. Post-procedure course complicated by MAI, hyperglycemia, and anemia 2/2 AIHA on prednisone and followed by hematology.  He was restarted on Coumadin, received 1 U PRBC per heme on 1/2/20 and was discharged to [facility] [facility] facility on 1/3/20.   He continued on  Torsemide 40mg po and prednisone 60mg daily  per heme & diuretics.  He returned to the ER 1/5 due to symptomatic anemia related to reported bleeding from L groin access site on 1/4/20.  Patient states that he had bleeding on 1/4/20 and the staff applied pressure and a band-aid and the bleeding stopped.  Day of admission he felt weak, short of breath, and dizzy and the rehab initiated transfer for further work-up.   In ER, patient was noted to have H+H of 5/15 downtrending from discharge values of 6.9/20.6 and noted to have a egg sized hematoma in the left groin.  Right groin stable with pea size firmness at access site.       PERTINENT PM/SXH:   Anemia  LBBB (left bundle branch block)  DYER (dyspnea on exertion)  Coronary disease  CHF (congestive heart failure)  Former smoker  Meniere disease  HLD (hyperlipidemia)  DM (diabetes mellitus)  Atrial fibrillation  HTN (hypertension)    Lipoma  History of skin graft  Status post appendectomy    FAMILY HISTORY:  Family history of brain tumor (Sibling)  Family history of heart attack (Child)  Family history of CHF (congestive heart failure)  Family history of lung cancer    ITEMS NOT CHECKED ARE NOT PRESENT    SOCIAL HISTORY:   Significant other/partner[ ]  Children[ ]  Mosque/Spirituality:  Substance hx:  [ ]   Tobacco hx:  [ ]   Alcohol hx: [ ]   Home Opioid hx:  [ ] I-Stop Reference No:  Living Situation: [x ]Home  [ ]Long term care  [ ]Rehab [ ]Other    ADVANCE DIRECTIVES:    DNR  yes  MOLST  [x ] 1/10/20    Living Will  [x ]     DECISION MAKER(s):  [x ] Health Care Proxy(s)  [ ] Surrogate(s)  [ ] Guardian           Name(s): Phone Number(s): 1-  Charisse Estrada [phone]/ 2- Bryan Rosenbaum [phone]    BASELINE (I)ADL(s) (prior to admission):  [place]: [ ]Total  [x ] Moderate [ ]Dependent    Allergies    No Known Allergies    Intolerances    MEDICATIONS  (STANDING):  aspirin enteric coated 81 milliGRAM(s) Oral daily  dextrose 5%. 1000 milliLiter(s) (50 mL/Hr) IV Continuous <Continuous>  dextrose 50% Injectable 12.5 Gram(s) IV Push once  dextrose 50% Injectable 25 Gram(s) IV Push once  dextrose 50% Injectable 25 Gram(s) IV Push once  ferrous    sulfate 325 milliGRAM(s) Oral daily  fluticasone propionate 50 MICROgram(s)/spray Nasal Spray 1 Spray(s) Both Nostrils two times a day  folic acid 1 milliGRAM(s) Oral daily  insulin glargine Injectable (LANTUS) 10 Unit(s) SubCutaneous at bedtime  insulin lispro (HumaLOG) corrective regimen sliding scale   SubCutaneous Before meals and at bedtime  insulin lispro Injectable (HumaLOG) 7 Unit(s) SubCutaneous three times a day with meals  levothyroxine 50 MICROGram(s) Oral daily  melatonin 3 milliGRAM(s) Oral at bedtime  meropenem  IVPB 500 milliGRAM(s) IV Intermittent every 8 hours  pantoprazole    Tablet 40 milliGRAM(s) Oral two times a day  predniSONE   Tablet 60 milliGRAM(s) Oral daily  senna 1 Tablet(s) Oral at bedtime  simvastatin 40 milliGRAM(s) Oral at bedtime  tiotropium 18 MICROgram(s) Capsule 1 Capsule(s) Inhalation daily  torsemide 40 milliGRAM(s) Oral daily    MEDICATIONS  (PRN):  ALBUTerol    90 MICROgram(s) HFA Inhaler 2 Puff(s) Inhalation every 6 hours PRN Shortness of Breath and/or Wheezing  dextrose 40% Gel 15 Gram(s) Oral once PRN Blood Glucose LESS THAN 70 milliGRAM(s)/deciliter  glucagon  Injectable 1 milliGRAM(s) IntraMuscular once PRN Glucose LESS THAN 70 milligrams/deciliter  polyethylene glycol 3350 17 Gram(s) Oral daily PRN Constipation    PRESENT SYMPTOMS: [x ]Unable to obtain due to poor mentation  pt does not want to answer questions states "i want to die"  He instructs me to speak with Charisse for any information I need.    Source if other than patient:  [ ]Family   [ ]Team     Pain: [ ]yes [x ]no  QOL impact -   Location -                    Aggravating factors -  Quality -  Radiation -  Timing-  Severity (0-10 scale):  Minimal acceptable level (0-10 scale):     PAIN AD Score:     http://geriatrictoolkit.[facility]/cog/painad.pdf (press ctrl +  left click to view)    Dyspnea:                           [ ]Mild [ ]Moderate [ ]Severe  Anxiety:                             [ ]Mild [ ]Moderate [ ]Severe  Fatigue:                             [ ]Mild [ ]Moderate [ x]Severe  Nausea:                            [ ]Mild [ ]Moderate [ ]Severe  Loss of appetite:              [x ]Mild [ ]Moderate [ ]Severe  Constipation:                    [ ]Mild [ ]Moderate [ ]Severe    Other Symptoms:  [ ]All other review of systems negative     Palliative Performance Status Version 2:       10-20  %    http://npcrc.org/files/news/palliative_performance_scale_ppsv2.pdf  PHYSICAL EXAM:  Vital Signs Last 24 Hrs  T(C): 35.8 (10 Ousmane 2020 08:00), Max: 37 (09 Jan 2020 16:00)  T(F): 96.5 (10 Ousmane 2020 08:00), Max: 98.6 (09 Jan 2020 16:00)  HR: 110 (10 Ousmane 2020 10:00) (91 - 122)  BP: 101/61 (10 Ousmane 2020 10:00) (87/52 - 120/53)  BP(mean): 68 (10 Ousmane 2020 09:00) (63 - 82)  RR: 18 (10 Ousmane 2020 10:00) (14 - 35)  SpO2: 100% (10 Ousmane 2020 10:00) (96% - 100%) I&O's Summary    09 Jan 2020 07:01  -  10 Ousmane 2020 07:00  --------------------------------------------------------  IN: 400 mL / OUT: 1000 mL / NET: -600 mL    10 Ousmane 2020 07:01  -  10 Ousmane 2020 15:10  --------------------------------------------------------  IN: 150 mL / OUT: 100 mL / NET: 50 mL      GENERAL:  [x ]Alert  [ ]Oriented x   [x ]Lethargic  [x ]Cachexia  [ ]Unarousable  [ x]Verbal  [ ]Non-Verbal    Behavioral:   [ ] Anxiety  [ ] Delirium [ ] Agitation [ ] Other    HEENT:  [ ]Normal   [x ]Dry mouth   [ ]ET Tube/Trach  [ ]Oral lesions    PULMONARY:   [ ]Clear [ ]Tachypnea  [ ]Audible excessive secretions   [ ]Rhonchi        [ ]Right [ ]Left [ ]Bilateral  [ ]Crackles        [ ]Right [ ]Left [ ]Bilateral  [ ]Wheezing     [ ]Right [ ]Left [ ]Bilatera  [x ]Diminished breath sounds [ ]right [ ]left [ ]bilateral    CARDIOVASCULAR:    [ x]Regular [ ]Irregular [ x]Tachy  [ ]Freddy [ ]Murmur [ ]Other    GASTROINTESTINAL:  [x]Soft  [ ]Distended   [x ]+BS  [x ]Non tender [ ]Tender  [ ]PEG [ ]OGT/ NGT  Last BM:     GENITOURINARY:  [ ]Normal [x ] Incontinent   [ ]Oliguria/Anuria   [ ]Jackson    [ ]External cath    MUSCULOSKELETAL:   [ ]Normal   [x ]Weakness  [x ]Bed/Wheelchair bound [ ]Edema    NEUROLOGIC:   [x ]No focal deficits  [ ]Cognitive impairment  [ ]Dysphagia [ ]Dysarthria [ ]Paresis [ ]Other     SKIN:   [x ]Normal    [ ]Rash  [ ]Pressure ulcer(s)       Present on admission [ ]y [ ]n    CRITICAL CARE:  [ ]Shock Present  [ ]Septic [ ]Cardiogenic [ ]Neurologic [ ]Hypovolemic  [ ]  Vasopressors [ ]  Inotropes   [ ]Respiratory failure present [ ]Mechanical ventilation [ ]Non-invasive ventilatory support [ ]High flow  [ ]Acute  [ ]Chronic [ ]Hypoxic  [ ]Hypercarbic [ ]Other  [ ]Other organ failure     LABS:                        7.9    17.43 )-----------( 71       ( 10 Ousmane 2020 06:21 )             21.8   01-10    137  |  103  |  129<H>  ----------------------------<  51<L>  5.1   |  19<L>  |  2.17<H>    Ca    8.3<L>      10 Ousmane 2020 00:57  Phos  3.7     01-10  Mg     2.0     01-10    TPro  6.1  /  Alb  3.1<L>  /  TBili  4.0<H>  /  DBili  x   /  AST  18  /  ALT  24  /  AlkPhos  84  01-10  PT/INR - ( 09 Jan 2020 00:54 )   PT: 11.9 sec;   INR: 1.03 ratio         PTT - ( 09 Jan 2020 00:54 )  PTT:19.8 sec      RADIOLOGY & ADDITIONAL STUDIES:    PROTEIN CALORIE MALNUTRITION PRESENT: [ ]mild [ ]moderate [ ]severe [ ]underweight [ ]morbid obesity  https://www.andeal.org/vault/2440/web/files/ONC/Table_Clinical%20Characteristics%20to%20Document%20Malnutrition-White%20JV%20et%20al%725946.pdf    Height (cm): 165.1 (01-09-20 @ 20:00), 165.1 (12-13-19 @ 07:31)  Weight (kg): 63.5 (01-05-20 @ 18:34), 58.5 (12-13-19 @ 07:31)  BMI (kg/m2): 23.3 (01-09-20 @ 20:00), 23.3 (01-05-20 @ 18:34), 21.5 (12-13-19 @ 07:31)    [x ]PPSV2 < or = to 30% [ ]significant weight loss  [x ]poor nutritional intake  [ ]anasarca     Albumin, Serum: 3.1 g/dL (01-10-20 @ 00:57)   [ ]Artificial Nutrition      REFERRALS:   [ ]Chaplaincy  [ ]Hospice  [ ]Child Life  [ ]Social Work  [ ]Case management [ ]Holistic Therapy     Goals of Care Document:

## 2020-01-10 NOTE — GOALS OF CARE CONVERSATION - ADVANCED CARE PLANNING - CONVERSATION DETAILS
Pt was referred by the Palliative Care Team. Met w/ the pt, HHA/HCP Charisse Ortez and friend Bryan Cao (alt HCP). Bryan' dtr Anabel Redd ([phone]) participated via phone. Hospice services discussed at length. Pt was able to participate in the conversation, is aware of the hospice program (his wife was on hospice), adamant he does not want any further blood draws and wants to go home ASAP. Spoke w/ Andi Macias NP, made aware Rituxan and long term IVAB are not part of the hospice plan of care. He will f/u w/ hematology and ID. Will continue to follow. Thank you.
Met with pt and Charisse at bedside.  Pt although the pt is alert and oriented he states he does not want to answer more questions and states I should speak with Charisse as she is his HCP.  We reviewed pts complicated hospital course over the past few weeks.  Reviewed MOLST form with Charisse .  pt is now DNR/DNI as it is line with pts wishes.  MOLST form filled out and placed in chart. Hospice referral made at [facility] request for support at home.  Goal is for pt to go home with care. Bryan (pts HCP #2) joined in the conversation.  He states he has been suffering.  Charisse and bryan request to speak together in private.  palliative care will sign off as goals are established.  Hospice referral made.

## 2020-01-10 NOTE — PROGRESS NOTE ADULT - ASSESSMENT
84M PMH A-fib (on coumadin), DM II, HLD, CAD (s/p stents 2014, 215), HF, Severe AS with associated hemolytic anemia, LBBB, presenting with anemia. Found on imaging to have LLE hematoma      pt with a [id number] hematoma over left groin   - bc positive for enterobacter  no signs s of UTI, pneumonia or endocarditis      suspect enterobacter related to groin pseudoaneurysm and hematoma.  pt has 8/8 bc positive for enterobacter  most likely intravascular source endo also possible   discussed Dr. Becker  cvs wants to avoid invasive procedures  not candidate fo aggressive surgery  palliative approach planned   prognosis guarded       meropenem          follow up bc  no jase planned  will need prolonged ab  organism is cipro sensitive  discussed with daughter who understands the prognosis and want to take him home    ( i am not sure that we have source control  po ciprp v. home IV ab  depending upon discharge plan   now on steroids

## 2020-01-10 NOTE — PROGRESS NOTE ADULT - ATTENDING COMMENTS
83 yo M hx Afib on Coumadin, DM2, CAD, HFrEF, severe AS, recent admit for TAVR c/b possible AIHA (positive RIGOBERTO but negative eluate) underwent balloon aortic valvuloplasty (12/26) and started on prednisone 1mg/kg, discharged to rehab (1/3) and now returned due to symptomatic anemia 2/2 bleeding from L groin access site. Hgb was 5.3 on admission, now s/p 4 units PRBC. Reviewed peripheral smear, and there appears to be agglutination, pending check cold agglutinin level. Patient s/p thrombin injection for pseudoaneurysm, and labwork consistent with ongoing hemolysis with elevated LDH, low haptoglobin, and with significant reticulocytosis (>11%). He is not responding to this point to the prednisone as well as hoped and the next step in therapy would be Rituxan, however he is now also with Enterobacter bacteremia and possibly endocarditis. Unlikely candidate for Rituxan, patient and family currently leaning towards hospice and palliative measures, but this is difficult with his current transfusion requirements. Further planning as per palliative care / hospice / primary team.

## 2020-01-10 NOTE — PROGRESS NOTE ADULT - ASSESSMENT
83 yo M with hx Afib (Coumadin), DM2, HLD, CAD (stent 2014 and 2015 in mLAD and RCA), HFrEF (40%) severe AS, HFrEF  30%, LBBB, baseline SCr 1.4, and Autoimmune hemolytic who was admitted from 12/-1/3 and optimized with diuretics per Heart failure service, planned for TAVR however underwent  balloon aortic valvuloplasty on 12/26, restarted on AC for atrial fibrillation, followed by heme for anemia and ultimately discharged to [facility] on 1/3/20.   Returned to ER on 1/5 after an episode of bleeding from left groin site and complaints of dizziness/weakness.  Found to be anemic and with L groin hematoma.     1/5 CTU, s/p 2PRBC  1/6 TTE: severe AS KATHERYN.5, severe LV dysfunction, decreased RV sys function. LE duplex partially thrombosed PSA w/ patent neck  1/9 Thrombin injection of left groin PSA  1/10 US L groin s/p thrombin injection pending, transferred to [facility], H/H 7.9/21.8. PLT 71.

## 2020-01-10 NOTE — PROGRESS NOTE ADULT - SUBJECTIVE AND OBJECTIVE BOX
infectious diseases progress note:    Patient is a 84y old  Male who presents with a chief complaint of Anemia (10 Ousmane 2020 10:15)        Anemia        ROS:  CONSTITUTIONAL:  Negative fever or chills, feels well, good appetite  EYES:  Negative  blurry vision or double vision  CARDIOVASCULAR:  Negative for chest pain or palpitations  RESPIRATORY:  Negative for cough, wheezing, or SOB   GASTROINTESTINAL:  Negative for nausea, vomiting, diarrhea, constipation, or abdominal pain  GENITOURINARY:  Negative frequency, urgency or dysuria  NEUROLOGIC:  No headache, confusion, dizziness, lightheadedness    Allergies    No Known Allergies    Intolerances        ANTIBIOTICS/RELEVANT:  antimicrobials  meropenem  IVPB 1000 milliGRAM(s) IV Intermittent every 12 hours    immunologic:    OTHER:  ALBUTerol    90 MICROgram(s) HFA Inhaler 2 Puff(s) Inhalation every 6 hours PRN  aspirin enteric coated 81 milliGRAM(s) Oral daily  dextrose 40% Gel 15 Gram(s) Oral once PRN  dextrose 5%. 1000 milliLiter(s) IV Continuous <Continuous>  dextrose 50% Injectable 12.5 Gram(s) IV Push once  dextrose 50% Injectable 25 Gram(s) IV Push once  dextrose 50% Injectable 25 Gram(s) IV Push once  ferrous    sulfate 325 milliGRAM(s) Oral daily  fluticasone propionate 50 MICROgram(s)/spray Nasal Spray 1 Spray(s) Both Nostrils two times a day  folic acid 1 milliGRAM(s) Oral daily  glucagon  Injectable 1 milliGRAM(s) IntraMuscular once PRN  insulin glargine Injectable (LANTUS) 10 Unit(s) SubCutaneous at bedtime  insulin lispro (HumaLOG) corrective regimen sliding scale   SubCutaneous Before meals and at bedtime  insulin lispro Injectable (HumaLOG) 7 Unit(s) SubCutaneous three times a day with meals  levothyroxine 50 MICROGram(s) Oral daily  melatonin 3 milliGRAM(s) Oral at bedtime  pantoprazole    Tablet 40 milliGRAM(s) Oral two times a day  polyethylene glycol 3350 17 Gram(s) Oral daily PRN  predniSONE   Tablet 60 milliGRAM(s) Oral daily  senna 1 Tablet(s) Oral at bedtime  simvastatin 40 milliGRAM(s) Oral at bedtime  tiotropium 18 MICROgram(s) Capsule 1 Capsule(s) Inhalation daily  torsemide 40 milliGRAM(s) Oral daily      Objective:  Vital Signs Last 24 Hrs  T(C): 35.8 (10 Ousmane 2020 08:00), Max: 37.1 (09 Jan 2020 12:00)  T(F): 96.5 (10 Ousmane 2020 08:00), Max: 98.8 (09 Jan 2020 12:00)  HR: 110 (10 Ousmane 2020 10:00) (91 - 122)  BP: 101/61 (10 Ousmane 2020 10:00) (86/58 - 120/53)  BP(mean): 68 (10 Ousmane 2020 09:00) (63 - 82)  RR: 18 (10 Ousmane 2020 10:00) (14 - 35)  SpO2: 100% (10 Ousmane 2020 10:00) (96% - 100%)       Eyes:RIYA, EOMI  Ear/Nose/Throat: no oral lesion, no sinus tenderness on percussion	  Neck:no JVD, no lymphadenopathy, supple  Respiratory: CTA azalia  Cardiovascular: S1S2 RRR, no murmurs  Gastrointestinal:soft, (+) BS, no HSM  Extremities groin wd ok       LABS:                        7.9    17.43 )-----------( 71       ( 10 Ousmane 2020 06:21 )             21.8     01-10    137  |  103  |  129<H>  ----------------------------<  51<L>  5.1   |  19<L>  |  2.17<H>    Ca    8.3<L>      10 Ousmane 2020 00:57  Phos  3.7     01-10  Mg     2.0     01-10    TPro  6.1  /  Alb  3.1<L>  /  TBili  4.0<H>  /  DBili  x   /  AST  18  /  ALT  24  /  AlkPhos  84  01-10    PT/INR - ( 09 Jan 2020 00:54 )   PT: 11.9 sec;   INR: 1.03 ratio         PTT - ( 09 Jan 2020 00:54 )  PTT:19.8 sec        MICROBIOLOGY:    RECENT CULTURES:  01-08 @ 12:35 .Blood Blood                No growth to date.    01-06 @ 22:03 .Blood Blood-Peripheral       Growth in aerobic bottle: Gram Negative Rods  Growth in anaerobic bottle: Gram Negative Rods           Growth in aerobic and anaerobic bottles: Enterobacter cloacae See  previous culture [id number]    01-06 @ 04:14 .Blood Blood       Growth in aerobic and anaerobic bottles: Gram Negative Rods           Growth in aerobic and anaerobic bottles: Enterobacter cloacae complex  See previous culture [id number]    01-06 @ 02:46 .Blood Blood   PCR    Growth in anaerobic bottle: Gram Negative Rods  Growth in aerobic bottle: Gram Negative Rods    Blood Culture PCR  Enterobacter cloacae complex  Blood Culture PCR     Growth in aerobic and anaerobic bottles: Enterobacter cloacae complex  ***Blood Panel PCR results on this specimen are available  approximately 3 hours after the Gram stain result.***  Gram stain, PCR, and/or culture results may not always  correspond due to difference in methodologies.  ************************************************************  This PCR assay was performed using [employer].  The following targets are tested for: Enterococcus,  vancomycin resistant enterococci, Listeria monocytogenes,  coagulase negative staphylococci, S. aureus,  methicillin resistant S. aureus, Streptococcus agalactiae  (Group B), S. pneumoniae, S. pyogenes (Group A),  Acinetobacter baumannii, Enterobacter cloacae, E. coli,  Klebsiella oxytoca, K. pneumoniae, Proteus sp.,  Serratia marcescens, Haemophilus influenzae,  Neisseria meningitidis, Pseudomonas aeruginosa, Candida  albicans, C. glabrata, C krusei, C parapsilosis,  C. tropicalis and the KPC resistance gene.          RESPIRATORY CULTURES:              RADIOLOGY & ADDITIONAL STUDIES:        Pager [phone]  After 5 pm/weekends or if no response :[phone]

## 2020-01-10 NOTE — GOALS OF CARE CONVERSATION - ADVANCED CARE PLANNING - NS PRO AD PATIENT TYPE ON CHART
Health Care Proxy (HCP)/Do Not Resuscitate (DNR)/Medical Orders for Life-Sustaining Treatment (MOLST)
Health Care Proxy (HCP)/Medical Orders for Life-Sustaining Treatment (MOLST)/Do Not Resuscitate (DNR)

## 2020-01-10 NOTE — PROGRESS NOTE ADULT - PROBLEM SELECTOR PLAN 1
post op care per CTS  L groin PSA, s/p thrombin injection  awaiting L groin ultrasound  ID following for +BCx  on meropenem IV  appreciate heme/onc recs  trend CBC, monitor H/H and PLT  transfuse as indicated  serum direct alisson pending

## 2020-01-11 DIAGNOSIS — R78.81 BACTEREMIA: ICD-10-CM

## 2020-01-11 DIAGNOSIS — N17.9 ACUTE KIDNEY FAILURE, UNSPECIFIED: ICD-10-CM

## 2020-01-11 DIAGNOSIS — I50.9 HEART FAILURE, UNSPECIFIED: ICD-10-CM

## 2020-01-11 DIAGNOSIS — E11.9 TYPE 2 DIABETES MELLITUS WITHOUT COMPLICATIONS: ICD-10-CM

## 2020-01-11 DIAGNOSIS — D58.9 HEREDITARY HEMOLYTIC ANEMIA, UNSPECIFIED: ICD-10-CM

## 2020-01-11 DIAGNOSIS — I48.91 UNSPECIFIED ATRIAL FIBRILLATION: ICD-10-CM

## 2020-01-11 DIAGNOSIS — Z91.89 OTHER SPECIFIED PERSONAL RISK FACTORS, NOT ELSEWHERE CLASSIFIED: ICD-10-CM

## 2020-01-11 DIAGNOSIS — Z71.89 OTHER SPECIFIED COUNSELING: ICD-10-CM

## 2020-01-11 DIAGNOSIS — D50.0 IRON DEFICIENCY ANEMIA SECONDARY TO BLOOD LOSS (CHRONIC): ICD-10-CM

## 2020-01-11 LAB
ALBUMIN SERPL ELPH-MCNC: 2.6 G/DL — LOW (ref 3.3–5)
ALP SERPL-CCNC: 76 U/L — SIGNIFICANT CHANGE UP (ref 40–120)
ALT FLD-CCNC: 18 U/L — SIGNIFICANT CHANGE UP (ref 10–45)
ANION GAP SERPL CALC-SCNC: 18 MMOL/L — HIGH (ref 5–17)
AST SERPL-CCNC: 17 U/L — SIGNIFICANT CHANGE UP (ref 10–40)
BILIRUB SERPL-MCNC: 4.2 MG/DL — HIGH (ref 0.2–1.2)
BUN SERPL-MCNC: 132 MG/DL — HIGH (ref 7–23)
CA TITR SERPL: ABNORMAL
CALCIUM SERPL-MCNC: 8.1 MG/DL — LOW (ref 8.4–10.5)
CHLORIDE SERPL-SCNC: 100 MMOL/L — SIGNIFICANT CHANGE UP (ref 96–108)
CO2 SERPL-SCNC: 16 MMOL/L — LOW (ref 22–31)
CREAT SERPL-MCNC: 1.78 MG/DL — HIGH (ref 0.5–1.3)
GLUCOSE BLDC GLUCOMTR-MCNC: 118 MG/DL — HIGH (ref 70–99)
GLUCOSE BLDC GLUCOMTR-MCNC: 169 MG/DL — HIGH (ref 70–99)
GLUCOSE BLDC GLUCOMTR-MCNC: 180 MG/DL — HIGH (ref 70–99)
GLUCOSE BLDC GLUCOMTR-MCNC: 196 MG/DL — HIGH (ref 70–99)
GLUCOSE SERPL-MCNC: 166 MG/DL — HIGH (ref 70–99)
HCT VFR BLD CALC: 19.5 % — CRITICAL LOW (ref 39–50)
HGB BLD-MCNC: 6.7 G/DL — CRITICAL LOW (ref 13–17)
MCHC RBC-ENTMCNC: 32.7 PG — SIGNIFICANT CHANGE UP (ref 27–34)
MCHC RBC-ENTMCNC: 34.4 GM/DL — SIGNIFICANT CHANGE UP (ref 32–36)
MCV RBC AUTO: 95.1 FL — SIGNIFICANT CHANGE UP (ref 80–100)
NRBC # BLD: 4 /100 WBCS — HIGH (ref 0–0)
PLATELET # BLD AUTO: 64 K/UL — LOW (ref 150–400)
POTASSIUM SERPL-MCNC: 4.6 MMOL/L — SIGNIFICANT CHANGE UP (ref 3.5–5.3)
POTASSIUM SERPL-SCNC: 4.6 MMOL/L — SIGNIFICANT CHANGE UP (ref 3.5–5.3)
PROT SERPL-MCNC: 5.2 G/DL — LOW (ref 6–8.3)
RBC # BLD: 2.05 M/UL — LOW (ref 4.2–5.8)
RBC # FLD: 21.4 % — HIGH (ref 10.3–14.5)
SODIUM SERPL-SCNC: 134 MMOL/L — LOW (ref 135–145)
WBC # BLD: 13.68 K/UL — HIGH (ref 3.8–10.5)
WBC # FLD AUTO: 13.68 K/UL — HIGH (ref 3.8–10.5)

## 2020-01-11 PROCEDURE — 99233 SBSQ HOSP IP/OBS HIGH 50: CPT

## 2020-01-11 PROCEDURE — 99222 1ST HOSP IP/OBS MODERATE 55: CPT

## 2020-01-11 RX ADMIN — SENNA PLUS 1 TABLET(S): 8.6 TABLET ORAL at 22:21

## 2020-01-11 RX ADMIN — Medication 5: at 11:55

## 2020-01-11 RX ADMIN — Medication 60 MILLIGRAM(S): at 06:03

## 2020-01-11 RX ADMIN — Medication 7 UNIT(S): at 08:12

## 2020-01-11 RX ADMIN — Medication 5: at 08:12

## 2020-01-11 RX ADMIN — INSULIN GLARGINE 10 UNIT(S): 100 INJECTION, SOLUTION SUBCUTANEOUS at 22:21

## 2020-01-11 RX ADMIN — Medication 81 MILLIGRAM(S): at 10:41

## 2020-01-11 RX ADMIN — Medication 5: at 17:08

## 2020-01-11 RX ADMIN — MEROPENEM 100 MILLIGRAM(S): 1 INJECTION INTRAVENOUS at 06:03

## 2020-01-11 RX ADMIN — PANTOPRAZOLE SODIUM 40 MILLIGRAM(S): 20 TABLET, DELAYED RELEASE ORAL at 17:18

## 2020-01-11 RX ADMIN — Medication 1 SPRAY(S): at 06:03

## 2020-01-11 RX ADMIN — Medication 1 SPRAY(S): at 17:08

## 2020-01-11 RX ADMIN — SIMVASTATIN 40 MILLIGRAM(S): 20 TABLET, FILM COATED ORAL at 22:21

## 2020-01-11 RX ADMIN — Medication 40 MILLIGRAM(S): at 06:04

## 2020-01-11 RX ADMIN — Medication 3 MILLIGRAM(S): at 22:21

## 2020-01-11 RX ADMIN — Medication 7 UNIT(S): at 17:08

## 2020-01-11 RX ADMIN — Medication 50 MICROGRAM(S): at 06:04

## 2020-01-11 RX ADMIN — Medication 7 UNIT(S): at 11:55

## 2020-01-11 RX ADMIN — MEROPENEM 100 MILLIGRAM(S): 1 INJECTION INTRAVENOUS at 13:22

## 2020-01-11 RX ADMIN — PANTOPRAZOLE SODIUM 40 MILLIGRAM(S): 20 TABLET, DELAYED RELEASE ORAL at 06:04

## 2020-01-11 RX ADMIN — TIOTROPIUM BROMIDE 1 CAPSULE(S): 18 CAPSULE ORAL; RESPIRATORY (INHALATION) at 10:43

## 2020-01-11 RX ADMIN — MEROPENEM 100 MILLIGRAM(S): 1 INJECTION INTRAVENOUS at 22:20

## 2020-01-11 RX ADMIN — Medication 1 MILLIGRAM(S): at 10:41

## 2020-01-11 RX ADMIN — Medication 325 MILLIGRAM(S): at 10:41

## 2020-01-11 NOTE — CONSULT NOTE ADULT - PROBLEM SELECTOR RECOMMENDATION 4
kps 10-20%
Could be from hypovolemia. Patient with dry oral mucosa and worsening uremia.   Given worsening uremia, would reduce dose of torsemide or provide alternative day dosing.

## 2020-01-11 NOTE — CONSULT NOTE ADULT - PROBLEM SELECTOR RECOMMENDATION 5
Met with pt and Charisse at bedside.  Pt although the pt is alert and oriented he states he does not want to answer more questions and states I should speak with Charisse as she is his HCP.  We reviewed pts complicated hospital course over the past few weeks.  Reviewed MOLST form with Charisse .  pt is now DNR/DNI as it is line with pts wishes.  MOLST form filled out and placed in chart. Hospice referral made at [facility] request for support at home.  Goal is for pt to go home with care. Bryan (pts HCP #2) joined in the conversation.  He states he has been suffering.  Charisse and bryan request to speak together in private.  palliative care will sign off as goals are established.  Hospices referral made.
Appears euvolemic to hypovolemic on exam.  Given worsening uremia, would reduce dose of torsemide or provide alternative day dosing.   Maintain euvolemia for comfort.    Encourage PO intake.

## 2020-01-11 NOTE — CONSULT NOTE ADULT - PROBLEM SELECTOR RECOMMENDATION 6
On asa alone   Patient with thrombocytopenia that may get worse. Asa unlikely to improve patients quality of life and may result in blood loss.   Patient still contemplating if he wants to stop taking aspirin.

## 2020-01-11 NOTE — PROVIDER CONTACT NOTE (CRITICAL VALUE NOTIFICATION) - BACKGROUND
1/5 Re- Admitted with Anemia, dizziness, Left groin hematoma. PMH; S/P Aortic Valve  Repair S/P Valvuloplasty - 1/3 D/C to Rehab . 1/9 Left groin hematoma- S/P thrombin base injection given.

## 2020-01-11 NOTE — CONSULT NOTE ADULT - PROBLEM SELECTOR RECOMMENDATION 8
Patient, HHA/HCP Charisse Ortez and friend/HCP Bryan Cao met prior with our palliative care team and requested home hospice with no more blood draws.  I personally discussed this with patient and he again affirmed this is what he wants. He however will make a decision regarding aspirin and insulin at home hospice by tomorrow. Patient, HHA/HCP Charisse Ortez and friend/HCP Bryan Cao met prior with our palliative care team and requested home hospice with no more blood draws.  I personally discussed this with patient and he again affirmed this is what he wants. He however will make a decision regarding aspirin and insulin at home hospice by tomorrow.  Patient is DNR/DNI. Molst in chart.

## 2020-01-11 NOTE — PROGRESS NOTE ADULT - ASSESSMENT
85 yo M with hx Afib (Coumadin), DM2, HLD, CAD (stent 2014 and 2015 in mLAD and RCA), HFrEF (40%) severe AS, HFrEF  30%, LBBB, baseline SCr 1.4, and Autoimmune hemolytic who was admitted from 12/-1/3 and optimized with diuretics per Heart failure service, planned for TAVR however underwent  balloon aortic valvuloplasty on 12/26, restarted on AC for atrial fibrillation, followed by heme for anemia and ultimately discharged to [facility] facility on 1/3/20.   Returned to ER on 1/5 after an episode of bleeding from left groin site and complaints of dizziness/weakness.  Found to be anemic and with L groin hematoma.     1/5 CTU, s/p 2PRBC  1/6 TTE: severe AS KATHERYN.5, severe LV dysfunction, decreased RV sys function. LE duplex partially thrombosed PSA w/ patent neck  1/9 Thrombin injection of left groin PSA  1/10 US L groin s/p thrombin injection pending, transferred to [facility], H/H 7.9/21.8. PLT 71.  1/11 H/H 6.7/19.5. Asymptomic, HD stable, 1 PRBC transfusion in progress. PLT 64. L groin stable, decrease in size of hematoma per ultrasound. Requesting transfer of service to medicine.

## 2020-01-11 NOTE — CONSULT NOTE ADULT - PROBLEM SELECTOR RECOMMENDATION 9
From left groin hematoma. Decreased in size on prior imaging. Most recent drop in Hgb likely more related to hemolytic anemia.   Patient does not want any additional blood draws. Patient pending final clarification of goals of care as well as clarification of plan with hematology and ID. Ultimately he will be discharged to home hospice likely early next week. Please notify case management for disposition issues. No medical indication for transfer to our service however we will continue to follow.   Please call if any questions. Thank you kindly. Patient pending final clarification of goals of care as well as clarification of plan with hematology and ID. Ultimately he will be discharged to home hospice likely early next week. Please notify case management for disposition planning. No medical indication for transfer to our service however we will continue to follow.   Please call if any questions. Thank you kindly.

## 2020-01-11 NOTE — PROGRESS NOTE ADULT - SUBJECTIVE AND OBJECTIVE BOX
Interval Hx; Events Overnight:  SUBJECTIVE: " I feel okay, I did not sleep well last night "    LABS:                6.7                  134  | 16   | 132          13.68 >-----------< 64      ------------------------< 166                   19.5                 4.6  | 100  | 1.78                                         Ca 8.1   Mg x     Ph x              VITAL SIGNS    Telemetry: afib      Daily     Daily       Vital Signs Last 24 Hrs  T(C): 36.3 (01-11-20 @ 05:00), Max: 36.3 (01-11-20 @ 05:00)  T(F): 97.3 (01-11-20 @ 05:00), Max: 97.3 (01-11-20 @ 05:00)  HR: 74 (01-11-20 @ 06:00) (74 - 114)  BP: 103/64 (01-11-20 @ 06:00) (86/44 - 104/65)  RR: 18 (01-11-20 @ 06:00) (18 - 18)  SpO2: 98% (01-11-20 @ 06:00) (96% - 100%)             I&O's Detail    10 Ousmane 2020 07:01  -  11 Jan 2020 07:00  --------------------------------------------------------  IN:    Oral Fluid: 470 mL  Total IN: 470 mL    OUT:    Incontinent per Condom Catheter: 950 mL    Voided: 400 mL  Total OUT: 1350 mL    Total NET: -880 mL                    GLUCOSE  CAPILLARY BLOOD GLUCOSE      POCT Blood Glucose.: 196 mg/dL (11 Jan 2020 11:49)  POCT Blood Glucose.: 169 mg/dL (11 Jan 2020 08:08)  POCT Blood Glucose.: 133 mg/dL (10 Ousmane 2020 21:37)  POCT Blood Glucose.: 171 mg/dL (10 Ousmane 2020 17:37)                PHYSICAL EXAM      General: NAD, well appearing, in no distress  Neurology: A&O x3, non focal, no neuro deficits. Moves all extremities to command.  CV : s1 s2 RRR, no murmurs, gallops, clicks.   Lungs: clear to auscultation  Abdomen: soft, nontender, nondistended, positive bowel sounds  :    voiding , incontinent        Extremities:    no  edema. + pedal pulses      Incision: b/l groin sites stable, L groin site, no bleeding noted  Skin: intact, no lesions        MEDICATIONS  ALBUTerol    90 MICROgram(s) HFA Inhaler 2 Puff(s) Inhalation every 6 hours PRN  aspirin enteric coated 81 milliGRAM(s) Oral daily  dextrose 40% Gel 15 Gram(s) Oral once PRN  dextrose 5%. 1000 milliLiter(s) IV Continuous <Continuous>  dextrose 50% Injectable 12.5 Gram(s) IV Push once  dextrose 50% Injectable 25 Gram(s) IV Push once  dextrose 50% Injectable 25 Gram(s) IV Push once  ferrous    sulfate 325 milliGRAM(s) Oral daily  fluticasone propionate 50 MICROgram(s)/spray Nasal Spray 1 Spray(s) Both Nostrils two times a day  folic acid 1 milliGRAM(s) Oral daily  glucagon  Injectable 1 milliGRAM(s) IntraMuscular once PRN  insulin glargine Injectable (LANTUS) 10 Unit(s) SubCutaneous at bedtime  insulin lispro (HumaLOG) corrective regimen sliding scale   SubCutaneous Before meals and at bedtime  insulin lispro Injectable (HumaLOG) 7 Unit(s) SubCutaneous three times a day with meals  levothyroxine 50 MICROGram(s) Oral daily  melatonin 3 milliGRAM(s) Oral at bedtime  meropenem  IVPB 500 milliGRAM(s) IV Intermittent every 8 hours  pantoprazole    Tablet 40 milliGRAM(s) Oral two times a day  polyethylene glycol 3350 17 Gram(s) Oral daily PRN  predniSONE   Tablet 60 milliGRAM(s) Oral daily  senna 1 Tablet(s) Oral at bedtime  simvastatin 40 milliGRAM(s) Oral at bedtime  tiotropium 18 MICROgram(s) Capsule 1 Capsule(s) Inhalation daily  torsemide 40 milliGRAM(s) Oral daily            < from: US Duplex Arterial Lower Ext Ltd, Left (01.10.20 @ 15:52) >  Findings:    Previously noted left groin pseudoaneurysm is thrombosed and measures 1.2 x 0.6 cm without internal vascular flow. Left common femoral artery and vein appear patent.    Avascular left groin hematoma is again noted measuring approximately 4.1 x 1.9 x 3.5 cm, previously 5.7 x 1.1 x 3.3 cm.    Impression:    Thrombosis of the previously noted left groin pseudoaneurysm as describedabove.    Interval decrease in size of left groin hematoma.    < end of copied text >

## 2020-01-11 NOTE — CONSULT NOTE ADULT - PROBLEM SELECTOR RECOMMENDATION 3
requiring full assist with all ADl's
Enterobacter bacteremia. Most recent Bcx negative. Patient prefers to go home on oral antibiotics. Please discuss with ID.   Treating his infection would provide further comfort.

## 2020-01-11 NOTE — CONSULT NOTE ADULT - ASSESSMENT
This is a pleasant 83 yo M PMHx of Afib (Coumadin), DM2, HLD, CAD (stent 2014 and 2015 in mLAD and RCA), HFrEF (40%), severe AS s/p balloon aortic valvuloplasty (12/26) here with acute blood loss anemia from left groin hematoma, warm agglutinin autoimmune hemolytic anemia, and enterobacter bacteremia. Currently pending home hospice.

## 2020-01-11 NOTE — CONSULT NOTE ADULT - ATTENDING COMMENTS
Awaiting ultrasound for PSA
85 yo M hx Afib on Coumadin, DM2, CAD, HFrEF, severe AS, recent admit for TAVR c/b possible AIHA (positive RIGOBERTO but negative eluate) underwent balloon aortic valvuloplasty (12/26) and started on prednisone 1mg/kg, discharged to rehab (1/3) and now returned due to symptomatic anemia 2/2 bleeding from L groin access site. Hgb was 5.3 on admission, now s/p 4 units PRBC. Will check hemolysis parameters (haptoglobin, reticulocyte count, LDH, repeat Coomb's) and reviewed peripheral smear : appears to be agglutination on smear, please check cold agglutinin. Patient having thrombin injection for pseudoaneurysm but will possibly need Rituxan if there seems to be refractory AIHA, especially if cold agglutinin titer positive. For now I would warm all products to be safe.
Dr. Porter Jain, DO  Attending Physician  Division of Hospital Medicine  [facility]  Pager:  [phone]

## 2020-01-11 NOTE — CONSULT NOTE ADULT - SUBJECTIVE AND OBJECTIVE BOX
INTERNAL MEDICINE CONSULT NOTE    Dr. Porter Jain DO  Attending Physician  Division of Hospital Medicine  [facility]  Pager:  [phone]    SUBJECTIVE  HPI:  This is a pleasant 83 yo M PMHx of Afib (Coumadin), DM2, HLD, CAD (stent 2014 and 2015 in mLAD and RCA), HFrEF (40%), severe AS s/p balloon aortic valvuloplasty (12/26) here with acute blood loss anemia from left groin hematoma, warm agglutinin autoimmune hemolytic anemia, and enterobacter bacteremia. Currently pending home hospice.             REVIEW OF SYSTEMS  CONSTITUTIONAL: No fevers. No chills  EYES/ENT: No visual changes. No discharge from eyes. No vertigo. No throat pain. No dysphagia.  NECK: No pain or stiffness or rigidity.  RESPIRATORY: No cough. No wheezing. No hemoptysis. No shortness of breath.  CARDIOVASCULAR: No chest pain. No palpitations.   GASTROINTESTINAL: No abdominal pain. No nausea. No vomiting. No hematemesis. No diarrhea. No constipation. No melena, No hematochezia.  GENITOURINARY: No dysuria. No hesitancy. No frequency. No hematuria.  NEUROLOGICAL: No numbness. No weakness. No change in speech. No fecal or urinary incontinence.   MSK: No joint swelling or erythema. No back pain.  SKIN: No itching or rashes.   PSYCH: Normal affect. Normal mood. No si. No hi.    Review of systems negative except for items noted above.      PAST MEDICAL & SURGICAL HISTORY:  Anemia  LBBB (left bundle branch block)  DYER (dyspnea on exertion)  Coronary disease: PCI  CHF (congestive heart failure)  Former smoker  Meniere disease  HLD (hyperlipidemia)  DM (diabetes mellitus)  Atrial fibrillation  HTN (hypertension)  Lipoma  History of skin graft  Status post appendectomy      MEDICATIONS  (STANDING):  aspirin enteric coated 81 milliGRAM(s) Oral daily  dextrose 5%. 1000 milliLiter(s) (50 mL/Hr) IV Continuous <Continuous>  dextrose 50% Injectable 12.5 Gram(s) IV Push once  dextrose 50% Injectable 25 Gram(s) IV Push once  dextrose 50% Injectable 25 Gram(s) IV Push once  ferrous    sulfate 325 milliGRAM(s) Oral daily  fluticasone propionate 50 MICROgram(s)/spray Nasal Spray 1 Spray(s) Both Nostrils two times a day  folic acid 1 milliGRAM(s) Oral daily  insulin glargine Injectable (LANTUS) 10 Unit(s) SubCutaneous at bedtime  insulin lispro (HumaLOG) corrective regimen sliding scale   SubCutaneous Before meals and at bedtime  insulin lispro Injectable (HumaLOG) 7 Unit(s) SubCutaneous three times a day with meals  levothyroxine 50 MICROGram(s) Oral daily  melatonin 3 milliGRAM(s) Oral at bedtime  meropenem  IVPB 500 milliGRAM(s) IV Intermittent every 8 hours  pantoprazole    Tablet 40 milliGRAM(s) Oral two times a day  predniSONE   Tablet 60 milliGRAM(s) Oral daily  senna 1 Tablet(s) Oral at bedtime  simvastatin 40 milliGRAM(s) Oral at bedtime  tiotropium 18 MICROgram(s) Capsule 1 Capsule(s) Inhalation daily  torsemide 40 milliGRAM(s) Oral daily    MEDICATIONS  (PRN):  ALBUTerol    90 MICROgram(s) HFA Inhaler 2 Puff(s) Inhalation every 6 hours PRN Shortness of Breath and/or Wheezing  dextrose 40% Gel 15 Gram(s) Oral once PRN Blood Glucose LESS THAN 70 milliGRAM(s)/deciliter  glucagon  Injectable 1 milliGRAM(s) IntraMuscular once PRN Glucose LESS THAN 70 milligrams/deciliter  polyethylene glycol 3350 17 Gram(s) Oral daily PRN Constipation      Allergies    No Known Allergies    Intolerances        T(C): 36.3 (01-11-20 @ 14:01), Max: 36.5 (01-11-20 @ 13:10)  T(F): 97.3 (01-11-20 @ 14:01), Max: 97.7 (01-11-20 @ 13:10)  HR: 87 (01-11-20 @ 14:01) (74 - 114)  BP: 99/58 (01-11-20 @ 14:01) (86/44 - 104/65)  ABP: --  ABP(mean): --  RR: 18 (01-11-20 @ 14:01) (18 - 18)  SpO2: 99% (01-11-20 @ 14:01) (91% - 100%)      CONSTITUTIONAL: No acute distress.   HEENT:  Conjunctiva clear B/L. Nasal mucosa normal. Dry oral mucosa. No posterior pharyngeal lesions noted.  Cardiovascular: Irregularly irregular with early systolic murmur; regular rate. No JVD noted. No lower extremity edema B/L. Extremities are warm and well perfused. Radial pulses 2+ B/L. Dorsalis pedis pulses 2+ B/L.    Respiratory: Decreased breath sounds at lung bases with bibasilar crackles. No accessory muscle use.   Gastrointestinal:  Soft, nontender. Non-distended. Non-rigid. No CVA tenderness B/L.  : suction catheter note. Clear-yellow urine.  MSK:  No joint swelling. No joint erythema B/L. No midline spinal tenderness.  Neurologic:  Alert and awake. Oriented x3. Moving all extremities. Following commands. Making eye contact.    Skin:  Palpable hematoma of left groin to deep palpation. No ecchymosis noted.   Psych:  Normal affect. Normal Mood.     LABS                        6.7    13.68 )-----------( 64       ( 11 Jan 2020 07:17 )             19.5     01-11    134<L>  |  100  |  132<H>  ----------------------------<  166<H>  4.6   |  16<L>  |  1.78<H>    Ca    8.1<L>      11 Jan 2020 07:17  Phos  3.7     01-10  Mg     2.0     01-10    TPro  5.2<L>  /  Alb  2.6<L>  /  TBili  4.2<H>  /  DBili  x   /  AST  17  /  ALT  18  /  AlkPhos  76  01-11 INTERNAL MEDICINE CONSULT NOTE    Dr. Porter Jain DO  Attending Physician  Division of Hospital Medicine  [facility]  Pager:  [phone]    SUBJECTIVE  HPI:  This is a pleasant 83 yo M PMHx of Afib (Coumadin), DM2, HLD, CAD (stent 2014 and 2015 in mLAD and RCA), HFrEF (40%), severe AS s/p balloon aortic valvuloplasty (12/26) here with acute blood loss anemia from left groin hematoma, warm agglutinin autoimmune hemolytic anemia, and enterobacter bacteremia. Currently pending home hospice. Patient without any acute complaints. Denies any source of pain. Denies any dyspnea, orthopnea, or cough. The patient again reaffirms that he would like to go home. He does not want any additional blood draws to be done. He prefers to go home on oral antibiotics as opposed to IV. Patient reports he has yet to decide on whether he wants to continue insulin and aspirin at home hospice or not.     REVIEW OF SYSTEMS  CONSTITUTIONAL: No fevers. No chills  EYES/ENT: No visual changes. No discharge from eyes. No vertigo. No throat pain. No dysphagia.  NECK: No pain or stiffness or rigidity.  RESPIRATORY: No cough. No wheezing. No hemoptysis. No shortness of breath.  CARDIOVASCULAR: No chest pain. No palpitations.   GASTROINTESTINAL: No abdominal pain. No nausea. No vomiting. No hematemesis. No diarrhea. No constipation. No melena, No hematochezia.  GENITOURINARY: No dysuria. No hesitancy. No frequency. No hematuria.  NEUROLOGICAL: No numbness. No weakness. No change in speech. No fecal or urinary incontinence.   MSK: No joint swelling or erythema. No back pain.  SKIN: No itching or rashes.   PSYCH: Normal affect. Normal mood. No si. No hi.    Review of systems negative except for items noted above.      PAST MEDICAL & SURGICAL HISTORY:  Anemia  LBBB (left bundle branch block)  DYER (dyspnea on exertion)  Coronary disease: PCI  CHF (congestive heart failure)  Former smoker  Meniere disease  HLD (hyperlipidemia)  DM (diabetes mellitus)  Atrial fibrillation  HTN (hypertension)  Lipoma  History of skin graft  Status post appendectomy      MEDICATIONS  (STANDING):  aspirin enteric coated 81 milliGRAM(s) Oral daily  dextrose 5%. 1000 milliLiter(s) (50 mL/Hr) IV Continuous <Continuous>  dextrose 50% Injectable 12.5 Gram(s) IV Push once  dextrose 50% Injectable 25 Gram(s) IV Push once  dextrose 50% Injectable 25 Gram(s) IV Push once  ferrous    sulfate 325 milliGRAM(s) Oral daily  fluticasone propionate 50 MICROgram(s)/spray Nasal Spray 1 Spray(s) Both Nostrils two times a day  folic acid 1 milliGRAM(s) Oral daily  insulin glargine Injectable (LANTUS) 10 Unit(s) SubCutaneous at bedtime  insulin lispro (HumaLOG) corrective regimen sliding scale   SubCutaneous Before meals and at bedtime  insulin lispro Injectable (HumaLOG) 7 Unit(s) SubCutaneous three times a day with meals  levothyroxine 50 MICROGram(s) Oral daily  melatonin 3 milliGRAM(s) Oral at bedtime  meropenem  IVPB 500 milliGRAM(s) IV Intermittent every 8 hours  pantoprazole    Tablet 40 milliGRAM(s) Oral two times a day  predniSONE   Tablet 60 milliGRAM(s) Oral daily  senna 1 Tablet(s) Oral at bedtime  simvastatin 40 milliGRAM(s) Oral at bedtime  tiotropium 18 MICROgram(s) Capsule 1 Capsule(s) Inhalation daily  torsemide 40 milliGRAM(s) Oral daily    MEDICATIONS  (PRN):  ALBUTerol    90 MICROgram(s) HFA Inhaler 2 Puff(s) Inhalation every 6 hours PRN Shortness of Breath and/or Wheezing  dextrose 40% Gel 15 Gram(s) Oral once PRN Blood Glucose LESS THAN 70 milliGRAM(s)/deciliter  glucagon  Injectable 1 milliGRAM(s) IntraMuscular once PRN Glucose LESS THAN 70 milligrams/deciliter  polyethylene glycol 3350 17 Gram(s) Oral daily PRN Constipation      Allergies  No Known Allergies    T(C): 36.3 (01-11-20 @ 14:01), Max: 36.5 (01-11-20 @ 13:10)  T(F): 97.3 (01-11-20 @ 14:01), Max: 97.7 (01-11-20 @ 13:10)  HR: 87 (01-11-20 @ 14:01) (74 - 114)  BP: 99/58 (01-11-20 @ 14:01) (86/44 - 104/65)  ABP: --  ABP(mean): --  RR: 18 (01-11-20 @ 14:01) (18 - 18)  SpO2: 99% (01-11-20 @ 14:01) (91% - 100%)    CONSTITUTIONAL: No acute distress.   HEENT:  Conjunctiva clear B/L. Nasal mucosa normal. Dry oral mucosa. No posterior pharyngeal lesions noted.  Cardiovascular: Irregularly irregular with early systolic murmur; regular rate. No JVD noted. No lower extremity edema B/L. Extremities are warm and well perfused. Radial pulses 2+ B/L. Dorsalis pedis pulses 2+ B/L.    Respiratory: Decreased breath sounds at lung bases with bibasilar crackles. No accessory muscle use.   Gastrointestinal:  Soft, nontender. Non-distended. Non-rigid. No CVA tenderness B/L.  : suction catheter note. Clear-yellow urine.  MSK:  No joint swelling. No joint erythema B/L. No midline spinal tenderness.  Neurologic:  Alert and awake. Oriented x3. Moving all extremities. Following commands. Making eye contact.    Skin:  Palpable hematoma of left groin to deep palpation. No ecchymosis noted.   Psych:  Normal affect. Normal Mood.     LABS                        6.7    13.68 )-----------( 64       ( 11 Jan 2020 07:17 )             19.5     01-11    134<L>  |  100  |  132<H>  ----------------------------<  166<H>  4.6   |  16<L>  |  1.78<H>    Ca    8.1<L>      11 Jan 2020 07:17  Phos  3.7     01-10  Mg     2.0     01-10    TPro  5.2<L>  /  Alb  2.6<L>  /  TBili  4.2<H>  /  DBili  x   /  AST  17  /  ALT  18  /  AlkPhos  76  01-11

## 2020-01-11 NOTE — PROGRESS NOTE ADULT - PROBLEM SELECTOR PLAN 1
L groin PSA, s/p thrombin injection  awaiting L groin ultrasound  1/10 US, decrease in size of hematoma  ID following for +BCx  on meropenem IV  appreciate heme/onc recs  trend CBC, monitor H/H and PLT  transfuse as indicated  serum direct alisson pending  appreciate palliative consult

## 2020-01-11 NOTE — CONSULT NOTE ADULT - PROBLEM SELECTOR RECOMMENDATION 2
medical management as per primary team
Warm agglutinin hemolytic anemia.   Currently on prednisone. Would clarify with heme/onc if there is any utility in providing a set duration of prednisone given patients is pending home hospice.   Patient is an unlikely candidate for Rituxan as he is pending home hospice.

## 2020-01-12 LAB
ALBUMIN SERPL ELPH-MCNC: 3.2 G/DL — LOW (ref 3.3–5)
ALP SERPL-CCNC: 103 U/L — SIGNIFICANT CHANGE UP (ref 40–120)
ALT FLD-CCNC: 14 U/L — SIGNIFICANT CHANGE UP (ref 10–45)
ANION GAP SERPL CALC-SCNC: 16 MMOL/L — SIGNIFICANT CHANGE UP (ref 5–17)
AST SERPL-CCNC: 20 U/L — SIGNIFICANT CHANGE UP (ref 10–40)
BILIRUB SERPL-MCNC: 4.4 MG/DL — HIGH (ref 0.2–1.2)
BLD GP AB SCN SERPL QL: NEGATIVE — SIGNIFICANT CHANGE UP
BUN SERPL-MCNC: 136 MG/DL — HIGH (ref 7–23)
CALCIUM SERPL-MCNC: 8.6 MG/DL — SIGNIFICANT CHANGE UP (ref 8.4–10.5)
CHLORIDE SERPL-SCNC: 100 MMOL/L — SIGNIFICANT CHANGE UP (ref 96–108)
CO2 SERPL-SCNC: 15 MMOL/L — LOW (ref 22–31)
CREAT SERPL-MCNC: 1.85 MG/DL — HIGH (ref 0.5–1.3)
GLUCOSE BLDC GLUCOMTR-MCNC: 172 MG/DL — HIGH (ref 70–99)
GLUCOSE BLDC GLUCOMTR-MCNC: 199 MG/DL — HIGH (ref 70–99)
GLUCOSE BLDC GLUCOMTR-MCNC: 210 MG/DL — HIGH (ref 70–99)
GLUCOSE BLDC GLUCOMTR-MCNC: 294 MG/DL — HIGH (ref 70–99)
GLUCOSE SERPL-MCNC: 204 MG/DL — HIGH (ref 70–99)
HCT VFR BLD CALC: 27.9 % — LOW (ref 39–50)
HGB BLD-MCNC: 9.4 G/DL — LOW (ref 13–17)
MCHC RBC-ENTMCNC: 33.7 GM/DL — SIGNIFICANT CHANGE UP (ref 32–36)
MCHC RBC-ENTMCNC: 34.7 PG — HIGH (ref 27–34)
MCV RBC AUTO: 103 FL — HIGH (ref 80–100)
NRBC # BLD: 6 /100 WBCS — HIGH (ref 0–0)
PLATELET # BLD AUTO: 72 K/UL — LOW (ref 150–400)
POTASSIUM SERPL-MCNC: 4.6 MMOL/L — SIGNIFICANT CHANGE UP (ref 3.5–5.3)
POTASSIUM SERPL-SCNC: 4.6 MMOL/L — SIGNIFICANT CHANGE UP (ref 3.5–5.3)
PROT SERPL-MCNC: 6.3 G/DL — SIGNIFICANT CHANGE UP (ref 6–8.3)
RBC # BLD: 2.71 M/UL — LOW (ref 4.2–5.8)
RBC # FLD: 24.1 % — HIGH (ref 10.3–14.5)
RH IG SCN BLD-IMP: POSITIVE — SIGNIFICANT CHANGE UP
SODIUM SERPL-SCNC: 131 MMOL/L — LOW (ref 135–145)
WBC # BLD: 12.52 K/UL — HIGH (ref 3.8–10.5)
WBC # FLD AUTO: 12.52 K/UL — HIGH (ref 3.8–10.5)

## 2020-01-12 PROCEDURE — 99232 SBSQ HOSP IP/OBS MODERATE 35: CPT

## 2020-01-12 RX ADMIN — PANTOPRAZOLE SODIUM 40 MILLIGRAM(S): 20 TABLET, DELAYED RELEASE ORAL at 17:30

## 2020-01-12 RX ADMIN — Medication 1 SPRAY(S): at 17:30

## 2020-01-12 RX ADMIN — Medication 10: at 22:21

## 2020-01-12 RX ADMIN — Medication 50 MICROGRAM(S): at 06:00

## 2020-01-12 RX ADMIN — PANTOPRAZOLE SODIUM 40 MILLIGRAM(S): 20 TABLET, DELAYED RELEASE ORAL at 06:00

## 2020-01-12 RX ADMIN — Medication 60 MILLIGRAM(S): at 06:00

## 2020-01-12 RX ADMIN — Medication 5: at 08:17

## 2020-01-12 RX ADMIN — Medication 81 MILLIGRAM(S): at 12:07

## 2020-01-12 RX ADMIN — Medication 325 MILLIGRAM(S): at 12:07

## 2020-01-12 RX ADMIN — Medication 3 MILLIGRAM(S): at 22:21

## 2020-01-12 RX ADMIN — MEROPENEM 100 MILLIGRAM(S): 1 INJECTION INTRAVENOUS at 05:59

## 2020-01-12 RX ADMIN — Medication 40 MILLIGRAM(S): at 06:00

## 2020-01-12 RX ADMIN — Medication 7 UNIT(S): at 11:53

## 2020-01-12 RX ADMIN — MEROPENEM 100 MILLIGRAM(S): 1 INJECTION INTRAVENOUS at 22:21

## 2020-01-12 RX ADMIN — Medication 5: at 11:53

## 2020-01-12 RX ADMIN — TIOTROPIUM BROMIDE 1 CAPSULE(S): 18 CAPSULE ORAL; RESPIRATORY (INHALATION) at 12:07

## 2020-01-12 RX ADMIN — SENNA PLUS 1 TABLET(S): 8.6 TABLET ORAL at 22:21

## 2020-01-12 RX ADMIN — MEROPENEM 100 MILLIGRAM(S): 1 INJECTION INTRAVENOUS at 13:38

## 2020-01-12 RX ADMIN — SIMVASTATIN 40 MILLIGRAM(S): 20 TABLET, FILM COATED ORAL at 22:21

## 2020-01-12 RX ADMIN — Medication 1 SPRAY(S): at 06:00

## 2020-01-12 RX ADMIN — Medication 1 MILLIGRAM(S): at 12:07

## 2020-01-12 RX ADMIN — Medication 7 UNIT(S): at 08:17

## 2020-01-12 RX ADMIN — INSULIN GLARGINE 10 UNIT(S): 100 INJECTION, SOLUTION SUBCUTANEOUS at 22:21

## 2020-01-12 NOTE — PROGRESS NOTE ADULT - SUBJECTIVE AND OBJECTIVE BOX
VITAL SIGNS-Telemetry:  afib   Vital Signs Last 24 Hrs  T(C): 36.8 (01-12-20 @ 12:32), Max: 36.8 (01-12-20 @ 12:32)  T(F): 98.2 (01-12-20 @ 12:32), Max: 98.2 (01-12-20 @ 12:32)  HR: 98 (01-12-20 @ 12:32) (71 - 98)  BP: 105/63 (01-12-20 @ 12:32) (94/55 - 106/50)  RR: 18 (01-12-20 @ 05:54) (18 - 18)  SpO2: 93% (01-12-20 @ 05:54) (93% - 99%)         01-11 @ 07:01  -  01-12 @ 07:00  --------------------------------------------------------  IN: 520 mL / OUT: 1475 mL / NET: -955 mL    01-12 @ 07:01  -  01-12 @ 16:14  --------------------------------------------------------  IN: 240 mL / OUT: 750 mL / NET: -510 mL    Daily     Daily     CAPILLARY BLOOD GLUCOSE  POCT Blood Glucose.: 199 mg/dL (12 Jan 2020 11:47)  POCT Blood Glucose.: 172 mg/dL (12 Jan 2020 08:09)  POCT Blood Glucose.: 118 mg/dL (11 Jan 2020 21:57)    PHYSICAL EXAM:  Neurology: alert and oriented x 3, nonfocal, no gross deficits  CV : irregular  Lungs: cta  Abdomen: soft, nontender, nondistended, positive bowel sounds, last bowel movement         Extremities:   no edema no calf tenderness groin cdi      ALBUTerol    90 MICROgram(s) HFA Inhaler 2 Puff(s) Inhalation every 6 hours PRN  aspirin enteric coated 81 milliGRAM(s) Oral daily  dextrose 40% Gel 15 Gram(s) Oral once PRN  dextrose 5%. 1000 milliLiter(s) IV Continuous <Continuous>  dextrose 50% Injectable 12.5 Gram(s) IV Push once  dextrose 50% Injectable 25 Gram(s) IV Push once  dextrose 50% Injectable 25 Gram(s) IV Push once  ferrous    sulfate 325 milliGRAM(s) Oral daily  fluticasone propionate 50 MICROgram(s)/spray Nasal Spray 1 Spray(s) Both Nostrils two times a day  folic acid 1 milliGRAM(s) Oral daily  glucagon  Injectable 1 milliGRAM(s) IntraMuscular once PRN  insulin glargine Injectable (LANTUS) 10 Unit(s) SubCutaneous at bedtime  insulin lispro (HumaLOG) corrective regimen sliding scale   SubCutaneous Before meals and at bedtime  insulin lispro Injectable (HumaLOG) 7 Unit(s) SubCutaneous three times a day with meals  levothyroxine 50 MICROGram(s) Oral daily  melatonin 3 milliGRAM(s) Oral at bedtime  meropenem  IVPB 500 milliGRAM(s) IV Intermittent every 8 hours  pantoprazole    Tablet 40 milliGRAM(s) Oral two times a day  polyethylene glycol 3350 17 Gram(s) Oral daily PRN  predniSONE   Tablet 60 milliGRAM(s) Oral daily  senna 1 Tablet(s) Oral at bedtime  simvastatin 40 milliGRAM(s) Oral at bedtime  tiotropium 18 MICROgram(s) Capsule 1 Capsule(s) Inhalation daily  torsemide 40 milliGRAM(s) Oral daily    Physical Therapy Rec:   Home  [ x ]   Home w/ PT  [  ]  Rehab  [  ]  Discussed with Cardiothoracic Team at AM rounds.

## 2020-01-12 NOTE — PROGRESS NOTE ADULT - PROBLEM SELECTOR PLAN 9
Patient pending final clarification of goals of care as well as clarification of plan with hematology and ID. Ultimately he will be discharged to home hospice likely early next week. Please notify case management for disposition planning. No medical indication for transfer to our service however we will continue to follow.   Please call if any questions. Thank you kindly.

## 2020-01-12 NOTE — PROGRESS NOTE ADULT - PROBLEM SELECTOR PLAN 1
L groin PSA, s/p thrombin injection  awaiting L groin ultrasound  1/10 US, decrease in size of hematoma  1/12 awaiting home hospice  ID following for +BCx  on meropenem IV  appreciate heme/onc recs  trend CBC, monitor H/H and PLT  transfuse as indicated  serum direct alisson pending  appreciate palliative consult

## 2020-01-12 NOTE — PROGRESS NOTE ADULT - ASSESSMENT
85 yo M with hx Afib (Coumadin), DM2, HLD, CAD (stent 2014 and 2015 in mLAD and RCA), HFrEF (40%) severe AS, HFrEF  30%, LBBB, baseline SCr 1.4, and Autoimmune hemolytic who was admitted from 12/-1/3 and optimized with diuretics per Heart failure service, planned for TAVR however underwent  balloon aortic valvuloplasty on 12/26, restarted on AC for atrial fibrillation, followed by heme for anemia and ultimately discharged to [facility] facility on 1/3/20.   Returned to ER on 1/5 after an episode of bleeding from left groin site and complaints of dizziness/weakness.  Found to be anemic and with L groin hematoma.     1/5 CTU, s/p 2PRBC  1/6 TTE: severe AS KATHERYN.5, severe LV dysfunction, decreased RV sys function. LE duplex partially thrombosed PSA w/ patent neck  1/9 Thrombin injection of left groin PSA  1/10 US L groin s/p thrombin injection pending, transferred to [facility], H/H 7.9/21.8. PLT 71.  1/11 H/H 6.7/19.5. Asymptomic, HD stable, 1 PRBC transfusion in progress. PLT 64. L groin stable, decrease in size of hematoma per ultrasound. Requesting transfer of service to medicine.   1/12 VSS waiting for home hospice to be set up

## 2020-01-12 NOTE — PROGRESS NOTE ADULT - PROBLEM SELECTOR PLAN 4
Could be from hypovolemia. Patient with dry oral mucosa and worsening uremia.   Given worsening uremia, would reduce dose of torsemide or provide alternative day dosing.

## 2020-01-12 NOTE — PROGRESS NOTE ADULT - SUBJECTIVE AND OBJECTIVE BOX
HOSPITALIST NOTE    Dr. Porter Jain DO  Attending Physician  Division of Hospital Medicine  [facility]  Pager:  [phone]    SUBJECTIVE  No acute complaints.  Patient reports that he does not want any more blood draws.   Further he states that he does not want to take insulin if he doesn't need to at home.     REVIEW OF SYSTEMS  CONSTITUTIONAL: No fevers. No chills  EYES/ENT: No visual changes. No discharge from eyes. No vertigo. No throat pain. No dysphagia.  NECK: No pain or stiffness or rigidity.  RESPIRATORY: No cough. No wheezing. No hemoptysis. No shortness of breath.  CARDIOVASCULAR: No chest pain. No palpitations.   GASTROINTESTINAL: No abdominal pain. No nausea. No vomiting. No hematemesis. No diarrhea. No constipation. No melena, No hematochezia.  GENITOURINARY: No dysuria. No hesitancy. No frequency. No hematuria.  NEUROLOGICAL: No numbness. No weakness. No change in speech. No fecal or urinary incontinence.   MSK: No joint swelling or erythema. No back pain.  SKIN: No itching or rashes.   PSYCH: Normal affect. Normal mood. No si. No hi.    Review of systems negative except for items noted above.      PAST MEDICAL & SURGICAL HISTORY:  Anemia  LBBB (left bundle branch block)  DYER (dyspnea on exertion)  Coronary disease: PCI  CHF (congestive heart failure)  Former smoker  Meniere disease  HLD (hyperlipidemia)  DM (diabetes mellitus)  Atrial fibrillation  HTN (hypertension)  Lipoma  History of skin graft  Status post appendectomy      MEDICATIONS  (STANDING):  aspirin enteric coated 81 milliGRAM(s) Oral daily  dextrose 5%. 1000 milliLiter(s) (50 mL/Hr) IV Continuous <Continuous>  dextrose 50% Injectable 12.5 Gram(s) IV Push once  dextrose 50% Injectable 25 Gram(s) IV Push once  dextrose 50% Injectable 25 Gram(s) IV Push once  ferrous    sulfate 325 milliGRAM(s) Oral daily  fluticasone propionate 50 MICROgram(s)/spray Nasal Spray 1 Spray(s) Both Nostrils two times a day  folic acid 1 milliGRAM(s) Oral daily  insulin glargine Injectable (LANTUS) 10 Unit(s) SubCutaneous at bedtime  insulin lispro (HumaLOG) corrective regimen sliding scale   SubCutaneous Before meals and at bedtime  insulin lispro Injectable (HumaLOG) 7 Unit(s) SubCutaneous three times a day with meals  levothyroxine 50 MICROGram(s) Oral daily  melatonin 3 milliGRAM(s) Oral at bedtime  meropenem  IVPB 500 milliGRAM(s) IV Intermittent every 8 hours  pantoprazole    Tablet 40 milliGRAM(s) Oral two times a day  predniSONE   Tablet 60 milliGRAM(s) Oral daily  senna 1 Tablet(s) Oral at bedtime  simvastatin 40 milliGRAM(s) Oral at bedtime  tiotropium 18 MICROgram(s) Capsule 1 Capsule(s) Inhalation daily  torsemide 40 milliGRAM(s) Oral daily    MEDICATIONS  (PRN):  ALBUTerol    90 MICROgram(s) HFA Inhaler 2 Puff(s) Inhalation every 6 hours PRN Shortness of Breath and/or Wheezing  dextrose 40% Gel 15 Gram(s) Oral once PRN Blood Glucose LESS THAN 70 milliGRAM(s)/deciliter  glucagon  Injectable 1 milliGRAM(s) IntraMuscular once PRN Glucose LESS THAN 70 milligrams/deciliter  polyethylene glycol 3350 17 Gram(s) Oral daily PRN Constipation      Allergies    No Known Allergies    Intolerances        T(C): 36.5 (01-12-20 @ 04:45), Max: 36.5 (01-11-20 @ 13:10)  T(F): 97.7 (01-12-20 @ 04:45), Max: 97.7 (01-11-20 @ 13:10)  HR: 71 (01-12-20 @ 05:54) (71 - 96)  BP: 106/50 (01-12-20 @ 05:54) (94/55 - 106/50)  ABP: --  ABP(mean): --  RR: 18 (01-12-20 @ 05:54) (18 - 18)  SpO2: 93% (01-12-20 @ 05:54) (93% - 99%)      CONSTITUTIONAL: No acute distress.   HEENT:  Conjunctiva clear B/L. Nasal mucosa normal. Dry oral mucosa. No posterior pharyngeal lesions noted.  Cardiovascular: Irregularly irregular with early systolic murmur; regular rate. No JVD noted. No lower extremity edema B/L. Extremities are warm and well perfused. Radial pulses 2+ B/L. Dorsalis pedis pulses 2+ B/L.    Respiratory: Decreased breath sounds at lung bases with bibasilar crackles. No accessory muscle use.   Gastrointestinal:  Soft, nontender. Non-distended. Non-rigid. No CVA tenderness B/L.  : suction catheter note. Clear-yellow urine.  MSK:  No joint swelling. No joint erythema B/L. No midline spinal tenderness.  Neurologic:  Alert and awake. Oriented x3. Moving all extremities. Following commands. Making eye contact.    Skin:  Palpable hematoma of left groin to deep palpation. No ecchymosis noted.   Psych:  Normal affect. Normal Mood.     LABS                        9.4    12.52 )-----------( 72       ( 12 Jan 2020 09:28 )             27.9     01-11    134<L>  |  100  |  132<H>  ----------------------------<  166<H>  4.6   |  16<L>  |  1.78<H>    Ca    8.1<L>      11 Jan 2020 07:17    TPro  5.2<L>  /  Alb  2.6<L>  /  TBili  4.2<H>  /  DBili  x   /  AST  17  /  ALT  18  /  AlkPhos  76  01-11 INTERNAL MEDICINE CONSULT NOTE    Dr. Porter Jain DO  Attending Physician  Division of Hospital Medicine  [facility]  Pager:  [phone]    SUBJECTIVE  No acute complaints.  Patient reports that he does not want any more blood draws.   Further he states that he does not want to take insulin if he doesn't need to at home.     REVIEW OF SYSTEMS  CONSTITUTIONAL: No fevers. No chills  EYES/ENT: No visual changes. No discharge from eyes. No vertigo. No throat pain. No dysphagia.  NECK: No pain or stiffness or rigidity.  RESPIRATORY: No cough. No wheezing. No hemoptysis. No shortness of breath.  CARDIOVASCULAR: No chest pain. No palpitations.   GASTROINTESTINAL: No abdominal pain. No nausea. No vomiting. No hematemesis. No diarrhea. No constipation. No melena, No hematochezia.  GENITOURINARY: No dysuria. No hesitancy. No frequency. No hematuria.  NEUROLOGICAL: No numbness. No weakness. No change in speech. No fecal or urinary incontinence.   MSK: No joint swelling or erythema. No back pain.  SKIN: No itching or rashes.   PSYCH: Normal affect. Normal mood. No si. No hi.    Review of systems negative except for items noted above.      PAST MEDICAL & SURGICAL HISTORY:  Anemia  LBBB (left bundle branch block)  DYER (dyspnea on exertion)  Coronary disease: PCI  CHF (congestive heart failure)  Former smoker  Meniere disease  HLD (hyperlipidemia)  DM (diabetes mellitus)  Atrial fibrillation  HTN (hypertension)  Lipoma  History of skin graft  Status post appendectomy      MEDICATIONS  (STANDING):  aspirin enteric coated 81 milliGRAM(s) Oral daily  dextrose 5%. 1000 milliLiter(s) (50 mL/Hr) IV Continuous <Continuous>  dextrose 50% Injectable 12.5 Gram(s) IV Push once  dextrose 50% Injectable 25 Gram(s) IV Push once  dextrose 50% Injectable 25 Gram(s) IV Push once  ferrous    sulfate 325 milliGRAM(s) Oral daily  fluticasone propionate 50 MICROgram(s)/spray Nasal Spray 1 Spray(s) Both Nostrils two times a day  folic acid 1 milliGRAM(s) Oral daily  insulin glargine Injectable (LANTUS) 10 Unit(s) SubCutaneous at bedtime  insulin lispro (HumaLOG) corrective regimen sliding scale   SubCutaneous Before meals and at bedtime  insulin lispro Injectable (HumaLOG) 7 Unit(s) SubCutaneous three times a day with meals  levothyroxine 50 MICROGram(s) Oral daily  melatonin 3 milliGRAM(s) Oral at bedtime  meropenem  IVPB 500 milliGRAM(s) IV Intermittent every 8 hours  pantoprazole    Tablet 40 milliGRAM(s) Oral two times a day  predniSONE   Tablet 60 milliGRAM(s) Oral daily  senna 1 Tablet(s) Oral at bedtime  simvastatin 40 milliGRAM(s) Oral at bedtime  tiotropium 18 MICROgram(s) Capsule 1 Capsule(s) Inhalation daily  torsemide 40 milliGRAM(s) Oral daily    MEDICATIONS  (PRN):  ALBUTerol    90 MICROgram(s) HFA Inhaler 2 Puff(s) Inhalation every 6 hours PRN Shortness of Breath and/or Wheezing  dextrose 40% Gel 15 Gram(s) Oral once PRN Blood Glucose LESS THAN 70 milliGRAM(s)/deciliter  glucagon  Injectable 1 milliGRAM(s) IntraMuscular once PRN Glucose LESS THAN 70 milligrams/deciliter  polyethylene glycol 3350 17 Gram(s) Oral daily PRN Constipation      Allergies    No Known Allergies    Intolerances        T(C): 36.5 (01-12-20 @ 04:45), Max: 36.5 (01-11-20 @ 13:10)  T(F): 97.7 (01-12-20 @ 04:45), Max: 97.7 (01-11-20 @ 13:10)  HR: 71 (01-12-20 @ 05:54) (71 - 96)  BP: 106/50 (01-12-20 @ 05:54) (94/55 - 106/50)  ABP: --  ABP(mean): --  RR: 18 (01-12-20 @ 05:54) (18 - 18)  SpO2: 93% (01-12-20 @ 05:54) (93% - 99%)      CONSTITUTIONAL: No acute distress.   HEENT:  Conjunctiva clear B/L. Nasal mucosa normal. Dry oral mucosa. No posterior pharyngeal lesions noted.  Cardiovascular: Irregularly irregular with early systolic murmur; regular rate. No JVD noted. No lower extremity edema B/L. Extremities are warm and well perfused. Radial pulses 2+ B/L. Dorsalis pedis pulses 2+ B/L.    Respiratory: Decreased breath sounds at lung bases with bibasilar crackles. No accessory muscle use.   Gastrointestinal:  Soft, nontender. Non-distended. Non-rigid. No CVA tenderness B/L.  : suction catheter note. Clear-yellow urine.  MSK:  No joint swelling. No joint erythema B/L. No midline spinal tenderness.  Neurologic:  Alert and awake. Oriented x3. Moving all extremities. Following commands. Making eye contact.    Skin:  Palpable hematoma of left groin to deep palpation. No ecchymosis noted.   Psych:  Normal affect. Normal Mood.     LABS                        9.4    12.52 )-----------( 72       ( 12 Jan 2020 09:28 )             27.9     01-11    134<L>  |  100  |  132<H>  ----------------------------<  166<H>  4.6   |  16<L>  |  1.78<H>    Ca    8.1<L>      11 Jan 2020 07:17    TPro  5.2<L>  /  Alb  2.6<L>  /  TBili  4.2<H>  /  DBili  x   /  AST  17  /  ALT  18  /  AlkPhos  76  01-11

## 2020-01-12 NOTE — PROGRESS NOTE ADULT - PROBLEM SELECTOR PLAN 3
Most recent Bcx negative. Patient prefers to go home on oral antibiotics. Please discuss with ID.   Treating his infection would provide further comfort.

## 2020-01-12 NOTE — PROGRESS NOTE ADULT - PROBLEM SELECTOR PLAN 5
Appears euvolemic to hypovolemic on exam.  Given worsening uremia, would reduce dose of torsemide or provide alternative day dosing.   Maintain euvolemia for comfort.    Encourage PO intake.

## 2020-01-12 NOTE — PROGRESS NOTE ADULT - PROBLEM SELECTOR PLAN 1
From left groin hematoma. Decreased in size on prior imaging. Hgb improved this AM.  Please stop obtained labs on patient as he does not want any additional blood draws.

## 2020-01-12 NOTE — PROGRESS NOTE ADULT - PROBLEM SELECTOR PLAN 8
Patient, HHA/HCP Charisse Ortez and friend/HCP Bryan Cao met prior with our palliative care team and requested home hospice with no more blood draws.  I personally discussed this with patient and he again affirmed this is what he wants.   Patient is DNR/DNI. Molst in chart.  Please document DNI in sunrise.

## 2020-01-12 NOTE — PROGRESS NOTE ADULT - PROBLEM SELECTOR PLAN 2
Warm agglutinin hemolytic anemia.   Currently on prednisone.  Please discuss with heme/onc when this can be discontinued. After discontinuation, the blood sugar will likely start to normalize.  Patient is an unlikely candidate for Rituxan as he is pending home hospice.

## 2020-01-12 NOTE — PROGRESS NOTE ADULT - PROBLEM SELECTOR PLAN 7
Patient does not want to use insulin at home if he doesn't have to.  Once prednisone is stopped, patient eventually no longer require insulin. Patient does not want to use insulin at home if he doesn't have to.  Once prednisone is stopped, sugars will begin to normalize.

## 2020-01-12 NOTE — PROGRESS NOTE ADULT - PROBLEM SELECTOR PLAN 6
On asa alone   Patient still contemplating if he wants to stop taking aspirin as this is not contributing to his comfort at this time but may prevent a stroke.

## 2020-01-13 LAB
ACANTHOCYTES BLD QL SMEAR: SLIGHT — SIGNIFICANT CHANGE UP
AGGLUTINATION: PRESENT — SIGNIFICANT CHANGE UP
ALBUMIN SERPL ELPH-MCNC: 3.2 G/DL — LOW (ref 3.3–5)
ALP SERPL-CCNC: 97 U/L — SIGNIFICANT CHANGE UP (ref 40–120)
ALT FLD-CCNC: 9 U/L — LOW (ref 10–45)
ANION GAP SERPL CALC-SCNC: 15 MMOL/L — SIGNIFICANT CHANGE UP (ref 5–17)
ANISOCYTOSIS BLD QL: SLIGHT — SIGNIFICANT CHANGE UP
AST SERPL-CCNC: 15 U/L — SIGNIFICANT CHANGE UP (ref 10–40)
BASOPHILS # BLD AUTO: 0 K/UL — SIGNIFICANT CHANGE UP (ref 0–0.2)
BASOPHILS NFR BLD AUTO: 0 % — SIGNIFICANT CHANGE UP (ref 0–2)
BILIRUB SERPL-MCNC: 4 MG/DL — HIGH (ref 0.2–1.2)
BUN SERPL-MCNC: 131 MG/DL — HIGH (ref 7–23)
BURR CELLS BLD QL SMEAR: PRESENT — SIGNIFICANT CHANGE UP
CALCIUM SERPL-MCNC: 8.4 MG/DL — SIGNIFICANT CHANGE UP (ref 8.4–10.5)
CHLORIDE SERPL-SCNC: 100 MMOL/L — SIGNIFICANT CHANGE UP (ref 96–108)
CO2 SERPL-SCNC: 18 MMOL/L — LOW (ref 22–31)
CREAT SERPL-MCNC: 1.59 MG/DL — HIGH (ref 0.5–1.3)
CULTURE RESULTS: SIGNIFICANT CHANGE UP
DACRYOCYTES BLD QL SMEAR: SLIGHT — SIGNIFICANT CHANGE UP
ELLIPTOCYTES BLD QL SMEAR: SLIGHT — SIGNIFICANT CHANGE UP
EOSINOPHIL # BLD AUTO: 0 K/UL — SIGNIFICANT CHANGE UP (ref 0–0.5)
EOSINOPHIL NFR BLD AUTO: 0 % — SIGNIFICANT CHANGE UP (ref 0–6)
GLUCOSE BLDC GLUCOMTR-MCNC: 134 MG/DL — HIGH (ref 70–99)
GLUCOSE BLDC GLUCOMTR-MCNC: 141 MG/DL — HIGH (ref 70–99)
GLUCOSE BLDC GLUCOMTR-MCNC: 142 MG/DL — HIGH (ref 70–99)
GLUCOSE BLDC GLUCOMTR-MCNC: 188 MG/DL — HIGH (ref 70–99)
GLUCOSE SERPL-MCNC: 152 MG/DL — HIGH (ref 70–99)
HCT VFR BLD CALC: 22.7 % — LOW (ref 39–50)
HGB BLD-MCNC: 7.6 G/DL — LOW (ref 13–17)
HYPOCHROMIA BLD QL: SLIGHT — SIGNIFICANT CHANGE UP
LYMPHOCYTES # BLD AUTO: 1.99 K/UL — SIGNIFICANT CHANGE UP (ref 1–3.3)
LYMPHOCYTES # BLD AUTO: 12 % — LOW (ref 13–44)
MAGNESIUM SERPL-MCNC: 2.3 MG/DL — SIGNIFICANT CHANGE UP (ref 1.6–2.6)
MANUAL SMEAR VERIFICATION: SIGNIFICANT CHANGE UP
MCHC RBC-ENTMCNC: 32.8 PG — SIGNIFICANT CHANGE UP (ref 27–34)
MCHC RBC-ENTMCNC: 33.5 GM/DL — SIGNIFICANT CHANGE UP (ref 32–36)
MCV RBC AUTO: 97.8 FL — SIGNIFICANT CHANGE UP (ref 80–100)
METAMYELOCYTES # FLD: 1 % — HIGH (ref 0–0)
MONOCYTES # BLD AUTO: 0.33 K/UL — SIGNIFICANT CHANGE UP (ref 0–0.9)
MONOCYTES NFR BLD AUTO: 2 % — SIGNIFICANT CHANGE UP (ref 2–14)
MYELOCYTES NFR BLD: 2 % — HIGH (ref 0–0)
NEUTROPHILS # BLD AUTO: 13.79 K/UL — HIGH (ref 1.8–7.4)
NEUTROPHILS NFR BLD AUTO: 82 % — HIGH (ref 43–77)
NEUTS BAND # BLD: 1 % — SIGNIFICANT CHANGE UP (ref 0–8)
NRBC # BLD: 4 /100 — HIGH (ref 0–0)
OVALOCYTES BLD QL SMEAR: SLIGHT — SIGNIFICANT CHANGE UP
PHOSPHATE SERPL-MCNC: 5.4 MG/DL — HIGH (ref 2.5–4.5)
PLAT MORPH BLD: NORMAL — SIGNIFICANT CHANGE UP
PLATELET # BLD AUTO: 70 K/UL — LOW (ref 150–400)
POIKILOCYTOSIS BLD QL AUTO: SLIGHT — SIGNIFICANT CHANGE UP
POTASSIUM SERPL-MCNC: 4.2 MMOL/L — SIGNIFICANT CHANGE UP (ref 3.5–5.3)
POTASSIUM SERPL-SCNC: 4.2 MMOL/L — SIGNIFICANT CHANGE UP (ref 3.5–5.3)
PROT SERPL-MCNC: 5.9 G/DL — LOW (ref 6–8.3)
RBC # BLD: 2.32 M/UL — LOW (ref 4.2–5.8)
RBC # FLD: 23.1 % — HIGH (ref 10.3–14.5)
RBC BLD AUTO: ABNORMAL
SCHISTOCYTES BLD QL AUTO: SLIGHT — SIGNIFICANT CHANGE UP
SODIUM SERPL-SCNC: 133 MMOL/L — LOW (ref 135–145)
SPECIMEN SOURCE: SIGNIFICANT CHANGE UP
WBC # BLD: 16.61 K/UL — HIGH (ref 3.8–10.5)
WBC # FLD AUTO: 16.61 K/UL — HIGH (ref 3.8–10.5)

## 2020-01-13 PROCEDURE — 99232 SBSQ HOSP IP/OBS MODERATE 35: CPT

## 2020-01-13 RX ADMIN — Medication 5: at 12:22

## 2020-01-13 RX ADMIN — MEROPENEM 100 MILLIGRAM(S): 1 INJECTION INTRAVENOUS at 13:20

## 2020-01-13 RX ADMIN — Medication 60 MILLIGRAM(S): at 05:49

## 2020-01-13 RX ADMIN — Medication 1 SPRAY(S): at 05:49

## 2020-01-13 RX ADMIN — Medication 7 UNIT(S): at 12:22

## 2020-01-13 RX ADMIN — Medication 2: at 22:23

## 2020-01-13 RX ADMIN — SIMVASTATIN 40 MILLIGRAM(S): 20 TABLET, FILM COATED ORAL at 22:21

## 2020-01-13 RX ADMIN — Medication 1 SPRAY(S): at 17:09

## 2020-01-13 RX ADMIN — Medication 2: at 08:34

## 2020-01-13 RX ADMIN — Medication 3 MILLIGRAM(S): at 22:21

## 2020-01-13 RX ADMIN — Medication 1 MILLIGRAM(S): at 12:23

## 2020-01-13 RX ADMIN — MEROPENEM 100 MILLIGRAM(S): 1 INJECTION INTRAVENOUS at 05:49

## 2020-01-13 RX ADMIN — SENNA PLUS 1 TABLET(S): 8.6 TABLET ORAL at 22:21

## 2020-01-13 RX ADMIN — Medication 81 MILLIGRAM(S): at 12:23

## 2020-01-13 RX ADMIN — Medication 50 MICROGRAM(S): at 05:49

## 2020-01-13 RX ADMIN — INSULIN GLARGINE 10 UNIT(S): 100 INJECTION, SOLUTION SUBCUTANEOUS at 22:22

## 2020-01-13 RX ADMIN — Medication 7 UNIT(S): at 17:09

## 2020-01-13 RX ADMIN — TIOTROPIUM BROMIDE 1 CAPSULE(S): 18 CAPSULE ORAL; RESPIRATORY (INHALATION) at 12:23

## 2020-01-13 RX ADMIN — Medication 2: at 17:09

## 2020-01-13 RX ADMIN — PANTOPRAZOLE SODIUM 40 MILLIGRAM(S): 20 TABLET, DELAYED RELEASE ORAL at 17:08

## 2020-01-13 RX ADMIN — Medication 7 UNIT(S): at 08:33

## 2020-01-13 RX ADMIN — Medication 40 MILLIGRAM(S): at 05:49

## 2020-01-13 RX ADMIN — Medication 325 MILLIGRAM(S): at 12:23

## 2020-01-13 RX ADMIN — PANTOPRAZOLE SODIUM 40 MILLIGRAM(S): 20 TABLET, DELAYED RELEASE ORAL at 05:49

## 2020-01-13 RX ADMIN — MEROPENEM 100 MILLIGRAM(S): 1 INJECTION INTRAVENOUS at 22:21

## 2020-01-13 NOTE — PROGRESS NOTE ADULT - SUBJECTIVE AND OBJECTIVE BOX
infectious diseases progress note:    Patient is a 84y old  Male who presents with a chief complaint of Anemia (12 Jan 2020 16:13)        Anemia             Allergies    No Known Allergies    Intolerances        ANTIBIOTICS/RELEVANT:  antimicrobials  meropenem  IVPB 500 milliGRAM(s) IV Intermittent every 8 hours    immunologic:    OTHER:  ALBUTerol    90 MICROgram(s) HFA Inhaler 2 Puff(s) Inhalation every 6 hours PRN  aspirin enteric coated 81 milliGRAM(s) Oral daily  dextrose 40% Gel 15 Gram(s) Oral once PRN  dextrose 5%. 1000 milliLiter(s) IV Continuous <Continuous>  dextrose 50% Injectable 12.5 Gram(s) IV Push once  dextrose 50% Injectable 25 Gram(s) IV Push once  dextrose 50% Injectable 25 Gram(s) IV Push once  ferrous    sulfate 325 milliGRAM(s) Oral daily  fluticasone propionate 50 MICROgram(s)/spray Nasal Spray 1 Spray(s) Both Nostrils two times a day  folic acid 1 milliGRAM(s) Oral daily  glucagon  Injectable 1 milliGRAM(s) IntraMuscular once PRN  insulin glargine Injectable (LANTUS) 10 Unit(s) SubCutaneous at bedtime  insulin lispro (HumaLOG) corrective regimen sliding scale   SubCutaneous Before meals and at bedtime  insulin lispro Injectable (HumaLOG) 7 Unit(s) SubCutaneous three times a day with meals  levothyroxine 50 MICROGram(s) Oral daily  melatonin 3 milliGRAM(s) Oral at bedtime  pantoprazole    Tablet 40 milliGRAM(s) Oral two times a day  polyethylene glycol 3350 17 Gram(s) Oral daily PRN  predniSONE   Tablet 60 milliGRAM(s) Oral daily  senna 1 Tablet(s) Oral at bedtime  simvastatin 40 milliGRAM(s) Oral at bedtime  tiotropium 18 MICROgram(s) Capsule 1 Capsule(s) Inhalation daily  torsemide 40 milliGRAM(s) Oral daily      Objective:  Vital Signs Last 24 Hrs  T(C): 36.3 (13 Jan 2020 04:48), Max: 36.8 (12 Jan 2020 12:32)  T(F): 97.3 (13 Jan 2020 04:48), Max: 98.2 (12 Jan 2020 12:32)  HR: 69 (13 Jan 2020 04:48) (69 - 98)  BP: 109/65 (13 Jan 2020 04:48) (105/63 - 111/72)  BP(mean): 85 (12 Jan 2020 20:26) (85 - 85)  RR: 18 (13 Jan 2020 04:48) (18 - 18)  SpO2: 96% (13 Jan 2020 04:48) (94% - 96%)     :     Eyes:RIYA, EOMI  Ear/Nose/Throat: no oral lesion, no sinus tenderness on percussion	  Neck:no JVD, no lymphadenopathy, supple  Respiratory: CTA azalia  Cardiovascular: S1S2 RRR, no murmurs  Gastrointestinal:soft, (+) BS, no HSM  Extremities:no e/e/c        LABS:                        9.4    12.52 )-----------( 72       ( 12 Jan 2020 09:28 )             27.9     01-12    131<L>  |  100  |  136<H>  ----------------------------<  204<H>  4.6   |  15<L>  |  1.85<H>    Ca    8.6      12 Jan 2020 09:28    TPro  6.3  /  Alb  3.2<L>  /  TBili  4.4<H>  /  DBili  x   /  AST  20  /  ALT  14  /  AlkPhos  103  01-12            MICROBIOLOGY:    RECENT CULTURES:  01-08 @ 12:35 .Blood Blood                No growth to date.    01-06 @ 22:03 .Blood Blood-Peripheral       Growth in aerobic bottle: Gram Negative Rods  Growth in anaerobic bottle: Gram Negative Rods           Growth in aerobic and anaerobic bottles: Enterobacter cloacae See  previous culture [id number]          RESPIRATORY CULTURES:              RADIOLOGY & ADDITIONAL STUDIES:        Pager [phone]  After 5 pm/weekends or if no response :[phone]

## 2020-01-13 NOTE — PROGRESS NOTE ADULT - ASSESSMENT
83 yo M with hx Afib (Coumadin), DM2, HLD, CAD (stent 2014 and 2015 in mLAD and RCA), HFrEF (40%) severe AS, HFrEF  30%, LBBB, baseline SCr 1.4, and Autoimmune hemolytic who was admitted from 12/-1/3 and optimized with diuretics per Heart failure service, planned for TAVR however underwent  balloon aortic valvuloplasty on 12/26, restarted on AC for atrial fibrillation, followed by heme for anemia and ultimately discharged to [facility] [facility] facility on 1/3/20.   Returned to ER on 1/5 after an episode of bleeding from left groin site and complaints of dizziness/weakness.  Found to be anemic and with L groin hematoma requiring transfusion of multiple units of PRBC since admission. Hematology consulted for evaluation of hemolytic anemia, started on Prednisone with discussion of initiation of Rituxin. Dr. Powell from ID was consulted for (+) blood cultures (enterobacter) now on meropenem.    Discussion with bear (HCP) at bedside. Given his overall frailty and comorbidities there is no plan to proceed with TAVR or aggressive surgical intervention,  continue treatement for bacteremia per ID. They are awaiting family meeting with ID, hematology, and Hospice to discuss discharge and treatment plans.     [id number]

## 2020-01-13 NOTE — PROGRESS NOTE ADULT - ASSESSMENT
84M PMH A-fib (on coumadin), DM II, HLD, CAD (s/p stents 2014, 215), HF, Severe AS with associated hemolytic anemia, LBBB, presenting with anemia. Found on imaging to have LLE hematoma      pt with a [id number] hematoma over left groin   - bc positive for enterobacter  no signs s of UTI, pneumonia or endocarditis      suspect enterobacter related to groin pseudoaneurysm and hematoma.  pt has 8/8 bc positive for enterobacter  most likely intravascular source endo also possible   discussed Dr. Becker  [facility] wants to avoid invasive procedures  not candidate fo aggressive surgery  palliative approach planned   prognosis guarded       meropenem          follow up bc one time negative   no jase planned  will need prolonged ab  organism is cipro sensitive  discussed with daughter who understands the prognosis and want to take him home    ( i am not sure that we have source control  po cipro v. home IV ab  depending upon discharge plan   now on steroids

## 2020-01-13 NOTE — PROGRESS NOTE ADULT - SUBJECTIVE AND OBJECTIVE BOX
HPI/Interval Hx    85 yo M with hx Afib (Coumadin), DM2, HLD, CAD (stent 2014 and 2015 in mLAD and RCA), HFrEF (40%) severe AS, HFrEF  30%, LBBB, baseline SCr 1.4, and Autoimmune hemolytic who was admitted from 12/-1/3 and optimized with diuretics per Heart failure service, planned for TAVR however underwent  balloon aortic valvuloplasty on 12/26, restarted on AC for atrial fibrillation, followed by heme for anemia and ultimately discharged to [facility] on 1/3/20.   Returned to ER on 1/5 after an episode of bleeding from left groin site and complaints of dizziness/weakness.  Found to be anemic and with L groin hematoma requiring transfusion of multiple units of PRBC since admission. Hematology consulted for evaluation of hemolytic anemia, started on Prednisone with discussion of initiation of Rituxin. Dr. Powell from ID was consulted for (+) blood cultures (enterobacter) now on meropenem.      MEDICATIONS  (STANDING):  aspirin enteric coated 81 milliGRAM(s) Oral daily  dextrose 5%. 1000 milliLiter(s) (50 mL/Hr) IV Continuous <Continuous>  dextrose 50% Injectable 12.5 Gram(s) IV Push once  dextrose 50% Injectable 25 Gram(s) IV Push once  dextrose 50% Injectable 25 Gram(s) IV Push once  ferrous    sulfate 325 milliGRAM(s) Oral daily  fluticasone propionate 50 MICROgram(s)/spray Nasal Spray 1 Spray(s) Both Nostrils two times a day  folic acid 1 milliGRAM(s) Oral daily  insulin glargine Injectable (LANTUS) 10 Unit(s) SubCutaneous at bedtime  insulin lispro (HumaLOG) corrective regimen sliding scale   SubCutaneous Before meals and at bedtime  insulin lispro Injectable (HumaLOG) 7 Unit(s) SubCutaneous three times a day with meals  levothyroxine 50 MICROGram(s) Oral daily  melatonin 3 milliGRAM(s) Oral at bedtime  meropenem  IVPB 500 milliGRAM(s) IV Intermittent every 8 hours  pantoprazole    Tablet 40 milliGRAM(s) Oral two times a day  predniSONE   Tablet 60 milliGRAM(s) Oral daily  senna 1 Tablet(s) Oral at bedtime  simvastatin 40 milliGRAM(s) Oral at bedtime  tiotropium 18 MICROgram(s) Capsule 1 Capsule(s) Inhalation daily  torsemide 40 milliGRAM(s) Oral daily    MEDICATIONS  (PRN):  ALBUTerol    90 MICROgram(s) HFA Inhaler 2 Puff(s) Inhalation every 6 hours PRN Shortness of Breath and/or Wheezing  dextrose 40% Gel 15 Gram(s) Oral once PRN Blood Glucose LESS THAN 70 milliGRAM(s)/deciliter  glucagon  Injectable 1 milliGRAM(s) IntraMuscular once PRN Glucose LESS THAN 70 milligrams/deciliter  polyethylene glycol 3350 17 Gram(s) Oral daily PRN Constipation      PAST MEDICAL & SURGICAL HISTORY:  Anemia  LBBB (left bundle branch block)  DYER (dyspnea on exertion)  Coronary disease: PCI  CHF (congestive heart failure)  Former smoker  Meniere disease  HLD (hyperlipidemia)  DM (diabetes mellitus)  Atrial fibrillation  HTN (hypertension)  Lipoma  History of skin graft  Status post appendectomy      Review of Systems  CONSTITUTIONAL: (+) fatigue, no fever/chills  EYES/ENT: No visual changes;  No vertigo or throat pain   RESPIRATORY: No cough, wheezing, hemoptysis; No shortness of breath  CARDIOVASCULAR: No chest pain or palpitations, PND, orthopnea, or lE edema   GASTROINTESTINAL: No abdominal or epigastric pain. No nausea, vomiting, or hematemesis; No diarrhea or constipation. No melena or hematochezia.  GENITOURINARY: No dysuria, frequency or hematuria  NEUROLOGICAL: No numbness or weakness  SKIN: No itching, burning, rashes, or lesions, lt. groin hematoma   All other review of systems is negative unless indicated above.    Physical Exam  General: A/ox 3, No acute Distress  Neck: Supple, NO JVD  Cardiac: S1 S2, II/VI RUSB murmur  Pulmonary: CTAB, Breathing unlabored, No Rhonchi/Rales/Wheezing  Abdomen: Soft, Non -tender, +BS x 4 quads  Neuro: A/o x 3, No focal deficits  Vital Signs Last 24 Hrs  T(C): 36.4 (13 Jan 2020 12:27), Max: 36.4 (13 Jan 2020 12:27)  T(F): 97.5 (13 Jan 2020 12:27), Max: 97.5 (13 Jan 2020 12:27)  HR: 104 (13 Jan 2020 12:27) (69 - 104)  BP: 110/61 (13 Jan 2020 12:27) (109/65 - 111/72)  BP(mean): 85 (12 Jan 2020 20:26) (85 - 85)  RR: 18 (13 Jan 2020 12:27) (18 - 18)  SpO2: 100% (13 Jan 2020 12:27) (94% - 100%)                        7.6    16.61 )-----------( 70       ( 13 Jan 2020 09:08 )             22.7     01-13    133<L>  |  100  |  131<H>  ----------------------------<  152<H>  4.2   |  18<L>  |  1.59<H>    Ca    8.4      13 Jan 2020 09:08  Phos  5.4     01-13  Mg     2.3     01-13    TPro  5.9<L>  /  Alb  3.2<L>  /  TBili  4.0<H>  /  DBili  x   /  AST  15  /  ALT  9<L>  /  AlkPhos  97  01-13      Other  < from: TTE Congential Anomalies (01.06.20 @ 08:00) >  Patient name: JESUS MORALES  YOB: 1935   Age: 84 (M)   MR#: [id number]  Study Date: 1/6/2020  Location: [place] [id number]: Ryann Hennessy RDCS  Study quality: Technically difficult  Referring Physician: Trevin Becker MD  Blood Pressure: 100/53 mmHg  Height: 165 cm  Weight: 64 kg  BSA: 1.7 m2  ------------------------------------------------------------------------  PROCEDURE: Transthoracic echocardiogram with 2-D, M-Mode  and complete spectral and color flow Doppler. Verbal  consent was obtained for injection of  Ultrasonic Enhancing  Agent following a discussion of risks and benefits.  Following intravenous injection of Ultrasonic Enhancing  Agent , harmonic imaging was performed.  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 Moderate mitral regurgitation.  Aortic Valve/Aorta: Calcified aortic valve with decreased  opening. Peak transaortic valve gradient equals 41 mm Hg,  estimated aortic valve area equals 0.5 sqcm (by continuity  equation), aortic valve velocity time integral equals 63  cm, consistent with severe aortic stenosis. Minimal aortic  regurgitation.  Peak left ventricular outflow tract  gradient equals 1 mm Hg, LVOT velocity time integral equals  12 cm.  Aortic Root: 2.5 cm.  Left Atrium: Severely dilated left atrium.  LA volume index  = 68 cc/m2.  Left Ventricle: Endocardial visualization enhanced with  intravenous injection of Ultrasonic Enhancing Agent  (Definity). Severe global left ventricular systolic  dysfunction. No left ventricular thrombus. Normal left  ventricular internal dimensions and wall thicknesses.  Right Heart: Moderate right atrial enlargement. The right  ventricle is not well visualized; grossly right ventricular  enlargement with mildly decreased right ventricular  systolic function. Normal tricuspid valve. Mild tricuspid  regurgitation. Pulmonic valve not well visualized, probably  normal.  Pericardium/Pleura: Normal pericardium with no pericardial  effusion.  Hemodynamic: Estimated right ventricular systolic pressure  equals 31.36 - 36.36 mm Hg, assuming right atrial pressure  equals 0 - 5 mm Hg, consistent with borderline pulmonary  hypertension. Color Doppler demonstrates no evidence of a  patent foramen ovale.  ------------------------------------------------------------------------  Conclusions:  1. Mitral annular calcification, otherwise normal mitral  valve. Moderate mitral regurgitation.  2. Calcified aortic valve with decreased opening. Peak  transaortic valve gradient equals 41 mm Hg, estimated  aortic valve area equals 0.5 sqcm (by continuity equation),  aortic valve velocity time integral equals 63 cm,  consistent with severe aortic stenosis. Minimal aortic  regurgitation.  3. Endocardial visualization enhanced with intravenous  injection of Ultrasonic Enhancing Agent (Definity). Severe  global left ventricular systolic dysfunction. No left  ventricular thrombus.  4. The right ventricle is not well visualized; grossly  right ventricular enlargement with mildly decreased right  ventricular systolic function.  *** Compared with echocardiogram of 12/26/2019, there has  been an interval increase in the gradients across the  aortic valve.  ------------------------------------------------------------------------  Confirmed on  1/6/2020 - 12:03:07 by Daniel Mckinney M.D.  ------------------------------------------------------------------------    < end of copied text >

## 2020-01-13 NOTE — PROGRESS NOTE ADULT - ATTENDING COMMENTS
Mr Latham continues to be treated with IV Abx for GN bacteremia as well as steroids for hemolytic anemia, still requiring intermittent transfusions.  There has been some discussion of consideration for starting Rituxan.  I had an extensive bedside discussion with the patient, his niece Anabel (the daughter of his brother and HCP), and his aide Charisse.  When asked directly, Mr Latham's only goal is to be able to be discharged to home and remain as comfortable as possible.  He understands that his extensive comorbidities precluded TAVR (a BAV was done for symptom palliation) and, while being treated, are not curable.  His niece is representing the family's wishes as she states her 86 year old father (and HCP) has asked her to do so on Mr Latham's behalf, stating that he is unable to make these decisions at this point.  It seems clear that neither Mr Latham or his niece wish to pursue Rituxan treatment.  They are hoping he may be able to go home on Abx (IV or PO) and be transfused as needed.  I have discussed this in detail with Dr Abdalla.  My impression is that he would be best served by home hospice.  We are in agreement that Palliative Care be re-consulted (they signed off as there appeared to be a disconnect between the wishes of the patient, his HCP, and the medical team.  Ultimately, I think the patient and his family (aide and niece) would be best served by a multidisciplinary meeting of all involved caregivers--Heme/Onc, Infectious Disease, Palliative Care, and Hospitalist Medicine--so that a unified and clear treatment plan and long-term management strategy be proposed, explained, and agreed upon.  From a cardiac perspective, as I made clear at the bedside on Monday afternoon, there is no further intervention planned at this time.  Trevin Becker MD Mr Latham continues to be treated with IV Abx for GN bacteremia as well as steroids for hemolytic anemia, still requiring intermittent transfusions.  There has been some discussion of consideration for starting Rituxan.  I had an extensive bedside discussion with the patient, his niece Anabel (the daughter of his brother and HCP), and his aide Charisse.  When asked directly, Mr Latham's only goal is to be able to be discharged to home and remain as comfortable as possible.  He understands that his extensive comorbidities precluded TAVR (a BAV was done for symptom palliation) and, while being treated, are not curable.  His niece is representing the family's wishes as she states her 86 year old father (and HCP) has asked her to do so on Mr Latham's behalf, stating that he is unable to make these decisions at this point.  It seems clear that neither Mr Latham or his niece wish to pursue Rituxan treatment.  They are hoping he may be able to go home on Abx (IV or PO) and be transfused as needed.  I have discussed this in detail with Dr Abdalla.  My impression is that he would be best served by home hospice.  We are in agreement that Palliative Care be re-consulted (they signed off as there appeared to be a disconnect between the wishes of the patient, his HCP, and the medical team).  Ultimately, I think the patient and his family (aide and niece) would be best served by a multidisciplinary meeting of all involved caregivers--Heme/Onc, Infectious Disease, Palliative Care, and Hospitalist Medicine--so that a unified and clear treatment plan and long-term management strategy be proposed, explained, and agreed upon.  I assured them that I would help to facilitate such a meeting.  From a cardiac perspective, as I made clear at the bedside on Monday afternoon, there is no further intervention planned at this time.  Trevin Becker MD

## 2020-01-14 LAB
ALBUMIN SERPL ELPH-MCNC: 3.1 G/DL — LOW (ref 3.3–5)
ALP SERPL-CCNC: 95 U/L — SIGNIFICANT CHANGE UP (ref 40–120)
ALT FLD-CCNC: 11 U/L — SIGNIFICANT CHANGE UP (ref 10–45)
ANION GAP SERPL CALC-SCNC: 15 MMOL/L — SIGNIFICANT CHANGE UP (ref 5–17)
AST SERPL-CCNC: 13 U/L — SIGNIFICANT CHANGE UP (ref 10–40)
BASOPHILS # BLD AUTO: 0 K/UL — SIGNIFICANT CHANGE UP (ref 0–0.2)
BASOPHILS NFR BLD AUTO: 0 % — SIGNIFICANT CHANGE UP (ref 0–2)
BILIRUB SERPL-MCNC: 4 MG/DL — HIGH (ref 0.2–1.2)
BUN SERPL-MCNC: 132 MG/DL — HIGH (ref 7–23)
CALCIUM SERPL-MCNC: 8.3 MG/DL — LOW (ref 8.4–10.5)
CHLORIDE SERPL-SCNC: 101 MMOL/L — SIGNIFICANT CHANGE UP (ref 96–108)
CO2 SERPL-SCNC: 16 MMOL/L — LOW (ref 22–31)
CREAT SERPL-MCNC: 1.7 MG/DL — HIGH (ref 0.5–1.3)
EOSINOPHIL # BLD AUTO: 0 K/UL — SIGNIFICANT CHANGE UP (ref 0–0.5)
EOSINOPHIL NFR BLD AUTO: 0 % — SIGNIFICANT CHANGE UP (ref 0–6)
GLUCOSE BLDC GLUCOMTR-MCNC: 151 MG/DL — HIGH (ref 70–99)
GLUCOSE BLDC GLUCOMTR-MCNC: 206 MG/DL — HIGH (ref 70–99)
GLUCOSE BLDC GLUCOMTR-MCNC: 98 MG/DL — SIGNIFICANT CHANGE UP (ref 70–99)
GLUCOSE BLDC GLUCOMTR-MCNC: 98 MG/DL — SIGNIFICANT CHANGE UP (ref 70–99)
GLUCOSE SERPL-MCNC: 144 MG/DL — HIGH (ref 70–99)
HCT VFR BLD CALC: 24.1 % — LOW (ref 39–50)
HGB BLD-MCNC: 7.9 G/DL — LOW (ref 13–17)
LYMPHOCYTES # BLD AUTO: 20 % — SIGNIFICANT CHANGE UP (ref 13–44)
LYMPHOCYTES # BLD AUTO: 3 K/UL — SIGNIFICANT CHANGE UP (ref 1–3.3)
MANUAL SMEAR VERIFICATION: SIGNIFICANT CHANGE UP
MCHC RBC-ENTMCNC: 32.8 GM/DL — SIGNIFICANT CHANGE UP (ref 32–36)
MCHC RBC-ENTMCNC: 33.2 PG — SIGNIFICANT CHANGE UP (ref 27–34)
MCV RBC AUTO: 101.3 FL — HIGH (ref 80–100)
MONOCYTES # BLD AUTO: 0.75 K/UL — SIGNIFICANT CHANGE UP (ref 0–0.9)
MONOCYTES NFR BLD AUTO: 5 % — SIGNIFICANT CHANGE UP (ref 2–14)
MYELOCYTES NFR BLD: 3 % — HIGH (ref 0–0)
NEUTROPHILS # BLD AUTO: 10.81 K/UL — HIGH (ref 1.8–7.4)
NEUTROPHILS NFR BLD AUTO: 72 % — SIGNIFICANT CHANGE UP (ref 43–77)
NRBC # BLD: 2 /100 — HIGH (ref 0–0)
PLAT MORPH BLD: NORMAL — SIGNIFICANT CHANGE UP
PLATELET # BLD AUTO: 79 K/UL — LOW (ref 150–400)
POTASSIUM SERPL-MCNC: 4.7 MMOL/L — SIGNIFICANT CHANGE UP (ref 3.5–5.3)
POTASSIUM SERPL-SCNC: 4.7 MMOL/L — SIGNIFICANT CHANGE UP (ref 3.5–5.3)
PROT SERPL-MCNC: 5.9 G/DL — LOW (ref 6–8.3)
RBC # BLD: 2.38 M/UL — LOW (ref 4.2–5.8)
RBC # FLD: 23.6 % — HIGH (ref 10.3–14.5)
RBC BLD AUTO: SIGNIFICANT CHANGE UP
SODIUM SERPL-SCNC: 132 MMOL/L — LOW (ref 135–145)
WBC # BLD: 15.01 K/UL — HIGH (ref 3.8–10.5)
WBC # FLD AUTO: 15.01 K/UL — HIGH (ref 3.8–10.5)

## 2020-01-14 PROCEDURE — 99232 SBSQ HOSP IP/OBS MODERATE 35: CPT | Mod: GC

## 2020-01-14 PROCEDURE — 99232 SBSQ HOSP IP/OBS MODERATE 35: CPT

## 2020-01-14 PROCEDURE — 99233 SBSQ HOSP IP/OBS HIGH 50: CPT

## 2020-01-14 RX ORDER — CIPROFLOXACIN LACTATE 400MG/40ML
500 VIAL (ML) INTRAVENOUS EVERY 12 HOURS
Refills: 0 | Status: DISCONTINUED | OUTPATIENT
Start: 2020-01-14 | End: 2020-01-15

## 2020-01-14 RX ADMIN — Medication 325 MILLIGRAM(S): at 11:31

## 2020-01-14 RX ADMIN — Medication 3 MILLIGRAM(S): at 21:51

## 2020-01-14 RX ADMIN — INSULIN GLARGINE 10 UNIT(S): 100 INJECTION, SOLUTION SUBCUTANEOUS at 21:51

## 2020-01-14 RX ADMIN — Medication 1 MILLIGRAM(S): at 11:31

## 2020-01-14 RX ADMIN — PANTOPRAZOLE SODIUM 40 MILLIGRAM(S): 20 TABLET, DELAYED RELEASE ORAL at 17:46

## 2020-01-14 RX ADMIN — SIMVASTATIN 40 MILLIGRAM(S): 20 TABLET, FILM COATED ORAL at 21:51

## 2020-01-14 RX ADMIN — Medication 50 MICROGRAM(S): at 06:00

## 2020-01-14 RX ADMIN — Medication 500 MILLIGRAM(S): at 17:46

## 2020-01-14 RX ADMIN — Medication 1 SPRAY(S): at 06:00

## 2020-01-14 RX ADMIN — Medication 7 UNIT(S): at 11:54

## 2020-01-14 RX ADMIN — Medication 5: at 08:26

## 2020-01-14 RX ADMIN — SENNA PLUS 1 TABLET(S): 8.6 TABLET ORAL at 21:51

## 2020-01-14 RX ADMIN — Medication 7 UNIT(S): at 08:26

## 2020-01-14 RX ADMIN — Medication 60 MILLIGRAM(S): at 06:00

## 2020-01-14 RX ADMIN — Medication 8: at 21:51

## 2020-01-14 RX ADMIN — Medication 81 MILLIGRAM(S): at 11:31

## 2020-01-14 RX ADMIN — TIOTROPIUM BROMIDE 1 CAPSULE(S): 18 CAPSULE ORAL; RESPIRATORY (INHALATION) at 11:31

## 2020-01-14 RX ADMIN — Medication 40 MILLIGRAM(S): at 06:00

## 2020-01-14 RX ADMIN — MEROPENEM 100 MILLIGRAM(S): 1 INJECTION INTRAVENOUS at 06:00

## 2020-01-14 RX ADMIN — PANTOPRAZOLE SODIUM 40 MILLIGRAM(S): 20 TABLET, DELAYED RELEASE ORAL at 06:00

## 2020-01-14 NOTE — PROGRESS NOTE ADULT - ASSESSMENT
This is a pleasant 85 yo M PMHx of Afib (Coumadin), DM2, HLD, CAD (stent 2014 and 2015 in mLAD and RCA), HFrEF (40%), severe AS s/p balloon aortic valvuloplasty (12/26) here with acute blood loss anemia from left groin hematoma, warm agglutinin autoimmune hemolytic anemia, and enterobacter bacteremia. Currently pending home hospice.

## 2020-01-14 NOTE — PROGRESS NOTE ADULT - ATTENDING COMMENTS
This patient had recent bacteremia and sepsis and a positive Alisson test in the setting of bacteremia. He is now responding to antibiotics and to sterid and folate. We must remember that alisson testing may be falsely positive in the setting of infection due to poly specific IgG deposition on red cells. He has responded to treatment with antibiotics and family is in agreement with hospice care. Goals of care were discussed with this patient.

## 2020-01-14 NOTE — PROGRESS NOTE ADULT - SUBJECTIVE AND OBJECTIVE BOX
HPI:  83 yo M with hx Afib (Coumadin), DM2, HLD, CAD (stent 2014 and 2015 in mLAD and RCA), HFrEF (40%) severe AS, HFrEF  30%, LBBB, baseline SCr 1.4, and Autoimmune hemolytic who was admitted from 12/13 -1/3 and optimized with diuretics per Heart failure service, planned for TAVR however underwent  balloon aortic valvuloplasty on 12/26 and transfused 1 U PRBC. Post-procedure course complicated by MAI, hyperglycemia, and anemia 2/2 AIHA on prednisone and followed by hematology.  He was restarted on Coumadin, received 1 U PRBC per heme on 1/2/20 and was discharged to [facility] facility on 1/3/20.   He continued on  Torsemide 40mg po and prednisone 60mg daily  per heme & diuretics.  He returned to the ER 1/5 due to symptomatic anemia related to reported bleeding from L groin access site on 1/4/20.  Patient states that he had bleeding on 1/4/20 and the staff applied pressure and a band-aid and the bleeding stopped.  Day of admission he felt weak, short of breath, and dizzy and the rehab initiated transfer for further work-up.   In ER, patient was noted to have H+H of 5/15 downtrending from discharge values of 6.9/20.6 and noted to have a egg sized hematoma in the left groin.  Right groin stable with pea size firmness at access site.       INTERVAL EVENTS:  1/14: patient comfortable, sleeping    ADVANCE DIRECTIVES:    DNR  yes  MOLST  [x ] 1/10/20    Living Will  [x ]     DECISION MAKER(s):  [x ] Health Care Proxy(s)  [ ] Surrogate(s)  [ ] Guardian           Name(s): Phone Number(s): 1-  Charisse Natalie [phone]/ 2- Bryan Rosenbaum [phone]    BASELINE (I)ADL(s) (prior to admission):  [place]: [ ]Total  [x ] Moderate [ ]Dependent    Allergies    No Known Allergies    Intolerances    MEDICATIONS  (STANDING):  aspirin enteric coated 81 milliGRAM(s) Oral daily  ciprofloxacin     Tablet 500 milliGRAM(s) Oral every 12 hours  dextrose 5%. 1000 milliLiter(s) (50 mL/Hr) IV Continuous <Continuous>  dextrose 50% Injectable 12.5 Gram(s) IV Push once  dextrose 50% Injectable 25 Gram(s) IV Push once  dextrose 50% Injectable 25 Gram(s) IV Push once  ferrous    sulfate 325 milliGRAM(s) Oral daily  fluticasone propionate 50 MICROgram(s)/spray Nasal Spray 1 Spray(s) Both Nostrils two times a day  folic acid 1 milliGRAM(s) Oral daily  insulin glargine Injectable (LANTUS) 10 Unit(s) SubCutaneous at bedtime  insulin lispro (HumaLOG) corrective regimen sliding scale   SubCutaneous Before meals and at bedtime  insulin lispro Injectable (HumaLOG) 7 Unit(s) SubCutaneous three times a day with meals  levothyroxine 50 MICROGram(s) Oral daily  melatonin 3 milliGRAM(s) Oral at bedtime  pantoprazole    Tablet 40 milliGRAM(s) Oral two times a day  predniSONE   Tablet 60 milliGRAM(s) Oral daily  senna 1 Tablet(s) Oral at bedtime  simvastatin 40 milliGRAM(s) Oral at bedtime  tiotropium 18 MICROgram(s) Capsule 1 Capsule(s) Inhalation daily  torsemide 40 milliGRAM(s) Oral daily    MEDICATIONS  (PRN):  ALBUTerol    90 MICROgram(s) HFA Inhaler 2 Puff(s) Inhalation every 6 hours PRN Shortness of Breath and/or Wheezing  dextrose 40% Gel 15 Gram(s) Oral once PRN Blood Glucose LESS THAN 70 milliGRAM(s)/deciliter  glucagon  Injectable 1 milliGRAM(s) IntraMuscular once PRN Glucose LESS THAN 70 milligrams/deciliter  polyethylene glycol 3350 17 Gram(s) Oral daily PRN Constipation    PRESENT SYMPTOMS: [x ]Unable to obtain due to poor mentation  pt does not want to answer questions states "i want to die"  He instructs me to speak with Charisse for any information I need.    Source if other than patient:  [ ]Family   [ ]Team     Pain: [ ]yes [x ]no  QOL impact -   Location -                    Aggravating factors -  Quality -  Radiation -  Timing-  Severity (0-10 scale):  Minimal acceptable level (0-10 scale):     PAIN AD Score:     http://geriatrictoolkit.[place].[email or web address]/cog/painad.pdf (press ctrl +  left click to view)    Dyspnea:                           [ ]Mild [ ]Moderate [ ]Severe  Anxiety:                             [ ]Mild [ ]Moderate [ ]Severe  Fatigue:                             [ ]Mild [ ]Moderate [ x]Severe  Nausea:                            [ ]Mild [ ]Moderate [ ]Severe  Loss of appetite:              [x ]Mild [ ]Moderate [ ]Severe  Constipation:                    [ ]Mild [ ]Moderate [ ]Severe    Other Symptoms:  [ ]All other review of systems negative     Palliative Performance Status Version 2:       10-20  %    http://[facility].org/files/news/palliative_performance_scale_ppsv2.pdf    PHYSICAL EXAM:  Vital Signs Last 24 Hrs  T(C): 36.7 (14 Jan 2020 13:57), Max: 36.7 (14 Jan 2020 13:57)  T(F): 98.1 (14 Jan 2020 13:57), Max: 98.1 (14 Jan 2020 13:57)  HR: 81 (14 Jan 2020 13:57) (81 - 104)  BP: 97/49 (14 Jan 2020 13:57) (97/49 - 107/68)  BP(mean): 81 (14 Jan 2020 05:00) (67 - 81)  RR: 18 (14 Jan 2020 13:57) (18 - 20)  SpO2: 93% (14 Jan 2020 13:57) (93% - 100%)      GENERAL:  [x ]Alert  [ ]Oriented x   [x ]Lethargic  [x ]Cachexia  [ ]Unarousable  [ x]Verbal  [ ]Non-Verbal    Behavioral:   [ ] Anxiety  [ ] Delirium [ ] Agitation [ ] Other    HEENT:  [ ]Normal   [x ]Dry mouth   [ ]ET Tube/Trach  [ ]Oral lesions    PULMONARY:   [ x]Clear [ ]Tachypnea  [ ]Audible excessive secretions   [ ]Rhonchi        [ ]Right [ ]Left [ ]Bilateral  [ ]Crackles        [ ]Right [ ]Left [ ]Bilateral  [ ]Wheezing     [ ]Right [ ]Left [ ]Bilatera  [ ]Diminished breath sounds [ ]right [ ]left [ ]bilateral    CARDIOVASCULAR:    [ x]Regular [ ]Irregular [ x]Tachy  [ ]Freddy [ ]Murmur [ ]Other    GASTROINTESTINAL:  [x]Soft  [ ]Distended   [x ]+BS  [x ]Non tender [ ]Tender  [ ]PEG [ ]OGT/ NGT  Last BM:     GENITOURINARY:  [ ]Normal [x ] Incontinent   [ ]Oliguria/Anuria   [ ]Jackson    [ ]External cath    MUSCULOSKELETAL:   [ ]Normal   [x ]Weakness  [x ]Bed/Wheelchair bound [ ]Edema    NEUROLOGIC:   [x ]No focal deficits  [ ]Cognitive impairment  [ ]Dysphagia [ ]Dysarthria [ ]Paresis [ ]Other     SKIN:   [x ]Normal    [ ]Rash  [ ]Pressure ulcer(s)       Present on admission [ ]y [ ]n    CRITICAL CARE:  [ ]Shock Present  [ ]Septic [ ]Cardiogenic [ ]Neurologic [ ]Hypovolemic  [ ]  Vasopressors [ ]  Inotropes   [ ]Respiratory failure present [ ]Mechanical ventilation [ ]Non-invasive ventilatory support [ ]High flow  [ ]Acute  [ ]Chronic [ ]Hypoxic  [ ]Hypercarbic [ ]Other  [ ]Other organ failure     LABS:                                 7.9    15.01 )-----------( 79       ( 14 Jan 2020 09:03 )             24.1     01-14    132<L>  |  101  |  132<H>  ----------------------------<  144<H>  4.7   |  16<L>  |  1.70<H>    Ca    8.3<L>      14 Jan 2020 09:03  Phos  5.4     01-13  Mg     2.3     01-13    RADIOLOGY & ADDITIONAL STUDIES:    PROTEIN CALORIE MALNUTRITION PRESENT: [ ]mild [ ]moderate [ ]severe [ ]underweight [ ]morbid obesity  https://www.andeal.org/vault/2440/web/files/ONC/Table_Clinical%20Characteristics%20to%20Document%20Malnutrition-White%20JV%20et%20al%205203.pdf    Height (cm): 165.1 (01-09-20 @ 20:00), 165.1 (12-13-19 @ 07:31)  Weight (kg): 63.5 (01-05-20 @ 18:34), 58.5 (12-13-19 @ 07:31)  BMI (kg/m2): 23.3 (01-09-20 @ 20:00), 23.3 (01-05-20 @ 18:34), 21.5 (12-13-19 @ 07:31)    [x ]PPSV2 < or = to 30% [ ]significant weight loss  [x ]poor nutritional intake  [ ]anasarca     Albumin, Serum: 3.1 g/dL (01-10-20 @ 00:57)   [ ]Artificial Nutrition      REFERRALS:   [ ]Chaplaincy  [ ]Hospice  [ ]Child Life  [ ]Social Work  [ ]Case management [ ]Holistic Therapy     Goals of Care Document:

## 2020-01-14 NOTE — PROGRESS NOTE ADULT - SUBJECTIVE AND OBJECTIVE BOX
INTERVAL HPI/OVERNIGHT EVENTS:  Patient S&E at bedside with his health care proxy brandon    VITAL SIGNS:  T(F): 98.1 (01-14-20 @ 13:57)  HR: 81 (01-14-20 @ 13:57)  BP: 97/49 (01-14-20 @ 13:57)  RR: 18 (01-14-20 @ 13:57)  SpO2: 93% (01-14-20 @ 13:57)  Wt(kg): --    PHYSICAL EXAM:    Constitutional: appears chronic ill   Eyes: EOMI, sclera non-icteric  Neck: supple, no masses, no JVD  Respiratory: CTA b/l, good air entry b/l  Cardiovascular: RRR, no M/R/G  Gastrointestinal: soft, NTND, no masses palpable, + BS, no hepatosplenomegaly  Extremities: no c/c/e  Neurological: AAOx3      MEDICATIONS  (STANDING):  aspirin enteric coated 81 milliGRAM(s) Oral daily  ciprofloxacin     Tablet 500 milliGRAM(s) Oral every 12 hours  dextrose 5%. 1000 milliLiter(s) (50 mL/Hr) IV Continuous <Continuous>  dextrose 50% Injectable 12.5 Gram(s) IV Push once  dextrose 50% Injectable 25 Gram(s) IV Push once  dextrose 50% Injectable 25 Gram(s) IV Push once  ferrous    sulfate 325 milliGRAM(s) Oral daily  fluticasone propionate 50 MICROgram(s)/spray Nasal Spray 1 Spray(s) Both Nostrils two times a day  folic acid 1 milliGRAM(s) Oral daily  insulin glargine Injectable (LANTUS) 10 Unit(s) SubCutaneous at bedtime  insulin lispro (HumaLOG) corrective regimen sliding scale   SubCutaneous Before meals and at bedtime  insulin lispro Injectable (HumaLOG) 7 Unit(s) SubCutaneous three times a day with meals  levothyroxine 50 MICROGram(s) Oral daily  melatonin 3 milliGRAM(s) Oral at bedtime  pantoprazole    Tablet 40 milliGRAM(s) Oral two times a day  predniSONE   Tablet 60 milliGRAM(s) Oral daily  senna 1 Tablet(s) Oral at bedtime  simvastatin 40 milliGRAM(s) Oral at bedtime  tiotropium 18 MICROgram(s) Capsule 1 Capsule(s) Inhalation daily  torsemide 40 milliGRAM(s) Oral daily    MEDICATIONS  (PRN):  ALBUTerol    90 MICROgram(s) HFA Inhaler 2 Puff(s) Inhalation every 6 hours PRN Shortness of Breath and/or Wheezing  dextrose 40% Gel 15 Gram(s) Oral once PRN Blood Glucose LESS THAN 70 milliGRAM(s)/deciliter  glucagon  Injectable 1 milliGRAM(s) IntraMuscular once PRN Glucose LESS THAN 70 milligrams/deciliter  polyethylene glycol 3350 17 Gram(s) Oral daily PRN Constipation      Allergies    No Known Allergies    Intolerances        LABS:                        7.9    15.01 )-----------( 79       ( 14 Jan 2020 09:03 )             24.1     01-14    132<L>  |  101  |  132<H>  ----------------------------<  144<H>  4.7   |  16<L>  |  1.70<H>    Ca    8.3<L>      14 Jan 2020 09:03  Phos  5.4     01-13  Mg     2.3     01-13    TPro  5.9<L>  /  Alb  3.1<L>  /  TBili  4.0<H>  /  DBili  x   /  AST  13  /  ALT  11  /  AlkPhos  95  01-14          RADIOLOGY & ADDITIONAL TESTS:  Studies reviewed.

## 2020-01-14 NOTE — PROGRESS NOTE ADULT - PROBLEM SELECTOR PLAN 4
- planning for joint meeting with ID, hematology, cardiology, and palliative care to discuss overall goals  - will continue to follow

## 2020-01-14 NOTE — PROGRESS NOTE ADULT - SUBJECTIVE AND OBJECTIVE BOX
I    SUBJECTIVE / OVERNIGHT EVENTS: pt denies chest pain, shortness of breath , wnat to go home      MEDICATIONS  (STANDING):  aspirin enteric coated 81 milliGRAM(s) Oral daily  ciprofloxacin     Tablet 500 milliGRAM(s) Oral every 12 hours  dextrose 5%. 1000 milliLiter(s) (50 mL/Hr) IV Continuous <Continuous>  dextrose 50% Injectable 12.5 Gram(s) IV Push once  dextrose 50% Injectable 25 Gram(s) IV Push once  dextrose 50% Injectable 25 Gram(s) IV Push once  ferrous    sulfate 325 milliGRAM(s) Oral daily  fluticasone propionate 50 MICROgram(s)/spray Nasal Spray 1 Spray(s) Both Nostrils two times a day  folic acid 1 milliGRAM(s) Oral daily  insulin glargine Injectable (LANTUS) 10 Unit(s) SubCutaneous at bedtime  insulin lispro (HumaLOG) corrective regimen sliding scale   SubCutaneous Before meals and at bedtime  insulin lispro Injectable (HumaLOG) 7 Unit(s) SubCutaneous three times a day with meals  levothyroxine 50 MICROGram(s) Oral daily  melatonin 3 milliGRAM(s) Oral at bedtime  pantoprazole    Tablet 40 milliGRAM(s) Oral two times a day  predniSONE   Tablet 60 milliGRAM(s) Oral daily  senna 1 Tablet(s) Oral at bedtime  simvastatin 40 milliGRAM(s) Oral at bedtime  tiotropium 18 MICROgram(s) Capsule 1 Capsule(s) Inhalation daily  torsemide 40 milliGRAM(s) Oral daily    MEDICATIONS  (PRN):  ALBUTerol    90 MICROgram(s) HFA Inhaler 2 Puff(s) Inhalation every 6 hours PRN Shortness of Breath and/or Wheezing  dextrose 40% Gel 15 Gram(s) Oral once PRN Blood Glucose LESS THAN 70 milliGRAM(s)/deciliter  glucagon  Injectable 1 milliGRAM(s) IntraMuscular once PRN Glucose LESS THAN 70 milligrams/deciliter  polyethylene glycol 3350 17 Gram(s) Oral daily PRN Constipation    Vital Signs Last 24 Hrs  T(C): 37 (14 Jan 2020 20:19), Max: 37 (14 Jan 2020 20:19)  T(F): 98.6 (14 Jan 2020 20:19), Max: 98.6 (14 Jan 2020 20:19)  HR: 95 (14 Jan 2020 20:19) (81 - 101)  BP: 99/51 (14 Jan 2020 20:19) (97/49 - 107/68)  BP(mean): 67 (14 Jan 2020 20:19) (67 - 81)  RR: 18 (14 Jan 2020 20:19) (18 - 18)  SpO2: 97% (14 Jan 2020 20:19) (93% - 100%)    CAPILLARY BLOOD GLUCOSE      POCT Blood Glucose.: 206 mg/dL (14 Jan 2020 21:18)  POCT Blood Glucose.: 98 mg/dL (14 Jan 2020 17:00)  POCT Blood Glucose.: 98 mg/dL (14 Jan 2020 11:34)  POCT Blood Glucose.: 151 mg/dL (14 Jan 2020 07:39)    I&O's Summary    13 Jan 2020 07:01  -  14 Jan 2020 07:00  --------------------------------------------------------  IN: 800 mL / OUT: 1550 mL / NET: -750 mL    14 Jan 2020 07:01  -  14 Jan 2020 23:47  --------------------------------------------------------  IN: 940 mL / OUT: 850 mL / NET: 90 mL        Constitutional: No fever, fatigue  Skin: No rash.  Eyes: No recent vision problems or eye pain.  ENT: No congestion, ear pain, or sore throat.  Cardiovascular: No chest pain or palpation.  Respiratory: No cough, shortness of breath, congestion, or wheezing.  Gastrointestinal: No abdominal pain, nausea, vomiting, or diarrhea.  Genitourinary: No dysuria.  Musculoskeletal: No joint swelling.  Neurologic: No headache.    PHYSICAL EXAM:  GENERAL: NAD  EYES: EOMI, PERRLA  NECK: Supple, No JVD  CHEST/LUNG: dec breath sounds at bases   HEART:  S1 , S2 +  ABDOMEN: soft , bs+  EXTREMITIES:  no edema  NEUROLOGY:alert awake communicative      LABS:                        7.9    15.01 )-----------( 79       ( 14 Jan 2020 09:03 )             24.1     01-14    132<L>  |  101  |  132<H>  ----------------------------<  144<H>  4.7   |  16<L>  |  1.70<H>    Ca    8.3<L>      14 Jan 2020 09:03  Phos  5.4     01-13  Mg     2.3     01-13    TPro  5.9<L>  /  Alb  3.1<L>  /  TBili  4.0<H>  /  DBili  x   /  AST  13  /  ALT  11  /  AlkPhos  95  01-14        dTPro  5.2<L>  /  Alb  2.6<L>  /  TBili  4.2<H>  /  DBili  x   /  AST  17  /  ALT  18  /  AlkPhos  76  01-11

## 2020-01-14 NOTE — PROGRESS NOTE ADULT - SUBJECTIVE AND OBJECTIVE BOX
infectious diseases progress note:    Patient is a 84y old  Male who presents with a chief complaint of Anemia (13 Jan 2020 16:40)        Anemia        ROS:  CONSTITUTIONAL:  Negative fever or chills, feels well, good appetite  EYES:  Negative  blurry vision or double vision  CARDIOVASCULAR:  Negative for chest pain or palpitations  RESPIRATORY:  Negative for cough, wheezing, or SOB   GASTROINTESTINAL:  Negative for nausea, vomiting, diarrhea, constipation, or abdominal pain  GENITOURINARY:  Negative frequency, urgency or dysuria  NEUROLOGIC:  No headache, confusion, dizziness, lightheadedness    Allergies    No Known Allergies    Intolerances        ANTIBIOTICS/RELEVANT:  antimicrobials  meropenem  IVPB 500 milliGRAM(s) IV Intermittent every 8 hours    immunologic:    OTHER:  ALBUTerol    90 MICROgram(s) HFA Inhaler 2 Puff(s) Inhalation every 6 hours PRN  aspirin enteric coated 81 milliGRAM(s) Oral daily  dextrose 40% Gel 15 Gram(s) Oral once PRN  dextrose 5%. 1000 milliLiter(s) IV Continuous <Continuous>  dextrose 50% Injectable 12.5 Gram(s) IV Push once  dextrose 50% Injectable 25 Gram(s) IV Push once  dextrose 50% Injectable 25 Gram(s) IV Push once  ferrous    sulfate 325 milliGRAM(s) Oral daily  fluticasone propionate 50 MICROgram(s)/spray Nasal Spray 1 Spray(s) Both Nostrils two times a day  folic acid 1 milliGRAM(s) Oral daily  glucagon  Injectable 1 milliGRAM(s) IntraMuscular once PRN  insulin glargine Injectable (LANTUS) 10 Unit(s) SubCutaneous at bedtime  insulin lispro (HumaLOG) corrective regimen sliding scale   SubCutaneous Before meals and at bedtime  insulin lispro Injectable (HumaLOG) 7 Unit(s) SubCutaneous three times a day with meals  levothyroxine 50 MICROGram(s) Oral daily  melatonin 3 milliGRAM(s) Oral at bedtime  pantoprazole    Tablet 40 milliGRAM(s) Oral two times a day  polyethylene glycol 3350 17 Gram(s) Oral daily PRN  predniSONE   Tablet 60 milliGRAM(s) Oral daily  senna 1 Tablet(s) Oral at bedtime  simvastatin 40 milliGRAM(s) Oral at bedtime  tiotropium 18 MICROgram(s) Capsule 1 Capsule(s) Inhalation daily  torsemide 40 milliGRAM(s) Oral daily      Objective:  Vital Signs Last 24 Hrs  T(C): 36.3 (14 Jan 2020 05:00), Max: 36.4 (13 Jan 2020 12:27)  T(F): 97.4 (14 Jan 2020 05:00), Max: 97.5 (13 Jan 2020 12:27)  HR: 101 (14 Jan 2020 05:00) (101 - 104)  BP: 107/68 (14 Jan 2020 05:00) (98/51 - 110/61)  BP(mean): 81 (14 Jan 2020 05:00) (67 - 81)  RR: 18 (14 Jan 2020 05:00) (18 - 20)  SpO2: 100% (14 Jan 2020 05:00) (100% - 100%)    PHYSICAL EXAM:  Constitutional:Well-developed, well nourished--no acute distress  Eyes:RIYA, EOMI  Ear/Nose/Throat: no oral lesion, no sinus tenderness on percussion	  Neck:no JVD, no lymphadenopathy, supple  Respiratory: CTA azalia  Cardiovascular: S1S2 RRR, no murmurs  Gastrointestinal:soft, (+) BS, no HSM  Extremities:no e/e/c        LABS:                        7.6    16.61 )-----------( 70       ( 13 Jan 2020 09:08 )             22.7     01-13    133<L>  |  100  |  131<H>  ----------------------------<  152<H>  4.2   |  18<L>  |  1.59<H>    Ca    8.4      13 Jan 2020 09:08  Phos  5.4     01-13  Mg     2.3     01-13    TPro  5.9<L>  /  Alb  3.2<L>  /  TBili  4.0<H>  /  DBili  x   /  AST  15  /  ALT  9<L>  /  AlkPhos  97  01-13            MICROBIOLOGY:    RECENT CULTURES:  01-08 @ 12:35 .Blood Blood                No growth at 5 days.          RESPIRATORY CULTURES:              RADIOLOGY & ADDITIONAL STUDIES:        Pager [phone]  After 5 pm/weekends or if no response :[phone]

## 2020-01-14 NOTE — PROGRESS NOTE ADULT - PROBLEM SELECTOR PLAN 7
Patient does not want to use insulin at home if he doesn't have to.  Once prednisone is stopped, sugars will begin to normalize.

## 2020-01-14 NOTE — PROGRESS NOTE ADULT - PROBLEM SELECTOR PLAN 1
stable at present   heme f/u   ? heme to decide about role of transfusion +immunosuppressants given pts comorbid conditions - appears risk outweighs benefits if the therapy - heme to decide

## 2020-01-14 NOTE — PROGRESS NOTE ADULT - ASSESSMENT
84M PMH A-fib (on coumadin), DM II, HLD, CAD (s/p stents 2014, 215), HF, Severe AS with associated hemolytic anemia, LBBB, presenting with anemia. Found on imaging to have LLE hematoma      pt with a [id number] hematoma over left groin   - bc positive for enterobacter  no signs s of UTI, pneumonia or endocarditis      suspect enterobacter related to groin pseudoaneurysm and hematoma.  pt has 8/8 bc positive for enterobacter  most likely intravascular source endo also possible   discussed Dr. Becker  [facility] wants to avoid invasive procedures  not candidate fo aggressive surgery  palliative approach planned   prognosis guarded                follow up bc one time negative   no jase planned  will need prolonged ab  organism is cipro sensitive  discussed with daughter who understands the prognosis and want to take him home    dc meropenem  cipro 500 mg po until feb 5

## 2020-01-14 NOTE — PROGRESS NOTE ADULT - NSHPATTENDINGPLANDISCUSS_GEN_ALL_CORE
CTS
CTS
patient present; his niece health care proxy, home attendant, Dr Saravia hematology
Patient, Dr Abdalla, Family (HCP) at Bedside
team

## 2020-01-14 NOTE — PROGRESS NOTE ADULT - ASSESSMENT
85 yo male with CAD,afib, severe AS with hemolytic anemia, CHF, multiple recent admissions.  Now admitted with groin pain, anemia.  found to have hematoma of the R groin. not a surgical candidate s/p bedside thrombin injection. Palliative called for GOC.

## 2020-01-14 NOTE — PROGRESS NOTE ADULT - PROBLEM SELECTOR PLAN 1
- likely infectious source, per ID unlikely to achieve source control  - c/w meropenem for now  - monitor WBC

## 2020-01-15 ENCOUNTER — TRANSCRIPTION ENCOUNTER (OUTPATIENT)
Age: 85
End: 2020-01-15

## 2020-01-15 VITALS
TEMPERATURE: 98 F | RESPIRATION RATE: 18 BRPM | DIASTOLIC BLOOD PRESSURE: 62 MMHG | OXYGEN SATURATION: 95 % | SYSTOLIC BLOOD PRESSURE: 101 MMHG | HEART RATE: 67 BPM

## 2020-01-15 LAB
GLUCOSE BLDC GLUCOMTR-MCNC: 108 MG/DL — HIGH (ref 70–99)
GLUCOSE BLDC GLUCOMTR-MCNC: 171 MG/DL — HIGH (ref 70–99)

## 2020-01-15 PROCEDURE — 94640 AIRWAY INHALATION TREATMENT: CPT

## 2020-01-15 PROCEDURE — P9040: CPT

## 2020-01-15 PROCEDURE — 36430 TRANSFUSION BLD/BLD COMPNT: CPT

## 2020-01-15 PROCEDURE — 84145 PROCALCITONIN (PCT): CPT

## 2020-01-15 PROCEDURE — 99291 CRITICAL CARE FIRST HOUR: CPT | Mod: 25

## 2020-01-15 PROCEDURE — 83615 LACTATE (LD) (LDH) ENZYME: CPT

## 2020-01-15 PROCEDURE — 82247 BILIRUBIN TOTAL: CPT

## 2020-01-15 PROCEDURE — 87186 SC STD MICRODIL/AGAR DIL: CPT

## 2020-01-15 PROCEDURE — 80048 BASIC METABOLIC PNL TOTAL CA: CPT

## 2020-01-15 PROCEDURE — 37252 INTRVASC US NONCORONARY 1ST: CPT

## 2020-01-15 PROCEDURE — 85730 THROMBOPLASTIN TIME PARTIAL: CPT

## 2020-01-15 PROCEDURE — 86157 COLD AGGLUTININ TITER: CPT

## 2020-01-15 PROCEDURE — 80053 COMPREHEN METABOLIC PANEL: CPT

## 2020-01-15 PROCEDURE — 87150 DNA/RNA AMPLIFIED PROBE: CPT

## 2020-01-15 PROCEDURE — 84443 ASSAY THYROID STIM HORMONE: CPT

## 2020-01-15 PROCEDURE — 81001 URINALYSIS AUTO W/SCOPE: CPT

## 2020-01-15 PROCEDURE — C8929: CPT

## 2020-01-15 PROCEDURE — 85045 AUTOMATED RETICULOCYTE COUNT: CPT

## 2020-01-15 PROCEDURE — 84100 ASSAY OF PHOSPHORUS: CPT

## 2020-01-15 PROCEDURE — 86922 COMPATIBILITY TEST ANTIGLOB: CPT

## 2020-01-15 PROCEDURE — 83735 ASSAY OF MAGNESIUM: CPT

## 2020-01-15 PROCEDURE — 86850 RBC ANTIBODY SCREEN: CPT

## 2020-01-15 PROCEDURE — 71045 X-RAY EXAM CHEST 1 VIEW: CPT

## 2020-01-15 PROCEDURE — 83010 ASSAY OF HAPTOGLOBIN QUANT: CPT

## 2020-01-15 PROCEDURE — 87040 BLOOD CULTURE FOR BACTERIA: CPT

## 2020-01-15 PROCEDURE — 93926 LOWER EXTREMITY STUDY: CPT

## 2020-01-15 PROCEDURE — 86901 BLOOD TYPING SEROLOGIC RH(D): CPT

## 2020-01-15 PROCEDURE — 82962 GLUCOSE BLOOD TEST: CPT

## 2020-01-15 PROCEDURE — 74176 CT ABD & PELVIS W/O CONTRAST: CPT

## 2020-01-15 PROCEDURE — 99238 HOSP IP/OBS DSCHRG MGMT 30/<: CPT

## 2020-01-15 PROCEDURE — 82248 BILIRUBIN DIRECT: CPT

## 2020-01-15 PROCEDURE — 85027 COMPLETE CBC AUTOMATED: CPT

## 2020-01-15 PROCEDURE — 93005 ELECTROCARDIOGRAM TRACING: CPT

## 2020-01-15 PROCEDURE — 86900 BLOOD TYPING SEROLOGIC ABO: CPT

## 2020-01-15 PROCEDURE — 85610 PROTHROMBIN TIME: CPT

## 2020-01-15 RX ORDER — POLYETHYLENE GLYCOL 3350 17 G/17G
17 POWDER, FOR SOLUTION ORAL
Qty: 1 | Refills: 0
Start: 2020-01-15 | End: 2020-02-13

## 2020-01-15 RX ORDER — ASPIRIN/CALCIUM CARB/MAGNESIUM 324 MG
1 TABLET ORAL
Qty: 30 | Refills: 0
Start: 2020-01-15 | End: 2020-02-13

## 2020-01-15 RX ORDER — MECLIZINE HCL 12.5 MG
1 TABLET ORAL
Qty: 0 | Refills: 0 | DISCHARGE

## 2020-01-15 RX ORDER — LANOLIN ALCOHOL/MO/W.PET/CERES
1 CREAM (GRAM) TOPICAL
Qty: 30 | Refills: 0
Start: 2020-01-15 | End: 2020-02-13

## 2020-01-15 RX ORDER — INSULIN LISPRO 100/ML
1 VIAL (ML) SUBCUTANEOUS
Qty: 1 | Refills: 0
Start: 2020-01-15

## 2020-01-15 RX ORDER — TIOTROPIUM BROMIDE 18 UG/1
1 CAPSULE ORAL; RESPIRATORY (INHALATION)
Qty: 1 | Refills: 0
Start: 2020-01-15 | End: 2020-02-13

## 2020-01-15 RX ORDER — INSULIN LISPRO 100/ML
7 VIAL (ML) SUBCUTANEOUS
Qty: 1 | Refills: 0
Start: 2020-01-15

## 2020-01-15 RX ORDER — WARFARIN SODIUM 2.5 MG/1
1 TABLET ORAL
Qty: 0 | Refills: 0 | DISCHARGE

## 2020-01-15 RX ORDER — INSULIN LISPRO 100/ML
1 VIAL (ML) SUBCUTANEOUS
Qty: 1 | Refills: 0
Start: 2020-01-15 | End: 2020-02-13

## 2020-01-15 RX ORDER — ENOXAPARIN SODIUM 100 MG/ML
10 INJECTION SUBCUTANEOUS
Qty: 1 | Refills: 0
Start: 2020-01-15

## 2020-01-15 RX ORDER — CIPROFLOXACIN LACTATE 400MG/40ML
1 VIAL (ML) INTRAVENOUS
Qty: 40 | Refills: 0
Start: 2020-01-15 | End: 2020-02-03

## 2020-01-15 RX ORDER — INSULIN LISPRO 100/ML
7 VIAL (ML) SUBCUTANEOUS
Qty: 0 | Refills: 0 | DISCHARGE

## 2020-01-15 RX ORDER — LEVOTHYROXINE SODIUM 125 MCG
1 TABLET ORAL
Qty: 30 | Refills: 0
Start: 2020-01-15 | End: 2020-02-13

## 2020-01-15 RX ORDER — SIMVASTATIN 20 MG/1
1 TABLET, FILM COATED ORAL
Qty: 0 | Refills: 0 | DISCHARGE
Start: 2020-01-15

## 2020-01-15 RX ORDER — SENNA PLUS 8.6 MG/1
1 TABLET ORAL
Qty: 30 | Refills: 0
Start: 2020-01-15 | End: 2020-02-13

## 2020-01-15 RX ORDER — FLUTICASONE PROPIONATE 50 MCG
1 SPRAY, SUSPENSION NASAL
Qty: 1 | Refills: 0
Start: 2020-01-15 | End: 2020-02-13

## 2020-01-15 RX ORDER — INSULIN LISPRO 100/ML
1 VIAL (ML) SUBCUTANEOUS
Qty: 0 | Refills: 0 | DISCHARGE

## 2020-01-15 RX ORDER — PANTOPRAZOLE SODIUM 20 MG/1
1 TABLET, DELAYED RELEASE ORAL
Qty: 60 | Refills: 0
Start: 2020-01-15 | End: 2020-02-13

## 2020-01-15 RX ADMIN — TIOTROPIUM BROMIDE 1 CAPSULE(S): 18 CAPSULE ORAL; RESPIRATORY (INHALATION) at 11:25

## 2020-01-15 RX ADMIN — Medication 5: at 12:01

## 2020-01-15 RX ADMIN — Medication 1 MILLIGRAM(S): at 11:25

## 2020-01-15 RX ADMIN — Medication 500 MILLIGRAM(S): at 05:31

## 2020-01-15 RX ADMIN — PANTOPRAZOLE SODIUM 40 MILLIGRAM(S): 20 TABLET, DELAYED RELEASE ORAL at 05:31

## 2020-01-15 RX ADMIN — Medication 40 MILLIGRAM(S): at 05:31

## 2020-01-15 RX ADMIN — Medication 7 UNIT(S): at 08:23

## 2020-01-15 RX ADMIN — Medication 325 MILLIGRAM(S): at 11:25

## 2020-01-15 RX ADMIN — Medication 81 MILLIGRAM(S): at 11:25

## 2020-01-15 RX ADMIN — Medication 7 UNIT(S): at 12:02

## 2020-01-15 RX ADMIN — Medication 50 MICROGRAM(S): at 05:31

## 2020-01-15 RX ADMIN — Medication 60 MILLIGRAM(S): at 05:31

## 2020-01-15 NOTE — DISCHARGE NOTE PROVIDER - NSDCCPCAREPLAN_GEN_ALL_CORE_FT
PRINCIPAL DISCHARGE DIAGNOSIS  Diagnosis: Anemia  Assessment and Plan of Treatment:       SECONDARY DISCHARGE DIAGNOSES  Diagnosis: CHF (congestive heart failure)  Assessment and Plan of Treatment: CHF (congestive heart failure)    Diagnosis: DM (diabetes mellitus)  Assessment and Plan of Treatment: DM (diabetes mellitus)    Diagnosis: Groin hematoma  Assessment and Plan of Treatment: PRINCIPAL DISCHARGE DIAGNOSIS  Diagnosis: Anemia  Assessment and Plan of Treatment: !      SECONDARY DISCHARGE DIAGNOSES  Diagnosis: CHF (congestive heart failure)  Assessment and Plan of Treatment: CHF (congestive heart failure)  Weigh yourself daily.  If you gain 3lbs in 3 days, or 5lbs in a week call your Health Care Provider.  Do not eat or drink foods containing more than 2000mg of salt (sodium) in your diet every day.  Call your Health Care Provider if you have any swelling or increased swelling in your feet, ankles, and/or stomach.  Take all of your medication as directed.  If you become dizzy call your Health Care Provider.      Diagnosis: Bacteremia  Assessment and Plan of Treatment: Bacteremia  cipro 500 mg po until feb 5    Diagnosis: Anticoagulated on Coumadin  Assessment and Plan of Treatment: Anticoagulated on Coumadin  Coumadin on Hold  Pt now s/p L groin pseudoaneurysm thrombin injection on 1/9.    Diagnosis: DM (diabetes mellitus)  Assessment and Plan of Treatment: DM (diabetes mellitus)  HgA1C this admission.  Make sure you get your HgA1c checked every three months.  If you take oral diabetes medications, check your blood glucose two times a day.  If you take insulin, check your blood glucose before meals and at bedtime.  It's important not to skip any meals.  Keep a log of your blood glucose results and always take it with you to your doctor appointments.  Keep a list of your current medications including injectables and over the counter medications and bring this medication list with you to all your doctor appointments.  If you have not seen your opthalmologist this year call for appointment.  Check your feet daily for redness, sores, or openings. Do not self treat. If no improvement in two days call your primary care physician for an appointment.  Low blood sugar (hypoglycemia) is a blood sugar below 70mg/dl. Check your blood sugar if you feel signs/symptoms of hypoglycemia. If your blood sugar is below 70 take 15 grams of carbohydrates (ex 4 oz of apple juice, 3-4 glucosr tablets, or 4-6 oz of regular soda) wait 15 minutes and repeat blood sugar to make sure it comes up above 70.  If your blood sugar is above 70 and you are due for a meal, have a meal.  If you are not due for a meal have a snack.  This snack helps keeps your blood sugar at a safe range.      Diagnosis: Groin hematoma  Assessment and Plan of Treatment: Pt now s/p L groin pseudoaneurysm thrombin injection on 1/9. PRINCIPAL DISCHARGE DIAGNOSIS  Diagnosis: Hemolytic anemia  Assessment and Plan of Treatment: Hemolytic anemia  c/w Prednisone      SECONDARY DISCHARGE DIAGNOSES  Diagnosis: Bacteremia  Assessment and Plan of Treatment: Bacteremia  cipro 500 mg po until feb 5    Diagnosis: Anticoagulated on Coumadin  Assessment and Plan of Treatment: Anticoagulated on Coumadin  Coumadin on Hold  Pt now s/p L groin pseudoaneurysm thrombin injection on 1/9.    Diagnosis: DM (diabetes mellitus)  Assessment and Plan of Treatment: DM (diabetes mellitus)  Hold Lantus  Take Fingerstick 3 times and day and at bedtime  Give humalog accordingly to finderstick guidelines  0 Unit(s) if Glucose 0 - 120  2 Unit(s) if Glucose 121 - 150  5 Unit(s) if Glucose 151 - 200  8 Unit(s) if Glucose 201 - 250  10 Unit(s) if Glucose 251 - 300  12 Unit(s) if Glucose Greater Than 300  Also Give Premeal coverage of 7 units premeal    Diagnosis: Groin hematoma  Assessment and Plan of Treatment: Pt now s/p L groin pseudoaneurysm thrombin injection on 1/9.    Diagnosis: Goals of care, counseling/discussion  Assessment and Plan of Treatment: Goals of care, counseling/discussion  Home with Hospice

## 2020-01-15 NOTE — DISCHARGE NOTE NURSING/CASE MANAGEMENT/SOCIAL WORK - PATIENT PORTAL LINK FT
You can access the FollowMyHealth Patient Portal offered by [facility] by registering at the following website: [email or web address]. By joining [employer]’s FollowMyHealth portal, you will also be able to view your health information using other applications (apps) compatible with our system.

## 2020-01-15 NOTE — DISCHARGE NOTE PROVIDER - CARE PROVIDER_API CALL
Moe Gonsalez (DO)  Family Medicine  Internal Medicine, [address] [place] [address]  Phone: [phone]  Fax: [phone]  Follow Up Time:

## 2020-01-15 NOTE — DISCHARGE NOTE NURSING/CASE MANAGEMENT/SOCIAL WORK - CAREGIVER NAME
Pt given discharge instructions and prescriptions, verbalized understanding.  Pt ambulated out of ER with steady gait Horace

## 2020-01-15 NOTE — DISCHARGE NOTE PROVIDER - NSDCMRMEDTOKEN_GEN_ALL_CORE_FT
albuterol 90 mcg/inh inhalation aerosol: 2 puff(s) inhaled every 6 hours, As needed, Shortness of Breath and/or Wheezing  aspirin 81 mg oral delayed release tablet: 1 tab(s) orally once a day  ciprofloxacin 500 mg oral tablet: 1 tab(s) orally every 12 hours until Feb 5 2020  Feosol 325 mg (65 mg elemental iron) oral tablet: 1 tab(s) orally once a day  fluticasone 50 mcg/inh nasal spray: 1 spray(s) nasal 2 times a day  please fill 30 day supply  folic acid 1 mg oral tablet: 1 tab(s) orally once a day  HumaLOG 100 units/mL subcutaneous solution: 7 unit(s) subcutaneous 3 times a day (before meals)  HumaLOG 100 units/mL subcutaneous solution: 1 application subcutaneous 4 times a day (before meals and at bedtime)  1 Unit(s) if Glucose 151 - 200  2 Unit(s) if Glucose 201 - 250  3 Unit(s) if Glucose 251 - 300  4 Unit(s) if Glucose 301 - 350  5 Unit(s) if Glucose 351 - 400  6 Unit(s) if Glucose GREATER THAN 400  Lantus Solostar Pen 100 units/mL subcutaneous solution: 10 unit(s) subcutaneous once a day (at bedtime)   levothyroxine 50 mcg (0.05 mg) oral tablet: 1 tab(s) orally once a day   meclizine 12.5 mg oral tablet: 1 tab(s) orally 3 times a day, As Needed  melatonin 3 mg oral tablet: 1 tab(s) orally once a day (at bedtime)  pantoprazole 40 mg oral delayed release tablet: 1 tab(s) orally 2 times a day  polyethylene glycol 3350 oral powder for reconstitution: 17 gram(s) orally once a day, As needed, Constipation 30 day supply  predniSONE 20 mg oral tablet: 3 tab(s) orally once a day  senna oral tablet: 1 tab(s) orally once a day (at bedtime)  simvastatin 40 mg oral tablet: 1 tab(s) orally once a day (at bedtime)  tiotropium 18 mcg inhalation capsule: 1 cap(s) inhaled once a day 30 day supply  torsemide 20 mg oral tablet: 2 tab(s) orally once a day albuterol 90 mcg/inh inhalation aerosol: 2 puff(s) inhaled every 6 hours, As needed, Shortness of Breath and/or Wheezing  aspirin 81 mg oral delayed release tablet: 1 tab(s) orally once a day  ciprofloxacin 500 mg oral tablet: 1 tab(s) orally every 12 hours until Feb 5 2020  Feosol 325 mg (65 mg elemental iron) oral tablet: 1 tab(s) orally once a day  fluticasone 50 mcg/inh nasal spray: 1 spray(s) nasal 2 times a day  please fill 30 day supply  folic acid 1 mg oral tablet: 1 tab(s) orally once a day  HumaLOG KwikPen 100 units/mL injectable solution: 1 unit(s) injectable 3 times a day (with meals)  0 Unit(s) if Glucose 0 - 120  2 Unit(s) if Glucose 121 - 150  5 Unit(s) if Glucose 151 - 200  8 Unit(s) if Glucose 201 - 250  10 Unit(s) if Glucose 251 - 300  12 Unit(s) if Glucose Greater Than 300   HumaLOG KwikPen 100 units/mL injectable solution: 7 unit(s) injectable 3 times a day (before meals)   levothyroxine 50 mcg (0.05 mg) oral tablet: 1 tab(s) orally once a day   melatonin 3 mg oral tablet: 1 tab(s) orally once a day (at bedtime)  pantoprazole 40 mg oral delayed release tablet: 1 tab(s) orally 2 times a day  polyethylene glycol 3350 oral powder for reconstitution: 17 gram(s) orally once a day, As needed, Constipation 30 day supply  predniSONE 20 mg oral tablet: 3 tab(s) orally once a day  senna oral tablet: 1 tab(s) orally once a day (at bedtime)  simvastatin 40 mg oral tablet: 1 tab(s) orally once a day (at bedtime)  tiotropium 18 mcg inhalation capsule: 1 cap(s) inhaled once a day 30 day supply  torsemide 20 mg oral tablet: 2 tab(s) orally once a day albuterol 90 mcg/inh inhalation aerosol: 2 puff(s) inhaled every 6 hours, As needed, Shortness of Breath and/or Wheezing  aspirin 81 mg oral delayed release tablet: 1 tab(s) orally once a day  ciprofloxacin 500 mg oral tablet: 1 tab(s) orally every 12 hours until Feb 5 2020  Feosol 325 mg (65 mg elemental iron) oral tablet: 1 tab(s) orally once a day  fluticasone 50 mcg/inh nasal spray: 1 spray(s) nasal 2 times a day  please fill 30 day supply  folic acid 1 mg oral tablet: 1 tab(s) orally once a day  HumaLOG KwikPen 100 units/mL injectable solution: 7 unit(s) injectable 3 times a day (before meals)   HumaLOG KwikPen 100 units/mL injectable solution: 0 Unit Glucose 0-120,2 Unit [id number], 5 Unit [id number],8 Unit Glucose 201-50,10 Unit Glucose 251-300,12 Unit Glucose Greater 300   levothyroxine 50 mcg (0.05 mg) oral tablet: 1 tab(s) orally once a day   melatonin 3 mg oral tablet: 1 tab(s) orally once a day (at bedtime)  pantoprazole 40 mg oral delayed release tablet: 1 tab(s) orally 2 times a day  polyethylene glycol 3350 oral powder for reconstitution: 17 gram(s) orally once a day, As needed, Constipation 30 day supply  predniSONE 20 mg oral tablet: 3 tab(s) orally once a day  senna oral tablet: 1 tab(s) orally once a day (at bedtime)  simvastatin 40 mg oral tablet: 1 tab(s) orally once a day (at bedtime)  tiotropium 18 mcg inhalation capsule: 1 cap(s) inhaled once a day 30 day supply  torsemide 20 mg oral tablet: 2 tab(s) orally once a day

## 2020-01-15 NOTE — DISCHARGE NOTE PROVIDER - HOSPITAL COURSE
This is a pleasant 83 yo M PMHx of Afib (Coumadin), DM2, HLD, CAD (stent 2014 and 2015 in mLAD and RCA), HFrEF (40%), severe AS s/p balloon aortic valvuloplasty (12/26) here with acute blood loss anemia from left groin hematoma, warm agglutinin autoimmune hemolytic anemia, and enterobacter bacteremia. Currently pending home hospice.         Anemia due to blood loss.  Plan: stable at present     heme f/u     ? heme to decide about role of transfusion +immunosuppressants given pts comorbid conditions - appears risk outweighs benefits if the therapy - heme to decide.           Hemolytic anemia.  Plan: as above.          Bacteremia.  Plan: abx as per ID.          JUHI (acute kidney injury).  Plan: MONITOR closely.           CHF (congestive heart failure).  Plan: Appears euvolemic to hypovolemic on exam.    cards f/u.          Atrial fibrillation. Plan: On asa alone     Patient still contemplating if he wants to stop taking aspirin as this is not contributing to his comfort at this time but may prevent a stroke.        DM (diabetes mellitus).  Plan: Patient does not want to use insulin at home if he doesn't have to.    Once prednisone is stopped, sugars will begin to normalize.         Goals of care, counseling/discussion.  Plan: palliative f/u.

## 2020-01-17 NOTE — PROVIDER CONTACT NOTE (CRITICAL VALUE NOTIFICATION) - ACTION/TREATMENT ORDERED:
MD aware. Will follow up on orders. Safety maintained. 85 year old female presenting to ED transfer [facility] s/p fall. Pt A+Ox3, reports her friend tripped and fell into her, [place]. Pt was transferred for subarachnoid hemorrhage, with 40mg nicardipine infusing at 25mL/hr and continued at same rate upon arrival to [facility] ED. Pt received Labetalol 10mg prior to [facility] ED arrival. Pt reports feeling sore on her lips and below her breasts after fall, however denies pain. PMH includes HTN, and pt takes Aspirin at home. Pt presents with left forehead laceration crescent shape about 1 inch, eft lower lip laceration, left periorbital and right knee ecchymosis. Pt denies headache, dizziness, change in vision, chest pain, SOB, N/V, abdominal pain, numbness or tingling. 85 year old female presenting to ED transfer from [place] ED s/p fall. Pt A+Ox3, reports her friend tripped and fell into her, pushing her [place] their [place]. Pt presented to [facility] where noted to have subarachnoid hemorrhage. Pt transferred to [facility] with 40mg nicardipine infusing at 25mL/hr and continued at same rate upon arrival to [facility] ED. Pt received Labetalol 10mg prior to [facility] ED arrival. Pt reports feeling sore on her lips and below her breasts after fall, however denies pain. PMH includes HTN, and pt takes Aspirin at home. Pt presents with left forehead laceration crescent shape about 1 inch, left lower lip laceration, left periorbital and right knee ecchymosis. Pt denies headache, dizziness, change in vision, chest pain, SOB, N/V, abdominal pain, numbness or tingling. Please see neuro flowsheet in paper chart for neuro checks.

## 2020-01-22 ENCOUNTER — TRANSCRIPTION ENCOUNTER (OUTPATIENT)
Age: 85
End: 2020-01-22

## 2020-01-22 ENCOUNTER — INPATIENT (INPATIENT)
Facility: HOSPITAL | Age: 85
LOS: 0 days | Discharge: HOSPICE | DRG: 809 | End: 2020-01-23
Attending: INTERNAL MEDICINE | Admitting: INTERNAL MEDICINE
Payer: OTHER MISCELLANEOUS

## 2020-01-22 VITALS
RESPIRATION RATE: 16 BRPM | DIASTOLIC BLOOD PRESSURE: 55 MMHG | SYSTOLIC BLOOD PRESSURE: 88 MMHG | HEART RATE: 101 BPM | TEMPERATURE: 98 F | OXYGEN SATURATION: 100 %

## 2020-01-22 DIAGNOSIS — D64.9 ANEMIA, UNSPECIFIED: ICD-10-CM

## 2020-01-22 DIAGNOSIS — T80.89XA OTHER COMPLICATIONS FOLLOWING INFUSION, TRANSFUSION AND THERAPEUTIC INJECTION, INITIAL ENCOUNTER: ICD-10-CM

## 2020-01-22 DIAGNOSIS — I50.9 HEART FAILURE, UNSPECIFIED: ICD-10-CM

## 2020-01-22 DIAGNOSIS — L03.90 CELLULITIS, UNSPECIFIED: ICD-10-CM

## 2020-01-22 DIAGNOSIS — R53.1 WEAKNESS: ICD-10-CM

## 2020-01-22 DIAGNOSIS — I48.91 UNSPECIFIED ATRIAL FIBRILLATION: ICD-10-CM

## 2020-01-22 DIAGNOSIS — Z51.5 ENCOUNTER FOR PALLIATIVE CARE: ICD-10-CM

## 2020-01-22 LAB
BLD GP AB SCN SERPL QL: NEGATIVE — SIGNIFICANT CHANGE UP
GLUCOSE BLDC GLUCOMTR-MCNC: 293 MG/DL — HIGH (ref 70–99)
GLUCOSE BLDC GLUCOMTR-MCNC: 327 MG/DL — HIGH (ref 70–99)
HCT VFR BLD CALC: 18.9 % — CRITICAL LOW (ref 39–50)
HGB BLD-MCNC: 6 G/DL — CRITICAL LOW (ref 13–17)
MCHC RBC-ENTMCNC: 31.7 GM/DL — LOW (ref 32–36)
MCHC RBC-ENTMCNC: 32.6 PG — SIGNIFICANT CHANGE UP (ref 27–34)
MCV RBC AUTO: 102.7 FL — HIGH (ref 80–100)
NRBC # BLD: 1 /100 WBCS — HIGH (ref 0–0)
PLATELET # BLD AUTO: 101 K/UL — LOW (ref 150–400)
RBC # BLD: 1.84 M/UL — LOW (ref 4.2–5.8)
RBC # FLD: 22 % — HIGH (ref 10.3–14.5)
RH IG SCN BLD-IMP: POSITIVE — SIGNIFICANT CHANGE UP
WBC # BLD: 7.2 K/UL — SIGNIFICANT CHANGE UP (ref 3.8–10.5)
WBC # FLD AUTO: 7.2 K/UL — SIGNIFICANT CHANGE UP (ref 3.8–10.5)

## 2020-01-22 PROCEDURE — 99222 1ST HOSP IP/OBS MODERATE 55: CPT

## 2020-01-22 RX ORDER — PROCHLORPERAZINE MALEATE 5 MG
1 TABLET ORAL
Qty: 0 | Refills: 0 | DISCHARGE
Start: 2020-01-22

## 2020-01-22 RX ORDER — MORPHINE SULFATE 50 MG/1
2.5 CAPSULE, EXTENDED RELEASE ORAL EVERY 4 HOURS
Refills: 0 | Status: DISCONTINUED | OUTPATIENT
Start: 2020-01-22 | End: 2020-01-23

## 2020-01-22 RX ORDER — SODIUM CHLORIDE 9 MG/ML
1000 INJECTION, SOLUTION INTRAVENOUS
Refills: 0 | Status: DISCONTINUED | OUTPATIENT
Start: 2020-01-22 | End: 2020-01-23

## 2020-01-22 RX ORDER — ALBUTEROL 90 UG/1
2 AEROSOL, METERED ORAL EVERY 6 HOURS
Refills: 0 | Status: DISCONTINUED | OUTPATIENT
Start: 2020-01-22 | End: 2020-01-23

## 2020-01-22 RX ORDER — CIPROFLOXACIN LACTATE 400MG/40ML
500 VIAL (ML) INTRAVENOUS EVERY 12 HOURS
Refills: 0 | Status: DISCONTINUED | OUTPATIENT
Start: 2020-01-22 | End: 2020-01-23

## 2020-01-22 RX ORDER — DEXTROSE 50 % IN WATER 50 %
15 SYRINGE (ML) INTRAVENOUS ONCE
Refills: 0 | Status: DISCONTINUED | OUTPATIENT
Start: 2020-01-22 | End: 2020-01-23

## 2020-01-22 RX ORDER — SODIUM CHLORIDE 9 MG/ML
1000 INJECTION INTRAMUSCULAR; INTRAVENOUS; SUBCUTANEOUS
Refills: 0 | Status: DISCONTINUED | OUTPATIENT
Start: 2020-01-22 | End: 2020-01-23

## 2020-01-22 RX ORDER — FLUTICASONE PROPIONATE 50 MCG
2 SPRAY, SUSPENSION NASAL
Refills: 0 | Status: DISCONTINUED | OUTPATIENT
Start: 2020-01-22 | End: 2020-01-23

## 2020-01-22 RX ORDER — DEXTROSE 50 % IN WATER 50 %
12.5 SYRINGE (ML) INTRAVENOUS ONCE
Refills: 0 | Status: DISCONTINUED | OUTPATIENT
Start: 2020-01-22 | End: 2020-01-23

## 2020-01-22 RX ORDER — ASPIRIN/CALCIUM CARB/MAGNESIUM 324 MG
81 TABLET ORAL DAILY
Refills: 0 | Status: DISCONTINUED | OUTPATIENT
Start: 2020-01-22 | End: 2020-01-23

## 2020-01-22 RX ORDER — ACETAMINOPHEN 500 MG
650 TABLET ORAL EVERY 6 HOURS
Refills: 0 | Status: DISCONTINUED | OUTPATIENT
Start: 2020-01-22 | End: 2020-01-23

## 2020-01-22 RX ORDER — TIOTROPIUM BROMIDE 18 UG/1
1 CAPSULE ORAL; RESPIRATORY (INHALATION) DAILY
Refills: 0 | Status: DISCONTINUED | OUTPATIENT
Start: 2020-01-22 | End: 2020-01-23

## 2020-01-22 RX ORDER — PANTOPRAZOLE SODIUM 20 MG/1
40 TABLET, DELAYED RELEASE ORAL
Refills: 0 | Status: DISCONTINUED | OUTPATIENT
Start: 2020-01-22 | End: 2020-01-23

## 2020-01-22 RX ORDER — FOLIC ACID 0.8 MG
1 TABLET ORAL DAILY
Refills: 0 | Status: DISCONTINUED | OUTPATIENT
Start: 2020-01-22 | End: 2020-01-23

## 2020-01-22 RX ORDER — FERROUS SULFATE 325(65) MG
325 TABLET ORAL DAILY
Refills: 0 | Status: DISCONTINUED | OUTPATIENT
Start: 2020-01-22 | End: 2020-01-23

## 2020-01-22 RX ORDER — INSULIN GLARGINE 100 [IU]/ML
10 INJECTION, SOLUTION SUBCUTANEOUS
Qty: 0 | Refills: 0 | DISCHARGE
Start: 2020-01-22

## 2020-01-22 RX ORDER — DEXTROSE 50 % IN WATER 50 %
25 SYRINGE (ML) INTRAVENOUS ONCE
Refills: 0 | Status: DISCONTINUED | OUTPATIENT
Start: 2020-01-22 | End: 2020-01-23

## 2020-01-22 RX ORDER — LANOLIN ALCOHOL/MO/W.PET/CERES
5 CREAM (GRAM) TOPICAL AT BEDTIME
Refills: 0 | Status: DISCONTINUED | OUTPATIENT
Start: 2020-01-22 | End: 2020-01-23

## 2020-01-22 RX ORDER — MORPHINE SULFATE 50 MG/1
1.25 CAPSULE, EXTENDED RELEASE ORAL
Qty: 0 | Refills: 0 | DISCHARGE
Start: 2020-01-22

## 2020-01-22 RX ORDER — SENNA PLUS 8.6 MG/1
2 TABLET ORAL AT BEDTIME
Refills: 0 | Status: DISCONTINUED | OUTPATIENT
Start: 2020-01-22 | End: 2020-01-22

## 2020-01-22 RX ORDER — INSULIN LISPRO 100/ML
VIAL (ML) SUBCUTANEOUS
Refills: 0 | Status: DISCONTINUED | OUTPATIENT
Start: 2020-01-22 | End: 2020-01-23

## 2020-01-22 RX ORDER — GLUCAGON INJECTION, SOLUTION 0.5 MG/.1ML
1 INJECTION, SOLUTION SUBCUTANEOUS ONCE
Refills: 0 | Status: DISCONTINUED | OUTPATIENT
Start: 2020-01-22 | End: 2020-01-23

## 2020-01-22 RX ORDER — SENNA PLUS 8.6 MG/1
1 TABLET ORAL AT BEDTIME
Refills: 0 | Status: DISCONTINUED | OUTPATIENT
Start: 2020-01-22 | End: 2020-01-23

## 2020-01-22 RX ORDER — INSULIN GLARGINE 100 [IU]/ML
10 INJECTION, SOLUTION SUBCUTANEOUS AT BEDTIME
Refills: 0 | Status: DISCONTINUED | OUTPATIENT
Start: 2020-01-22 | End: 2020-01-23

## 2020-01-22 RX ORDER — FUROSEMIDE 40 MG
20 TABLET ORAL ONCE
Refills: 0 | Status: DISCONTINUED | OUTPATIENT
Start: 2020-01-22 | End: 2020-01-23

## 2020-01-22 RX ORDER — POLYETHYLENE GLYCOL 3350 17 G/17G
17 POWDER, FOR SOLUTION ORAL DAILY
Refills: 0 | Status: DISCONTINUED | OUTPATIENT
Start: 2020-01-22 | End: 2020-01-23

## 2020-01-22 RX ORDER — LEVOTHYROXINE SODIUM 125 MCG
50 TABLET ORAL DAILY
Refills: 0 | Status: DISCONTINUED | OUTPATIENT
Start: 2020-01-22 | End: 2020-01-23

## 2020-01-22 RX ORDER — POLYETHYLENE GLYCOL 3350 17 G/17G
17 POWDER, FOR SOLUTION ORAL DAILY
Refills: 0 | Status: DISCONTINUED | OUTPATIENT
Start: 2020-01-22 | End: 2020-01-22

## 2020-01-22 RX ORDER — ACETAMINOPHEN 500 MG
2 TABLET ORAL
Qty: 0 | Refills: 0 | DISCHARGE
Start: 2020-01-22

## 2020-01-22 RX ORDER — PROCHLORPERAZINE MALEATE 5 MG
10 TABLET ORAL EVERY 6 HOURS
Refills: 0 | Status: DISCONTINUED | OUTPATIENT
Start: 2020-01-22 | End: 2020-01-23

## 2020-01-22 RX ADMIN — Medication 5 MILLIGRAM(S): at 21:54

## 2020-01-22 RX ADMIN — Medication 500 MILLIGRAM(S): at 18:00

## 2020-01-22 RX ADMIN — Medication 4: at 18:13

## 2020-01-22 RX ADMIN — Medication 100 MILLIGRAM(S): at 18:00

## 2020-01-22 RX ADMIN — Medication 2 SPRAY(S): at 18:00

## 2020-01-22 RX ADMIN — SODIUM CHLORIDE 10 MILLILITER(S): 9 INJECTION INTRAMUSCULAR; INTRAVENOUS; SUBCUTANEOUS at 18:00

## 2020-01-22 RX ADMIN — INSULIN GLARGINE 10 UNIT(S): 100 INJECTION, SOLUTION SUBCUTANEOUS at 21:52

## 2020-01-22 NOTE — H&P ADULT - ASSESSMENT
88 y/o M arrived via ambulance from home for blood transfusion. Hemoglobin found to be 6.8. Patient on home hospice services with [facility].

## 2020-01-22 NOTE — H&P ADULT - PROBLEM SELECTOR PLAN 5
EF 30% as per echo from 12/13/20. Patient on home hospice for end stage heart failure. Reports he had a "balloon" placed on a previous hospital admission but was not well enough to have further surgical intervention.

## 2020-01-22 NOTE — DISCHARGE NOTE PROVIDER - NSDCMRMEDTOKEN_GEN_ALL_CORE_FT
Actamin 325 mg oral tablet: 2 tab(s) orally every 6 hours, As needed, Temp greater or equal to 38C (100.4F), Mild Pain (1 - 3)  albuterol 90 mcg/inh inhalation aerosol: 2 puff(s) inhaled every 6 hours, As needed, Shortness of Breath and/or Wheezing  aspirin 81 mg oral delayed release tablet: 1 tab(s) orally once a day  Ativan 0.5 mg oral tablet: 1 tab(s) orally every 4 hours, As needed, Anxiety  ciprofloxacin 500 mg oral tablet: 1 tab(s) orally every 12 hours until Feb 5 2020  Feosol 325 mg (65 mg elemental iron) oral tablet: 1 tab(s) orally once a day  fluticasone 50 mcg/inh nasal spray: 1 spray(s) nasal 2 times a day  please fill 30 day supply  folic acid 1 mg oral tablet: 1 tab(s) orally once a day  HumaLOG KwikPen 100 units/mL injectable solution: 7 unit(s) injectable 3 times a day (before meals)   HumaLOG KwikPen 100 units/mL injectable solution: 0 Unit Glucose 0-120,2 Unit [id number], 5 Unit [id number],8 Unit Glucose 201-50,10 Unit Glucose 251-300,12 Unit Glucose Greater 300   insulin glargine: 10 unit(s) subcutaneous once a day (at bedtime)  levothyroxine 50 mcg (0.05 mg) oral tablet: 1 tab(s) orally once a day   melatonin 3 mg oral tablet: 1 tab(s) orally once a day (at bedtime)  Monodox 100 mg oral capsule: 1 cap(s) orally every 12 hours  morphine 10 mg/5 mL oral solution: 1.25 milliliter(s) orally every 4 hours, As needed, pain  morphine 10 mg/5 mL oral solution: 1.25 milliliter(s) orally every 4 hours, As needed, dyspnea  pantoprazole 40 mg oral delayed release tablet: 1 tab(s) orally 2 times a day  polyethylene glycol 3350 oral powder for reconstitution: 17 gram(s) orally once a day, As needed, Constipation 30 day supply  predniSONE 20 mg oral tablet: 3 tab(s) orally once a day  prochlorperazine 10 mg oral tablet: 1 tab(s) orally every 6 hours, As needed, nausea/vomiting  senna oral tablet: 1 tab(s) orally once a day (at bedtime)  tiotropium 18 mcg inhalation capsule: 1 cap(s) inhaled once a day 30 day supply  torsemide 20 mg oral tablet: 2 tab(s) orally once a day Actamin 325 mg oral tablet: 2 tab(s) orally every 6 hours, As needed, Temp greater or equal to 38C (100.4F), Mild Pain (1 - 3)  albuterol 90 mcg/inh inhalation aerosol: 2 puff(s) inhaled every 6 hours, As needed, Shortness of Breath and/or Wheezing  aspirin 81 mg oral delayed release tablet: 1 tab(s) orally once a day  Ativan 0.5 mg oral tablet: 1 tab(s) orally every 4 hours, As needed, Anxiety  ciprofloxacin 500 mg oral tablet: 1 tab(s) orally every 12 hours until Feb 5 2020  Feosol 325 mg (65 mg elemental iron) oral tablet: 1 tab(s) orally once a day  fluticasone 50 mcg/inh nasal spray: 1 spray(s) nasal 2 times a day  please fill 30 day supply  folic acid 1 mg oral tablet: 1 tab(s) orally once a day  HumaLOG KwikPen 100 units/mL injectable solution: 7 unit(s) injectable 3 times a day (before meals)   HumaLOG KwikPen 100 units/mL injectable solution: 0 Unit Glucose 0-120,2 Unit [id number], 5 Unit [id number],8 Unit Glucose 201-50,10 Unit Glucose 251-300,12 Unit Glucose Greater 300   insulin glargine: 10 unit(s) subcutaneous once a day (at bedtime)  levothyroxine 50 mcg (0.05 mg) oral tablet: 1 tab(s) orally once a day   melatonin 3 mg oral tablet: 1 tab(s) orally once a day (at bedtime)  Monodox 100 mg oral capsule: 1 cap(s) orally every 12 hours  morphine 10 mg/5 mL oral solution: 1.25 milliliter(s) orally every 4 hours, As needed, pain  morphine 10 mg/5 mL oral solution: 1.25 milliliter(s) orally every 4 hours, As needed, dyspnea  pantoprazole 40 mg oral delayed release tablet: 1 tab(s) orally 2 times a day  polyethylene glycol 3350 oral powder for reconstitution: 17 gram(s) orally once a day, As needed, Constipation 30 day supply  predniSONE 20 mg oral tablet: 3 tab(s) orally once a day  prochlorperazine 10 mg oral tablet: 1 tab(s) orally every 6 hours, As needed, nausea/vomiting  senna oral tablet: 1 tab(s) orally once a day (at bedtime)  tiotropium 18 mcg inhalation capsule: 1 cap(s) inhaled once a day 30 day supply  torsemide 20 mg oral tablet: 2 tab(s) orally once a day Actamin 325 mg oral tablet: 2 tab(s) orally every 6 hours, As needed, Temp greater or equal to 38C (100.4F), Mild Pain (1 - 3)  albuterol 90 mcg/inh inhalation aerosol: 2 puff(s) inhaled every 6 hours, As needed, Shortness of Breath and/or Wheezing  aspirin 81 mg oral delayed release tablet: 1 tab(s) orally once a day  Ativan 0.5 mg oral tablet: 1 tab(s) orally every 4 hours, As needed, Anxiety  ciprofloxacin 500 mg oral tablet: 1 tab(s) orally every 12 hours until Feb 5 2020  Feosol 325 mg (65 mg elemental iron) oral tablet: 1 tab(s) orally once a day  fluticasone 50 mcg/inh nasal spray: 1 spray(s) nasal 2 times a day  please fill 30 day supply  folic acid 1 mg oral tablet: 1 tab(s) orally once a day  HumaLOG KwikPen 100 units/mL injectable solution: 7 unit(s) injectable 3 times a day (before meals)   HumaLOG KwikPen 100 units/mL injectable solution: 0 Unit Glucose 0-120,2 Unit [id number], 5 Unit [id number],8 Unit Glucose 201-50,10 Unit Glucose 251-300,12 Unit Glucose Greater 300   insulin glargine: 10 unit(s) subcutaneous once a day (at bedtime)  levothyroxine 50 mcg (0.05 mg) oral tablet: 1 tab(s) orally once a day   melatonin 3 mg oral tablet: 1 tab(s) orally once a day (at bedtime)  Monodox 100 mg oral capsule: 1 cap(s) orally every 12 hours  morphine 10 mg/5 mL oral solution: 1.25 milliliter(s) orally every 4 hours, As needed, pain  morphine 10 mg/5 mL oral solution: 1.25 milliliter(s) orally every 4 hours, As needed, dyspnea  pantoprazole 40 mg oral delayed release tablet: 1 tab(s) orally 2 times a day  polyethylene glycol 3350 oral powder for reconstitution: 17 gram(s) orally once a day, As needed, Constipation 30 day supply  predniSONE 20 mg oral tablet: 3 tab(s) orally once a day  prochlorperazine 10 mg oral tablet: 1 tab(s) orally every 6 hours, As needed, nausea/vomiting  senna oral tablet: 1 tab(s) orally once a day (at bedtime)  tiotropium 18 mcg inhalation capsule: 1 cap(s) inhaled once a day 30 day supply  torsemide 20 mg oral tablet: 2 tab(s) orally once a day

## 2020-01-22 NOTE — H&P ADULT - NSHPREVIEWOFSYSTEMS_GEN_ALL_CORE
REVIEW OF SYSTEMS:  CONSTITUTIONAL: mild weakness, no fevers or chills  EYES/ENT: No visual changes;  No vertigo or throat pain   NECK: No pain or stiffness  RESPIRATORY: No cough, wheezing, hemoptysis; No shortness of breath  CARDIOVASCULAR: No chest pain or palpitations  GASTROINTESTINAL: No abdominal or epigastric pain. No nausea, vomiting, or hematemesis; No diarrhea or constipation. No melena or hematochezia.  GENITOURINARY: No dysuria, frequency or hematuria  NEUROLOGICAL: No numbness or weakness  SKIN: Skin tears and redness to B/L arms

## 2020-01-22 NOTE — DISCHARGE NOTE PROVIDER - HOSPITAL COURSE
Admitted from home hospice for blood transfusion for low hemoglobin. Patient reported mildly increased weakness on admission, however, overall no change in physical state since hospital discharge on 1/5/20. Denies any symptoms of dyspnea, cough, fevers, nausea/vomiting. Transfused one unit pRBCS and then to be discharged back home with [facility].         Patient resides in a private residence with his aide caretaker and her .

## 2020-01-22 NOTE — PATIENT PROFILE ADULT - STATED REASON FOR ADMISSION
Pt sent here from his nursing home in [place] for decreased H&H levels. from home hospice in [place] for decreased H&H levels.

## 2020-01-22 NOTE — DISCHARGE NOTE PROVIDER - NSDCCPCAREPLAN_GEN_ALL_CORE_FT
PRINCIPAL DISCHARGE DIAGNOSIS  Diagnosis: Anemia  Assessment and Plan of Treatment: Anemia      SECONDARY DISCHARGE DIAGNOSES  Diagnosis: CHF (congestive heart failure)  Assessment and Plan of Treatment: CHF (congestive heart failure)    Diagnosis: Cellulitis  Assessment and Plan of Treatment: Cellulitis    Diagnosis: Atrial fibrillation  Assessment and Plan of Treatment: Atrial fibrillation    Diagnosis: Weakness  Assessment and Plan of Treatment: Weakness    Diagnosis: Palliative care encounter  Assessment and Plan of Treatment: Palliative care encounter

## 2020-01-22 NOTE — H&P ADULT - ATTENDING COMMENTS
I have personally seen and examined this patient and agree with the above assessment and plan, which I have reviewed and edited where appropriate.  Symptomatic anemia - weakness.    Type and cross, transfuse 1 unit, DC am with hospice.

## 2020-01-22 NOTE — H&P ADULT - NSHPSOURCEINFORD_GEN_ALL_CORE
Patient/Chart(s) R 2nd toe wound, rule out osteomyelitis  -Follow up R foot xray  -Podiatry (Dr White) following  -Follow up wound care  -ESR elevated  -Continue azactam/vanco IV   -Tylenol prn fever  -Trend cbcs  -Follow up blood cx

## 2020-01-22 NOTE — H&P ADULT - PROBLEM SELECTOR PLAN 3
History of autoimmune hemolytic anemia. Sent from home hospice with a hemoglobin of 6.8. Patient reports mild increased weakness, however, overall unchanged physical state.    Sent for one unit pRBCs.

## 2020-01-22 NOTE — H&P ADULT - PROBLEM SELECTOR PLAN 6
Patient admitted to PCU as a home hospice patient for a blood transfusion. HCP found in alpha tab from last admission. HCP is the caretaker who resides with the patient. Patient confirmed DNR/DNI which was documented on the [facility] paperwork.

## 2020-01-22 NOTE — H&P ADULT - PROBLEM SELECTOR PLAN 1
Heart rate auscultated as regular on physical exam. Patient no longer on AC 2/2 history of right groin hematoma on last hospitalization.

## 2020-01-22 NOTE — H&P ADULT - HISTORY OF PRESENT ILLNESS
85 yo M with hx Afib (Coumadin), DM2, HLD, CAD (stent 2014 and 2015 in mLAD and RCA), HFrEF (40%) severe AS, HFrEF  30%, LBBB, baseline SCr 1.4, and Autoimmune hemolytic who was admitted from 12/13 -1/3 and optimized with diuretics per Heart failure service, planned for TAVR however underwent  balloon aortic valvuloplasty on 12/26 and transfused 1 U PRBC. Patient re-admitted from 1/5-1/15 for symptomatic anemia. Patient was sent home on 1/15 with home hospice services. Patient states he requested a CBC to be done in the home to see if his hemoglobin had dropped. Denies any increased weakness, dyspnea, nausea/vomiting, fevers or any other symptoms. Hemoglobin found to be 6.8 from hospice notes and patient subsequently transferred to [facility] PCU as a hospice patient for a blood transfusion.

## 2020-01-22 NOTE — H&P ADULT - PROBLEM SELECTOR PLAN 4
Chronic from advanced disease. Mildly worsened with lower H&H at this time. Full assistance given by a caretaker in the home.

## 2020-01-22 NOTE — H&P ADULT - PROBLEM SELECTOR PLAN 2
Upper arm extremity cellulitis likely from fragile skin and skin tears worsened by chronic prednisone use. Patient on Cipro until 2/5/20 and Doxycycline until 1/31/20. Non toxic appearing and patient afebrile.    C/w current course of oral antibiotics.

## 2020-01-23 ENCOUNTER — TRANSCRIPTION ENCOUNTER (OUTPATIENT)
Age: 85
End: 2020-01-23

## 2020-01-23 VITALS
TEMPERATURE: 98 F | SYSTOLIC BLOOD PRESSURE: 105 MMHG | RESPIRATION RATE: 16 BRPM | HEART RATE: 114 BPM | OXYGEN SATURATION: 96 % | DIASTOLIC BLOOD PRESSURE: 57 MMHG

## 2020-01-23 LAB — GLUCOSE BLDC GLUCOMTR-MCNC: 198 MG/DL — HIGH (ref 70–99)

## 2020-01-23 PROCEDURE — 86850 RBC ANTIBODY SCREEN: CPT

## 2020-01-23 PROCEDURE — 94640 AIRWAY INHALATION TREATMENT: CPT

## 2020-01-23 PROCEDURE — 86901 BLOOD TYPING SEROLOGIC RH(D): CPT

## 2020-01-23 PROCEDURE — 36430 TRANSFUSION BLD/BLD COMPNT: CPT

## 2020-01-23 PROCEDURE — 86900 BLOOD TYPING SEROLOGIC ABO: CPT

## 2020-01-23 PROCEDURE — 82962 GLUCOSE BLOOD TEST: CPT

## 2020-01-23 PROCEDURE — 86922 COMPATIBILITY TEST ANTIGLOB: CPT

## 2020-01-23 PROCEDURE — 85027 COMPLETE CBC AUTOMATED: CPT

## 2020-01-23 PROCEDURE — P9040: CPT

## 2020-01-23 PROCEDURE — 99239 HOSP IP/OBS DSCHRG MGMT >30: CPT | Mod: GC

## 2020-01-23 RX ADMIN — Medication 2 SPRAY(S): at 05:29

## 2020-01-23 RX ADMIN — Medication 1: at 08:56

## 2020-01-23 RX ADMIN — Medication 500 MILLIGRAM(S): at 05:29

## 2020-01-23 RX ADMIN — PANTOPRAZOLE SODIUM 40 MILLIGRAM(S): 20 TABLET, DELAYED RELEASE ORAL at 05:30

## 2020-01-23 RX ADMIN — Medication 50 MICROGRAM(S): at 05:29

## 2020-01-23 RX ADMIN — Medication 100 MILLIGRAM(S): at 05:29

## 2020-01-23 RX ADMIN — Medication 20 MILLIGRAM(S): at 05:29

## 2020-01-23 RX ADMIN — Medication 60 MILLIGRAM(S): at 05:29

## 2020-01-23 NOTE — PROGRESS NOTE ADULT - PROBLEM SELECTOR PLAN 1
Heart rate auscultated as regular on physical exam. Patient no longer on AC 2/2 history of right groin hematoma on last hospitalization. Heart rate auscultated as regular on physical exam. Patient no longer on AC 2/2 history of right groin hematoma on last hospitalization.    Discharge home this morning and will maintain current home regimen.

## 2020-01-23 NOTE — DISCHARGE NOTE NURSING/CASE MANAGEMENT/SOCIAL WORK - PATIENT PORTAL LINK FT
You can access the FollowMyHealth Patient Portal offered by [facility] by registering at the following website: [email or web address]. By joining [facility] FollowMyHealth portal, you will also be able to view your health information using other applications (apps) compatible with our system.

## 2020-01-23 NOTE — PROGRESS NOTE ADULT - ATTENDING COMMENTS
I have personally seen and examined this patient and agree with the above assessment and plan, which I have reviewed and edited where appropriate.   Admitted for transfusion due to weakness from anemia.  Symptom improved.  Patient to be discharged today.

## 2020-01-23 NOTE — PROGRESS NOTE ADULT - PROBLEM SELECTOR PLAN 4
Chronic from advanced disease. Mildly worsened with lower H&H at this time. Full assistance given by a caretaker in the home. Chronic from advanced disease.    Improved s/p one unit blood.

## 2020-01-23 NOTE — PROGRESS NOTE ADULT - PROBLEM SELECTOR PLAN 6
Patient admitted to PCU as a home hospice patient for a blood transfusion. HCP found in alpha tab from last admission. HCP is the caretaker who resides with the patient. Patient confirmed DNR/DNI which was documented on the [facility] paperwork. Patient admitted to PCU as a home hospice patient for a blood transfusion. HCP found in alpha tab from last admission. HCP is the caretaker who resides with the patient. Patient confirmed DNR/DNI which was documented on the [facility] paperwork.    CLARENCE re-completed with patient today. Confirmed DNR/DNI and at this time he would like to keep the option open for more blood transfusions. CLARENCE copied for the chart and [facility] and he will take the original home. Patient's caretaker and HCP will be home to care for the patient when he arrives back home.

## 2020-01-23 NOTE — PROGRESS NOTE ADULT - SUBJECTIVE AND OBJECTIVE BOX
GAP TEAM PALLIATIVE CARE UNIT PROGRESS NOTE:      [  ] Patient on hospice program.    INDICATION FOR PALLIATIVE CARE UNIT SERVICES:    INTERVAL HPI/OVERNIGHT EVENTS:    DNR on chart: Yes    Allergies    No Known Allergies    Intolerances    MEDICATIONS  (STANDING):  aspirin  chewable 81 milliGRAM(s) Oral daily  ciprofloxacin     Tablet 500 milliGRAM(s) Oral every 12 hours  dextrose 5%. 1000 milliLiter(s) (50 mL/Hr) IV Continuous <Continuous>  dextrose 50% Injectable 12.5 Gram(s) IV Push once  dextrose 50% Injectable 25 Gram(s) IV Push once  dextrose 50% Injectable 25 Gram(s) IV Push once  doxycycline hyclate Capsule 100 milliGRAM(s) Oral every 12 hours  ferrous    sulfate 325 milliGRAM(s) Oral daily  fluticasone propionate 50 MICROgram(s)/spray Nasal Spray 2 Spray(s) Both Nostrils two times a day  folic acid 1 milliGRAM(s) Oral daily  insulin glargine Injectable (LANTUS) 10 Unit(s) SubCutaneous at bedtime  insulin lispro (HumaLOG) corrective regimen sliding scale   SubCutaneous three times a day before meals  levothyroxine 50 MICROGram(s) Oral daily  melatonin 5 milliGRAM(s) Oral at bedtime  pantoprazole    Tablet 40 milliGRAM(s) Oral before breakfast  predniSONE   Tablet 60 milliGRAM(s) Oral daily  senna 1 Tablet(s) Oral at bedtime  sodium chloride 0.9%. 1000 milliLiter(s) (10 mL/Hr) IV Continuous <Continuous>  tiotropium 18 MICROgram(s) Capsule 1 Capsule(s) Inhalation daily  torsemide 20 milliGRAM(s) Oral daily    MEDICATIONS  (PRN):  acetaminophen   Tablet .. 650 milliGRAM(s) Oral every 6 hours PRN Temp greater or equal to 38C (100.4F), Mild Pain (1 - 3)  ALBUTerol    90 MICROgram(s) HFA Inhaler 2 Puff(s) Inhalation every 6 hours PRN dyspnea  dextrose 40% Gel 15 Gram(s) Oral once PRN Blood Glucose LESS THAN 70 milliGRAM(s)/deciliter  furosemide   Injectable 20 milliGRAM(s) IV Push once PRN dyspnea  glucagon  Injectable 1 milliGRAM(s) IntraMuscular once PRN Glucose LESS THAN 70 milligrams/deciliter  LORazepam     Tablet 0.5 milliGRAM(s) Oral every 4 hours PRN Anxiety  morphine   Solution 2.5 milliGRAM(s) Oral every 4 hours PRN pain  morphine   Solution 2.5 milliGRAM(s) Oral every 4 hours PRN dyspnea  polyethylene glycol 3350 17 Gram(s) Oral daily PRN Constipation  prochlorperazine   Tablet 10 milliGRAM(s) Oral every 6 hours PRN nausea/vomiting    ITEMS UNCHECKED ARE NOT PRESENT    PRESENT SYMPTOMS: [ ]Unable to obtain due to poor mentation   Source if other than patient:  [ ]Family   [ ]Team     Pain: [ ] yes [ ] no  QOL impact -   Location -                    Aggravating factors -  Quality -  Radiation -  Timing-  Severity (0-10 scale):  Minimal acceptable level (0-10 scale):     Dyspnea:                           [ ]Mild [ ]Moderate [ ]Severe  Anxiety:                             [ ]Mild [ ]Moderate [ ]Severe  Fatigue:                             [ ]Mild [ ]Moderate [ ]Severe  Nausea:                             [ ]Mild [ ]Moderate [ ]Severe  Loss of appetite:              [ ]Mild [ ]Moderate [ ]Severe  Constipation:                    [ ]Mild [ ]Moderate [ ]Severe    PAINAD Score:    [email or web address] (Ctrl +  left click to view)  		  Other Symptoms:  [ ]All other review of systems negative     Palliative Performance Status Version 2:         %         http://npcrc.org/files/news/palliative_performance_scale_[email or web address].pdf  PHYSICAL EXAM:  Vital Signs Last 24 Hrs  T(C): 36.9 (23 Jan 2020 07:39), Max: 36.9 (23 Jan 2020 07:39)  T(F): 98.5 (23 Jan 2020 07:39), Max: 98.5 (23 Jan 2020 07:39)  HR: 114 (23 Jan 2020 07:39) (101 - 114)  BP: 105/57 (23 Jan 2020 07:39) (88/55 - 105/57)  BP(mean): --  RR: 16 (23 Jan 2020 07:39) (16 - 16)  SpO2: 96% (23 Jan 2020 07:39) (96% - 100%) I&O's Summary  GENERAL:  [ ]Alert  [ ]Oriented x   [ ]Lethargic  [ ]Cachexia  [ ]Unarousable  [ ]Verbal  [ ]Non-Verbal  Behavioral:   [ ] Anxiety  [ ] Delirium [ ] Agitation [ ] Other  HEENT:  [ ]Normal   [ ]Dry mouth   [ ]ET Tube/Trach  [ ]Oral lesions  PULMONARY:   [ ]Clear [ ]Tachypnea  [ ]Audible excessive secretions   [ ]Rhonchi        [ ]Right [ ]Left [ ]Bilateral  [ ]Crackles        [ ]Right [ ]Left [ ]Bilateral  [ ]Wheezing     [ ]Right [ ]Left [ ]Bilateral  [ ]Diminshed BS [ ]Right [ ]Left [ ]Bilateral    CARDIOVASCULAR:    [ ]Regular [ ]Irregular [ ]Tachy  [ ]Freddy [ ]Murmur [ ]Other  GASTROINTESTINAL:  [ ]Soft  [ ]Distended   [ ]+BS  [ ]Non tender [ ]Tender  [ ]PEG [ ]OGT/ NGT   Last BM:     GENITOURINARY:  [ ]Normal [ ] Incontinent   [ ]Oliguria/Anuria   [ ]Jackson  MUSCULOSKELETAL:   [ ]Normal   [ ]Weakness  [ ]Bed/Wheelchair bound [ ]Edema  NEUROLOGIC:   [ ]No focal deficits  [ ] Cognitive impairment  [ ] Dysphagia [ ]Dysarthria [ ] Paresis [ ]Other   SKIN:   [ ]Normal  [ ]Rash     [ ]Pressure ulcer(s)  [ ]y [ ]n  Present on admission      CRITICAL CARE:  [ ] Shock Present  [ ]Septic [ ]Cardiogenic [ ]Neurologic [ ]Hypovolemic  [ ]  Vasopressors [ ]  Inotropes   [ ] Respiratory failure present [ ] Mechanical Ventilation [ ] Non-invasive ventilatory support [ ] High-Flow  [ ] Acute  [ ] Chronic [ ] Hypoxic  [ ] Hypercarbic [ ] Other  [ ] Other organ failure     LABS:                        6.0    7.20  )-----------( 101      ( 22 Jan 2020 16:46 )             18.9             RADIOLOGY & ADDITIONAL STUDIES:    PROTEIN CALORIE MALNUTRITION: [ ] mild [ ] moderate [ ] severe  [ ] underweight [ ] morbid obesity    https://www.andeal.org/vault/2440/web/files/ONC/Table_Clinical%20Characteristics%20to%20Document%20Malnutrition-White%20JV%20et%20al%818287.pdf    Height (cm): 165.1 (01-09-20 @ 20:00), 165.1 (12-13-19 @ 07:31)  Weight (kg): 63.5 (01-05-20 @ 18:34), 58.5 (12-13-19 @ 07:31)  BMI (kg/m2): 23.3 (01-09-20 @ 20:00), 23.3 (01-05-20 @ 18:34), 21.5 (12-13-19 @ 07:31)    [ ] PPSV2 < or = 30% [ ] significant weight loss [ ] poor nutritional intake [ ] anasarca Albumin, Serum: 3.1 g/dL (01-14-20 @ 09:03)    Artificial Nutrition [ ]     REFERRALS:   [ ]Chaplaincy  [ ] Hospice  [ ]Child Life  [ ]Social Work  [ ]Case management [ ]Holistic Therapy [ ] Physical Therapy [ ] Dietary   Goals of Care Document: GAP TEAM PALLIATIVE CARE UNIT PROGRESS NOTE:      [ x ] Patient on hospice program.    INDICATION FOR PALLIATIVE CARE UNIT SERVICES: Transfusion of one unit pRBCs for symptomatic anemia.	    INTERVAL HPI/OVERNIGHT EVENTS: S/p one unit pRBCs overnight. Reports feeling better this morning and anxious to get home. Ambulance to arrive at 10 am to transport patient back home.    DNR on chart: Yes    Allergies    No Known Allergies    Intolerances    MEDICATIONS  (STANDING):  aspirin  chewable 81 milliGRAM(s) Oral daily  ciprofloxacin     Tablet 500 milliGRAM(s) Oral every 12 hours  dextrose 5%. 1000 milliLiter(s) (50 mL/Hr) IV Continuous <Continuous>  dextrose 50% Injectable 12.5 Gram(s) IV Push once  dextrose 50% Injectable 25 Gram(s) IV Push once  dextrose 50% Injectable 25 Gram(s) IV Push once  doxycycline hyclate Capsule 100 milliGRAM(s) Oral every 12 hours  ferrous    sulfate 325 milliGRAM(s) Oral daily  fluticasone propionate 50 MICROgram(s)/spray Nasal Spray 2 Spray(s) Both Nostrils two times a day  folic acid 1 milliGRAM(s) Oral daily  insulin glargine Injectable (LANTUS) 10 Unit(s) SubCutaneous at bedtime  insulin lispro (HumaLOG) corrective regimen sliding scale   SubCutaneous three times a day before meals  levothyroxine 50 MICROGram(s) Oral daily  melatonin 5 milliGRAM(s) Oral at bedtime  pantoprazole    Tablet 40 milliGRAM(s) Oral before breakfast  predniSONE   Tablet 60 milliGRAM(s) Oral daily  senna 1 Tablet(s) Oral at bedtime  sodium chloride 0.9%. 1000 milliLiter(s) (10 mL/Hr) IV Continuous <Continuous>  tiotropium 18 MICROgram(s) Capsule 1 Capsule(s) Inhalation daily  torsemide 20 milliGRAM(s) Oral daily    MEDICATIONS  (PRN):  acetaminophen   Tablet .. 650 milliGRAM(s) Oral every 6 hours PRN Temp greater or equal to 38C (100.4F), Mild Pain (1 - 3)  ALBUTerol    90 MICROgram(s) HFA Inhaler 2 Puff(s) Inhalation every 6 hours PRN dyspnea  dextrose 40% Gel 15 Gram(s) Oral once PRN Blood Glucose LESS THAN 70 milliGRAM(s)/deciliter  furosemide   Injectable 20 milliGRAM(s) IV Push once PRN dyspnea  glucagon  Injectable 1 milliGRAM(s) IntraMuscular once PRN Glucose LESS THAN 70 milligrams/deciliter  LORazepam     Tablet 0.5 milliGRAM(s) Oral every 4 hours PRN Anxiety  morphine   Solution 2.5 milliGRAM(s) Oral every 4 hours PRN pain  morphine   Solution 2.5 milliGRAM(s) Oral every 4 hours PRN dyspnea  polyethylene glycol 3350 17 Gram(s) Oral daily PRN Constipation  prochlorperazine   Tablet 10 milliGRAM(s) Oral every 6 hours PRN nausea/vomiting    ITEMS UNCHECKED ARE NOT PRESENT    PRESENT SYMPTOMS: [ ]Unable to obtain due to poor mentation   Source if other than patient:  [ ]Family   [ ]Team     Pain: [ ] yes [x ] no  QOL impact -   Location -                    Aggravating factors -  Quality -  Radiation -  Timing-  Severity (0-10 scale):  Minimal acceptable level (0-10 scale):     Dyspnea:                           [ ]Mild [ ]Moderate [ ]Severe  Anxiety:                             [ ]Mild [ ]Moderate [ ]Severe  Fatigue:                             [x ]Mild [ ]Moderate [ ]Severe  Nausea:                             [ ]Mild [ ]Moderate [ ]Severe  Loss of appetite:              [ ]Mild [ ]Moderate [ ]Severe  Constipation:                    [ ]Mild [ ]Moderate [ ]Severe    PAINAD Score:    [email or web address] (Ctrl +  left click to view)  		  Other Symptoms:  [ x]All other review of systems negative     Palliative Performance Status Version 2:        30-40%         http://[email or web address]/files/news/palliative_performance_scale_[email or web address].pdf  PHYSICAL EXAM:  Vital Signs Last 24 Hrs  T(C): 36.9 (23 Jan 2020 07:39), Max: 36.9 (23 Jan 2020 07:39)  T(F): 98.5 (23 Jan 2020 07:39), Max: 98.5 (23 Jan 2020 07:39)  HR: 114 (23 Jan 2020 07:39) (101 - 114)  BP: 105/57 (23 Jan 2020 07:39) (88/55 - 105/57)  BP(mean): --  RR: 16 (23 Jan 2020 07:39) (16 - 16)  SpO2: 96% (23 Jan 2020 07:39) (96% - 100%) I&O's Summary  GENERAL:  [ x]Alert  [x ]Oriented x 3   [ ]Lethargic  [ ]Cachexia  [ ]Unarousable  [ ]Verbal  [ ]Non-Verbal  Behavioral:   [ ] Anxiety  [ ] Delirium [ ] Agitation [ ] Other  HEENT:  [x ]Normal   [ ]Dry mouth   [ ]ET Tube/Trach  [ ]Oral lesions  PULMONARY:   [x ]Clear [ ]Tachypnea  [ ]Audible excessive secretions   [ ]Rhonchi        [ ]Right [ ]Left [ ]Bilateral  [ ]Crackles        [ ]Right [ ]Left [ ]Bilateral  [ ]Wheezing     [ ]Right [ ]Left [ ]Bilateral  [ ]Diminshed BS [ ]Right [ ]Left [ ]Bilateral    CARDIOVASCULAR:    [x ]Regular [ ]Irregular [ ]Tachy  [ ]Freddy [x ]Murmur [ ]Other  GASTROINTESTINAL:  [x ]Soft  [ ]Distended   [ x]+BS  [ x]Non tender [ ]Tender  [ ]PEG [ ]OGT/ NGT   Last BM: 1/22/20    GENITOURINARY:  [x ]Normal [ ] Incontinent   [ ]Oliguria/Anuria   [ ]Jackson  MUSCULOSKELETAL:   [ ]Normal   [x ]Weakness  [x ]Bed/Wheelchair bound [ ]Edema  NEUROLOGIC:   [ ]No focal deficits  [ ] Cognitive impairment  [ ] Dysphagia [ ]Dysarthria [ ] Paresis [ ]Other   SKIN:   [ ]Normal  [ ]Rash     [ ]Pressure ulcer(s)  [ ]y [ ]n  Present on admission  [x] skin tears B/L arms    CRITICAL CARE:  [ ] Shock Present  [ ]Septic [ ]Cardiogenic [ ]Neurologic [ ]Hypovolemic  [ ]  Vasopressors [ ]  Inotropes   [ ] Respiratory failure present [ ] Mechanical Ventilation [ ] Non-invasive ventilatory support [ ] High-Flow  [ ] Acute  [ ] Chronic [ ] Hypoxic  [ ] Hypercarbic [ ] Other  [ ] Other organ failure     LABS:                        6.0    7.20  )-----------( 101      ( 22 Jan 2020 16:46 )             18.9             RADIOLOGY & ADDITIONAL STUDIES:    PROTEIN CALORIE MALNUTRITION: [ ] mild [ ] moderate [ ] severe  [ ] underweight [ ] morbid obesity    https://www.andeal.org/vault/2440/web/files/ONC/Table_Clinical%20Characteristics%20to%20Document%20Malnutrition-White%20JV%20et%20al%256322.pdf    Height (cm): 165.1 (01-09-20 @ 20:00), 165.1 (12-13-19 @ 07:31)  Weight (kg): 63.5 (01-05-20 @ 18:34), 58.5 (12-13-19 @ 07:31)  BMI (kg/m2): 23.3 (01-09-20 @ 20:00), 23.3 (01-05-20 @ 18:34), 21.5 (12-13-19 @ 07:31)    [ ] PPSV2 < or = 30% [ ] significant weight loss [ ] poor nutritional intake [ ] anasarca Albumin, Serum: 3.1 g/dL (01-14-20 @ 09:03)    Artificial Nutrition [ ]     REFERRALS:   [ ]Chaplaincy  [ ] Hospice  [ ]Child Life  [ ]Social Work  [ ]Case management [ ]Holistic Therapy [ ] Physical Therapy [ ] Dietary   Goals of Care Document: GAP TEAM PALLIATIVE CARE UNIT PROGRESS NOTE:      [ x ] Patient on hospice program.    INDICATION FOR PALLIATIVE CARE UNIT SERVICES: Transfusion of one unit pRBCs for symptomatic anemia.	    INTERVAL HPI/OVERNIGHT EVENTS: S/p one unit pRBCs overnight. Reports feeling better this morning and anxious to get home. Ambulance to arrive at 10 am to transport patient back home.    DNR on chart: Yes    Allergies    No Known Allergies    Intolerances    MEDICATIONS  (STANDING):  aspirin  chewable 81 milliGRAM(s) Oral daily  ciprofloxacin     Tablet 500 milliGRAM(s) Oral every 12 hours  dextrose 5%. 1000 milliLiter(s) (50 mL/Hr) IV Continuous <Continuous>  dextrose 50% Injectable 12.5 Gram(s) IV Push once  dextrose 50% Injectable 25 Gram(s) IV Push once  dextrose 50% Injectable 25 Gram(s) IV Push once  doxycycline hyclate Capsule 100 milliGRAM(s) Oral every 12 hours  ferrous    sulfate 325 milliGRAM(s) Oral daily  fluticasone propionate 50 MICROgram(s)/spray Nasal Spray 2 Spray(s) Both Nostrils two times a day  folic acid 1 milliGRAM(s) Oral daily  insulin glargine Injectable (LANTUS) 10 Unit(s) SubCutaneous at bedtime  insulin lispro (HumaLOG) corrective regimen sliding scale   SubCutaneous three times a day before meals  levothyroxine 50 MICROGram(s) Oral daily  melatonin 5 milliGRAM(s) Oral at bedtime  pantoprazole    Tablet 40 milliGRAM(s) Oral before breakfast  predniSONE   Tablet 60 milliGRAM(s) Oral daily  senna 1 Tablet(s) Oral at bedtime  sodium chloride 0.9%. 1000 milliLiter(s) (10 mL/Hr) IV Continuous <Continuous>  tiotropium 18 MICROgram(s) Capsule 1 Capsule(s) Inhalation daily  torsemide 20 milliGRAM(s) Oral daily    MEDICATIONS  (PRN):  acetaminophen   Tablet .. 650 milliGRAM(s) Oral every 6 hours PRN Temp greater or equal to 38C (100.4F), Mild Pain (1 - 3)  ALBUTerol    90 MICROgram(s) HFA Inhaler 2 Puff(s) Inhalation every 6 hours PRN dyspnea  dextrose 40% Gel 15 Gram(s) Oral once PRN Blood Glucose LESS THAN 70 milliGRAM(s)/deciliter  furosemide   Injectable 20 milliGRAM(s) IV Push once PRN dyspnea  glucagon  Injectable 1 milliGRAM(s) IntraMuscular once PRN Glucose LESS THAN 70 milligrams/deciliter  LORazepam     Tablet 0.5 milliGRAM(s) Oral every 4 hours PRN Anxiety  morphine   Solution 2.5 milliGRAM(s) Oral every 4 hours PRN pain  morphine   Solution 2.5 milliGRAM(s) Oral every 4 hours PRN dyspnea  polyethylene glycol 3350 17 Gram(s) Oral daily PRN Constipation  prochlorperazine   Tablet 10 milliGRAM(s) Oral every 6 hours PRN nausea/vomiting    ITEMS UNCHECKED ARE NOT PRESENT    PRESENT SYMPTOMS: [ ]Unable to obtain due to poor mentation   Source if other than patient:  [ ]Family   [ ]Team     Pain: [ ] yes [x ] no  QOL impact -   Location -                    Aggravating factors -  Quality -  Radiation -  Timing-  Severity (0-10 scale):  Minimal acceptable level (0-10 scale):     Dyspnea:                           [ ]Mild [ ]Moderate [ ]Severe  Anxiety:                             [ ]Mild [ ]Moderate [ ]Severe  Fatigue:                             [x ]Mild [ ]Moderate [ ]Severe  Nausea:                             [ ]Mild [ ]Moderate [ ]Severe  Loss of appetite:              [ ]Mild [ ]Moderate [ ]Severe  Constipation:                    [ ]Mild [ ]Moderate [ ]Severe    PAINAD Score:    [email or web address] (Ctrl +  left click to view)  		  Other Symptoms:  [ x]All other review of systems negative     Palliative Performance Status Version 2:        30-40%         [email or web address]/[email or web address]_performance_[email or web address]_[email or web address].pdf  PHYSICAL EXAM:  Vital Signs Last 24 Hrs  T(C): 36.9 (23 Jan 2020 07:39), Max: 36.9 (23 Jan 2020 07:39)  T(F): 98.5 (23 Jan 2020 07:39), Max: 98.5 (23 Jan 2020 07:39)  HR: 114 (23 Jan 2020 07:39) (101 - 114)  BP: 105/57 (23 Jan 2020 07:39) (88/55 - 105/57)  BP(mean): --  RR: 16 (23 Jan 2020 07:39) (16 - 16)  SpO2: 96% (23 Jan 2020 07:39) (96% - 100%) I&O's Summary  GENERAL:  [ x]Alert  [x ]Oriented x 3   [ ]Lethargic  [ ]Cachexia  [ ]Unarousable  [ x ]Verbal  [ ]Non-Verbal  Behavioral:   [ ] Anxiety  [ ] Delirium [ ] Agitation [ ] Other  HEENT:  [x ]Normal   [ ]Dry mouth   [ ]ET Tube/Trach  [ ]Oral lesions  PULMONARY:   [x ]Clear [ ]Tachypnea  [ ]Audible excessive secretions   [ ]Rhonchi        [ ]Right [ ]Left [ ]Bilateral  [ ]Crackles        [ ]Right [ ]Left [ ]Bilateral  [ ]Wheezing     [ ]Right [ ]Left [ ]Bilateral  [ ]Diminished BS [ ]Right [ ]Left [ ]Bilateral    CARDIOVASCULAR:    [x ]Regular [ ]Irregular [ ]Tachy  [ ]Freddy [x ]Murmur [ ]Other  GASTROINTESTINAL:  [x ]Soft  [ ]Distended   [ x]+BS  [ x]Non tender [ ]Tender  [ ]PEG [ ]OGT/ NGT   Last BM: 1/22/20    GENITOURINARY:  [x ]Normal [ ] Incontinent   [ ]Oliguria/Anuria   [ ]Jackson  MUSCULOSKELETAL:   [ ]Normal   [x ]Weakness  [x ]Bed/Wheelchair bound [ ]Edema  NEUROLOGIC:   [ ]No focal deficits  [ ] Cognitive impairment  [ ] Dysphagia [ ]Dysarthria [ ] Paresis [ ]Other   SKIN: sallow  [ ]Normal  [ ]Rash     [ ]Pressure ulcer(s)  [ ]y [ ]n  Present on admission  [x] skin tears B/L arms    CRITICAL CARE:  [ ] Shock Present  [ ]Septic [ ]Cardiogenic [ ]Neurologic [ ]Hypovolemic  [ ]  Vasopressors [ ]  Inotropes   [ ] Respiratory failure present [ ] Mechanical Ventilation [ ] Non-invasive ventilatory support [ ] High-Flow  [ ] Acute  [ ] Chronic [ ] Hypoxic  [ ] Hypercarbic [ ] Other  [ ] Other organ failure     LABS:                        6.0    7.20  )-----------( 101      ( 22 Jan 2020 16:46 )             18.9             RADIOLOGY & ADDITIONAL STUDIES: None    PROTEIN CALORIE MALNUTRITION: [ ] mild [ ] moderate [ ] severe  [ ] underweight [ ] morbid obesity    https://www.andeal.org/vault/3360/web/files/ONC/Table_Clinical%20Characteristics%20to%20Document%20Malnutrition-White%20JV%20et%20al%202012.pdf    Height (cm): 165.1 (01-09-20 @ 20:00), 165.1 (12-13-19 @ 07:31)  Weight (kg): 63.5 (01-05-20 @ 18:34), 58.5 (12-13-19 @ 07:31)  BMI (kg/m2): 23.3 (01-09-20 @ 20:00), 23.3 (01-05-20 @ 18:34), 21.5 (12-13-19 @ 07:31)    [ ] PPSV2 < or = 30% [ ] significant weight loss [ ] poor nutritional intake [ ] anasarca Albumin, Serum: 3.1 g/dL (01-14-20 @ 09:03)    Artificial Nutrition [ ]     REFERRALS:   [ ]Chaplaincy  [ ] Hospice  [ ]Child Life  [ ]Social Work  [ ]Case management [ ]Holistic Therapy [ ] Physical Therapy [ ] Dietary   Goals of Care Document:

## 2020-01-23 NOTE — PROGRESS NOTE ADULT - PROBLEM SELECTOR PLAN 3
History of autoimmune hemolytic anemia. Sent from home hospice with a hemoglobin of 6.8. Patient reports mild increased weakness, however, overall unchanged physical state.    Sent for one unit pRBCs. History of autoimmune hemolytic anemia.     H&H 6 & 18.9 in [facility]. Received one unit pRBCs. Reports improvement. Will be sent home this morning with [facility] care in the home.

## 2020-01-23 NOTE — DISCHARGE NOTE NURSING/CASE MANAGEMENT/SOCIAL WORK - NSDCPEPT PROEDHF_GEN_ALL_CORE
Call primary care provider for follow up after discharge/Report signs and symptoms to primary care provider/Monitor weight daily/Low salt diet/Activities as tolerated

## 2020-01-23 NOTE — PROGRESS NOTE ADULT - ASSESSMENT
88 y/o M arrived via ambulance from home for blood transfusion. Hemoglobin found to be 6.8. Patient on home hospice services with [facility]. 89 y/o M arrived via ambulance from home for blood transfusion. Hemoglobin found to be 6.8. Patient on home hospice services with [facility].    S/p one unit pRBCs. Patient reports feeling well this morning. Ate some breakfast. Plan to discharge home via ambulance at 10 am.

## 2020-01-23 NOTE — PROGRESS NOTE ADULT - PROBLEM SELECTOR PLAN 2
Upper arm extremity cellulitis likely from fragile skin and skin tears worsened by chronic prednisone use. Patient on Cipro until 2/5/20 and Doxycycline until 1/31/20. Non toxic appearing and patient afebrile.    C/w current course of oral antibiotics. Upper arm extremity cellulitis likely from fragile skin and skin tears worsened by chronic prednisone use. Patient on Cipro until 2/5/20 and Doxycycline until 1/31/20. Remains Non toxic appearing and patient afebrile.    C/w current course of oral antibiotics as directed. Followed by [facility] in the home.

## 2020-04-02 PROBLEM — I50.20 HEART FAILURE WITH REDUCED EJECTION FRACTION: Status: ACTIVE | Noted: 2019-12-10

## 2020-04-10 NOTE — H&P ADULT - NSHPPHYSICALEXAM_GEN_ALL_CORE
English
PHYSICAL EXAM:  GENERAL: NAD, lying in bed comfortably  HEAD:  Atraumatic, Normocephalic  EYES: EOMI, PERRLA, conjunctiva and sclera jaundiced  ENT: Moist mucous membranes  NECK: Supple, No JVD  CHEST/LUNG: Clear to auscultation bilaterally; No rales, rhonchi, wheezing, or rubs. Unlabored respirations  HEART: Regular rate and rhythm; + late systolic murmur, rubs, or gallops  ABDOMEN: Bowel sounds present; Soft, Nontender, Nondistended. No hepatomegally  EXTREMITIES:  2+ Peripheral Pulses, brisk capillary refill. No clubbing, cyanosis, or edema  NERVOUS SYSTEM:  Alert & Oriented X3, speech clear. No deficits, hard of hearing  MSK: FROM all 4 extremities, full and equal strength  SKIN: Erythema and open skin tears to B/L arms; pallor

## 2020-05-14 PROBLEM — I35.0 SEVERE AORTIC STENOSIS: Status: ACTIVE | Noted: 2019-12-10

## 2020-10-22 NOTE — PROGRESS NOTE ADULT - PROBLEM SELECTOR PLAN 1
Daniel Figueroa PGY2 Patient with Non-oliguric JUHI with ATN in the setting of contrast/ Diuretics.    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 BUN elevated can be explained by steroids.  - Hyperuricemia and hyperphosphatemia can be explained by ongoing renal failure  - Continue to hold nephrotoxic medications such as RCA and ARB/ACE  - Continue to monitor renal function and electrolytes   - No indication for HD as of yet.   - Dose medications as per eGFR.  - Would avoid sudden hemodynamic shift in BP and achieve goal SBP>100 Patient with Non-oliguric JUHI with ATN in the setting of contrast/ Diuretics.    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 BUN elevated can be explained by steroids.  - Hyperuricemia and hyperphosphatemia can be explained by ongoing renal failure  - Continue to hold nephrotoxic medications such as RCA and ARB/ACE  - Continue to monitor renal function and electrolytes   - No indication for dialysis  - Dose medications as per eGFR

## 2021-03-08 NOTE — PROGRESS NOTE ADULT - ASSESSMENT
Pharmacy is filling the prescription from 02.24.2021. Mother notified they will be reaching out to her when she can pick it up.    Ruthie Contreras, CMA     85 yo M with hx Afib (Coumadin), DM2, HLD, CAD (stent 2014 and 2015 in mLAD and RCA), HFrEF (40%) severe AS, LBBB, baseline SCr 1.4, was referred by cardiologist for symptomatic CHF exacerbation. Echo from 12/10 shows severe aortic stenosis and EF 30%. Patient s/p RHC/LHC prior to scheduled TAVR. Patient was found to have worsening LE swelling and anemia with Hb of 7.9 and was admitted to medicine for further management. Pt was being optimized pre TAVR with diuretics per Heart failure service.   Pt underwent balloon aortic valvuloplasty on 12/26.    12/26 AV balloon valvuloplasty. s/p 1 PRBC  12/27 s/p PRBC x1. H/H stable 8/24 Transferred to 2 [facility]. Continue w/ intermittent diureses.    12/28: creatinine improving.   12/29: Creatinine continues to improve. Pt recommended for rehab. Also lives alone. Discharge when medically stable. Diuretics still being adjusted. Insulin added to regimen as blood sugars are high likely 2/2 addition of prednisone.   12/30: VSS. Continued improvement of BUN and creatinine. Cont on Lasix 40mg IV BID. Lantus and humalog added for elevated sugars, Dr. Cobb to evaluate pt today for insulin regimen. INR of 1.0, will dose 7.5 of Coumadin today in accordance with his home Coumadin schedule (7.5mg on M/W/F, 5mg on T/Th/Sat/Sun). Cont patient on prednisone 60 and folic acid as per heme, improvement of H&H today at 8.1 & 24.   Discharge planning-rehab once medically stable.   12/31 - vss hct 22 - pt asymptomatic - heme to tape steroids. d/c to rehab thursday. Elsie   1/1/20 - lasix po d/c'd by HF- pt received lasix 40mg po this am - will start Torsemide 40mg po tomorrow - d/c to rehab thursday on pred 60 per heme & diuretics.

## 2021-05-06 NOTE — CONSULT NOTE ADULT - ATTENDING COMMENTS
JUHI, CKD III, AS  No edema  Renal insult in setting of transfusion and diuretics  Hold diuretics and any other nephrotoxins  No IVF  Encourage eating  Anticipate recovery in coming days  Will follow, remainder per fellow
Patient counseled for compliance with consistent low carb diet and exercise as tolerated outpatient.
Performed

## 2021-05-07 NOTE — PATIENT PROFILE ADULT - PUBLIC BENEFITS
Spoke with patient she was advised to get CBC and LFT's done. Patient asked for order to be printed she would like to get the blood work done at the [facility].   no

## 2021-05-24 NOTE — H&P ADULT - NSHPLANGLIMITEDENGLISH_GEN_A_CORE
Bed: 06  Expected date:   Expected time:   Means of arrival: [facility]  Comments:  SHONDA 3-trach low O2 sat   No

## 2021-07-01 NOTE — PROCEDURE NOTE - GENERAL PROCEDURE DETAILS
1,000 units of thrombin (1cc) injected into right femoral artery PSA. PSA appeared thrombosed at the end of the procedure. Femoral artery was visualized with good flow following procedure, foot continued to be warm. Calcipotriene Pregnancy And Lactation Text: This medication has not been proven safe during pregnancy. It is unknown if this medication is excreted in breast milk.

## 2021-09-01 NOTE — PATIENT PROFILE ADULT - TRANSPORTATION
60M with PMH HTN, HLD, CAD s/p stents (on dual antiplatelet therapy) presenting with hematuria, abdominal pain, elevated liver enzymes, CT showing intra and extrahepatic biliary ductal dilation, heterogeneous enhancement of pancreatic head, admitting for workup of CT and laboratory findings.    #Transaminitis   MRI noted, no discrete pancreatic mass or pancreatic duct dilation, differential includes occult pancreatic head CA, cholangiocarcinoma, or chronic pancreatitis. Likely obstructive biliary process with elevated bili  -GI consulted, recommendations appreciated plan for transfer for EUS/ERCP  -Avoid hepatotoxic medications, hold statin    #Anemia   -Likely anemia of chronic disease  -No overt signs of bleeding  -Follow up AM CBC    #CAD  -Recent MI in July with 4 stents, continue ASA   -Stop Ticagrelor. Needs to be held for 5 days prior to EUS/ERCP    #Hematuria - resolved     #HTN  -Chronic, continue Metoprolol, Losartan  -Monitor vitals     #Current smoker  The patient is an active smoker. The patient was advised to quit. The patient was informed of risks associated w/ smoking. The options for assistance with smoking cessation were reviewed with the patient. The smoking cessation educational materials order set was ordered. The patient accepted the nicotine patch. The total amount of time spent discussing smoking cessation was 6 minutes.     #Prophylactic measure  DVT prophylaxis: SCDs    Discussed with Dr Vazquez Tovar (GI at [facility]) regarding transfer recommending cardio consult prior to EUS/ERCP - either patient can stay at [facility] and be transferred on Monday after hold Brilienta for 5 days or patient can go home and go to [facility] ED on Tuesday for EUS/ERCP.  60M with PMH HTN, HLD, CAD s/p stents (on dual antiplatelet therapy) presenting with hematuria, abdominal pain, elevated liver enzymes, CT showing intra and extrahepatic biliary ductal dilation, heterogeneous enhancement of pancreatic head, admitting for workup of CT and laboratory findings.    #Transaminitis   MRI noted, no discrete pancreatic mass or pancreatic duct dilation, differential includes occult pancreatic head CA, cholangiocarcinoma, or chronic pancreatitis. Likely obstructive biliary process with elevated bili  -GI consulted, recommendations appreciated plan for transfer for EUS/ERCP  -Avoid hepatotoxic medications, hold statin    #Anemia   -Likely anemia of chronic disease  -No overt signs of bleeding  -Follow up AM CBC    #CAD  -Recent MI in July with 4 stents, continue ASA   -Stop Ticagrelor. Needs to be held for 5 days prior to EUS/ERCP  -Cardio, Dr Harrison, consulted    #Hematuria - resolved     #HTN  -Chronic, continue Metoprolol, Losartan  -Monitor vitals     #Current smoker  The patient is an active smoker. The patient was advised to quit. The patient was informed of risks associated w/ smoking. The options for assistance with smoking cessation were reviewed with the patient. The smoking cessation educational materials order set was ordered. The patient accepted the nicotine patch. The total amount of time spent discussing smoking cessation was 6 minutes.     #Prophylactic measure  DVT prophylaxis: SCDs    Discussed with Dr Vazquez Tovar (GI at [facility]) regarding transfer recommending cardio consult prior to EUS/ERCP - either patient can stay at [facility] and be transferred on Monday after hold Brilienta for 5 days or patient can go home and go to [facility] ED on Tuesday for EUS/ERCP.  60M with PMH HTN, HLD, CAD s/p stents (on dual antiplatelet therapy) presenting with hematuria, abdominal pain, elevated liver enzymes, CT showing intra and extrahepatic biliary ductal dilation, heterogeneous enhancement of pancreatic head, admitting for workup of CT and laboratory findings.    #Transaminitis   MRI noted, no discrete pancreatic mass or pancreatic duct dilation, differential includes occult pancreatic head CA, cholangiocarcinoma, or chronic pancreatitis. Likely obstructive biliary process with elevated bili  -GI consulted, recommendations appreciated plan for transfer for EUS/ERCP  -Avoid hepatotoxic medications, hold statin    #Anemia   -Likely anemia of chronic disease  -No overt signs of bleeding  -Follow up AM CBC    #CAD  -Recent MI in July with 4 stents, continue ASA   -Discussed with cardio Dr Harrison, recommending to continue Ticagrelor for at least 60 days after stent placement  -Cardio, Dr Harrison, consulted    #Hematuria - resolved     #HTN  -Chronic, continue Metoprolol, Losartan  -Monitor vitals     #Current smoker  The patient is an active smoker. The patient was advised to quit. The patient was informed of risks associated w/ smoking. The options for assistance with smoking cessation were reviewed with the patient. The smoking cessation educational materials order set was ordered. The patient accepted the nicotine patch. The total amount of time spent discussing smoking cessation was 6 minutes.     #Prophylactic measure  DVT prophylaxis: SCDs    Discussed with Dr Vazquez Tovar (GI at [facility]) regarding transfer recommending cardio consult prior to EUS/ERCP - either patient can stay at [facility] and be transferred after hold Brilienta for 5 days or patient can go home and go to [facility] ED for EUS/ERCP.   Follow up CMP, if tbili downtrending will consider discharge with outpatient follow up by cardio and GI.  60M with PMH HTN, HLD, CAD s/p stents (on dual antiplatelet therapy) presenting with hematuria, abdominal pain, elevated liver enzymes, CT showing intra and extrahepatic biliary ductal dilation, heterogeneous enhancement of pancreatic head, admitting for workup of CT and laboratory findings.    #Transaminitis   MRI noted, no discrete pancreatic mass or pancreatic duct dilation, differential includes occult pancreatic head CA, cholangiocarcinoma, or chronic pancreatitis. Likely obstructive biliary process with elevated bili  -GI consulted, recommendations appreciated plan for transfer for EUS/ERCP  -Avoid hepatotoxic medications, hold statin    #Anemia   -Likely anemia of chronic disease  -No overt signs of bleeding  -Follow up AM CBC    #CAD  -Recent MI in July with 4 stents, continue ASA   -Discussed with cardio Dr Harrison, recommending to continue Ticagrelor for at least 60 days after stent placement  -Cardio, Dr Harrison, consulted    #Hematuria - resolved     #HTN  -Chronic, continue Metoprolol, Losartan  -Monitor vitals     #Current smoker  The patient is an active smoker. The patient was advised to quit. The patient was informed of risks associated w/ smoking. The options for assistance with smoking cessation were reviewed with the patient. The smoking cessation educational materials order set was ordered. The patient accepted the nicotine patch. The total amount of time spent discussing smoking cessation was 6 minutes.     #Prophylactic measure  DVT prophylaxis: SCDs    Discussed with Dr Vazquez Tovar (GI at [facility]) regarding transfer recommending cardio consult prior to EUS/ERCP - either patient can stay at [facility] and be transferred after hold Brilinta for 5 days or patient can go home and go to [facility] ED for EUS/ERCP.   Follow up CMP, if tbili downtrending will consider discharge with outpatient follow up by cardio and GI as patient can not be off of Brilinta until 60 days after stent placement (~9/17/2021) no

## 2022-04-28 NOTE — PROGRESS NOTE ADULT - PROBLEM SELECTOR PROBLEM 4
Called [employer] concerning pt prognosis and file.  Reference #[id number] JUHI (acute kidney injury)

## 2022-07-08 NOTE — PROVIDER CONTACT NOTE (CRITICAL VALUE NOTIFICATION) - DATE AND TIME:
09-Jan-2020 20:52 Patient called back and does not currently have insurance. States that why has not scheduled stress test.    Plans to get new insurance next month. Let patient know Dr. Neli Rodríguez would like to have patient schedule test.  Encouraged patient to schedule stress test for when she does have insurance coverage. Number to central scheduling provided.

## 2022-08-18 NOTE — CHART NOTE - NSCHARTNOTEFT_GEN_A_CORE
Mr Latham was tentatively scheduled for TAVR next Monday 12/23.  Currently, his Hgb has been dropping to a low of 6.5 today (now 7.5 following 1 unit PRBC's).  His creatinine is also trending up and he had an hypoxic episode following/during transfusion.  He's quite frail and, in light of the above reasons, he is not in optimal condition for TAVR.  In the meantime, we are considering BAV to alleviate the symptoms from his severe AS.  Potentially, we will be able to do the BAV tomorrow, I have made him NPO after midnight tonight pending team availability and patient condition.  Will reassess in the morning.  Plan discussed with pt, brother, and caretaker as well as internal medicine intern and Dr. Becker. Performed Resulted

## 2023-03-28 NOTE — PROGRESS NOTE ADULT - ASSESSMENT
CERTIFICATE OF        March 28, 2023      Re: Hernesto Wilkerson  [address] [place] [address]      This is to certify that Hernesto Wilkerson has been under my care from 3/28/2023 and can return to  when he is fever free for 24hours and symptoms are improved.          SIGNATURE:___________________________________________          Jamee Krishna MD  [facility][note 3]  [address] [address]  Dept Phone: [phone]     83 yo M with hx Afib (Coumadin), DM2, HLD, CAD (stent 2014 and 2015 in mLAD and RCA), HFrEF (40%) severe AS, LBBB, baseline SCr 1.4, was referred by cardiologist for symptomatic CHF exacerbation. Echo from 12/10 shows severe aortic stenosis and EF 30%. Patient s/p RHC/LHC prior to scheduled TAVR. Patient was found to have worsening LE swelling and anemia with Hb of 7.9 and was admitted to medicine for further management. Pt was being optimized pre TAVR with diuretics per Heart failure service.   Pt underwent balloon aortic valvuloplasty on 12/26.    12/26 AV balloon valvuloplasty. s/p 1 PRBC  12/27 s/p PRBC x1. H/H stable 8/24 Transferred to 2 [facility]. Continue w/ intermittent diureses.    12/28: creatinine improving.   12/29: Creatinine continues to improve. Pt recommended for rehab. Also lives alone. Discharge when medically stable. Diuretics still being adjusted. Insulin added to regimen as blood sugars are high likely 2/2 addition of prednisone.   12/30: VSS. Continued improvement of BUN and creatinine. Cont on Lasix 40mg IV BID. Lantus and humalog added for elevated sugars, Dr. Cobb to evaluate pt today for insulin regimen. INR of 1.0, will dose 7.5 of Coumadin today in accordance with his home Coumadin schedule (7.5mg on M/W/F, 5mg on T/Th/Sat/Sun). Cont patient on prednisone 60 and folic acid as per heme, improvement of H&H today at 8.1 & 24.   Discharge planning-rehab once medically stable.   12/31 - vss hct 22 - pt asymptomatic - heme to tape steroids. d/c to rehab thursday. 85 yo M with hx Afib (Coumadin), DM2, HLD, CAD (stent 2014 and 2015 in mLAD and RCA), HFrEF (40%) severe AS, LBBB, baseline SCr 1.4, was referred by cardiologist for symptomatic CHF exacerbation. Echo from 12/10 shows severe aortic stenosis and EF 30%. Patient s/p RHC/LHC prior to scheduled TAVR. Patient was found to have worsening LE swelling and anemia with Hb of 7.9 and was admitted to medicine for further management. Pt was being optimized pre TAVR with diuretics per Heart failure service.   Pt underwent balloon aortic valvuloplasty on 12/26.    12/26 AV balloon valvuloplasty. s/p 1 PRBC  12/27 s/p PRBC x1. H/H stable 8/24 Transferred to 2 [facility]. Continue w/ intermittent diureses.    12/28: creatinine improving.   12/29: Creatinine continues to improve. Pt recommended for rehab. Also lives alone. Discharge when medically stable. Diuretics still being adjusted. Insulin added to regimen as blood sugars are high likely 2/2 addition of prednisone.   12/30: VSS. Continued improvement of BUN and creatinine. Cont on Lasix 40mg IV BID. Lantus and humalog added for elevated sugars, Dr. Cobb to evaluate pt today for insulin regimen. INR of 1.0, will dose 7.5 of Coumadin today in accordance with his home Coumadin schedule (7.5mg on M/W/F, 5mg on T/Th/Sat/Sun). Cont patient on prednisone 60 and folic acid as per heme, improvement of H&H today at 8.1 & 24.   Discharge planning-rehab once medically stable.   12/31 - vss hct 22 - pt asymptomatic - heme to tape steroids. d/c to rehab thursday. Hulbert

## 2023-11-13 NOTE — DISCHARGE NOTE NURSING/CASE MANAGEMENT/SOCIAL WORK - NSDCPEPAMP_GEN_ALL_CORE
1 “[facility] for Tobacco Control” pamphlet given Principal Discharge DX:	Fever

## 2024-08-28 NOTE — PRE-OP CHECKLIST - ALLERGY BAND ON
[FreeTextEntry1] : 68M DM2, hypothyroidism, HTN, HLD vit D deficiency presents for follow up.  1) DM2 with microalbuminuria HbA1c 7.3%  Patient asking about adding GLP1 for weight loss and beenfits for cardiorenal Add Mounjaro 2.5mg SQ once weekly discussed possible GI side effects (prior adverse effects with Bydureon years ago). -Continue Tresiba 54 units qhs  - Continue Humalog 8 units before breakfast and reduce to 8 units before lunch, continue dinner Humalog 10 units. - Continue Jardiance 25mg daily (reviewed hold x 3 days before procedure/surgery) -Notify me for hypoglycemia if so will reduce insulin regimen accordingly. - f/u ophtho, podiatry, nephrology - Received Libre2, has not started using it. Encouraged to use Theodora but declines at this time. - Repeat labs today  - Patient requires a therapeutic CGM - Patient has diabetes mellitus - Patient has been using a home blood glucose monitor and performing frequent (four times a day) testing, 6 months - Patient is being insulin-treated with multiple daily injections (MDI) of insulin x4day, x 6months or a continuous subcutaneous insulin infusion (CSII) pump [id number] - Patient insulin treatment regimen requires frequent adjustments by the Patient on the basis of therapeutic CGM testing results.   2) Hypothyroidism - Check TFTs - continue levothyroxine 112 mcg daily  3) Vit D deficiency - on ergocalciferol 50,000u 2x per month - 25OHD 41 at goal  4) albuminuria - Following with nephrology Dr. Amaya. - Microalbumin/cr improved to 29 Jan 2024.. Recommend continue Jardiance for renal protection. - Resumed on Losartan 25mg Aug 2022. Using intermittently (holds if SBP < 120) - Previously on Kerendia which was stopped. Follows nephrology.  5) HLD with elevated triglycerides - continue niacin and recommend lower carb diet. - Continue atorvastatin 10mg  Follow up 3 months. no known allergies

## 2025-02-21 NOTE — CONSULT NOTE ADULT - PROBLEM SELECTOR PROBLEM 5
DAPT is on hold for ? GIB   Recent PCI       Hb is stable   No acute lifethreatning bleeding ~!  Start patient on IV heparin  Spoke with Dr De Oliveira and Dr Chin       Full note to follow     
Acute kidney injury
JUHI (acute kidney injury)

## 2025-03-03 NOTE — PROGRESS NOTE ADULT - PROBLEM SELECTOR PLAN 3
Continued Stay SW/CM Assessment/Plan of Care Note     Discharge Disposition/Plan: home with spouse     IMM/MOON-add to document list: N/A  Transportation for Discharge: spouse   Barriers to Discharge: medical status   Patient's discharge planning goal: home  Discharge Services: TBD  Prior Services: none  Baseline Level of Care: independent   DME: CPAP               SDOH Triggers have not been identified.  PCP: Zaid Jenkins MD   Advanced Directives:  not on file. Patient does not want new document at this time     Active Substitute Decision Maker (SDM)    There are no active Substitute Decision Maker (SDM) on file.     Progress note:  Patient will discharge home today and continues to have no service needs. No IMM needed due to commercial insurance.    Disposition Recommendations:  Preliminary discharge destination:    / recommendation for discharge:      Destination Pharmacy:        Discharge Plan/Needs:     Continued Care and Services - Admitted Since 2/28/2025    No active coordination exists for this encounter.     Prior To Hospitalization:    Living Situation:   and residing at      .  Support Systems:     Home Devices/Equipment: CPAP/BiPAP machine (T)            Mobility Assist Devices: None   Type of Service Prior to Hospitalization: None               Patient/Family discharge goal (s):        Resources provided:           Prior Function                Current Function  Last Filed Values       None            Therapy Recommendations for Discharge:   PT:      Last Filed Values       None          OT:       Last Filed Values       None          SLP:    Last Filed Values       None            Mobility Equipment Recommended for Discharge:        Barriers to Discharge  Identified Barriers to Discharge/Transition Planning:                     -Structural team's note appreciated. Tentative plan for BAV vs TAVR 12/23

## 2025-06-04 NOTE — PROGRESS NOTE ADULT - ASSESSMENT
Patient Assistance    PT responded to FN Mychart Message and will place medication order               Additional notes: [facility]                            84M PMH chronic anemia, HFrEF (40%), Afib on coumadin, T2DM, HLD, PVD, Menieres Disease, carotid stenosis, LBBB on EKG, CAD s/p PCI to mLAD in June 2014 and PCI to pRCA that was complicated by perforation and cardiogenic shock in May of 2015 presented for elective C/C for TAVR evaluation for severe AS, admitted for acute on on chronic CHF and anemia workup.

## 2025-07-21 NOTE — DIETITIAN INITIAL EVALUATION ADULT. - PROBLEM SELECTOR PLAN 3
Hb 7.9, baseline 8-9 in 2015, follows with outside hematologist, Dr. Miranda  - Will check iron studies, ferritin, B12, folate.   - Transfuse if Hb<7, T&S ED MD